# Patient Record
Sex: MALE | Race: WHITE | Employment: FULL TIME | URBAN - METROPOLITAN AREA
[De-identification: names, ages, dates, MRNs, and addresses within clinical notes are randomized per-mention and may not be internally consistent; named-entity substitution may affect disease eponyms.]

---

## 2019-11-25 ENCOUNTER — HOSPITAL ENCOUNTER (OUTPATIENT)
Age: 59
Setting detail: SPECIMEN
Discharge: HOME | End: 2019-11-25
Payer: OTHER GOVERNMENT

## 2019-11-25 LAB
ABSOLUTE EOS #: 0.43 K/UL (ref 0–0.44)
ABSOLUTE IMMATURE GRANULOCYTE: 0.03 K/UL (ref 0–0.3)
ABSOLUTE LYMPH #: 2.22 K/UL (ref 1.1–3.7)
ABSOLUTE MONO #: 0.51 K/UL (ref 0.1–1.2)
ANION GAP SERPL CALCULATED.3IONS-SCNC: 15 MMOL/L (ref 9–17)
BASOPHILS # BLD: 1 % (ref 0–2)
BASOPHILS ABSOLUTE: 0.07 K/UL (ref 0–0.2)
BUN BLDV-MCNC: 22 MG/DL (ref 8–23)
BUN/CREAT BLD: ABNORMAL (ref 9–20)
CALCIUM SERPL-MCNC: 10.3 MG/DL (ref 8.6–10.4)
CHLORIDE BLD-SCNC: 95 MMOL/L (ref 98–107)
CHOLESTEROL/HDL RATIO: 6.1
CHOLESTEROL: 220 MG/DL
CO2: 24 MMOL/L (ref 20–31)
CREAT SERPL-MCNC: 0.6 MG/DL (ref 0.7–1.2)
DIFFERENTIAL TYPE: ABNORMAL
EOSINOPHILS RELATIVE PERCENT: 5 % (ref 1–4)
GFR AFRICAN AMERICAN: >60 ML/MIN
GFR NON-AFRICAN AMERICAN: >60 ML/MIN
GFR SERPL CREATININE-BSD FRML MDRD: ABNORMAL ML/MIN/{1.73_M2}
GFR SERPL CREATININE-BSD FRML MDRD: ABNORMAL ML/MIN/{1.73_M2}
GLUCOSE BLD-MCNC: 418 MG/DL (ref 70–99)
HCT VFR BLD CALC: 45.7 % (ref 40.7–50.3)
HDLC SERPL-MCNC: 36 MG/DL
HEMOGLOBIN: 15.1 G/DL (ref 13–17)
IMMATURE GRANULOCYTES: 0 %
LDL CHOLESTEROL: 154 MG/DL (ref 0–130)
LYMPHOCYTES # BLD: 24 % (ref 24–43)
MCH RBC QN AUTO: 27.4 PG (ref 25.2–33.5)
MCHC RBC AUTO-ENTMCNC: 33 G/DL (ref 28.4–34.8)
MCV RBC AUTO: 82.8 FL (ref 82.6–102.9)
MONOCYTES # BLD: 6 % (ref 3–12)
NRBC AUTOMATED: 0 PER 100 WBC
PDW BLD-RTO: 13.2 % (ref 11.8–14.4)
PLATELET # BLD: 407 K/UL (ref 138–453)
PLATELET ESTIMATE: ABNORMAL
PMV BLD AUTO: 11.4 FL (ref 8.1–13.5)
POTASSIUM SERPL-SCNC: 4.7 MMOL/L (ref 3.7–5.3)
RBC # BLD: 5.52 M/UL (ref 4.21–5.77)
RBC # BLD: ABNORMAL 10*6/UL
SEG NEUTROPHILS: 64 % (ref 36–65)
SEGMENTED NEUTROPHILS ABSOLUTE COUNT: 5.91 K/UL (ref 1.5–8.1)
SODIUM BLD-SCNC: 134 MMOL/L (ref 135–144)
TRIGL SERPL-MCNC: 150 MG/DL
TSH SERPL DL<=0.05 MIU/L-ACNC: 1.26 MIU/L (ref 0.3–5)
VLDLC SERPL CALC-MCNC: ABNORMAL MG/DL (ref 1–30)
WBC # BLD: 9.2 K/UL (ref 3.5–11.3)
WBC # BLD: ABNORMAL 10*3/UL

## 2020-03-25 ENCOUNTER — HOSPITAL ENCOUNTER (OUTPATIENT)
Age: 60
Setting detail: SPECIMEN
Discharge: HOME | End: 2020-03-25
Payer: OTHER GOVERNMENT

## 2020-03-29 LAB
CULTURE: ABNORMAL
DIRECT EXAM: ABNORMAL
DIRECT EXAM: ABNORMAL
Lab: ABNORMAL
SPECIMEN DESCRIPTION: ABNORMAL

## 2020-10-14 ENCOUNTER — HOSPITAL ENCOUNTER (OUTPATIENT)
Age: 60
Setting detail: SPECIMEN
Discharge: HOME | End: 2020-10-14
Payer: OTHER GOVERNMENT

## 2020-10-14 LAB
ABSOLUTE EOS #: 0.2 K/UL (ref 0–0.44)
ABSOLUTE IMMATURE GRANULOCYTE: 0.04 K/UL (ref 0–0.3)
ABSOLUTE LYMPH #: 2.05 K/UL (ref 1.1–3.7)
ABSOLUTE MONO #: 0.69 K/UL (ref 0.1–1.2)
ANION GAP SERPL CALCULATED.3IONS-SCNC: 15 MMOL/L (ref 9–17)
BASOPHILS # BLD: 1 % (ref 0–2)
BASOPHILS ABSOLUTE: 0.08 K/UL (ref 0–0.2)
BUN BLDV-MCNC: 20 MG/DL (ref 8–23)
BUN/CREAT BLD: ABNORMAL (ref 9–20)
CALCIUM SERPL-MCNC: 10 MG/DL (ref 8.6–10.4)
CHLORIDE BLD-SCNC: 97 MMOL/L (ref 98–107)
CHOLESTEROL/HDL RATIO: 4.2
CHOLESTEROL: 155 MG/DL
CO2: 24 MMOL/L (ref 20–31)
CREAT SERPL-MCNC: 0.66 MG/DL (ref 0.7–1.2)
DIFFERENTIAL TYPE: ABNORMAL
EOSINOPHILS RELATIVE PERCENT: 2 % (ref 1–4)
GFR AFRICAN AMERICAN: >60 ML/MIN
GFR NON-AFRICAN AMERICAN: >60 ML/MIN
GFR SERPL CREATININE-BSD FRML MDRD: ABNORMAL ML/MIN/{1.73_M2}
GFR SERPL CREATININE-BSD FRML MDRD: ABNORMAL ML/MIN/{1.73_M2}
GLUCOSE BLD-MCNC: 223 MG/DL (ref 70–99)
HCT VFR BLD CALC: 47.7 % (ref 40.7–50.3)
HDLC SERPL-MCNC: 37 MG/DL
HEMOGLOBIN: 15.3 G/DL (ref 13–17)
IMMATURE GRANULOCYTES: 0 %
LDL CHOLESTEROL: 82 MG/DL (ref 0–130)
LYMPHOCYTES # BLD: 21 % (ref 24–43)
MCH RBC QN AUTO: 27.8 PG (ref 25.2–33.5)
MCHC RBC AUTO-ENTMCNC: 32.1 G/DL (ref 28.4–34.8)
MCV RBC AUTO: 86.7 FL (ref 82.6–102.9)
MONOCYTES # BLD: 7 % (ref 3–12)
NRBC AUTOMATED: 0 PER 100 WBC
PDW BLD-RTO: 13.5 % (ref 11.8–14.4)
PLATELET # BLD: 351 K/UL (ref 138–453)
PLATELET ESTIMATE: ABNORMAL
PMV BLD AUTO: 10.9 FL (ref 8.1–13.5)
POTASSIUM SERPL-SCNC: 4.3 MMOL/L (ref 3.7–5.3)
RBC # BLD: 5.5 M/UL (ref 4.21–5.77)
RBC # BLD: ABNORMAL 10*6/UL
SEG NEUTROPHILS: 69 % (ref 36–65)
SEGMENTED NEUTROPHILS ABSOLUTE COUNT: 6.77 K/UL (ref 1.5–8.1)
SODIUM BLD-SCNC: 136 MMOL/L (ref 135–144)
TRIGL SERPL-MCNC: 181 MG/DL
TSH SERPL DL<=0.05 MIU/L-ACNC: 2.11 MIU/L (ref 0.3–5)
VLDLC SERPL CALC-MCNC: ABNORMAL MG/DL (ref 1–30)
WBC # BLD: 9.8 K/UL (ref 3.5–11.3)
WBC # BLD: ABNORMAL 10*3/UL

## 2021-09-10 ENCOUNTER — HOSPITAL ENCOUNTER (OUTPATIENT)
Dept: CT IMAGING | Age: 61
Discharge: HOME | End: 2021-09-10

## 2021-09-10 DIAGNOSIS — Z00.6 EXAMINATION FOR NORMAL COMPARISON FOR CLINICAL RESEARCH: ICD-10-CM

## 2021-09-13 ENCOUNTER — HOSPITAL ENCOUNTER (OUTPATIENT)
Dept: CT IMAGING | Age: 61
Discharge: HOME | End: 2021-09-13

## 2021-09-13 DIAGNOSIS — R52 PAIN: ICD-10-CM

## 2021-09-14 ENCOUNTER — HOSPITAL ENCOUNTER (OUTPATIENT)
Dept: GENERAL RADIOLOGY | Age: 61
Discharge: HOME | End: 2021-09-14

## 2021-09-14 DIAGNOSIS — Z00.6 EXAMINATION FOR NORMAL COMPARISON FOR CLINICAL RESEARCH: ICD-10-CM

## 2021-09-21 PROBLEM — C32.1 PRIMARY SQUAMOUS CELL CARCINOMA OF SUPRAGLOTTIS (HCC): Status: ACTIVE | Noted: 2021-09-21

## 2021-09-28 NOTE — PROGRESS NOTES
Stress:     Feeling of Stress :    Social Connections:     Frequency of Communication with Friends and Family:     Frequency of Social Gatherings with Friends and Family:     Attends [employer]ism Services:     Active Member of Clubs or Organizations:     Attends Club or Organization Meetings:     Marital Status:    Intimate Partner Violence:     Fear of Current or Ex-Partner:     Emotionally Abused:     Physically Abused:     Sexually Abused:         Kaye Acuna  [phone]    Phone: [phone] [address]  [place] [address]     Employment:  Retired [employer], . Immunizations:  Immunization History   Administered Date(s) Administered    Influenza 01/29/2013    Influenza Virus Vaccine 09/23/2014        Health Screenings:    C-Scope: never  Prostate: none        Health Maintenance Due   Topic Date Due    Hepatitis C screen  Never done    Pneumococcal 0-64 years Vaccine (1 of 2 - PPSV23) Never done    COVID-19 Vaccine (1) Never done    HIV screen  Never done    Colon cancer screen colonoscopy  Never done    Shingles Vaccine (1 of 2) Never done    Diabetic retinal exam  01/01/2015    DTaP/Tdap/Td vaccine (1 - Tdap) 08/05/2015    Diabetic foot exam  09/23/2015    A1C test (Diabetic or Prediabetic)  09/23/2015    Diabetic microalbuminuria test  09/23/2015    Flu vaccine (1) 09/01/2021        Interests:   Not reviewed today. Fam HX:   Family History   Problem Relation Age of Onset    Diabetes Mother     Stroke Mother     Cancer Maternal Grandfather         liver    Cancer Father         Hospitalizations:   None recent. Allergies:   Allergies   Allergen Reactions    Lisinopril Swelling     Angioedema of the face/lips  Other reaction(s): cough  Pt unsure but thinks he is allergic to this          Adult Illness:  Patient Active Problem List   Diagnosis    Diabetes mellitus type 2 in nonobese ([address]    GERD (gastroesophageal reflux disease)    Hyperlipidemia    tablet by mouth 2 times daily (before meals). 60 tablet 5    insulin glargine (LANTUS SOLOSTAR) 100 UNIT/ML injection pen Inject 50 Units into the skin nightly. 1 Pen 0    Cimetidine (ACID REDUCER PO) Take 150 mg by mouth daily.  Insulin Pen Needle ([employer] PEN NEEDLES) 31G X 6 MM MISC by Does not apply route. 100 each 3    MULTIPLE VITAMIN PO Take 1 tablet by mouth daily.  Dulaglutide 0.75 MG/0.5ML SOPN Inject into the skin every 7 days (Patient not taking: Reported on 2021)      hydrochlorothiazide (HYDRODIURIL) 25 MG tablet Take 1 tablet by mouth daily (Patient not taking: Reported on 2021) 30 tablet 3     No current facility-administered medications for this visit. Denies taking Tylenol with codeine at this time. EXAM:    BP (!) 167/79 (Site: Left Upper Arm, Position: Sitting, Cuff Size: Medium Adult)   Pulse 81   Temp 97.9 °F (36.6 °C) (Oral)   Resp 18   Ht 5' 10.98\" (1.803 m)   Wt 187 lb 12.8 oz (85.2 kg)   SpO2 98%   BMI 26.21 kg/m²      ECO  General: Non-ill appearing. Very pleasant with very upbeat positive demeanor. Significant other present. HEENT: NC/AT,nonicteric, perrla,eom intact, no mucosal lesions. Upper and lower dentition in poor repair. Neck: normal thyroid, no masses. pulses nl, no bruits,   Nodes: No adenopathy  Lungs/chest: clear, no rales,rhonchi or wheezing, lung bases clear  CV: rrr, no rubs ,gallops or murmurs  Breasts: Not examined  Abd/Rectal: soft, non-tender,bowel sounds normal , no HSM,no masses  Back: normal curvature, No midline tenderness. flanks nontender  : Not Examined  Extremities: no cyanosis,clubbing or edema. Skin: unremarkable  Neuro: A and O x 4, CN exam nonfocal, Motor- no deficits, Sensory- no deficits, gait-nl, speech- fluent, no ataxia.   Devices: none      DATA:    LAB:     CBC with Differential:      Lab Results   Component Value Date    WBC 9.8 10/14/2020    RBC 5.50 10/14/2020    HGB 15.3 10/14/2020    HCT 47.7 10/14/2020     10/14/2020    MCV 86.7 10/14/2020    MCH 27.8 10/14/2020    MCHC 32.1 10/14/2020    RDW 13.5 10/14/2020    NRBC 0 05/28/2015    SEGSPCT 72.3 05/28/2015    LYMPHOPCT 21 10/14/2020    MONOPCT 7 10/14/2020    BASOPCT 1 10/14/2020    MONOSABS 0.69 10/14/2020    MONOSABS 0.4 05/28/2015    LYMPHSABS 2.05 10/14/2020    LYMPHSABS 1.7 05/28/2015    EOSABS 0.20 10/14/2020    EOSABS 0.2 05/28/2015    BASOSABS 0.08 10/14/2020    DIFFTYPE NOT REPORTED 10/14/2020     Lab Results   Component Value Date/Time    SEGSABS 5.9 05/28/2015 10:45 AM       CMP:    Lab Results   Component Value Date     10/14/2020    K 4.3 10/14/2020    CL 97 10/14/2020    CO2 24 10/14/2020    BUN 20 10/14/2020    CREATININE 0.66 10/14/2020    GFRAA >60 10/14/2020    LABGLOM >60 10/14/2020    LABGLOM >90 05/28/2015    GLUCOSE 223 10/14/2020    PROT 6.3 08/14/2013    LABALBU 3.7 08/14/2013    CALCIUM 10.0 10/14/2020    BILITOT 0.4 08/14/2013    ALKPHOS 89 08/14/2013    AST 18 08/14/2013    ALT 16 08/14/2013       BMP:    Lab Results   Component Value Date     10/14/2020    K 4.3 10/14/2020    CL 97 10/14/2020    CO2 24 10/14/2020    BUN 20 10/14/2020    LABALBU 3.7 08/14/2013    CREATININE 0.66 10/14/2020    CALCIUM 10.0 10/14/2020    GFRAA >60 10/14/2020    LABGLOM >60 10/14/2020    LABGLOM >90 05/28/2015    GLUCOSE 223 10/14/2020       Magnesium:  No results found for: MG  PT/INR:  No results found for: PROTIME, INR  TSH:    Lab Results   Component Value Date    TSH 2.11 10/14/2020     VITAMIN B12: No components found for: B12  FOLATE:  No results found for: FOLATE  IRON:  No results found for: IRON  Iron Saturation:  No components found for: PERCENTFE  TIBC:  No results found for: TIBC  FERRITIN:  No results found for: FERRITIN  PSA: No results found for: PSA     9/29/2021  Labs ordered today include CBC, CMP and PSA    IMAGING:  [facility]   7/29/2021  Chest CT-no mediastinal or hilar adenopathy.   Right posterior lateral tracheal adherent mucus versus nodule 6 mm.    7/29/2021  CT soft tissues neck: 2.8 cm right transglottic laryngeal neoplasm. No cervical adenopathy. PROCEDURES:  Laryngoscopy with excision of tumor/vocal cord stripping per Dr. Daniela Mustafa 8/30/2021    PATHOLOGY:   A. 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 Right supraglottis biopsy: Keratinizing squamous cell carcinoma, invasive    GENETICS:  Not applicable    MOLECULAR:  Not applicable    ASSESSMENT/PLAN:    1: Diagnosis: 33-year-old gentleman with dysphonia since February 2021 found to have a firm lesion in the right larynx as described above. 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 Imaging thus far includes soft tissues of the neck and chest CT late July at [facility].  We will attempt to schedule PET scan. No adenopathy on exam.    2) Prognosis / Disease Status: Very good/pretreatment T3 N0 M0 supraglottic cancer    3) Work-up:    Labs: CBC, CMP, magnesium and PSA   Imaging: Attempt to get a PET/CT   Procedures: Done                                   Request PICC line in late October for chemotherapy. Consults: Nurse PICC line team                               Chemotherapy teaching. Radiation oncology already consulted. Other: Pre-existing diabetic neuropathy. 4) Symptom Management: None needed at this time. 5) Supportive care provided. Level of care is appropriate. Teaching done today. Emphasized that the standard of care is cis-platinum with concurrent radiation since he wishes not to have a total laryngectomy. I can give the platinum low-dose weekly to try to minimize toxicity but this will still very likely make his underlying neuropathy worse.   Other options was radiation alone which I think would compromise his cure rate for his worrisome supraglottic

## 2021-09-29 ENCOUNTER — HOSPITAL ENCOUNTER (OUTPATIENT)
Dept: RADIATION ONCOLOGY | Age: 61
Discharge: HOME | End: 2021-09-29
Payer: OTHER GOVERNMENT

## 2021-09-29 ENCOUNTER — CLINICAL DOCUMENTATION (OUTPATIENT)
Dept: CASE MANAGEMENT | Age: 61
End: 2021-09-29

## 2021-09-29 ENCOUNTER — HOSPITAL ENCOUNTER (OUTPATIENT)
Dept: INFUSION THERAPY | Age: 61
Discharge: HOME | End: 2021-09-29
Payer: OTHER GOVERNMENT

## 2021-09-29 ENCOUNTER — OFFICE VISIT (OUTPATIENT)
Dept: ONCOLOGY | Age: 61
End: 2021-09-29
Payer: OTHER GOVERNMENT

## 2021-09-29 VITALS
HEART RATE: 81 BPM | SYSTOLIC BLOOD PRESSURE: 167 MMHG | RESPIRATION RATE: 18 BRPM | HEIGHT: 71 IN | TEMPERATURE: 97.9 F | DIASTOLIC BLOOD PRESSURE: 79 MMHG | BODY MASS INDEX: 26.29 KG/M2 | WEIGHT: 187.8 LBS | OXYGEN SATURATION: 98 %

## 2021-09-29 VITALS
HEART RATE: 82 BPM | HEIGHT: 71 IN | WEIGHT: 189.6 LBS | OXYGEN SATURATION: 95 % | DIASTOLIC BLOOD PRESSURE: 89 MMHG | RESPIRATION RATE: 18 BRPM | SYSTOLIC BLOOD PRESSURE: 191 MMHG | TEMPERATURE: 97.7 F | BODY MASS INDEX: 26.54 KG/M2

## 2021-09-29 DIAGNOSIS — C32.1 PRIMARY SQUAMOUS CELL CARCINOMA OF SUPRAGLOTTIS (HCC): Primary | ICD-10-CM

## 2021-09-29 DIAGNOSIS — C32.1 PRIMARY SQUAMOUS CELL CARCINOMA OF SUPRAGLOTTIS (HCC): ICD-10-CM

## 2021-09-29 LAB
ABSOLUTE IMMATURE GRANULOCYTE: 0.01 THOU/MM3 (ref 0–0.07)
ALBUMIN SERPL-MCNC: 4.1 G/DL (ref 3.5–5.1)
ALP BLD-CCNC: 104 U/L (ref 38–126)
ALT SERPL-CCNC: 9 U/L (ref 11–66)
ANION GAP SERPL CALCULATED.3IONS-SCNC: 11 MEQ/L (ref 8–16)
AST SERPL-CCNC: 11 U/L (ref 5–40)
BASINOPHIL, AUTOMATED: 1 % (ref 0–3)
BASOPHILS ABSOLUTE: 0.1 THOU/MM3 (ref 0–0.1)
BILIRUB SERPL-MCNC: 0.5 MG/DL (ref 0.3–1.2)
BUN BLDV-MCNC: 10 MG/DL (ref 7–22)
CALCIUM SERPL-MCNC: 9.4 MG/DL (ref 8.5–10.5)
CHLORIDE BLD-SCNC: 101 MEQ/L (ref 98–111)
CO2: 25 MEQ/L (ref 23–33)
CREAT SERPL-MCNC: 0.6 MG/DL (ref 0.4–1.2)
EOSINOPHILS ABSOLUTE: 0.1 THOU/MM3 (ref 0–0.4)
EOSINOPHILS RELATIVE PERCENT: 1 % (ref 0–4)
GFR SERPL CREATININE-BSD FRML MDRD: > 90 ML/MIN/1.73M2
GLUCOSE BLD-MCNC: 163 MG/DL (ref 70–108)
HCT VFR BLD CALC: 42.7 % (ref 42–52)
HEMOGLOBIN: 14 GM/DL (ref 14–18)
IMMATURE GRANULOCYTES: 0 %
LYMPHOCYTES # BLD: 29 % (ref 15–47)
LYMPHOCYTES ABSOLUTE: 2.1 THOU/MM3 (ref 1–4.8)
MAGNESIUM: 1.9 MG/DL (ref 1.6–2.4)
MCH RBC QN AUTO: 27 PG (ref 26–33)
MCHC RBC AUTO-ENTMCNC: 32.8 GM/DL (ref 32.2–35.5)
MCV RBC AUTO: 82 FL (ref 80–94)
MONOCYTES ABSOLUTE: 0.3 THOU/MM3 (ref 0.4–1.3)
MONOCYTES: 5 % (ref 0–12)
PDW BLD-RTO: 13 % (ref 11.5–14.5)
PLATELET # BLD: 307 THOU/MM3 (ref 130–400)
PMV BLD AUTO: 9.4 FL (ref 9.4–12.4)
POTASSIUM SERPL-SCNC: 4.1 MEQ/L (ref 3.5–5.2)
PROSTATE SPECIFIC ANTIGEN: 1.06 NG/ML (ref 0–1)
RBC # BLD: 5.19 MILL/MM3 (ref 4.7–6.1)
SEG NEUTROPHILS: 64 % (ref 43–75)
SEGMENTED NEUTROPHILS ABSOLUTE COUNT: 4.7 THOU/MM3 (ref 1.8–7.7)
SODIUM BLD-SCNC: 137 MEQ/L (ref 135–145)
TOTAL PROTEIN: 7.4 G/DL (ref 6.1–8)
WBC # BLD: 7.4 THOU/MM3 (ref 4.8–10.8)

## 2021-09-29 PROCEDURE — 83735 ASSAY OF MAGNESIUM: CPT

## 2021-09-29 PROCEDURE — 99211 OFF/OP EST MAY X REQ PHY/QHP: CPT

## 2021-09-29 PROCEDURE — 36415 COLL VENOUS BLD VENIPUNCTURE: CPT

## 2021-09-29 PROCEDURE — 84153 ASSAY OF PSA TOTAL: CPT

## 2021-09-29 PROCEDURE — 80053 COMPREHEN METABOLIC PANEL: CPT

## 2021-09-29 PROCEDURE — 99202 OFFICE O/P NEW SF 15 MIN: CPT | Performed by: RADIOLOGY

## 2021-09-29 PROCEDURE — 99205 OFFICE O/P NEW HI 60 MIN: CPT | Performed by: RADIOLOGY

## 2021-09-29 PROCEDURE — 85025 COMPLETE CBC W/AUTO DIFF WBC: CPT

## 2021-09-29 PROCEDURE — 99204 OFFICE O/P NEW MOD 45 MIN: CPT | Performed by: INTERNAL MEDICINE

## 2021-09-29 RX ORDER — PROCHLORPERAZINE MALEATE 10 MG
10 TABLET ORAL EVERY 6 HOURS PRN
Qty: 30 TABLET | Refills: 3 | Status: SHIPPED | OUTPATIENT
Start: 2021-09-29 | End: 2021-11-24 | Stop reason: ALTCHOICE

## 2021-09-29 RX ORDER — GABAPENTIN 300 MG/1
300 CAPSULE ORAL 3 TIMES DAILY
COMMUNITY
Start: 2021-07-30

## 2021-09-29 RX ORDER — SODIUM FLUORIDE 5 MG/G
GEL, DENTIFRICE DENTAL
COMMUNITY
Start: 2021-09-22 | End: 2022-09-22

## 2021-09-29 RX ORDER — ONDANSETRON 8 MG/1
8 TABLET, ORALLY DISINTEGRATING ORAL EVERY 8 HOURS PRN
Qty: 10 TABLET | Refills: 2 | Status: SHIPPED | OUTPATIENT
Start: 2021-09-29 | End: 2021-11-24 | Stop reason: ALTCHOICE

## 2021-09-29 NOTE — PROGRESS NOTES
Perla Jones  9/29/2021    Chaperone: No    Advance Directives     Power of  Living Will ACP-Advance Directive ACP-Power of     Not on File Not on File Not on File Not on File        No living will or POA. Interested in having set up. 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 No pertinent surgical history. Allergies   Allergen Reactions    Lisinopril Swelling     Angioedema of the face/lips          Current Outpatient Medications:     Dulaglutide (TRULICITY SC), Inject into the skin every 7 days, Disp: , Rfl:     Insulin Aspart (NOVOLOG FLEXPEN SC), Inject into the skin, Disp: , Rfl:     glimepiride (AMARYL) 4 MG tablet, TAKE ONE TABLET BY MOUTH TWICE DAILY, Disp: 30 tablet, Rfl: 0    LANTUS SOLOSTAR 100 UNIT/ML injection pen, INJECT 52 UNITS INTO THE SKIN NIGHTLY., Disp: 3 Pen, Rfl: 0    hydrochlorothiazide (HYDRODIURIL) 25 MG tablet, Take 1 tablet by mouth daily, Disp: 30 tablet, Rfl: 3    acetaminophen-codeine (TYLENOL/CODEINE #3) 300-30 MG per tablet, Take 1 tablet by mouth every 4 hours as needed for Pain, Disp: 20 tablet, Rfl: 0    ibuprofen (ADVIL;MOTRIN) 600 MG tablet, Take 1 tablet by mouth every 6 hours as needed for Pain for up to 30 doses. , Disp: 30 tablet, Rfl: 0    meloxicam (MOBIC) 7.5 MG tablet, TAKE ONE TABLET BY MOUTH EVERY DAY, Disp: 30 tablet, Rfl: 3    diphenhydrAMINE (BENADRYL) 25 MG tablet, Take 25 mg by mouth every 6 hours as needed for Itching., Disp: , Rfl:     metFORMIN (GLUCOPHAGE) 1000 MG tablet, Take 1 tablets twice a day, Disp: 60 tablet, Rfl: 3    hydrochlorothiazide (HYDRODIURIL) 25 MG tablet, Take 1 tablet by mouth daily. , Disp: 30 tablet, Rfl: 3    aspirin 81 MG tablet, Take 1 tablet by mouth daily. , Disp: 30 tablet, Rfl: 11    atorvastatin (LIPITOR) 20 MG tablet, Take 1 tablet by mouth daily. , Disp: 30 tablet, Rfl: 6    omeprazole (PRILOSEC) 20 MG capsule, Take 1 capsule by mouth daily. , Disp: 30 capsule, Rfl: 6    gemfibrozil (LOPID) 600 MG tablet, Take 1 tablet by mouth 2 times daily (before meals). , Disp: 60 tablet, Rfl: 5    insulin glargine (LANTUS SOLOSTAR) 100 UNIT/ML injection pen, Inject 50 Units into the skin nightly., Disp: 1 Pen, Rfl: 0    Cimetidine (ACID REDUCER PO), Take 150 mg by mouth daily. , Disp: , Rfl:     Insulin Pen Needle ([employer] PEN NEEDLES) 31G X 6 MM MISC, by Does not apply route., Disp: 100 each, Rfl: 3    MULTIPLE VITAMIN PO, Take 1 tablet by mouth daily. , Disp: , Rfl:       ADDITIONAL COMMENTS: Seen in Consultation with Dr. Radha Puckett today.        Myra Claude, RN BSN

## 2021-09-29 NOTE — PROGRESS NOTES
[address] [facility] [address], [address]        [facility], [address] [facility]        Amparo Wei: [phone]        F: [phone]       [email or web address]            INITIAL CONSULTATION    Date of Service: 2021  Patient ID: Chelle Finney   : 1960  MRN: [id number]   Acct Number: [de-identified]         Requesting Provider:  Dr. General Waldron  Reason for request: Evaluation for the potential role of definitive radiation therapy. CONSULTANT: Veto Diaz MD    CHIEF COMPLAINT: Evaluation for the potential role of definitive radiation therapy. ASSESSMENT:  Cancer Staging  Primary squamous cell carcinoma of supraglottis ([facility]  Staging form: Larynx - Supraglottis, AJCC 8th Edition  - Clinical stage from 2021: Stage III (cT3, cN0, cM0) - Signed by Morsi Palomo MD on 2021      PLAN:  With regards to radiation to the head/neck area, I discussed the possible short-term side effects of skin reaction (causing redness, dryness, or peeling), tiredness, low blood counts (causing infection or bleeding), sore throat, change in taste, hair loss in treated area and dryness of the mouth. Possible long-term side effects discussed included hyperpigmentation of the skin, increased firmness of the tissues of the neck, permanent dryness of the mouth, permanent change in taste, damage to the nerves (causing numbness, weakness, or paralysis), damage to the brain, damage to the bone (causing necrosis), decreased thyroid function, and scarring of the lung (causing shortness of breath/cough. Mr. Jeniffer Dan presents today, doing well overall noting only mild complaints related to his recently diagnosed head neck cancer. Patient states he tolerates p.o. diet well with no significant limitations. Patient states his appetite is good overall.   Patient notes voice character changes beginning approximately 6 months prior to initial presentation, these changes persist.  Patient notes that his weight is stable overall. Patient has no other specific general complaints with regards to his recently diagnosed cancer. Agree with recommendation to move forward with definitive chemoradiation therapy. Patient will see medical oncology, Dr. Vicente Guzman, for further discussion of concurrent systemic treatment. Plan of care discussed in detail with the patient and he is agreeable to move forward at this time. Patient requires dental evaluation prior to consideration of radiation therapy start. Patient will be evaluated by nutrition services during the course of radiation therapy. Patient initially seen and worked up at [facility] prior speech eval.    We discussed the risks, benefits, and rationale for proceeding with radiation therapy and all of their questions and concerns were answered and addressed to their satisfaction. Consent was signed at today's visit with CT simulation for treatment planning to be performed in the next 1-2 weeks. They have our clinic number to call with any questions or concerns if they were to have any prior to next visit. Thank you for allowing my assistance in the care of your patient. HISTORY OF PRESENT ILLNESS:  Oncology History Overview Note   Jeremiah Liang is a 59 y.o. male    Initially presented with a history significant for dysphonia for a period of approximately 8 months. He present to Dr. Esmer Feng ([facility]) on 08/25/21, with further examination demonstrating a Right sided supraglottic laryngeal mass, noting an immobile right vocal fold (left vocal fold mobile) with mild interarytenoid pachydermia and post cricoid edema. CT neck was performed prior to this visit on 07/29/2021 which demonstrated a Right sided laryngeal mass, 2.8 cm, that extended to midline.  He was taken for a DML on 08/30/21 with biopsy, intraoperative findings demonstrated a firm lesion of the right larynx, with submucosal fullness fo the right aryepiglottic fold posteriorly, extending down to the ipsilateral false cord, ventricle, and right true vocal cord, the lesion was noted to be necrotic in the deep aspect, supraglottic component mildly debulked, no apparent involvement of the right pyriform sinus. Pathology returned as invasive squamous cell carcinoma (SCC). 21 CT Abd/Pelv W Con    Impression:  No acute findings in the abdomen or pelvis        21 CT ST Neck W Con                 Primary squamous cell carcinoma of supraglottis ([address]   2021 Initial Diagnosis    Primary squamous cell carcinoma of supraglottis [facility])         Mr. Charles Samaniego is a 59 y.o. male with above mentioned oncologic history presenting today for initial consultation regarding the potential role for radiation therapy. Mr. Juli Moy presents today doing well overall. He does have complaints of persistent laryngitis which started approximately 6 months prior to presentation. He continues to have changes in his voice character however he can speak clearly overall with no significant difficulty communicating. Patient states that he has good oral intake, denies any significant recent weight loss, states that he has a good appetite, and tolerates normal diet well. He has a history of GERD, as well as moderate-severe diabetes from which he has chronic neuropathy in bilateral feet. He notes intermittent headache and fatigue. He notes mild shortness of breath with exertion. He notes good  and GI function however states that he does note intermittent frequent urination. He is otherwise well and has no other specific general complaints on review of systems at this time. REVIEW OF SYSTEMS: AS per HPI above. PHYSICAL EXAMINATION:   VITAL SIGNS: There were no vitals taken for this visit. ECO - Symptomatic but completely ambulatory (Restricted in physically strenuous activity but ambulatory and able to carry out work of a light or sedentary nature.  For example, light housework, office work)    PAIN: 0/10    General: NAD, AO x 3, Mentation is clear with appropriate affect. HEENT: Normocephalic, atraumatic  Thorax:  Unlabored  Thorax:  No obvious oral cavity masses lesions or abnormalities, no palpable cervical lymphadenopathy noted in the neck bilaterally. COR: Regular rate and rhythm  Abdomen:  Soft, NT/ND, no rebound  Neuro:  Cranial nerves grossly intact; no focal deficits    Past Medical History:   Diagnosis Date    Arthritis     GERD (gastroesophageal reflux disease)     Hypertension     Keratinizing squamous cell carcinoma of larynx ([facility] [address] [address] 08/30/2021    Type II or unspecified type diabetes mellitus without mention of complication, not stated as uncontrolled         No past surgical history on file. Family History   Problem Relation Age of Onset    Diabetes Mother     Stroke Mother     Cancer Maternal Grandfather         liver    Cancer Father        Social History     Socioeconomic History    Marital status:      Spouse name: Rick Aguilar Number of children: 2    Years of education: 15    Highest education level: Not on file   Occupational History    Occupation: [employer]     Employer: [employer]   Tobacco Use    Smoking status: Never Smoker    Smokeless tobacco: Never Used    Tobacco comment: Recently started cigars but quit   Substance and Sexual Activity    Alcohol use: Yes     Alcohol/week: 1.0 standard drinks     Types: 1 Shots of liquor per week     Comment: occasionaly    Drug use: No    Sexual activity: Not Currently   Other Topics Concern    Not on file   Social History Narrative    Not on file     Social Determinants of Health     Financial Resource Strain:     Difficulty of Paying Living Expenses:    Food Insecurity:     Worried About Running Out of Food in the Last Year:     [address] in the Last Year:    Transportation Needs:     Lack of Transportation (Medical):      Lack of Transportation (Non-Medical): Physical Activity:     Days of Exercise per Week:     Minutes of Exercise per Session:    Stress:     Feeling of Stress :    Social Connections:     Frequency of Communication with Friends and Family:     Frequency of Social Gatherings with Friends and Family:     Attends Nondenominational Services:     Active Member of Clubs or Organizations:     Attends Club or Organization Meetings:     Marital Status:    Intimate Partner Violence:     Fear of Current or Ex-Partner:     Emotionally Abused:     Physically Abused:     Sexually Abused: Allergies   Allergen Reactions    Lisinopril Swelling     Angioedema of the face/lips  Other reaction(s): cough  Pt unsure but thinks he is allergic to this          Current Outpatient Medications   Medication Sig Dispense Refill    Dulaglutide (TRULICITY SC) Inject into the skin every 7 days      Insulin Aspart (NOVOLOG FLEXPEN SC) Inject into the skin      Dulaglutide 0.75 MG/0.5ML SOPN Inject into the skin every 7 days (Patient not taking: Reported on 9/29/2021)      vitamin D (CHOLECALCIFEROL) 125 MCG (5000 UT) CAPS capsule Take 125 mcg by mouth daily      gabapentin (NEURONTIN) 300 MG capsule       SODIUM FLUORIDE, DENTAL GEL, 1.1 % GEL Apply 3-4 drops of gel directly to teeth. Do  Not eat or drink for 30min. Once daily at bedtime.  ondansetron (ZOFRAN-ODT) 8 MG TBDP disintegrating tablet Place 1 tablet under the tongue every 8 hours as needed for Nausea or Vomiting 10 tablet 2    prochlorperazine (COMPAZINE) 10 MG tablet Take 1 tablet by mouth every 6 hours as needed (nausea) 30 tablet 3    glimepiride (AMARYL) 4 MG tablet TAKE ONE TABLET BY MOUTH TWICE DAILY 30 tablet 0    LANTUS SOLOSTAR 100 UNIT/ML injection pen INJECT 52 UNITS INTO THE SKIN NIGHTLY.  3 Pen 0    hydrochlorothiazide (HYDRODIURIL) 25 MG tablet Take 1 tablet by mouth daily (Patient not taking: Reported on 9/29/2021) 30 tablet 3    acetaminophen-codeine (TYLENOL/CODEINE #3) 300-30 MG per tablet Take 1 tablet by mouth every 4 hours as needed for Pain 20 tablet 0    ibuprofen (ADVIL;MOTRIN) 600 MG tablet Take 1 tablet by mouth every 6 hours as needed for Pain for up to 30 doses. 30 tablet 0    meloxicam (MOBIC) 7.5 MG tablet TAKE ONE TABLET BY MOUTH EVERY DAY 30 tablet 3    diphenhydrAMINE (BENADRYL) 25 MG tablet Take 25 mg by mouth every 6 hours as needed for Itching.  metFORMIN (GLUCOPHAGE) 1000 MG tablet Take 1 tablets twice a day 60 tablet 3    hydrochlorothiazide (HYDRODIURIL) 25 MG tablet Take 1 tablet by mouth daily. 30 tablet 3    aspirin 81 MG tablet Take 1 tablet by mouth daily. 30 tablet 11    atorvastatin (LIPITOR) 20 MG tablet Take 1 tablet by mouth daily. 30 tablet 6    omeprazole (PRILOSEC) 20 MG capsule Take 1 capsule by mouth daily. 30 capsule 6    gemfibrozil (LOPID) 600 MG tablet Take 1 tablet by mouth 2 times daily (before meals). 60 tablet 5    insulin glargine (LANTUS SOLOSTAR) 100 UNIT/ML injection pen Inject 50 Units into the skin nightly. 1 Pen 0    Cimetidine (ACID REDUCER PO) Take 150 mg by mouth daily.  Insulin Pen Needle ([employer] PEN NEEDLES) 31G X 6 MM MISC by Does not apply route. 100 each 3    MULTIPLE VITAMIN PO Take 1 tablet by mouth daily. No current facility-administered medications for this encounter. No outpatient medications have been marked as taking for the 9/29/21 encounter [facility] Encounter) with Karina Virk MD.       LABORATORY STUDIES:   Onc labs:   Lab Results   Component Value Date    PSA 1.06 09/29/2021       PATHOLOGY: As per HPI above. RADIOLOGIC STUDIES: As per HPI above. Attending Supervising Physicians Attestation Statement  I saw and evaluated the patient independently.  I discussed the findings and plans with resident physician (Dr. Yan Glover) and agree as documented in his note after reviewing the patient chart carefully and prolonged discussion regarding the resident assessment and plan including adjustments that would benefit our patient. Electronically signed by Steve Jha MD on 9/29/21 at 10:10 AM EDT     ATTESTATION: 30 minutes were spent with the patient at today's visit reviewing pertinent information related to their oncologic diagnosis, including any recent labs, imaging, follow ups and plan of care going forward.     CC:Dr. Juanita Sylvester ([facility]) Dr. Tomasz Zavala   ACC:[facility]

## 2021-09-29 NOTE — PATIENT INSTRUCTIONS
Schedule PET/CT  Labs today  Plan concurrent chemotherapy with radiation which will include low-dose cisplatin weekly x6. Tentative start date November 1 (patient needs dental extraction done first at [facility])  Chemotherapy teaching. Patient will have a PICC line placed several days before starting chemotherapy.

## 2021-09-29 NOTE — PROGRESS NOTES
Name: Harpreet Carrillo  : 1960  MRN: [id number]    Oncology Navigation- Initial Note:    Intake-  Contact Type: Radiation Oncology    Diagnosis: Head/Neck- malignant    Home Disposition: Lives with other who is able to assist    Patient needs and barriers to care: Knowledge deficit, Financial Concerns/ Disability and Symptom Management     Referral Source: Outpatient    Receptive to Advanced Care Planning/ Palliative Care:  Deferred    Interventions-   General Interventions: Navigation Welcome Packet given and program explained. Referrals: Financial Navigation, Dietician, Speech         Continuum of Care: Diagnosis/Active Treatment    Notes:   First, met with Carina Tristan and wife, Desiree in Radiation Oncology prior to consultation with Dr. Arvind Reyes. Initial Staging and work up at [facility]. Prefers to have treatment here-combination Chemo/radiation. Has been seen by dental at [facility]-calling for another appointment to address teeth extraction per . Carina Tristan is a [employer] that [employer]-does have there insurance-is concerned regarding finances-just started as a  for [employer]. Only symptom-hoarse voice. Smoked cigars for one year, social drinker. Met with Thierno Yanes during consultation with Dr. Bryant Summers in Medical Oncology. Dr. Bryant Summers explained role of chemotherapy. Cisplatin for two cycles usually done, but due to Catracho's diabetes and neuropathy, will plan on weekly Cisplatin-lower dose. Will monitor neuropathies closely. Plan  PICC insertion closer to time to start chemotherapy. Labs drawn today. Chemo teaching 10/11  PET 10/14  Return for follow up with Dr. Bryant Summers and plan to start chemotherapy that day. Contact information given and they know to call with any questions or concerns. Dr. Bryant Summers escribed zofran and compazine for potential nausea/vomiting to his pharmacy to have once Chemotherapy begins.   Left voice mail regarding these scripts and what they were for as above.      Electronically signed by Noah Lauren RN on 9/29/2021 at 5:32 PM

## 2021-09-30 RX ORDER — DIPHENHYDRAMINE HYDROCHLORIDE 50 MG/ML
50 INJECTION INTRAMUSCULAR; INTRAVENOUS ONCE
OUTPATIENT
Start: 2021-11-02 | End: 2021-11-01

## 2021-09-30 RX ORDER — SODIUM CHLORIDE 0.9 % (FLUSH) 0.9 %
5-40 SYRINGE (ML) INJECTION PRN
OUTPATIENT
Start: 2021-11-02

## 2021-09-30 RX ORDER — METHYLPREDNISOLONE SODIUM SUCCINATE 125 MG/2ML
125 INJECTION, POWDER, LYOPHILIZED, FOR SOLUTION INTRAMUSCULAR; INTRAVENOUS ONCE
OUTPATIENT
Start: 2021-11-02 | End: 2021-11-01

## 2021-09-30 RX ORDER — EPINEPHRINE 1 MG/ML
0.3 INJECTION, SOLUTION, CONCENTRATE INTRAVENOUS PRN
OUTPATIENT
Start: 2021-11-02

## 2021-09-30 RX ORDER — HEPARIN SODIUM (PORCINE) LOCK FLUSH IV SOLN 100 UNIT/ML 100 UNIT/ML
500 SOLUTION INTRAVENOUS PRN
OUTPATIENT
Start: 2021-11-02

## 2021-09-30 RX ORDER — SODIUM CHLORIDE 9 MG/ML
INJECTION, SOLUTION INTRAVENOUS CONTINUOUS
OUTPATIENT
Start: 2021-11-02

## 2021-09-30 RX ORDER — SODIUM CHLORIDE 9 MG/ML
20 INJECTION, SOLUTION INTRAVENOUS ONCE
OUTPATIENT
Start: 2021-11-02 | End: 2021-11-01

## 2021-09-30 RX ORDER — SODIUM CHLORIDE 9 MG/ML
25 INJECTION, SOLUTION INTRAVENOUS PRN
OUTPATIENT
Start: 2021-11-02

## 2021-09-30 RX ORDER — PALONOSETRON 0.05 MG/ML
0.25 INJECTION, SOLUTION INTRAVENOUS ONCE
OUTPATIENT
Start: 2021-11-02

## 2021-10-05 RX ORDER — SODIUM CHLORIDE 0.9 % (FLUSH) 0.9 %
5-40 SYRINGE (ML) INJECTION PRN
OUTPATIENT
Start: 2021-10-05

## 2021-10-05 RX ORDER — SODIUM CHLORIDE 9 MG/ML
25 INJECTION, SOLUTION INTRAVENOUS PRN
OUTPATIENT
Start: 2021-10-05

## 2021-10-05 RX ORDER — HEPARIN SODIUM (PORCINE) LOCK FLUSH IV SOLN 100 UNIT/ML 100 UNIT/ML
500 SOLUTION INTRAVENOUS PRN
OUTPATIENT
Start: 2021-10-05

## 2021-10-08 ENCOUNTER — HOSPITAL ENCOUNTER (OUTPATIENT)
Dept: INFUSION THERAPY | Age: 61
Discharge: HOME | End: 2021-10-08
Payer: OTHER GOVERNMENT

## 2021-10-08 PROCEDURE — 99212 OFFICE O/P EST SF 10 MIN: CPT

## 2021-10-08 RX ORDER — AMLODIPINE BESYLATE 5 MG/1
5 TABLET ORAL DAILY
Status: ON HOLD | COMMUNITY
Start: 2021-09-30 | End: 2022-03-26 | Stop reason: HOSPADM

## 2021-10-08 NOTE — PROGRESS NOTES
Patient taught on Cisplatin with Pre Medications and IV hydration concurrent with radiation. New patient folder with the following - chemotherapy drug information sheets(Lexicomp and Chemocare), pre medication information sheets, chemotherapy side effects handouts, Understanding your blood counts, [place] diet, Importance to hydration,when to call physician sheet, reduce risk infection sheet,bleeding precaution,neutropenia precaution, All questions answered satisfactory and support given. Patient financial navigator explained to patient and informed that she may be calling him if assistance needing. Patient given a tour of facility. Approximately 75 minutes spent teaching patient. Spoke to Abby Fernandez regarding need for PICC insertion the last week of October, informed patient our office will be reaching out for PICC scheduling date. Discharged in satisfactory condition per self.

## 2021-10-08 NOTE — PLAN OF CARE
Problem: Intellectual/Education/Knowledge Deficit  Goal: Teaching initiated upon admission  Outcome: Met This Shift  Intervention: Verbal/written education provided  Note: Initial chemotherapy teaching completed, patient verbalizes understanding of potential chemotherapy side effects and when to call doctor if needed     Problem: Discharge Planning  Goal: Knowledge of discharge instructions  Description: Knowledge of discharge instructions  Outcome: Met This Shift  Intervention: Interaction with patient/family and care team  Note: Patient verbalizes understanding of discharge instructions, follow up appointment, and when to call physician if needed      Care plan reviewed with patient. Patient verbalizes understanding of the plan of care and contributes to goal setting.

## 2021-10-11 DIAGNOSIS — C32.1 PRIMARY SQUAMOUS CELL CARCINOMA OF SUPRAGLOTTIS (HCC): Primary | ICD-10-CM

## 2021-10-11 PROCEDURE — 99212 OFFICE O/P EST SF 10 MIN: CPT | Performed by: NURSE PRACTITIONER

## 2021-10-11 PROCEDURE — 99215 OFFICE O/P EST HI 40 MIN: CPT | Performed by: NURSE PRACTITIONER

## 2021-10-15 ENCOUNTER — TELEPHONE (OUTPATIENT)
Dept: ONCOLOGY | Age: 61
End: 2021-10-15

## 2021-10-18 NOTE — TELEPHONE ENCOUNTER
Dental extraction has been completed and is healing nicely. Understandably concerned about his diabetic neuropathy getting worse with the weekly cis-platinum.  -Upfront dose reduction from the standard 40 mg meter squared to 20 mg/m². Monitor weekly and discontinue if neuropathy is worsened. Low-dose carbotaxol category 2B recommendation but would also be neuropathic. Consider carbo AUC of 1 and Taxol very low dose. Carbo infusional 5-FU also a consideration. Plan low-dose cis-platinum and stop if needed. I reviewed this with the patient carefully today and he understands and agrees.

## 2021-10-19 ENCOUNTER — APPOINTMENT (OUTPATIENT)
Dept: RADIATION ONCOLOGY | Age: 61
End: 2021-10-19
Attending: RADIOLOGY
Payer: OTHER GOVERNMENT

## 2021-10-21 ENCOUNTER — HOSPITAL ENCOUNTER (OUTPATIENT)
Dept: RADIATION ONCOLOGY | Age: 61
Discharge: HOME | End: 2021-10-21
Attending: RADIOLOGY
Payer: OTHER GOVERNMENT

## 2021-10-21 ENCOUNTER — HOSPITAL ENCOUNTER (OUTPATIENT)
Dept: CT IMAGING | Age: 61
Discharge: HOME | End: 2021-10-21
Payer: OTHER GOVERNMENT

## 2021-10-21 DIAGNOSIS — C32.1 MALIGNANT NEOPLASM OF SUPRAGLOTTIS (HCC): ICD-10-CM

## 2021-10-21 PROCEDURE — 77334 RADIATION TREATMENT AID(S): CPT | Performed by: RADIOLOGY

## 2021-10-21 PROCEDURE — 77290 THER RAD SIMULAJ FIELD CPLX: CPT | Performed by: RADIOLOGY

## 2021-10-21 PROCEDURE — 3209999900 CT GUIDE RADIATION THERAPY NO CHARGE

## 2021-10-21 PROCEDURE — 77263 THER RADIOLOGY TX PLNG CPLX: CPT | Performed by: RADIOLOGY

## 2021-10-21 PROCEDURE — 77470 SPECIAL RADIATION TREATMENT: CPT | Performed by: RADIOLOGY

## 2021-10-21 PROCEDURE — 77332 RADIATION TREATMENT AID(S): CPT | Performed by: RADIOLOGY

## 2021-10-27 ENCOUNTER — HOSPITAL ENCOUNTER (OUTPATIENT)
Dept: PET IMAGING | Age: 61
Discharge: HOME | End: 2021-10-27
Payer: OTHER GOVERNMENT

## 2021-10-27 ENCOUNTER — HOSPITAL ENCOUNTER (OUTPATIENT)
Dept: PET IMAGING | Age: 61
Discharge: HOME | End: 2021-10-14
Payer: OTHER GOVERNMENT

## 2021-10-27 DIAGNOSIS — C32.1 PRIMARY SQUAMOUS CELL CARCINOMA OF SUPRAGLOTTIS (HCC): ICD-10-CM

## 2021-10-27 PROCEDURE — 78815 PET IMAGE W/CT SKULL-THIGH: CPT

## 2021-10-27 PROCEDURE — 3430000000 HC RX DIAGNOSTIC RADIOPHARMACEUTICAL: Performed by: INTERNAL MEDICINE

## 2021-10-27 PROCEDURE — A9552 F18 FDG: HCPCS | Performed by: INTERNAL MEDICINE

## 2021-10-27 RX ORDER — FLUDEOXYGLUCOSE F 18 200 MCI/ML
14 INJECTION, SOLUTION INTRAVENOUS
Status: COMPLETED | OUTPATIENT
Start: 2021-10-27 | End: 2021-10-27

## 2021-10-27 RX ADMIN — FLUDEOXYGLUCOSE F 18 14 MILLICURIE: 200 INJECTION, SOLUTION INTRAVENOUS at 12:00

## 2021-10-28 ENCOUNTER — HOSPITAL ENCOUNTER (OUTPATIENT)
Dept: PHYSICAL THERAPY | Age: 61
Setting detail: THERAPIES SERIES
Discharge: HOME | End: 2021-10-28
Payer: OTHER GOVERNMENT

## 2021-10-28 PROCEDURE — 97161 PT EVAL LOW COMPLEX 20 MIN: CPT

## 2021-10-28 PROCEDURE — 97535 SELF CARE MNGMENT TRAINING: CPT

## 2021-10-28 PROCEDURE — 97110 THERAPEUTIC EXERCISES: CPT

## 2021-10-28 NOTE — PROGRESS NOTES
** PLEASE SIGN, DATE AND TIME CERTIFICATION BELOW AND RETURN TO [facility] OUTPATIENT REHABILITATION (FAX #: [phone]). ATTEST/CO-SIGN IF ACCESSING VIA [employer]. THANK YOU.**    I certify that I have examined the patient below and determined that Physical Medicine and Rehabilitation service is necessary and that I approve the established plan of care for up to 90 days or as specifically noted. Attestation, signature or co-signature of physician indicates approval of certification requirements.    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]    Treatment Diagnosis Z71.9, cancer-related fatigue, postural abnormality   Date of Evaluation 10/28/21    Additional Pertinent History DM II, HTN, neuropathy due to DM,      Functional Outcome Measure Used O: BFI   Functional Outcome Score 6.2 (10/28/21)       Insurance: Primary: Payor:  EAST /  /  / ,   Secondary:    Authorization Information: $46 copay, aquatics and modalities coverd   Visit # 1, 1/10 for progress note   Visits Allowed: 13, then precert   Recertification Date:    Physician Follow-Up: Josué 21   Physician Orders: Fito Anguiano and treat   History of Present Illness: 2021 SCC of supraglottis, will be completing concurrent chemoradiation       SUBJECTIVE: Pt reports he is to start concurrent chemo radiation on Tuesday but was hoping that he would just have to do radiation only. Pt notes that he has had a long standing issue with fatigue and shortness of breath with exertion and is curious if it is related to his cancer.     Social/Functional History and Current Status:  Medications and Allergies have been reviewed and are listed on Medical History Questionnaire. Delford Jeans lives with spouse in a trailer with stairs and no handrail to enter. Wife lives in trailer and pt lives in truck. Task Previous Current   ADLs  Independent Independent   IADL's Independent Independent   Ambulation Independent - assistance for long distance with scooter Independent - assistance with long distance with scooter   Transfers Independent Independent   Recreation Independent Independent   Community Integration Independent Independent   Driving Active  Active    Work [employer]. Occupation:  and  for [employer].        OBJECTIVE:   Posture: Poor, Protruded Head, Protracted Scapula  Palpation: No tenderness with palpation  Observation: No obvious deformity other than posture    Range of Motion/Strength (Range of Motion in degrees)    Right Left Comments   Shoulder Flexion AROM 180 180    Shoulder ABDuction AROM 180 180    Shoulder Strength 4+/5 4+/5    Elbow Strength 5/5 5/5    Neck Flexion 64     Neck Extension 32     Neck Rotation 78 78    Neck Sidebending 38 37                  Circumferential Measurements:   Head and Neck Measurements   NECK (cm)   Superior 45   Middle 43   Inferior 40.5   TOTAL NECK COMPOSITE 128.5     Brief Fatigue Inventory: 6.2 (0: none, 1-3: mild, 4-6: moderate, 7-10: severe)  Quick Dash: 15.9% impaired    Treatment Initiated: See below    TREATMENT   Precautions: Lymphedema risk   Pain: 0/10     X in shaded column indicates activity completed today   Modalities Parameters/  Location   Notes             Manual Therapy Time/Technique   Notes             Exercise/Intervention     Notes   Diaphragmatic breathing 5x   x     Retro shoulder rolls 10x   x     Lateral neck bends 5 sec x10   x     Neck flexion 5 sec x10   x     Neck extension 5 sec x10   x     Shoulder abduction 10x   x     Scapular retractions 10x   x Chin retractions 10x   x                                              Lymphatic system, lymphedema risk factors and risk reduction strategies reviewed with handout provided. Discussed importance of good skin hygiene, preventing infections in head and neck as well as to utilize muscle-pump action of muscles to stimulate lymphatic drainage during times of both inactivity and activity. Encouraged pt to avoid using saunas/hot tubs longer than 15 minutes and safety concerns with grooming tasks. Educated to limit fatty and salty foods as this can aggravate lymphedema. Educated to importance of diaphragmatic breathing and strengthening of neck/chest/shoulder girdle to further improve the efficiency of the muscle pump action of the head and neck. Discussed potential benefits of compression garments and that purpose is to also stimulate lymphatic drainage along with maintaining size of head and neck. Educated that if the patient wishes to obtain a garment, the patient would need to go through a physician to get a prescription for insurance to cover garment or the patient can purchase outright. Specific Interventions Next Treatment: PORi protocol for gentle manual lymphatic drainage and myofascial release, gentle stretches, strengthening, conditioning, posture, lymphedema education as needed      Activity Tolerance: Patient tolerated treatment well    ASSESSMENT:  Assessment: Pt presents with poor posture and cancer related fatigue. Note pt will be starting concurrent chemoradiation which will place him at risk for fatigue, skin irritation/wounds, deconditioning, lymphedema and impaired shoulder and neck mobility and function. He would benefit from skilled PT/Oncology Rehab to address these issues and allow optimal survivorship.   Body Structures/Functions/Activity Limitations:Decreased tolerance of activities and Impaired posture  Prognosis: good    GOALS:  Patient Goal: Avoid lymphedema and reduced neck mobility for driving purposes for work    Short Term Goals to be met in 12 weeks:  1. See LTGs    Long Term Goals to be met in 12 weeks:   1. Pt to demo neck mobility controlled at 32 deg of neck extension for grooming tasks after completion of radiation. 2. 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 3. Pt to demo B neck sidebending controlled at 37-38 deg for grooming and dressing after completion of radiation. 4. Pt to demo BFI score sustained 6.2 after completion of radiation for continued ability to tolerate work. Patient Education: Plan of care, goals. See \"Treatment Initiated\" for further details. Education Outcome: Verbalized understanding  Education Barriers: None    PLAN:  Treatment Recommendations: Strengthening, Range of Motion, Conditioning, Lymphedema Management, Manual Techniques, Pain Management, Neuropathy Management, Postural Re-Training, Body Mechanics/Ergonomics, Home Exercise Prescription and Safety Education    Plan of care initiated. Plan to see patient up to 2 times per week for 12 weeks to address the treatment planned outlined above, will start with next appt 1/2 way thru radiation and adjust frequency as needed.     Time In 10:00   Time Out 1040   Timed Code Minutes: 30 min   Total Treatment Time: 40 min       Electronically Signed by: Sumi Cameron PT PT, DPT, NIKKI [id number] 10/28/2021

## 2021-11-01 DIAGNOSIS — C32.1 PRIMARY SQUAMOUS CELL CARCINOMA OF SUPRAGLOTTIS (HCC): Primary | ICD-10-CM

## 2021-11-02 ENCOUNTER — HOSPITAL ENCOUNTER (OUTPATIENT)
Dept: INFUSION THERAPY | Age: 61
End: 2021-11-02
Payer: OTHER GOVERNMENT

## 2021-11-02 ENCOUNTER — CLINICAL DOCUMENTATION (OUTPATIENT)
Dept: CASE MANAGEMENT | Age: 61
End: 2021-11-02

## 2021-11-02 NOTE — PROGRESS NOTES
Name: Jeanne Mon  : 1960  MRN: [id number]    Oncology Navigation Follow-Up Note    Contact Type:  Telephone    Notes:   21-Spoke with Dr. Mary Sullivan after he met with Carlos Ospina wanted to discuss with him if chemotherapy was necessary-he already spoke to [facility] and Dr. Rola Rogel who both said yes to chemotherapy. After discussing with Dr. Mary Sullivan, stated he still wanted to think about it and let him know. I notified Dr. Rola Rogel of above. We cancelled  appointment for follow up and chemotherapy-and rescheduled for -as that is when Radiation, per Dr. Mary Sullivan, would be ready to start radiation and Dr. Rola Rogel would initiate chemotherapy at same time. I phoned Leonel Adame to let him know of change, in case he decides to go ahead with chemo plus radiation as originally planned. Leonel Adame told me he has decided for no chemo, just radiation. I told him I would notify Dr. Rola Rogel and at this point no follow up until after radiation is completed. Urged him to contact Dr. Mary Sullivan and he verbalized he would let him know his decision. 21-Notified Dr. Rola Rogel of [facility] decision and removed him from the schedule. Notified Dr. Mary Sullivan and he heard from patient within past hour. Will follow in Radiation.     Electronically signed by Jayna Morocho RN on 2021 at 2:51 PM

## 2021-11-05 ENCOUNTER — HOSPITAL ENCOUNTER (OUTPATIENT)
Dept: RADIATION ONCOLOGY | Age: 61
End: 2021-11-05
Attending: RADIOLOGY
Payer: OTHER GOVERNMENT

## 2021-11-05 PROCEDURE — 77338 DESIGN MLC DEVICE FOR IMRT: CPT | Performed by: RADIOLOGY

## 2021-11-05 PROCEDURE — 77300 RADIATION THERAPY DOSE PLAN: CPT | Performed by: RADIOLOGY

## 2021-11-05 PROCEDURE — 77301 RADIOTHERAPY DOSE PLAN IMRT: CPT | Performed by: RADIOLOGY

## 2021-11-10 ENCOUNTER — HOSPITAL ENCOUNTER (OUTPATIENT)
Dept: RADIATION ONCOLOGY | Age: 61
Discharge: HOME | End: 2021-11-10
Attending: RADIOLOGY
Payer: OTHER GOVERNMENT

## 2021-11-10 PROCEDURE — 77386 HC NTSTY MODUL RAD TX DLVR CPLX: CPT | Performed by: RADIOLOGY

## 2021-11-10 PROCEDURE — 77014 PR CT GUIDANCE PLACEMENT RAD THERAPY FIELDS: CPT | Performed by: RADIOLOGY

## 2021-11-11 ENCOUNTER — HOSPITAL ENCOUNTER (OUTPATIENT)
Dept: RADIATION ONCOLOGY | Age: 61
Discharge: HOME | End: 2021-11-11
Attending: RADIOLOGY
Payer: OTHER GOVERNMENT

## 2021-11-11 PROCEDURE — 77386 HC NTSTY MODUL RAD TX DLVR CPLX: CPT | Performed by: RADIOLOGY

## 2021-11-11 PROCEDURE — 77014 PR CT GUIDANCE PLACEMENT RAD THERAPY FIELDS: CPT | Performed by: RADIOLOGY

## 2021-11-12 ENCOUNTER — HOSPITAL ENCOUNTER (OUTPATIENT)
Dept: RADIATION ONCOLOGY | Age: 61
Discharge: HOME | End: 2021-11-12
Attending: RADIOLOGY
Payer: OTHER GOVERNMENT

## 2021-11-12 DIAGNOSIS — C32.1 PRIMARY SQUAMOUS CELL CARCINOMA OF SUPRAGLOTTIS (HCC): Primary | ICD-10-CM

## 2021-11-12 PROCEDURE — 77386 HC NTSTY MODUL RAD TX DLVR CPLX: CPT | Performed by: RADIOLOGY

## 2021-11-12 PROCEDURE — 77014 PR CT GUIDANCE PLACEMENT RAD THERAPY FIELDS: CPT | Performed by: RADIOLOGY

## 2021-11-15 ENCOUNTER — HOSPITAL ENCOUNTER (OUTPATIENT)
Dept: RADIATION ONCOLOGY | Age: 61
Discharge: HOME | End: 2021-11-15
Attending: RADIOLOGY
Payer: OTHER GOVERNMENT

## 2021-11-15 PROCEDURE — 77386 HC NTSTY MODUL RAD TX DLVR CPLX: CPT | Performed by: RADIOLOGY

## 2021-11-15 PROCEDURE — 77014 PR CT GUIDANCE PLACEMENT RAD THERAPY FIELDS: CPT | Performed by: RADIOLOGY

## 2021-11-15 PROCEDURE — 77336 RADIATION PHYSICS CONSULT: CPT | Performed by: RADIOLOGY

## 2021-11-16 ENCOUNTER — CLINICAL DOCUMENTATION (OUTPATIENT)
Dept: NUTRITION | Age: 61
End: 2021-11-16

## 2021-11-16 ENCOUNTER — HOSPITAL ENCOUNTER (OUTPATIENT)
Dept: RADIATION ONCOLOGY | Age: 61
Discharge: HOME | End: 2021-11-16
Attending: RADIOLOGY
Payer: OTHER GOVERNMENT

## 2021-11-16 ENCOUNTER — HOSPITAL ENCOUNTER (OUTPATIENT)
Age: 61
Discharge: HOME | End: 2021-11-16
Payer: OTHER GOVERNMENT

## 2021-11-16 DIAGNOSIS — C32.1 PRIMARY SQUAMOUS CELL CARCINOMA OF SUPRAGLOTTIS (HCC): ICD-10-CM

## 2021-11-16 LAB
BASOPHILS # BLD: 0.6 %
BASOPHILS ABSOLUTE: 0 THOU/MM3 (ref 0–0.1)
EOSINOPHIL # BLD: 1.5 %
EOSINOPHILS ABSOLUTE: 0.1 THOU/MM3 (ref 0–0.4)
ERYTHROCYTE [DISTWIDTH] IN BLOOD BY AUTOMATED COUNT: 14 % (ref 11.5–14.5)
ERYTHROCYTE [DISTWIDTH] IN BLOOD BY AUTOMATED COUNT: 43 FL (ref 35–45)
HCT VFR BLD CALC: 46 % (ref 42–52)
HEMOGLOBIN: 14.6 GM/DL (ref 14–18)
IMMATURE GRANS (ABS): 0.02 THOU/MM3 (ref 0–0.07)
IMMATURE GRANULOCYTES: 0.2 %
LYMPHOCYTES # BLD: 18.7 %
LYMPHOCYTES ABSOLUTE: 1.5 THOU/MM3 (ref 1–4.8)
MCH RBC QN AUTO: 27.2 PG (ref 26–33)
MCHC RBC AUTO-ENTMCNC: 31.7 GM/DL (ref 32.2–35.5)
MCV RBC AUTO: 85.7 FL (ref 80–94)
MONOCYTES # BLD: 5.8 %
MONOCYTES ABSOLUTE: 0.5 THOU/MM3 (ref 0.4–1.3)
NUCLEATED RED BLOOD CELLS: 0 /100 WBC
PLATELET # BLD: 345 THOU/MM3 (ref 130–400)
PMV BLD AUTO: 10.4 FL (ref 9.4–12.4)
RBC # BLD: 5.37 MILL/MM3 (ref 4.7–6.1)
SEG NEUTROPHILS: 73.2 %
SEGMENTED NEUTROPHILS ABSOLUTE COUNT: 5.9 THOU/MM3 (ref 1.8–7.7)
WBC # BLD: 8.1 THOU/MM3 (ref 4.8–10.8)

## 2021-11-16 PROCEDURE — 77014 PR CT GUIDANCE PLACEMENT RAD THERAPY FIELDS: CPT | Performed by: RADIOLOGY

## 2021-11-16 PROCEDURE — 85025 COMPLETE CBC W/AUTO DIFF WBC: CPT

## 2021-11-16 PROCEDURE — 77427 RADIATION TX MANAGEMENT X5: CPT | Performed by: RADIOLOGY

## 2021-11-16 PROCEDURE — 77386 HC NTSTY MODUL RAD TX DLVR CPLX: CPT | Performed by: RADIOLOGY

## 2021-11-16 PROCEDURE — 36415 COLL VENOUS BLD VENIPUNCTURE: CPT

## 2021-11-17 ENCOUNTER — HOSPITAL ENCOUNTER (OUTPATIENT)
Dept: RADIATION ONCOLOGY | Age: 61
Discharge: HOME | End: 2021-11-17
Attending: RADIOLOGY
Payer: OTHER GOVERNMENT

## 2021-11-17 PROCEDURE — 77014 PR CT GUIDANCE PLACEMENT RAD THERAPY FIELDS: CPT | Performed by: RADIOLOGY

## 2021-11-17 PROCEDURE — 77386 HC NTSTY MODUL RAD TX DLVR CPLX: CPT | Performed by: RADIOLOGY

## 2021-11-18 ENCOUNTER — HOSPITAL ENCOUNTER (OUTPATIENT)
Dept: RADIATION ONCOLOGY | Age: 61
Discharge: HOME | End: 2021-11-18
Attending: RADIOLOGY
Payer: OTHER GOVERNMENT

## 2021-11-18 PROCEDURE — 77014 PR CT GUIDANCE PLACEMENT RAD THERAPY FIELDS: CPT | Performed by: RADIOLOGY

## 2021-11-18 PROCEDURE — 77386 HC NTSTY MODUL RAD TX DLVR CPLX: CPT | Performed by: RADIOLOGY

## 2021-11-19 ENCOUNTER — HOSPITAL ENCOUNTER (OUTPATIENT)
Dept: RADIATION ONCOLOGY | Age: 61
Discharge: HOME | End: 2021-11-19
Attending: RADIOLOGY
Payer: OTHER GOVERNMENT

## 2021-11-19 PROCEDURE — 77386 HC NTSTY MODUL RAD TX DLVR CPLX: CPT | Performed by: RADIOLOGY

## 2021-11-19 PROCEDURE — 77014 PR CT GUIDANCE PLACEMENT RAD THERAPY FIELDS: CPT | Performed by: RADIOLOGY

## 2021-11-21 ENCOUNTER — HOSPITAL ENCOUNTER (OUTPATIENT)
Dept: RADIATION ONCOLOGY | Age: 61
Discharge: HOME | End: 2021-11-21
Attending: RADIOLOGY
Payer: OTHER GOVERNMENT

## 2021-11-21 PROCEDURE — 77014 PR CT GUIDANCE PLACEMENT RAD THERAPY FIELDS: CPT | Performed by: RADIOLOGY

## 2021-11-21 PROCEDURE — 77386 HC NTSTY MODUL RAD TX DLVR CPLX: CPT | Performed by: RADIOLOGY

## 2021-11-21 PROCEDURE — 77336 RADIATION PHYSICS CONSULT: CPT | Performed by: RADIOLOGY

## 2021-11-22 ENCOUNTER — HOSPITAL ENCOUNTER (OUTPATIENT)
Dept: RADIATION ONCOLOGY | Age: 61
Discharge: HOME | End: 2021-11-22
Attending: RADIOLOGY
Payer: OTHER GOVERNMENT

## 2021-11-22 PROCEDURE — 77386 HC NTSTY MODUL RAD TX DLVR CPLX: CPT | Performed by: RADIOLOGY

## 2021-11-22 PROCEDURE — 77427 RADIATION TX MANAGEMENT X5: CPT | Performed by: RADIOLOGY

## 2021-11-22 PROCEDURE — 77014 PR CT GUIDANCE PLACEMENT RAD THERAPY FIELDS: CPT | Performed by: RADIOLOGY

## 2021-11-23 ENCOUNTER — HOSPITAL ENCOUNTER (OUTPATIENT)
Dept: RADIATION ONCOLOGY | Age: 61
Discharge: HOME | End: 2021-11-23
Attending: RADIOLOGY
Payer: OTHER GOVERNMENT

## 2021-11-23 PROCEDURE — 77014 PR CT GUIDANCE PLACEMENT RAD THERAPY FIELDS: CPT | Performed by: RADIOLOGY

## 2021-11-23 PROCEDURE — 77386 HC NTSTY MODUL RAD TX DLVR CPLX: CPT | Performed by: RADIOLOGY

## 2021-11-24 ENCOUNTER — APPOINTMENT (OUTPATIENT)
Dept: RADIATION ONCOLOGY | Age: 61
End: 2021-11-24
Attending: RADIOLOGY
Payer: OTHER GOVERNMENT

## 2021-11-24 ENCOUNTER — CLINICAL DOCUMENTATION (OUTPATIENT)
Dept: NUTRITION | Age: 61
End: 2021-11-24

## 2021-11-24 ENCOUNTER — HOSPITAL ENCOUNTER (OUTPATIENT)
Dept: SPEECH THERAPY | Age: 61
Setting detail: THERAPIES SERIES
Discharge: HOME | End: 2021-11-24
Payer: OTHER GOVERNMENT

## 2021-11-24 ENCOUNTER — HOSPITAL ENCOUNTER (OUTPATIENT)
Dept: RADIATION ONCOLOGY | Age: 61
Discharge: HOME | End: 2021-11-24
Attending: RADIOLOGY
Payer: OTHER GOVERNMENT

## 2021-11-24 ENCOUNTER — APPOINTMENT (OUTPATIENT)
Dept: SPEECH THERAPY | Age: 61
End: 2021-11-24
Payer: OTHER GOVERNMENT

## 2021-11-24 PROCEDURE — 77386 HC NTSTY MODUL RAD TX DLVR CPLX: CPT | Performed by: RADIOLOGY

## 2021-11-24 PROCEDURE — 77014 PR CT GUIDANCE PLACEMENT RAD THERAPY FIELDS: CPT | Performed by: RADIOLOGY

## 2021-11-24 PROCEDURE — 92610 EVALUATE SWALLOWING FUNCTION: CPT | Performed by: SPEECH-LANGUAGE PATHOLOGIST

## 2021-11-24 NOTE — PROGRESS NOTES
** PLEASE SIGN, DATE AND TIME CERTIFICATION BELOW AND RETURN TO [facility] OUTPATIENT REHABILITATION (FAX #: [phone]). ATTEST/CO-SIGN IF ACCESSING VIA [employer]. THANK YOU.**    I certify that I have examined the patient below and determined that Physical Medicine and Rehabilitation service is necessary and that I approve the established plan of care for up to 90 days or as specifically noted. Attestation, signature or co-signature of physician indicates approval of certification requirements.    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]    Treatment Diagnosis dysphagia   Date of Evaluation 21      Functional Outcome Measure Used formerly [facility] NOMS: swallowing   Functional Outcome Score Level 5 (21)       Insurance: Primary: Payor:  EAST /  /  / ,   Secondary:    Authorization Information: No precert required   Visit # 1, 1/10 for progress note   Visits Allowed: No visit limit based on medical necessity   Recertification Date: 54   Physician Follow-Up: Weekly with radiation   Physician Orders: Please schedule with ST at the cancer center - eval and treat   Pertinent History: Patient reports he had a history of dysphonia since at least 2021 when he had COVID-19. Patient saw Dr. Roman Gandhi at [facility] in 2021 and then had laryngoscopy with excision tumor or vocal cord stripping with operating microscope on 21.   The biopsy came back as invasive squamous cell carcinoma. Patient had a FEES completed at [facility] on 9/7/21 and a regular diet with thin liquids was recommended. Patient started radiation treatment on 11/11/21 and is scheduled to be finished on 12/30/21. Patient reports he is not doing chemotherapy. Patient does not currently have a feeding tube. Patient reports he has not lost any weight since the radiation started. Patient did have some dentition extracted prior to starting radiation. Patient also noted to have right vocal fold paralysis. Patient reports some difficulty swallowing pills at times. SUBJECTIVE: Patient cooperative. Seen after radiation treatment and follow up with Mauricio Elliott CNP and Dr. Zina Huber this date. Patient's spouse present throughout. Pain:  No pain reported. Current Diet: Regular with thin liquids    Respiratory Status:  Independent    Behavioral Observation:  Alert    Oral Mechanism Evaluation:      Facial / Labial WFL    Lingual WFL    Dentition Impaired Missing some dentition - had to have a few extracted prior to starting radiation   Velum WFL    Vocal Quality Impaired Aphonic   Sensation WFL    Cough Not Tested No spontaneous cough elicited     Patient Evaluated Using:  Puree, soft solid, hard solid, thin liquids via cup    Oral Phase:  Impaired:  Impaired/lack of Mastication at times    Pharyngeal Phase: WFL:  Pharyngeal phase appears WFL but cannot rule out pharyngeal phase deficits from a bedside swallowing evaluation alone. Signs and Symptoms of Laryngeal Penetration/Aspiration: No signs/symptoms of aspiration evident in this evaluation, but cannot rule out silent aspiration. IMPRESSIONS: Patient presents with mild oral dysphagia characterized by impaired/lack of mastication at times. Patient's pharyngeal phase of the swallow appears within functional limits at this time.   No s/s of aspiration identified during this evaluation, however, cannot rule out silent aspiration from a clinical swallow evaluation alone. **FEES completed on 9/7/21 at [facility] identified mild pharyngeal residue that cleared with multiple swallows. RECOMMENDATIONS/ASSESSMENT:  Instrumental Evaluation: Instrumental evaluation not indicated at this time. Diet Recommendations:  Regular with thin liquids  Strategies:  Full Upright Position, Small Bite/Sip, Medications Whole with Puree and slow rate   Rehabilitation Potential/Prognosis: good  Areas for Improvement: Impaired swallow function  Specific Interventions Next Treatment: Monitor diet, pharyngeal strengthening exercises, laryngeal elevation exercises    Activity/Treatment Tolerance:  [x]  Patient tolerated treatment well  []  Patient limited by fatigue  []  Patient limited by pain   []  Patient limited by other medical complications  []  Other:     GOALS:  Patient Goal: did not state. Will further assess in treatment. Short Term Goals:  Short-term Goals  Timeframe for Short-term Goals: 6 weeks  Goal 1: Patient will tolerate a regular diet with thin liquids without overt s/s of aspiration and use of swallowing strategies with min cues to safely maintain adequate nutrition and hydration orally. Goal 2: Patient will complete tongue base retraction exercises x 25 to continue with adequate range of motion and strength throughout radiation treatment. Goal 3: Patient will complete effortful swallows x 25 for continued pharyngeal strength to tolerate a PO diet throughout radiation treatment. Goal 4: Patient will complete laryngeal elevation exercises x 20 for continued airway protection throughout radiation treatment. Long Term Goals:  Long-term Goals  Timeframe for Long-term Goals: 12 weeks  Goal 1: Patient will tolerate a regular diet with thin liquids without overt s/s of aspiration and independent use of swallowing strategies to safely maintain adequate nutrition and hydration orally.       Patient Education:   [x]  HEP/Education Completed: Plan of Care, Goals, HEP - reviewed all exercises and patient return demonstrated, complete HEP 2 x per day, handout of HEP provided, effects of radiation on swallowing  []  No new Education completed  []  Reviewed Prior HEP      [x]  Patient verbalized and/or demonstrated understanding of education provided. []  Patient unable to verbalize and/or demonstrate understanding of education provided. Will continue education. []  Barriers to learning:     PLAN:  Treatment Recommendations:     [x]  Plan of care initiated. Plan to see patient 1 time every 1-3 weeks for 12 weeks to address the treatment planned outlined above.   []  Continue with current plan of care  []  Modify plan of care as follows:    []  Hold pending physician visit  []  Discharge    Time In 1155   Time Out 1229   Timed Code Minutes: 0 min   Total Treatment Time: 34 min       Tenzin Paulino M.S. [address] [id number]

## 2021-11-29 ENCOUNTER — HOSPITAL ENCOUNTER (OUTPATIENT)
Dept: RADIATION ONCOLOGY | Age: 61
Discharge: HOME | End: 2021-11-29
Attending: RADIOLOGY
Payer: OTHER GOVERNMENT

## 2021-11-29 PROCEDURE — 77014 PR CT GUIDANCE PLACEMENT RAD THERAPY FIELDS: CPT | Performed by: RADIOLOGY

## 2021-11-29 PROCEDURE — 77336 RADIATION PHYSICS CONSULT: CPT | Performed by: RADIOLOGY

## 2021-11-29 PROCEDURE — 77386 HC NTSTY MODUL RAD TX DLVR CPLX: CPT | Performed by: RADIOLOGY

## 2021-11-30 ENCOUNTER — HOSPITAL ENCOUNTER (OUTPATIENT)
Dept: RADIATION ONCOLOGY | Age: 61
Discharge: HOME | End: 2021-11-30
Attending: RADIOLOGY
Payer: OTHER GOVERNMENT

## 2021-11-30 PROCEDURE — 77014 PR CT GUIDANCE PLACEMENT RAD THERAPY FIELDS: CPT | Performed by: RADIOLOGY

## 2021-11-30 PROCEDURE — 77386 HC NTSTY MODUL RAD TX DLVR CPLX: CPT | Performed by: RADIOLOGY

## 2021-11-30 RX ORDER — LIDOCAINE HYDROCHLORIDE 20 MG/ML
5 SOLUTION OROPHARYNGEAL PRN
Qty: 100 ML | Refills: 3 | Status: SHIPPED | OUTPATIENT
Start: 2021-11-30

## 2021-12-01 ENCOUNTER — HOSPITAL ENCOUNTER (OUTPATIENT)
Dept: RADIATION ONCOLOGY | Age: 61
Discharge: HOME | End: 2021-12-01
Attending: RADIOLOGY
Payer: OTHER GOVERNMENT

## 2021-12-01 ENCOUNTER — CLINICAL DOCUMENTATION (OUTPATIENT)
Dept: NUTRITION | Age: 61
End: 2021-12-01

## 2021-12-01 PROCEDURE — 77386 HC NTSTY MODUL RAD TX DLVR CPLX: CPT | Performed by: RADIOLOGY

## 2021-12-01 PROCEDURE — 77427 RADIATION TX MANAGEMENT X5: CPT | Performed by: RADIOLOGY

## 2021-12-01 PROCEDURE — 77014 PR CT GUIDANCE PLACEMENT RAD THERAPY FIELDS: CPT | Performed by: RADIOLOGY

## 2021-12-02 ENCOUNTER — HOSPITAL ENCOUNTER (OUTPATIENT)
Dept: RADIATION ONCOLOGY | Age: 61
Discharge: HOME | End: 2021-12-02
Attending: RADIOLOGY
Payer: OTHER GOVERNMENT

## 2021-12-02 PROCEDURE — 77386 HC NTSTY MODUL RAD TX DLVR CPLX: CPT | Performed by: RADIOLOGY

## 2021-12-02 PROCEDURE — 77014 PR CT GUIDANCE PLACEMENT RAD THERAPY FIELDS: CPT | Performed by: RADIOLOGY

## 2021-12-03 ENCOUNTER — HOSPITAL ENCOUNTER (OUTPATIENT)
Dept: RADIATION ONCOLOGY | Age: 61
Discharge: HOME | End: 2021-12-03
Attending: RADIOLOGY
Payer: OTHER GOVERNMENT

## 2021-12-03 PROCEDURE — 77386 HC NTSTY MODUL RAD TX DLVR CPLX: CPT | Performed by: RADIOLOGY

## 2021-12-03 PROCEDURE — 77014 PR CT GUIDANCE PLACEMENT RAD THERAPY FIELDS: CPT | Performed by: RADIOLOGY

## 2021-12-06 ENCOUNTER — HOSPITAL ENCOUNTER (OUTPATIENT)
Dept: RADIATION ONCOLOGY | Age: 61
Discharge: HOME | End: 2021-12-06
Attending: RADIOLOGY
Payer: OTHER GOVERNMENT

## 2021-12-06 PROCEDURE — 77386 HC NTSTY MODUL RAD TX DLVR CPLX: CPT | Performed by: RADIOLOGY

## 2021-12-06 PROCEDURE — 77336 RADIATION PHYSICS CONSULT: CPT | Performed by: RADIOLOGY

## 2021-12-06 PROCEDURE — 77014 PR CT GUIDANCE PLACEMENT RAD THERAPY FIELDS: CPT | Performed by: RADIOLOGY

## 2021-12-07 ENCOUNTER — HOSPITAL ENCOUNTER (OUTPATIENT)
Dept: RADIATION ONCOLOGY | Age: 61
Discharge: HOME | End: 2021-12-07
Attending: RADIOLOGY
Payer: OTHER GOVERNMENT

## 2021-12-07 PROCEDURE — 77014 PR CT GUIDANCE PLACEMENT RAD THERAPY FIELDS: CPT | Performed by: RADIOLOGY

## 2021-12-07 PROCEDURE — 77386 HC NTSTY MODUL RAD TX DLVR CPLX: CPT | Performed by: RADIOLOGY

## 2021-12-08 ENCOUNTER — HOSPITAL ENCOUNTER (OUTPATIENT)
Dept: RADIATION ONCOLOGY | Age: 61
Discharge: HOME | End: 2021-12-08
Attending: RADIOLOGY
Payer: OTHER GOVERNMENT

## 2021-12-08 ENCOUNTER — CLINICAL DOCUMENTATION (OUTPATIENT)
Dept: NUTRITION | Age: 61
End: 2021-12-08

## 2021-12-08 DIAGNOSIS — C32.1 PRIMARY SQUAMOUS CELL CARCINOMA OF SUPRAGLOTTIS (HCC): ICD-10-CM

## 2021-12-08 PROCEDURE — 77386 HC NTSTY MODUL RAD TX DLVR CPLX: CPT | Performed by: RADIOLOGY

## 2021-12-08 PROCEDURE — 77427 RADIATION TX MANAGEMENT X5: CPT | Performed by: RADIOLOGY

## 2021-12-08 PROCEDURE — 77014 PR CT GUIDANCE PLACEMENT RAD THERAPY FIELDS: CPT | Performed by: RADIOLOGY

## 2021-12-09 ENCOUNTER — HOSPITAL ENCOUNTER (OUTPATIENT)
Dept: RADIATION ONCOLOGY | Age: 61
Discharge: HOME | End: 2021-12-09
Attending: RADIOLOGY
Payer: OTHER GOVERNMENT

## 2021-12-09 PROCEDURE — 77014 PR CT GUIDANCE PLACEMENT RAD THERAPY FIELDS: CPT | Performed by: RADIOLOGY

## 2021-12-09 PROCEDURE — 77386 HC NTSTY MODUL RAD TX DLVR CPLX: CPT | Performed by: RADIOLOGY

## 2021-12-09 PROCEDURE — 77427 RADIATION TX MANAGEMENT X5: CPT | Performed by: RADIOLOGY

## 2021-12-10 ENCOUNTER — HOSPITAL ENCOUNTER (OUTPATIENT)
Dept: RADIATION ONCOLOGY | Age: 61
Discharge: HOME | End: 2021-12-10
Attending: RADIOLOGY
Payer: OTHER GOVERNMENT

## 2021-12-10 PROCEDURE — 77386 HC NTSTY MODUL RAD TX DLVR CPLX: CPT | Performed by: RADIOLOGY

## 2021-12-10 PROCEDURE — 77014 PR CT GUIDANCE PLACEMENT RAD THERAPY FIELDS: CPT | Performed by: RADIOLOGY

## 2021-12-13 ENCOUNTER — HOSPITAL ENCOUNTER (OUTPATIENT)
Dept: RADIATION ONCOLOGY | Age: 61
Discharge: HOME | End: 2021-12-13
Attending: RADIOLOGY
Payer: OTHER GOVERNMENT

## 2021-12-13 PROCEDURE — 77386 HC NTSTY MODUL RAD TX DLVR CPLX: CPT | Performed by: RADIOLOGY

## 2021-12-13 PROCEDURE — 77336 RADIATION PHYSICS CONSULT: CPT | Performed by: RADIOLOGY

## 2021-12-13 PROCEDURE — 77014 PR CT GUIDANCE PLACEMENT RAD THERAPY FIELDS: CPT | Performed by: RADIOLOGY

## 2021-12-14 ENCOUNTER — HOSPITAL ENCOUNTER (OUTPATIENT)
Dept: RADIATION ONCOLOGY | Age: 61
Discharge: HOME | End: 2021-12-14
Attending: RADIOLOGY
Payer: OTHER GOVERNMENT

## 2021-12-14 PROCEDURE — 77014 PR CT GUIDANCE PLACEMENT RAD THERAPY FIELDS: CPT | Performed by: RADIOLOGY

## 2021-12-14 PROCEDURE — 77386 HC NTSTY MODUL RAD TX DLVR CPLX: CPT | Performed by: RADIOLOGY

## 2021-12-15 ENCOUNTER — HOSPITAL ENCOUNTER (OUTPATIENT)
Dept: RADIATION ONCOLOGY | Age: 61
Discharge: HOME | End: 2021-12-15
Attending: RADIOLOGY
Payer: OTHER GOVERNMENT

## 2021-12-15 PROCEDURE — 77014 PR CT GUIDANCE PLACEMENT RAD THERAPY FIELDS: CPT | Performed by: RADIOLOGY

## 2021-12-15 PROCEDURE — 77386 HC NTSTY MODUL RAD TX DLVR CPLX: CPT | Performed by: RADIOLOGY

## 2021-12-16 ENCOUNTER — HOSPITAL ENCOUNTER (OUTPATIENT)
Dept: RADIATION ONCOLOGY | Age: 61
Discharge: HOME | End: 2021-12-16
Attending: RADIOLOGY
Payer: OTHER GOVERNMENT

## 2021-12-16 ENCOUNTER — HOSPITAL ENCOUNTER (OUTPATIENT)
Dept: INFUSION THERAPY | Age: 61
Discharge: HOME | End: 2021-12-16
Payer: OTHER GOVERNMENT

## 2021-12-16 VITALS
DIASTOLIC BLOOD PRESSURE: 65 MMHG | SYSTOLIC BLOOD PRESSURE: 127 MMHG | RESPIRATION RATE: 18 BRPM | TEMPERATURE: 98.2 F | OXYGEN SATURATION: 98 % | HEART RATE: 71 BPM

## 2021-12-16 DIAGNOSIS — E86.0 DEHYDRATION: ICD-10-CM

## 2021-12-16 PROCEDURE — 2580000003 HC RX 258: Performed by: NURSE PRACTITIONER

## 2021-12-16 PROCEDURE — 96360 HYDRATION IV INFUSION INIT: CPT

## 2021-12-16 PROCEDURE — 77014 PR CT GUIDANCE PLACEMENT RAD THERAPY FIELDS: CPT | Performed by: RADIOLOGY

## 2021-12-16 PROCEDURE — 77427 RADIATION TX MANAGEMENT X5: CPT | Performed by: RADIOLOGY

## 2021-12-16 PROCEDURE — 77386 HC NTSTY MODUL RAD TX DLVR CPLX: CPT | Performed by: RADIOLOGY

## 2021-12-16 PROCEDURE — 96361 HYDRATE IV INFUSION ADD-ON: CPT

## 2021-12-16 RX ORDER — 0.9 % SODIUM CHLORIDE 0.9 %
1000 INTRAVENOUS SOLUTION INTRAVENOUS ONCE
Status: CANCELLED | OUTPATIENT
Start: 2021-12-21 | End: 2021-12-21

## 2021-12-16 RX ORDER — 0.9 % SODIUM CHLORIDE 0.9 %
1000 INTRAVENOUS SOLUTION INTRAVENOUS ONCE
Status: COMPLETED | OUTPATIENT
Start: 2021-12-16 | End: 2021-12-16

## 2021-12-16 RX ADMIN — SODIUM CHLORIDE 1000 ML: 9 INJECTION, SOLUTION INTRAVENOUS at 10:29

## 2021-12-16 ASSESSMENT — PAIN SCALES - GENERAL: PAINLEVEL_OUTOF10: 2

## 2021-12-16 NOTE — PLAN OF CARE
Care plan reviewed with patient. Patient verbalized understanding of the plan of care and contribute to goal setting. Problem: Intellectual/Education/Knowledge Deficit  Goal: Teaching initiated upon admission  Outcome: Met This Shift  Note: Verbalized understanding of hydration  Intervention: Verbal/written education provided  Note: Discuss hydration     Problem: Discharge Planning  Goal: Knowledge of discharge instructions  Description: Knowledge of discharge instructions  Outcome: Met This Shift  Note: Verbalized understanding of discharge instructions, follow ups and when to call doctor   Intervention: Discharge to appropriate level of care  Note: Discuss discharge instructions, follow ups and when to call doctor. Problem: Falls - Risk of:  Goal: Will remain free from falls  Description: Will remain free from falls  Outcome: Met This Shift  Note: Free from falls while in infusion center.  Verbalized understanding of fall prevention and to ask for assistance with ambulation   Intervention: Assess risk factors for falls  Description: Assess risk factors for falls  Note: Assess for fall risk, instruct to ask for assistance with ambulation

## 2021-12-16 NOTE — PROGRESS NOTES
Hydration complete. Tolerated well. Discharged in satisfactory condition. During infusion drank some water and ate a pudding. Encouraged to increase fluid intake. Verbalized understanding. Discharged in satisfactory condition.  Ambulated off unit per self

## 2021-12-17 ENCOUNTER — HOSPITAL ENCOUNTER (OUTPATIENT)
Dept: RADIATION ONCOLOGY | Age: 61
Discharge: HOME | End: 2021-12-17
Attending: RADIOLOGY
Payer: OTHER GOVERNMENT

## 2021-12-17 DIAGNOSIS — C32.1 PRIMARY SQUAMOUS CELL CARCINOMA OF SUPRAGLOTTIS (HCC): Primary | ICD-10-CM

## 2021-12-17 PROCEDURE — 77014 PR CT GUIDANCE PLACEMENT RAD THERAPY FIELDS: CPT | Performed by: RADIOLOGY

## 2021-12-17 PROCEDURE — 77386 HC NTSTY MODUL RAD TX DLVR CPLX: CPT | Performed by: RADIOLOGY

## 2021-12-17 RX ORDER — OXYCODONE HYDROCHLORIDE 5 MG/1
5 TABLET ORAL EVERY 4 HOURS PRN
Qty: 42 TABLET | Refills: 0 | Status: SHIPPED | OUTPATIENT
Start: 2021-12-17 | End: 2021-12-29 | Stop reason: SDUPTHER

## 2021-12-20 ENCOUNTER — HOSPITAL ENCOUNTER (OUTPATIENT)
Dept: RADIATION ONCOLOGY | Age: 61
Discharge: HOME | End: 2021-12-20
Attending: RADIOLOGY
Payer: OTHER GOVERNMENT

## 2021-12-20 PROCEDURE — 77336 RADIATION PHYSICS CONSULT: CPT | Performed by: RADIOLOGY

## 2021-12-20 PROCEDURE — 77014 PR CT GUIDANCE PLACEMENT RAD THERAPY FIELDS: CPT | Performed by: RADIOLOGY

## 2021-12-20 PROCEDURE — 77386 HC NTSTY MODUL RAD TX DLVR CPLX: CPT | Performed by: RADIOLOGY

## 2021-12-21 ENCOUNTER — HOSPITAL ENCOUNTER (OUTPATIENT)
Dept: RADIATION ONCOLOGY | Age: 61
Discharge: HOME | End: 2021-12-21
Attending: RADIOLOGY
Payer: OTHER GOVERNMENT

## 2021-12-21 ENCOUNTER — HOSPITAL ENCOUNTER (OUTPATIENT)
Dept: INFUSION THERAPY | Age: 61
Discharge: HOME | End: 2021-12-21
Payer: OTHER GOVERNMENT

## 2021-12-21 VITALS
DIASTOLIC BLOOD PRESSURE: 73 MMHG | TEMPERATURE: 98.2 F | OXYGEN SATURATION: 95 % | SYSTOLIC BLOOD PRESSURE: 141 MMHG | BODY MASS INDEX: 22.88 KG/M2 | HEART RATE: 81 BPM | HEIGHT: 71 IN | WEIGHT: 163.4 LBS | RESPIRATION RATE: 16 BRPM

## 2021-12-21 DIAGNOSIS — E86.0 DEHYDRATION: Primary | ICD-10-CM

## 2021-12-21 PROCEDURE — 77386 HC NTSTY MODUL RAD TX DLVR CPLX: CPT | Performed by: RADIOLOGY

## 2021-12-21 PROCEDURE — 2580000003 HC RX 258: Performed by: NURSE PRACTITIONER

## 2021-12-21 PROCEDURE — 96361 HYDRATE IV INFUSION ADD-ON: CPT

## 2021-12-21 PROCEDURE — 77014 PR CT GUIDANCE PLACEMENT RAD THERAPY FIELDS: CPT | Performed by: RADIOLOGY

## 2021-12-21 PROCEDURE — 96360 HYDRATION IV INFUSION INIT: CPT

## 2021-12-21 RX ORDER — 0.9 % SODIUM CHLORIDE 0.9 %
1000 INTRAVENOUS SOLUTION INTRAVENOUS ONCE
Status: CANCELLED | OUTPATIENT
Start: 2021-12-23 | End: 2021-12-23

## 2021-12-21 RX ORDER — 0.9 % SODIUM CHLORIDE 0.9 %
1000 INTRAVENOUS SOLUTION INTRAVENOUS ONCE
Status: COMPLETED | OUTPATIENT
Start: 2021-12-21 | End: 2021-12-21

## 2021-12-21 RX ADMIN — SODIUM CHLORIDE 1000 ML: 9 INJECTION, SOLUTION INTRAVENOUS at 10:12

## 2021-12-21 NOTE — PROGRESS NOTES
Patient tolerated one liter of normal saline over 2 hours without any complications. Patient verbalized understanding of discharge instructions. Ambulated off unit per self with belongings.

## 2021-12-21 NOTE — PLAN OF CARE
Problem: Musculor/Skeletal Functional Status  Goal: Absence of falls  Outcome: Met This Shift  Note: Patient verbalizes understanding of fall precautions. Patient free from falls this visit. Intervention: Fall precautions  Note: Discussed fall precautions, call light within reach. Problem: Intellectual/Education/Knowledge Deficit  Goal: Teaching initiated upon admission  Outcome: Met This Shift  Note: Patient verbalizes understanding to verbal information given on IV fluids,action and possible side effects. Aware to call MD if develop complications. Intervention: Verbal/written education provided  Note: Discussed IV fluids and when to call the doctor. Problem: Discharge Planning  Goal: Knowledge of discharge instructions  Description: Knowledge of discharge instructions  Outcome: Met This Shift  Note: Verbalized understanding of discharge instructions, follow-up appointments, and when to call the physician. Intervention: Discharge to appropriate level of care  Note: Discuss discharge instructions, follow ups, and when to call the doctor. Care plan reviewed with patient. Patient verbalizes understanding of the plan of care and contribute to goal setting.

## 2021-12-22 ENCOUNTER — HOSPITAL ENCOUNTER (OUTPATIENT)
Dept: RADIATION ONCOLOGY | Age: 61
Discharge: HOME | End: 2021-12-22
Attending: RADIOLOGY
Payer: OTHER GOVERNMENT

## 2021-12-22 ENCOUNTER — CLINICAL DOCUMENTATION (OUTPATIENT)
Dept: NUTRITION | Age: 61
End: 2021-12-22

## 2021-12-22 PROCEDURE — 77386 HC NTSTY MODUL RAD TX DLVR CPLX: CPT | Performed by: RADIOLOGY

## 2021-12-22 PROCEDURE — 77014 PR CT GUIDANCE PLACEMENT RAD THERAPY FIELDS: CPT | Performed by: RADIOLOGY

## 2021-12-23 ENCOUNTER — HOSPITAL ENCOUNTER (OUTPATIENT)
Dept: RADIATION ONCOLOGY | Age: 61
Discharge: HOME | End: 2021-12-23
Attending: RADIOLOGY
Payer: OTHER GOVERNMENT

## 2021-12-23 ENCOUNTER — HOSPITAL ENCOUNTER (OUTPATIENT)
Dept: INFUSION THERAPY | Age: 61
Discharge: HOME | End: 2021-12-23
Payer: OTHER GOVERNMENT

## 2021-12-23 VITALS
SYSTOLIC BLOOD PRESSURE: 144 MMHG | RESPIRATION RATE: 16 BRPM | TEMPERATURE: 98.2 F | HEART RATE: 78 BPM | BODY MASS INDEX: 22.62 KG/M2 | OXYGEN SATURATION: 97 % | HEIGHT: 71 IN | DIASTOLIC BLOOD PRESSURE: 68 MMHG | WEIGHT: 161.6 LBS

## 2021-12-23 DIAGNOSIS — E86.0 DEHYDRATION: Primary | ICD-10-CM

## 2021-12-23 PROCEDURE — 77014 PR CT GUIDANCE PLACEMENT RAD THERAPY FIELDS: CPT | Performed by: RADIOLOGY

## 2021-12-23 PROCEDURE — 77427 RADIATION TX MANAGEMENT X5: CPT | Performed by: RADIOLOGY

## 2021-12-23 PROCEDURE — 96360 HYDRATION IV INFUSION INIT: CPT

## 2021-12-23 PROCEDURE — 2580000003 HC RX 258: Performed by: NURSE PRACTITIONER

## 2021-12-23 PROCEDURE — 77386 HC NTSTY MODUL RAD TX DLVR CPLX: CPT | Performed by: RADIOLOGY

## 2021-12-23 PROCEDURE — 96361 HYDRATE IV INFUSION ADD-ON: CPT

## 2021-12-23 RX ORDER — 0.9 % SODIUM CHLORIDE 0.9 %
1000 INTRAVENOUS SOLUTION INTRAVENOUS ONCE
Status: CANCELLED | OUTPATIENT
Start: 2021-12-28 | End: 2021-12-28

## 2021-12-23 RX ORDER — 0.9 % SODIUM CHLORIDE 0.9 %
1000 INTRAVENOUS SOLUTION INTRAVENOUS ONCE
Status: COMPLETED | OUTPATIENT
Start: 2021-12-23 | End: 2021-12-23

## 2021-12-23 RX ADMIN — SODIUM CHLORIDE 1000 ML: 9 INJECTION, SOLUTION INTRAVENOUS at 10:44

## 2021-12-23 ASSESSMENT — PAIN DESCRIPTION - LOCATION: LOCATION: THROAT

## 2021-12-23 ASSESSMENT — PAIN SCALES - GENERAL: PAINLEVEL_OUTOF10: 6

## 2021-12-23 ASSESSMENT — PAIN DESCRIPTION - PAIN TYPE: TYPE: ACUTE PAIN

## 2021-12-23 NOTE — PROGRESS NOTES
Patient assessed for the following post IV hydration:    Dizziness   No  Lightheadedness  No      Acute nausea/vomiting No  Headache   No  Chest pain/pressure  No  Rash/itching   No  Shortness of breath  No    Patient tolerated IV fluids without any complications. Last vital signs:   BP (!) 144/68   Pulse 78   Temp 98.2 °F (36.8 °C) (Oral)   Resp 16   Ht 5' 11\" (1.803 m)   Wt 161 lb 9.6 oz (73.3 kg)   SpO2 97%   BMI 22.54 kg/m²     Patient instructed if experience any of the above symptoms following today's infusion, he is to notify MD immediately or go to the emergency department. Discharge instructions given to patient. Verbalizes understanding. Ambulated off unit per self, accompanied by spouse, with belongings.

## 2021-12-23 NOTE — PLAN OF CARE
Problem: Pain:  Description: Pain management should include both nonpharmacologic and pharmacologic interventions. Goal: Pain level will decrease  Description: Pain level will decrease  12/23/2021 1544 by Winsome Mar RN  Outcome: Met This Shift  12/23/2021 1543 by Winsome Mar RN  Outcome: Met This Shift  Goal: Control of acute pain  Description: Control of acute pain  12/23/2021 1544 by Winsome Mar RN  Outcome: Met This Shift  Note: Patient rating pain a 6 out of 10 using JETHRO scale, Pain located in throat. 12/23/2021 1543 by Winsome Mar RN  Outcome: Met This Shift  Goal: Control of chronic pain  Description: Control of chronic pain  12/23/2021 1544 by Winsome Mar RN  Outcome: Met This Shift  12/23/2021 1543 by Winsome Mar RN  Outcome: Met This Shift  Intervention: Assess barriers to pain control  Description: Assess barriers to pain control - REMINDER(s):  Addiction concerns. Healthcare practices, cultural.  Pain medication concerns. Past pain experiences. Note: Patient encouraged to take prescribed pain medications and call Physician if pain is not controlled. Problem: Musculor/Skeletal Functional Status  Goal: Absence of falls  Outcome: Met This Shift  Note: Patient free from falls while in O.P. Oncology. Intervention: Fall precautions  Note: Patient assessed for fall risk on [facility] to [facility]. Fall band placed on patient. Discussed the need to use the call light for assistance prior to getting up out of chair/bed. Problem: Intellectual/Education/Knowledge Deficit  Goal: Teaching initiated upon admission  Outcome: Met This Shift  Note: Verbalizes understanding to signs and symptoms of dehydration and when to call the Physician. Intervention: Verbal/written education provided  Note: Patient received 1000 ml  IV infusion over 2 hours. Patient drank nothing while in OP oncology. Encouraged patient to drink 48 to 64 ounces of fluids everyday. Problem: Discharge Planning  Goal: Knowledge of discharge instructions  Description: Knowledge of discharge instructions  Outcome: Met This Shift  Note: Verbalized understanding of discharge instructions, follow-up appointments, and when to call the physician. Intervention: Interaction with patient/family and care team  Note: Discuss understanding of discharge instructions,follow-up appointments, and when to call the physician. Care plan reviewed with patient and spouse. Patient and spouse verbalize understanding of the plan of care and contribute to goal setting.

## 2021-12-27 ENCOUNTER — HOSPITAL ENCOUNTER (OUTPATIENT)
Dept: RADIATION ONCOLOGY | Age: 61
Discharge: HOME | End: 2021-12-27
Attending: RADIOLOGY
Payer: OTHER GOVERNMENT

## 2021-12-27 PROCEDURE — 77336 RADIATION PHYSICS CONSULT: CPT | Performed by: RADIOLOGY

## 2021-12-27 PROCEDURE — 77386 HC NTSTY MODUL RAD TX DLVR CPLX: CPT | Performed by: RADIOLOGY

## 2021-12-27 PROCEDURE — 77014 PR CT GUIDANCE PLACEMENT RAD THERAPY FIELDS: CPT | Performed by: RADIOLOGY

## 2021-12-28 ENCOUNTER — HOSPITAL ENCOUNTER (OUTPATIENT)
Dept: RADIATION ONCOLOGY | Age: 61
Discharge: HOME | End: 2021-12-28
Attending: RADIOLOGY
Payer: OTHER GOVERNMENT

## 2021-12-28 ENCOUNTER — HOSPITAL ENCOUNTER (OUTPATIENT)
Dept: INFUSION THERAPY | Age: 61
Discharge: HOME | End: 2021-12-28
Payer: OTHER GOVERNMENT

## 2021-12-28 VITALS
HEIGHT: 71 IN | SYSTOLIC BLOOD PRESSURE: 119 MMHG | BODY MASS INDEX: 21.95 KG/M2 | TEMPERATURE: 97.8 F | DIASTOLIC BLOOD PRESSURE: 71 MMHG | HEART RATE: 99 BPM | RESPIRATION RATE: 16 BRPM | WEIGHT: 156.8 LBS | OXYGEN SATURATION: 97 %

## 2021-12-28 DIAGNOSIS — E86.0 DEHYDRATION: Primary | ICD-10-CM

## 2021-12-28 PROCEDURE — 96361 HYDRATE IV INFUSION ADD-ON: CPT

## 2021-12-28 PROCEDURE — 77014 PR CT GUIDANCE PLACEMENT RAD THERAPY FIELDS: CPT | Performed by: RADIOLOGY

## 2021-12-28 PROCEDURE — 77386 HC NTSTY MODUL RAD TX DLVR CPLX: CPT | Performed by: RADIOLOGY

## 2021-12-28 PROCEDURE — 2580000003 HC RX 258: Performed by: NURSE PRACTITIONER

## 2021-12-28 PROCEDURE — 96360 HYDRATION IV INFUSION INIT: CPT

## 2021-12-28 RX ORDER — 0.9 % SODIUM CHLORIDE 0.9 %
1000 INTRAVENOUS SOLUTION INTRAVENOUS ONCE
Status: CANCELLED | OUTPATIENT
Start: 2021-12-30 | End: 2021-12-30

## 2021-12-28 RX ORDER — 0.9 % SODIUM CHLORIDE 0.9 %
1000 INTRAVENOUS SOLUTION INTRAVENOUS ONCE
Status: COMPLETED | OUTPATIENT
Start: 2021-12-28 | End: 2021-12-28

## 2021-12-28 RX ADMIN — SODIUM CHLORIDE 1000 ML: 9 INJECTION, SOLUTION INTRAVENOUS at 10:19

## 2021-12-28 ASSESSMENT — PAIN DESCRIPTION - PAIN TYPE: TYPE: ACUTE PAIN

## 2021-12-28 ASSESSMENT — PAIN SCALES - GENERAL: PAINLEVEL_OUTOF10: 6

## 2021-12-28 ASSESSMENT — PAIN DESCRIPTION - LOCATION: LOCATION: THROAT

## 2021-12-28 NOTE — PROGRESS NOTES
Patient assessed for the following post fluid replacement:    Dizziness   No  Lightheadedness  No      Acute nausea/vomiting No  Headache   No  Chest pain/pressure  No  Rash/itching   No  Shortness of breath  No    Patient kept for 20 minutes observation post infusion. Patient tolerated treatment without any complications. Last vital signs:   /71   Pulse 99   Temp 97.8 °F (36.6 °C) (Oral)   Resp 16   Ht 5' 11\" (1.803 m)   Wt 156 lb 12.8 oz (71.1 kg)   SpO2 97%   BMI 21.87 kg/m²         Patient instructed if experience any of the above symptoms following today's infusion,he/she is to notify MD immediately or go to the emergency department. Discharge instructions given to patient. Verbalizes understanding. Ambulated off unit per self with belongings.

## 2021-12-28 NOTE — PLAN OF CARE
Problem: Musculor/Skeletal Functional Status  Goal: Absence of falls  Outcome: Met This Shift  Note: No falls occurred with visit today. Intervention: Fall precautions  Note: Discussed the need to use the call light for assistance when getting up to ambulate. Problem: Discharge Planning  Goal: Knowledge of discharge instructions  Description: Knowledge of discharge instructions  Outcome: Met This Shift  Note: Verbalize understanding of discharge instructions, follow up appointments, and when to call Physician. Intervention: Interaction with patient/family and care team  Note: Discuss understanding of discharge instructions, follow up appointments and when to call Physician. Problem: Intellectual/Education/Knowledge Deficit  Goal: Teaching initiated upon admission  Outcome: Met This Shift  Note: Education on fluid replacement  Intervention: Verbal/written education provided  Note: Patient verbalizes understanding to verbal information given on fluid bolus,action and possible side effects. Aware to call MD if develop complications. Care plan reviewed with patient. Patient verbalizes understanding of the plan of care and contributes to goal setting.

## 2021-12-29 ENCOUNTER — HOSPITAL ENCOUNTER (OUTPATIENT)
Dept: RADIATION ONCOLOGY | Age: 61
Discharge: HOME | End: 2021-12-29
Attending: RADIOLOGY
Payer: OTHER GOVERNMENT

## 2021-12-29 DIAGNOSIS — C32.1 PRIMARY SQUAMOUS CELL CARCINOMA OF SUPRAGLOTTIS (HCC): ICD-10-CM

## 2021-12-29 PROCEDURE — 77386 HC NTSTY MODUL RAD TX DLVR CPLX: CPT | Performed by: RADIOLOGY

## 2021-12-29 PROCEDURE — 77014 PR CT GUIDANCE PLACEMENT RAD THERAPY FIELDS: CPT | Performed by: RADIOLOGY

## 2021-12-29 RX ORDER — OXYCODONE HYDROCHLORIDE 5 MG/1
5 TABLET ORAL EVERY 4 HOURS PRN
Qty: 42 TABLET | Refills: 0 | Status: SHIPPED | OUTPATIENT
Start: 2021-12-29 | End: 2022-01-07 | Stop reason: SDUPTHER

## 2021-12-30 ENCOUNTER — HOSPITAL ENCOUNTER (OUTPATIENT)
Dept: RADIATION ONCOLOGY | Age: 61
Discharge: HOME | End: 2021-12-30
Attending: RADIOLOGY
Payer: OTHER GOVERNMENT

## 2021-12-30 ENCOUNTER — HOSPITAL ENCOUNTER (OUTPATIENT)
Dept: INFUSION THERAPY | Age: 61
Discharge: HOME | End: 2021-12-30
Payer: OTHER GOVERNMENT

## 2021-12-30 VITALS
HEART RATE: 102 BPM | TEMPERATURE: 98.2 F | OXYGEN SATURATION: 96 % | RESPIRATION RATE: 16 BRPM | DIASTOLIC BLOOD PRESSURE: 52 MMHG | SYSTOLIC BLOOD PRESSURE: 87 MMHG

## 2021-12-30 DIAGNOSIS — C32.1 PRIMARY SQUAMOUS CELL CARCINOMA OF SUPRAGLOTTIS (HCC): ICD-10-CM

## 2021-12-30 DIAGNOSIS — E86.0 DEHYDRATION: Primary | ICD-10-CM

## 2021-12-30 PROCEDURE — 77014 PR CT GUIDANCE PLACEMENT RAD THERAPY FIELDS: CPT | Performed by: RADIOLOGY

## 2021-12-30 PROCEDURE — 2580000003 HC RX 258: Performed by: NURSE PRACTITIONER

## 2021-12-30 PROCEDURE — 77386 HC NTSTY MODUL RAD TX DLVR CPLX: CPT | Performed by: RADIOLOGY

## 2021-12-30 PROCEDURE — 96360 HYDRATION IV INFUSION INIT: CPT

## 2021-12-30 PROCEDURE — 96361 HYDRATE IV INFUSION ADD-ON: CPT

## 2021-12-30 PROCEDURE — 6370000000 HC RX 637 (ALT 250 FOR IP): Performed by: INTERNAL MEDICINE

## 2021-12-30 RX ORDER — ONDANSETRON 4 MG/1
4 TABLET, FILM COATED ORAL ONCE
Status: DISCONTINUED | OUTPATIENT
Start: 2021-12-30 | End: 2021-12-30 | Stop reason: RX

## 2021-12-30 RX ORDER — FENTANYL 12 UG/H
2 PATCH TRANSDERMAL
Qty: 10 PATCH | Refills: 0 | Status: SHIPPED
Start: 2021-12-30 | End: 2022-01-07 | Stop reason: SDUPTHER

## 2021-12-30 RX ORDER — ONDANSETRON 4 MG/1
4 TABLET, ORALLY DISINTEGRATING ORAL ONCE
Status: COMPLETED | OUTPATIENT
Start: 2021-12-30 | End: 2021-12-30

## 2021-12-30 RX ORDER — 0.9 % SODIUM CHLORIDE 0.9 %
1000 INTRAVENOUS SOLUTION INTRAVENOUS ONCE
Status: CANCELLED | OUTPATIENT
Start: 2021-12-30 | End: 2021-12-30

## 2021-12-30 RX ORDER — 0.9 % SODIUM CHLORIDE 0.9 %
1000 INTRAVENOUS SOLUTION INTRAVENOUS ONCE
Status: COMPLETED | OUTPATIENT
Start: 2021-12-30 | End: 2021-12-30

## 2021-12-30 RX ADMIN — SODIUM CHLORIDE 1000 ML: 9 INJECTION, SOLUTION INTRAVENOUS at 11:12

## 2021-12-30 RX ADMIN — ONDANSETRON 4 MG: 4 TABLET, ORALLY DISINTEGRATING ORAL at 11:09

## 2021-12-30 ASSESSMENT — PAIN DESCRIPTION - FREQUENCY: FREQUENCY: CONTINUOUS

## 2021-12-30 ASSESSMENT — PAIN DESCRIPTION - LOCATION: LOCATION: THROAT

## 2021-12-30 ASSESSMENT — PAIN SCALES - GENERAL: PAINLEVEL_OUTOF10: 6

## 2021-12-30 ASSESSMENT — PAIN DESCRIPTION - PAIN TYPE: TYPE: ACUTE PAIN

## 2021-12-30 ASSESSMENT — PAIN DESCRIPTION - ONSET: ONSET: ON-GOING

## 2021-12-30 NOTE — PROGRESS NOTES
Patient assessed for the following post infusion:    Dizziness   No  Lightheadedness  No      Acute nausea/vomiting No  Headache   No  Chest pain/pressure  No  Rash/itching   No  Shortness of breath  No    Patient kept for 20 minutes observation post infusion chemotherapy. Patient tolerated infusion treatment without any complications. Last vital signs:   BP (!) 87/52   Pulse 102   Temp 98.2 °F (36.8 °C) (Oral)   Resp 16   SpO2 96%       Patient instructed if experience any of the above symptoms following today's infusion,he/she is to notify MD immediately or go to the emergency department. Discharge instructions given to patient. Verbalizes understanding. Ambulated off unit per self with belongings.

## 2021-12-30 NOTE — PLAN OF CARE
Problem: Intellectual/Education/Knowledge Deficit  Goal: Teaching initiated upon admission  Outcome: Met This Shift  Note: Verbalizes understanding to signs and symptoms of dehydration and when to call the Physician. Intervention: Verbal/written education provided  Note: Patient received 1000ml  IV infusion over 2 hours. Patient drank 12oz while in OP oncology. Encouraged patient to drink 48 to 64 ounces of fluids everyday. Problem: Musculor/Skeletal Functional Status  Goal: Absence of falls  Outcome: Met This Shift  Note: Free from falls while in O.P. Oncology. Intervention: Fall precautions  Note: Discussed the need to use the call light for assistance when getting up to ambulate. Problem: Discharge Planning  Goal: Knowledge of discharge instructions  Description: Knowledge of discharge instructions  Outcome: Met This Shift   Care plan reviewed with patient and spouse. Patient and spouse verbalize understanding of the plan of care and contribute to goal setting.

## 2022-01-03 ENCOUNTER — APPOINTMENT (OUTPATIENT)
Dept: RADIATION ONCOLOGY | Age: 62
End: 2022-01-03
Attending: RADIOLOGY
Payer: OTHER GOVERNMENT

## 2022-01-04 ENCOUNTER — HOSPITAL ENCOUNTER (OUTPATIENT)
Dept: INFUSION THERAPY | Age: 62
Discharge: HOME | End: 2022-01-04
Payer: OTHER GOVERNMENT

## 2022-01-04 VITALS
TEMPERATURE: 98.2 F | OXYGEN SATURATION: 96 % | SYSTOLIC BLOOD PRESSURE: 153 MMHG | RESPIRATION RATE: 16 BRPM | HEART RATE: 86 BPM | DIASTOLIC BLOOD PRESSURE: 86 MMHG

## 2022-01-04 DIAGNOSIS — E86.0 DEHYDRATION: Primary | ICD-10-CM

## 2022-01-04 DIAGNOSIS — C32.1 PRIMARY SQUAMOUS CELL CARCINOMA OF SUPRAGLOTTIS (HCC): ICD-10-CM

## 2022-01-04 PROCEDURE — 2580000003 HC RX 258: Performed by: RADIOLOGY

## 2022-01-04 PROCEDURE — 96360 HYDRATION IV INFUSION INIT: CPT

## 2022-01-04 PROCEDURE — 96361 HYDRATE IV INFUSION ADD-ON: CPT

## 2022-01-04 RX ORDER — 0.9 % SODIUM CHLORIDE 0.9 %
1000 INTRAVENOUS SOLUTION INTRAVENOUS ONCE
Status: COMPLETED | OUTPATIENT
Start: 2022-01-04 | End: 2022-01-04

## 2022-01-04 RX ORDER — 0.9 % SODIUM CHLORIDE 0.9 %
1000 INTRAVENOUS SOLUTION INTRAVENOUS ONCE
Status: CANCELLED | OUTPATIENT
Start: 2022-01-06 | End: 2022-01-06

## 2022-01-04 RX ADMIN — SODIUM CHLORIDE 1000 ML: 9 INJECTION, SOLUTION INTRAVENOUS at 13:36

## 2022-01-04 NOTE — PROGRESS NOTES
Patient tolerated 1 liter 0.9 Ns IV bolus over 2 hours without any complications. Denies dizziness, lightheadedness, acute nausea or vomiting, headache, heart palpitations, rash/itching or increased SOB. Last vital signs  BP (!) 153/86   Pulse 86   Temp 98.2 °F (36.8 °C) (Oral)   Resp 16   SpO2 96%     Patient instructed if they experience any of the above symptoms following today's visit, he/she is to notify the Physician or go to the Emergency Dept. Discharge instructions given to patient, Verbalizes understanding. Ambulated off unit per self in stable condition with all belongings.

## 2022-01-04 NOTE — PLAN OF CARE
Problem: Intellectual/Education/Knowledge Deficit  Goal: Teaching initiated upon admission  Outcome: Met This Shift  Note: Patient verbalizes understanding to verbal information given on 2 hours IV hydration,action and possible side effects. Aware to call MD if develop complications. Intervention: Verbal/written education provided  Note: Patient received 1 liter 0.9 NS IV infusion over 2 hours. Patient drank some water while in OP oncology. Encouraged patient to drink 48 to 64 ounces of fluids everyday. Problem: Musculor/Skeletal Functional Status  Goal: Absence of falls  Outcome: Met This Shift  Note: Free from falls while in O.P. Oncology. Intervention: Fall precautions  Note: Discussed the need to use the call light for assistance when getting up to ambulate. Problem: Discharge Planning  Goal: Knowledge of discharge instructions  Description: Knowledge of discharge instructions  Outcome: Met This Shift  Note: Verbalize understanding of discharge instructions, follow up appointments, and when to call Physician. Intervention: Interaction with patient/family and care team  Note: Discuss understanding of discharge instructions, follow up appointments and when to call Physician. Care plan reviewed with patient and spouse. Patient and spouse verbalize understanding of the plan of care and contribute to goal setting.

## 2022-01-06 ENCOUNTER — HOSPITAL ENCOUNTER (OUTPATIENT)
Dept: INFUSION THERAPY | Age: 62
Discharge: HOME | End: 2022-01-06
Payer: OTHER GOVERNMENT

## 2022-01-06 VITALS
DIASTOLIC BLOOD PRESSURE: 68 MMHG | HEIGHT: 71 IN | WEIGHT: 154.2 LBS | HEART RATE: 91 BPM | OXYGEN SATURATION: 96 % | BODY MASS INDEX: 21.59 KG/M2 | RESPIRATION RATE: 16 BRPM | TEMPERATURE: 98.4 F | SYSTOLIC BLOOD PRESSURE: 139 MMHG

## 2022-01-06 DIAGNOSIS — E86.0 DEHYDRATION: Primary | ICD-10-CM

## 2022-01-06 DIAGNOSIS — C32.1 PRIMARY SQUAMOUS CELL CARCINOMA OF SUPRAGLOTTIS (HCC): ICD-10-CM

## 2022-01-06 PROCEDURE — 96360 HYDRATION IV INFUSION INIT: CPT

## 2022-01-06 PROCEDURE — 2580000003 HC RX 258: Performed by: RADIOLOGY

## 2022-01-06 PROCEDURE — 96361 HYDRATE IV INFUSION ADD-ON: CPT

## 2022-01-06 RX ORDER — 0.9 % SODIUM CHLORIDE 0.9 %
1000 INTRAVENOUS SOLUTION INTRAVENOUS ONCE
Status: CANCELLED | OUTPATIENT
Start: 2022-01-11 | End: 2022-01-11

## 2022-01-06 RX ORDER — 0.9 % SODIUM CHLORIDE 0.9 %
1000 INTRAVENOUS SOLUTION INTRAVENOUS ONCE
Status: COMPLETED | OUTPATIENT
Start: 2022-01-06 | End: 2022-01-06

## 2022-01-06 RX ADMIN — SODIUM CHLORIDE 1000 ML: 9 INJECTION, SOLUTION INTRAVENOUS at 13:25

## 2022-01-06 ASSESSMENT — PAIN DESCRIPTION - LOCATION: LOCATION: THROAT

## 2022-01-06 ASSESSMENT — PAIN SCALES - GENERAL: PAINLEVEL_OUTOF10: 6

## 2022-01-06 ASSESSMENT — PAIN DESCRIPTION - PAIN TYPE: TYPE: ACUTE PAIN

## 2022-01-06 NOTE — PROGRESS NOTES
Patient assessed for the following post IV hydration:    Dizziness   No  Lightheadedness  No      Acute nausea/vomiting No  Headache   No  Chest pain/pressure  No  Rash/itching   No  Shortness of breath  No    Patient tolerated IV hydration without any complications. Last vital signs:   /68   Pulse 91   Temp 98.4 °F (36.9 °C) (Oral)   Resp 16   Ht 5' 11\" (1.803 m)   Wt 154 lb 3.2 oz (69.9 kg)   SpO2 96%   BMI 21.51 kg/m²     Patient instructed if experience any of the above symptoms following today's infusion, he is to notify MD immediately or go to the emergency department. Discharge instructions given to patient. Verbalizes understanding. Ambulated off unit per self, accompanied by spouse, with belongings.

## 2022-01-07 DIAGNOSIS — C32.1 PRIMARY SQUAMOUS CELL CARCINOMA OF SUPRAGLOTTIS (HCC): ICD-10-CM

## 2022-01-07 RX ORDER — OXYCODONE HYDROCHLORIDE 5 MG/1
5 TABLET ORAL EVERY 4 HOURS PRN
Qty: 42 TABLET | Refills: 0 | Status: SHIPPED | OUTPATIENT
Start: 2022-01-07 | End: 2022-01-14

## 2022-01-07 RX ORDER — FENTANYL 12 UG/H
2 PATCH TRANSDERMAL
Qty: 10 PATCH | Refills: 0 | Status: SHIPPED | OUTPATIENT
Start: 2022-01-07 | End: 2022-01-22

## 2022-01-10 NOTE — PROGRESS NOTES
[facility]           [address]        [place],  [facility]        Shayne Mckenzie: [phone]        F: [phone]       fintonic     END OF TREATMENT SUMMARY    Date of Service: 2021  Patient ID: Dileep Garrido   : 1960  MRN: [id number]   Acct Number: [de-identified]           DIAGNOSIS:   Cancer Staging  Primary squamous cell carcinoma of supraglottis ([facility] [place] [address]  Staging form: Larynx - Supraglottis, AJCC 8th Edition  - Clinical stage from 2021: Stage III (cT3, cN0, cM0) - Signed by Felipe Figueredo MD on 2021      HISTORY OF PRESENT ILLNESS:  Oncology History Overview Note   Dileep Garrido is a 59 y.o. male    Initially presented with a history significant for dysphonia for a period of approximately 8 months. He present to Dr. Maura Avery ([facility]) on 21, with further examination demonstrating a Right sided supraglottic laryngeal mass, noting an immobile right vocal fold (left vocal fold mobile) with mild interarytenoid pachydermia and post cricoid edema. CT neck was performed prior to this visit on 2021 which demonstrated a Right sided laryngeal mass, 2.8 cm, that extended to midline. He was taken for a DML on 21 with biopsy, intraoperative findings demonstrated a firm lesion of the right larynx, with submucosal fullness fo the right aryepiglottic fold posteriorly, extending down to the ipsilateral false cord, ventricle, and right true vocal cord, the lesion was noted to be necrotic in the deep aspect, supraglottic component mildly debulked, no apparent involvement of the right pyriform sinus. Pathology returned as invasive squamous cell carcinoma (SCC).     21 CT Abd/Pelv W Con    Impression:  No acute findings in the abdomen or pelvis        21 CT ST Neck W Con                 Primary squamous cell carcinoma of supraglottis ([facility] [place] [address]   2021 Initial Diagnosis    Primary squamous cell carcinoma of supraglottis ([facility] [address]     11/10/2021 - 12/30/2021 Radiation    Treatment Course Number: 1    Treatment Site (s) Modality Dose (cGy) Fractions Elapsed Days   Supraglottic larynx Primary, High Risk/Regional Lymph Nodes IMRT/SIB 7000/6300/5400 35 50     Cumulative Dose: 7000 cGy    Radiation therapy delivered under the care of Dr. Royer Richard MD MS ([facility])           Treatment Tolerance/Response:     Comfort Alteration  Fatigue:Must curtail daily activities even with rest periods and early bedtime    Pain Location: Throat  Pain Intensity (Current): 9  Between very severe and worst possible pain  Pain Treatment: Opioid  Pain Relief: Pain relieved 25%    Emotional Alteration:   Coping: effective    Nutritional Alteration  Anorexia: Loss of appetite but able to eat a small amount of food and/or liquids  Nausea: No nausea noted   Vomiting: No vomiting   Salivary Glands- Acute: Mild PO dryness, thick saliva, altered taste, no change in PO intake   Taste Disturbance (Dysgeusia): Markedly altered, no taste  Dyspepsia/Heartburn: None  Dysphagia/Esophagitis: Dysphagia, requires pureed, soft or liquid diet    Skin Alteration   Skin reaction: Faint or dull erythema; follicular reaction  Alopecia: Mild hair loss    Mucous Membrane Alteration  Mucositis XRT Related: Erythema of the mucosa  Pharynx and Esophagus- Acute: Mild dysphagia or odynophagia, topical anesthetic, soft diet  Voice Changes: Whispered speech, not able to vocalize, marked edema    Ventilation Alteration  Cough: Productive  Hemoptysis: None  Dyspnea: Dyspnea on exertion   Mucous Quantity/Quality: clear/yellow, thick    CNS Alteration  Level of Consciousness: Alert, responds briskly, appropriately with minimal stimulus  Seizure Activity: None  Insomnia: Normal   Orientation: Oriented in 3 spheres of person, place, and time  Comment: n/a  Speech Impairment: No  Ataxia: Normal    Additional Comments: Using CeraVe BID, Betamethasone/Aquaphor at night.  MLB is helping, and Biotene if mouth gets really dry. ECO - Symptomatic, <50% in bed during the day (Ambulatory and capable of all self care but unable to carry out any work activities. Up and about more than 50% of waking hours)    Roslyn Chapman tolerated radiation therapy well with moderate to severe treatment related symptoms as detailed above. Radiation therapy was completed as planned without any significant treatment breaks or suspensions. Any treatment effects experienced at completion of therapy should improve or resolve in the next 2-3 weeks. Continue symptom management, if required, as instructed on the last day of treatment. Systemic Therapy: None Patient refused      Follow Up Plan: Patient tolerated radiation therapy well overall with moderate to severe side effects noted above. Continue regular follow up with all other treating physicians. The patient will return to clinic in 1 month or sooner if clinically indicated. They have our clinic number to call with any questions or concerns if needed. Thank you for allowing us to be a part of their care.     Electronically signed by Candy Hernandez MD on 1/10/2022 at 2:58 PM  Radiation Oncology    CC:  Dr. Gracia Kumar ([facility])   [facility]: Tumor Registry: [address]

## 2022-01-11 ENCOUNTER — HOSPITAL ENCOUNTER (OUTPATIENT)
Dept: INFUSION THERAPY | Age: 62
End: 2022-01-11

## 2022-01-13 ENCOUNTER — HOSPITAL ENCOUNTER (OUTPATIENT)
Dept: INFUSION THERAPY | Age: 62
Discharge: HOME | End: 2022-01-13
Payer: OTHER GOVERNMENT

## 2022-01-13 VITALS
OXYGEN SATURATION: 98 % | TEMPERATURE: 99.2 F | DIASTOLIC BLOOD PRESSURE: 86 MMHG | RESPIRATION RATE: 18 BRPM | HEART RATE: 102 BPM | SYSTOLIC BLOOD PRESSURE: 124 MMHG

## 2022-01-13 DIAGNOSIS — E86.0 DEHYDRATION: Primary | ICD-10-CM

## 2022-01-13 DIAGNOSIS — C32.1 PRIMARY SQUAMOUS CELL CARCINOMA OF SUPRAGLOTTIS (HCC): ICD-10-CM

## 2022-01-13 PROCEDURE — 96361 HYDRATE IV INFUSION ADD-ON: CPT

## 2022-01-13 PROCEDURE — 2580000003 HC RX 258: Performed by: RADIOLOGY

## 2022-01-13 PROCEDURE — 96360 HYDRATION IV INFUSION INIT: CPT

## 2022-01-13 RX ORDER — 0.9 % SODIUM CHLORIDE 0.9 %
1000 INTRAVENOUS SOLUTION INTRAVENOUS ONCE
Status: CANCELLED | OUTPATIENT
Start: 2022-01-13 | End: 2022-01-13

## 2022-01-13 RX ORDER — 0.9 % SODIUM CHLORIDE 0.9 %
1000 INTRAVENOUS SOLUTION INTRAVENOUS ONCE
Status: COMPLETED | OUTPATIENT
Start: 2022-01-13 | End: 2022-01-13

## 2022-01-13 RX ADMIN — SODIUM CHLORIDE 1000 ML: 9 INJECTION, SOLUTION INTRAVENOUS at 13:16

## 2022-01-13 ASSESSMENT — PAIN DESCRIPTION - LOCATION: LOCATION: THROAT

## 2022-01-13 ASSESSMENT — PAIN SCALES - GENERAL: PAINLEVEL_OUTOF10: 4

## 2022-01-13 NOTE — PLAN OF CARE
Problem: Musculor/Skeletal Functional Status  Goal: Absence of falls  Outcome: Met This Shift  Note: Patient verbalizes understanding of fall precautions. Patient free from falls this visit. Intervention: Fall precautions  Note: Discussed fall precautions, call light within reach. Problem: Intellectual/Education/Knowledge Deficit  Goal: Teaching initiated upon admission  Outcome: Met This Shift  Note: Patient verbalizes understanding to verbal information given on IV fluids,action and possible side effects. Aware to call MD if develop complications. Intervention: Verbal/written education provided  Note: Discussed IV fluids and when to call the doctor. Problem: Discharge Planning  Goal: Knowledge of discharge instructions  Description: Knowledge of discharge instructions  Outcome: Met This Shift  Note: Verbalized understanding of discharge instructions, follow-up appointments, and when to call the physician. Intervention: Discharge to appropriate level of care  Note: Discuss discharge instructions, follow ups, and when to call the doctor. Care plan reviewed with patient and family. Patient and family verbalize understanding of the plan of care and contribute to goal setting.

## 2022-01-13 NOTE — PROGRESS NOTES
Hydration complete. Tolerated well. Patient drank full bottle of water while getting infusion. States will call if needed. Discharged in satisfactory condition.  Ambulated off unit per self

## 2022-02-02 ENCOUNTER — HOSPITAL ENCOUNTER (OUTPATIENT)
Dept: RADIATION ONCOLOGY | Age: 62
Discharge: HOME | End: 2022-02-02
Attending: RADIOLOGY
Payer: OTHER GOVERNMENT

## 2022-02-02 VITALS
WEIGHT: 148.6 LBS | HEART RATE: 101 BPM | OXYGEN SATURATION: 98 % | TEMPERATURE: 97.2 F | BODY MASS INDEX: 20.73 KG/M2 | SYSTOLIC BLOOD PRESSURE: 125 MMHG | RESPIRATION RATE: 18 BRPM | DIASTOLIC BLOOD PRESSURE: 80 MMHG

## 2022-02-02 PROCEDURE — 99212 OFFICE O/P EST SF 10 MIN: CPT | Performed by: RADIOLOGY

## 2022-02-02 ASSESSMENT — PAIN SCALES - GENERAL: PAINLEVEL_OUTOF10: 2

## 2022-02-02 ASSESSMENT — PAIN DESCRIPTION - LOCATION: LOCATION: THROAT

## 2022-02-02 ASSESSMENT — PAIN DESCRIPTION - PAIN TYPE: TYPE: CHRONIC PAIN

## 2022-02-08 DIAGNOSIS — C32.1 PRIMARY SQUAMOUS CELL CARCINOMA OF SUPRAGLOTTIS (HCC): Primary | ICD-10-CM

## 2022-02-08 NOTE — PROGRESS NOTES
[facility])         [address], [address]        [place],  [facility]        Allen Spatz: [phone]        F: [phone]       [email or web address]          FOLLOW UP    Date of Service: 2022  Patient ID: Bonny Little   : 1960  MRN: [id number]   Acct Number: [de-identified]       DATE OF SERVICE: 2022   LOCATION: [facility]  PROVIDER: Shelley Soliman MD    FOLLOW UP PHYSICIANS: Dr. Marlin Poole ([facility])    ASSESSMENT AND PLAN:  Cancer Staging  Primary squamous cell carcinoma of supraglottis ([facility] [address]  Staging form: Larynx - Supraglottis, AJCC 8th Edition  - Clinical stage from 2021: Stage III (cT3, cN0, cM0) - Signed by Tristan Sharp MD on 2021    Mr. Lorne Hamman presents today for regularly scheduled follow-up visit following completion of definitive chemoradiation therapy for his supraglottic laryngeal carcinoma. Patient is now 1 month out from treatment completion doing well overall. Patient tolerated p.o. intake well with no dietary limitations, weight stable. Patient notes only mild pain with swallowing which is relieved with over-the-counter NSAIDs. Patient notes taste is improving and is her. Patient does note intermittent coughing with swallowing from time to time. We will have him be seen by speech-language pathology for further evaluation and treatment recommendation. He is otherwise well with no other significant complaints. We will plan to order surveillance imaging for treatment response prior to next visit. Patient to continue regular follow-up with all other treating physicians. The patient will return to clinic in 3 months or sooner if clinically indicated. They have our clinic number to call with any questions or concerns if needed. Thank you for allowing us to be a part of their care.     HPI:  Oncology History Overview Note   Bonny Little is a 59 y.o. male    Initially presented with a history significant for now doing well. Patient states that he was partial taste which is improving. Patient states that he has good saliva. Patient notes intermittent cough with food. Patient notes intermittent blurry vision. Patient notes moderate fatigue on exertion. Patient otherwise well and has no other specific general complaints at this time on complete review of systems. CHAPERONE: na    ROS:  A complete review of systems was performed and found to be negative except as presented above. LABORATORY STUDIES:    Onc labs:   Lab Results   Component Value Date    PSA 1.06 09/29/2021       MEDICATIONS:   Current Outpatient Medications   Medication Sig Dispense Refill    lidocaine viscous hcl (XYLOCAINE) 2 % SOLN solution Take 5 mLs by mouth as needed for Irritation 100 mL 3    prochlorperazine (COMPAZINE) 10 MG tablet Take 1 tablet by mouth every 6 hours as needed (nausea) 120 tablet 3    amLODIPine (NORVASC) 5 MG tablet Take 5 mg by mouth daily      Dulaglutide (TRULICITY SC) Inject into the skin every 7 days      Insulin Aspart (NOVOLOG FLEXPEN SC) Inject into the skin Sliding Scale      vitamin D (CHOLECALCIFEROL) 125 MCG (5000 UT) CAPS capsule Take 125 mcg by mouth daily      gabapentin (NEURONTIN) 300 MG capsule 300 mg 3 times daily.  SODIUM FLUORIDE, DENTAL GEL, 1.1 % GEL Apply 3-4 drops of gel directly to teeth. Do  Not eat or drink for 30min. Once daily at bedtime.  glimepiride (AMARYL) 4 MG tablet TAKE ONE TABLET BY MOUTH TWICE DAILY 30 tablet 0    LANTUS SOLOSTAR 100 UNIT/ML injection pen INJECT 52 UNITS INTO THE SKIN NIGHTLY. 3 Pen 0    hydrochlorothiazide (HYDRODIURIL) 25 MG tablet Take 1 tablet by mouth daily 30 tablet 3    acetaminophen-codeine (TYLENOL/CODEINE #3) 300-30 MG per tablet Take 1 tablet by mouth every 4 hours as needed for Pain 20 tablet 0    ibuprofen (ADVIL;MOTRIN) 600 MG tablet Take 1 tablet by mouth every 6 hours as needed for Pain for up to 30 doses.  30 tablet 0    meloxicam (MOBIC) 7.5 MG tablet TAKE ONE TABLET BY MOUTH EVERY DAY 30 tablet 3    diphenhydrAMINE (BENADRYL) 25 MG tablet Take 25 mg by mouth every 6 hours as needed for Itching.  metFORMIN (GLUCOPHAGE) 1000 MG tablet Take 1 tablets twice a day 60 tablet 3    hydrochlorothiazide (HYDRODIURIL) 25 MG tablet Take 1 tablet by mouth daily. 30 tablet 3    aspirin 81 MG tablet Take 1 tablet by mouth daily. 30 tablet 11    atorvastatin (LIPITOR) 20 MG tablet Take 1 tablet by mouth daily. 30 tablet 6    omeprazole (PRILOSEC) 20 MG capsule Take 1 capsule by mouth daily. 30 capsule 6    gemfibrozil (LOPID) 600 MG tablet Take 1 tablet by mouth 2 times daily (before meals). 60 tablet 5    insulin glargine (LANTUS SOLOSTAR) 100 UNIT/ML injection pen Inject 50 Units into the skin nightly. 1 Pen 0    Cimetidine (ACID REDUCER PO) Take 150 mg by mouth daily.  Insulin Pen Needle ([employer] PEN NEEDLES) 31G X 6 MM MISC by Does not apply route. 100 each 3    MULTIPLE VITAMIN PO Take 1 tablet by mouth daily. No current facility-administered medications for this encounter. EXAMINATION:  VITAL SIGNS: /80   Pulse 101   Temp 97.2 °F (36.2 °C) (Infrared)   Resp 18   Wt 148 lb 9.6 oz (67.4 kg)   SpO2 98%   BMI 20.73 kg/m²     ECO - Symptomatic but completely ambulatory (Restricted in physically strenuous activity but ambulatory and able to carry out work of a light or sedentary nature. For example, light housework, office work)    PAIN: 0/10    General: NAD, AO x 3, Mentation is clear with appropriate affect. HEENT:  Visible oral cavity no obvious lesions noted on exam, no significant palpable lymphadenopathy noted bilaterally.    Thorax:  Unlabored  COR: Regular rate and rhythm  Abdomen:  Non-distended  Neuro:  Cranial nerves grossly intact; no focal deficits    Electronically signed by Kurt Sanches MD on 22 at 8:11 AM EST     ATTESTATION: 30 minutes were spent with the patient at today's visit reviewing pertinent information related to their oncologic diagnosis, including any recent labs, imaging, follow ups and plan of care going forward.     CC:Dr. Alexia Fox ([facility])  ACC:[facility]

## 2022-02-10 NOTE — PLAN OF CARE
Problem: Pain:  Description: Pain management should include both nonpharmacologic and pharmacologic interventions. Goal: Pain level will decrease  Description: Pain level will decrease  Outcome: Met This Shift  Goal: Control of acute pain  Description: Control of acute pain  Outcome: Met This Shift  Note: Patient rating pain a 6 out of 10 using JETHRO scale, Pain located in throat. Goal: Control of chronic pain  Description: Control of chronic pain  Outcome: Met This Shift  Intervention: Assess barriers to pain control  Description: Assess barriers to pain control - REMINDER(s):  Addiction concerns. Healthcare practices, cultural.  Pain medication concerns. Past pain experiences. Note: Patient encouraged to take prescribed pain medications and call Physician if pain is not controlled. Problem: Intellectual/Education/Knowledge Deficit  Goal: Teaching initiated upon admission  Outcome: Met This Shift  Note: Verbalizes understanding to signs and symptoms of dehydration and when to call the Physician. Intervention: Verbal/written education provided  Note: Patient received 1000 ml of NS IV infusion over 2 hours. Patient drank 250 ml of boost while in OP oncology. Encouraged patient to drink 48 to 64 ounces of fluids everyday. Problem: Musculor/Skeletal Functional Status  Goal: Absence of falls  Outcome: Met This Shift  Note: Patient free from falls while in O.P. Oncology. Intervention: Fall precautions  Note: Patient assessed for fall risk on admission to [address] [facility] [facility]. Fall band placed on patient. Discussed the need to use the call light for assistance prior to getting up out of chair/bed. Problem: Discharge Planning  Goal: Knowledge of discharge instructions  Description: Knowledge of discharge instructions  Outcome: Met This Shift  Note: Verbalized understanding of discharge instructions, follow-up appointments, and when to call the physician.    Intervention: Interaction with patient/family and care team  Note: Discuss understanding of discharge instructions,follow-up appointments, and when to call the physician. Care plan reviewed with patient and spouse. Patient and spouse verbalize understanding of the plan of care and contribute to goal setting. [FreeTextEntry3] : GM 1y FMA 1y PS 1y L 1y

## 2022-02-14 ENCOUNTER — HOSPITAL ENCOUNTER (OUTPATIENT)
Dept: PHYSICAL THERAPY | Age: 62
Setting detail: THERAPIES SERIES
Discharge: HOME | End: 2022-02-14
Payer: OTHER GOVERNMENT

## 2022-02-14 DIAGNOSIS — C32.1 PRIMARY SQUAMOUS CELL CARCINOMA OF SUPRAGLOTTIS (HCC): Primary | ICD-10-CM

## 2022-02-14 PROCEDURE — 97110 THERAPEUTIC EXERCISES: CPT

## 2022-02-14 PROCEDURE — 97161 PT EVAL LOW COMPLEX 20 MIN: CPT

## 2022-02-14 NOTE — DISCHARGE SUMMARY
** PLEASE SIGN, DATE AND TIME CERTIFICATION BELOW AND RETURN TO [facility] OUTPATIENT REHABILITATION (FAX #: [phone]). ATTEST/CO-SIGN IF ACCESSING VIA [employer]. THANK YOU.**    I certify that I have examined the patient below and determined that Physical Medicine and Rehabilitation service is necessary and that I approve the established plan of care for up to 90 days or as specifically noted. Attestation, signature or co-signature of physician indicates approval of certification requirements.    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 States his fatigue is slowly improving and has returned to work. Wife present for session to supplement history. Social/Functional History and Current Status:  Medications and Allergies have been reviewed and are listed on Medical History Questionnaire.       Claudia Simon lives with spouse in a trailer with stairs and no handrail to enter. Wife lives in trailer and pt lives in truck.     Task Previous Current   ADLs  Independent Independent   IADL's Independent Independent   Ambulation Independent - assistance for long distance with scooter Independent - assistance with long distance with scooter   Transfers Independent Independent   Recreation Independent Independent   Community Integration Independent Independent   Driving Active  Active    Work [employer]. Occupation:  and  for [employer].            OBJECTIVE:   Posture: Poor, Protruded Head, Protracted Scapula  Palpation: No tenderness with palpation  Observation: No obvious deformity other than posture    Range of Motion/Strength (Range of Motion in degrees)     Right Left Comments   Shoulder Flexion AROM 180 180     Shoulder ABDuction AROM 180 180     Shoulder Strength 4+/5 4+/5     Elbow Strength 5/5 5/5     Neck Flexion 55   64 deg on 10/28/21    Neck Extension 22    32 deg on 10/28/21   Neck Rotation 76 84     Neck Sidebending 39 34                              Circumferential Measurements:   Head and Neck Measurements   NECK (cm)   Superior 42.7   Middle 39   Inferior 38.8   TOTAL NECK COMPOSITE 120.5       10/28/21:  128.5  Brief Fatigue Inventory: 5 (0: none, 1-3: mild, 4-6: moderate, 7-10: severe)  Quick Dash: 13.6% impaired  6-Minute Walk Test: covered 1615 feet with Luna RPE of 3; normal for age range is 7420-8216 feet    Treatment Initiated: Reviewed HEP for neck flexion and extension stretches and importance of stretches with post-radiation and to help stimulate lymphatic drainage.       TREATMENT   Precautions: Head and neck lymphedema   Pain: 5/10 throat    X in shaded column indicates activity completed today   Modalities Parameters/  Location  Notes         Manual Therapy Time/Technique  Notes         Exercise/Intervention   Notes   Neck flexion and extension stretches 3  x Importance of stretches for ROM post-radiation and to encourage lymphatic drainage        Specific Interventions Next Treatment: N/A    Activity Tolerance: Patient tolerated treatment well    ASSESSMENT:  Assessment: Pt with reduced neck flexion and extension however still within functional limits and reviewed HEP. No further skilled PT recommended at this time. Recommend Speech Therapy consult for swallowing difficulties. Body Structures/Functions/Activity Limitations:Neck stiffness and Lymphedema Risk  Prognosis: good    GOALS:  Patient Goal: Improve neck mobility    Short Term Goals:  1. See LTGs    Long Term Goals to be met in 1 visit:   1. Pt to be independent with HEP for improved neck mobility for driving and grooming needs. Patient Education: Plan of care, goals. See \"Treatment Initiated\" for further details. Recommendation of discharge. Pt in agreement.   Education Outcome: Verbalized understanding  Education Barriers: None    PLAN:  discharge    Time In 0940   Time Out 1020   Timed Code Minutes: 15 min   Total Treatment Time: 40 min       Electronically Signed by: Jesus Trujillo PT, DPT, NIKKI, CLT [id number] 2/14/2022

## 2022-02-15 DIAGNOSIS — R13.10 DYSPHAGIA, UNSPECIFIED TYPE: ICD-10-CM

## 2022-02-15 DIAGNOSIS — C32.1 PRIMARY SQUAMOUS CELL CARCINOMA OF SUPRAGLOTTIS (HCC): Primary | ICD-10-CM

## 2022-03-15 ENCOUNTER — APPOINTMENT (OUTPATIENT)
Dept: CT IMAGING | Age: 62
DRG: 312 | End: 2022-03-15
Payer: OTHER GOVERNMENT

## 2022-03-15 ENCOUNTER — HOSPITAL ENCOUNTER (INPATIENT)
Age: 62
LOS: 13 days | Discharge: HOME | DRG: 312 | End: 2022-03-28
Attending: EMERGENCY MEDICINE | Admitting: INTERNAL MEDICINE
Payer: OTHER GOVERNMENT

## 2022-03-15 ENCOUNTER — APPOINTMENT (OUTPATIENT)
Dept: GENERAL RADIOLOGY | Age: 62
DRG: 312 | End: 2022-03-15
Payer: OTHER GOVERNMENT

## 2022-03-15 DIAGNOSIS — E86.0 DEHYDRATION: ICD-10-CM

## 2022-03-15 DIAGNOSIS — R55 SYNCOPE AND COLLAPSE: Primary | ICD-10-CM

## 2022-03-15 PROBLEM — I95.1 ORTHOSTATIC HYPOTENSION: Status: ACTIVE | Noted: 2022-03-15

## 2022-03-15 LAB
ANION GAP SERPL CALCULATED.3IONS-SCNC: 8 MEQ/L (ref 8–16)
BASOPHILS # BLD: 0.3 %
BASOPHILS ABSOLUTE: 0.1 THOU/MM3 (ref 0–0.1)
BUN BLDV-MCNC: 14 MG/DL (ref 7–22)
CALCIUM SERPL-MCNC: 9.6 MG/DL (ref 8.5–10.5)
CHLORIDE BLD-SCNC: 102 MEQ/L (ref 98–111)
CO2: 28 MEQ/L (ref 23–33)
CREAT SERPL-MCNC: 0.6 MG/DL (ref 0.4–1.2)
EKG ATRIAL RATE: 80 BPM
EKG P AXIS: 54 DEGREES
EKG P-R INTERVAL: 160 MS
EKG Q-T INTERVAL: 420 MS
EKG QRS DURATION: 100 MS
EKG QTC CALCULATION (BAZETT): 484 MS
EKG R AXIS: 56 DEGREES
EKG T AXIS: 75 DEGREES
EKG VENTRICULAR RATE: 80 BPM
EOSINOPHIL # BLD: 0.6 %
EOSINOPHILS ABSOLUTE: 0.1 THOU/MM3 (ref 0–0.4)
ERYTHROCYTE [DISTWIDTH] IN BLOOD BY AUTOMATED COUNT: 13.3 % (ref 11.5–14.5)
ERYTHROCYTE [DISTWIDTH] IN BLOOD BY AUTOMATED COUNT: 42 FL (ref 35–45)
GFR SERPL CREATININE-BSD FRML MDRD: > 90 ML/MIN/1.73M2
GLUCOSE BLD-MCNC: 191 MG/DL (ref 70–108)
GLUCOSE BLD-MCNC: 70 MG/DL (ref 70–108)
GLUCOSE BLD-MCNC: 88 MG/DL (ref 70–108)
GLUCOSE BLD-MCNC: 91 MG/DL (ref 70–108)
HCT VFR BLD CALC: 37.9 % (ref 42–52)
HEMOGLOBIN: 11.9 GM/DL (ref 14–18)
IMMATURE GRANS (ABS): 0.1 THOU/MM3 (ref 0–0.07)
IMMATURE GRANULOCYTES: 0.6 %
LYMPHOCYTES # BLD: 4.1 %
LYMPHOCYTES ABSOLUTE: 0.7 THOU/MM3 (ref 1–4.8)
MCH RBC QN AUTO: 27.1 PG (ref 26–33)
MCHC RBC AUTO-ENTMCNC: 31.4 GM/DL (ref 32.2–35.5)
MCV RBC AUTO: 86.3 FL (ref 80–94)
MONOCYTES # BLD: 3.1 %
MONOCYTES ABSOLUTE: 0.5 THOU/MM3 (ref 0.4–1.3)
NUCLEATED RED BLOOD CELLS: 0 /100 WBC
OSMOLALITY CALCULATION: 275.7 MOSMOL/KG (ref 275–300)
PLATELET # BLD: 347 THOU/MM3 (ref 130–400)
PMV BLD AUTO: 9.1 FL (ref 9.4–12.4)
POTASSIUM SERPL-SCNC: 4.1 MEQ/L (ref 3.5–5.2)
RBC # BLD: 4.39 MILL/MM3 (ref 4.7–6.1)
SEG NEUTROPHILS: 91.3 %
SEGMENTED NEUTROPHILS ABSOLUTE COUNT: 15.9 THOU/MM3 (ref 1.8–7.7)
SODIUM BLD-SCNC: 138 MEQ/L (ref 135–145)
WBC # BLD: 17.4 THOU/MM3 (ref 4.8–10.8)

## 2022-03-15 PROCEDURE — 36415 COLL VENOUS BLD VENIPUNCTURE: CPT

## 2022-03-15 PROCEDURE — 96360 HYDRATION IV INFUSION INIT: CPT

## 2022-03-15 PROCEDURE — 1200000003 HC TELEMETRY R&B

## 2022-03-15 PROCEDURE — 96361 HYDRATE IV INFUSION ADD-ON: CPT

## 2022-03-15 PROCEDURE — 2580000003 HC RX 258: Performed by: STUDENT IN AN ORGANIZED HEALTH CARE EDUCATION/TRAINING PROGRAM

## 2022-03-15 PROCEDURE — 99223 1ST HOSP IP/OBS HIGH 75: CPT | Performed by: PHYSICIAN ASSISTANT

## 2022-03-15 PROCEDURE — 85025 COMPLETE CBC W/AUTO DIFF WBC: CPT

## 2022-03-15 PROCEDURE — 6370000000 HC RX 637 (ALT 250 FOR IP): Performed by: STUDENT IN AN ORGANIZED HEALTH CARE EDUCATION/TRAINING PROGRAM

## 2022-03-15 PROCEDURE — 2580000003 HC RX 258: Performed by: PHYSICIAN ASSISTANT

## 2022-03-15 PROCEDURE — 93005 ELECTROCARDIOGRAM TRACING: CPT | Performed by: EMERGENCY MEDICINE

## 2022-03-15 PROCEDURE — 71111 X-RAY EXAM RIBS/CHEST4/> VWS: CPT

## 2022-03-15 PROCEDURE — 72125 CT NECK SPINE W/O DYE: CPT

## 2022-03-15 PROCEDURE — 82948 REAGENT STRIP/BLOOD GLUCOSE: CPT

## 2022-03-15 PROCEDURE — 6370000000 HC RX 637 (ALT 250 FOR IP): Performed by: PHYSICIAN ASSISTANT

## 2022-03-15 PROCEDURE — 93010 ELECTROCARDIOGRAM REPORT: CPT | Performed by: INTERNAL MEDICINE

## 2022-03-15 PROCEDURE — 71110 X-RAY EXAM RIBS BIL 3 VIEWS: CPT

## 2022-03-15 PROCEDURE — 6360000002 HC RX W HCPCS: Performed by: PHYSICIAN ASSISTANT

## 2022-03-15 PROCEDURE — 80048 BASIC METABOLIC PNL TOTAL CA: CPT

## 2022-03-15 PROCEDURE — 70450 CT HEAD/BRAIN W/O DYE: CPT

## 2022-03-15 PROCEDURE — 99285 EMERGENCY DEPT VISIT HI MDM: CPT

## 2022-03-15 RX ORDER — SODIUM CHLORIDE 0.9 % (FLUSH) 0.9 %
10 SYRINGE (ML) INJECTION EVERY 12 HOURS SCHEDULED
Status: DISCONTINUED | OUTPATIENT
Start: 2022-03-15 | End: 2022-03-28 | Stop reason: HOSPADM

## 2022-03-15 RX ORDER — AMLODIPINE BESYLATE 5 MG/1
5 TABLET ORAL DAILY
Status: DISCONTINUED | OUTPATIENT
Start: 2022-03-15 | End: 2022-03-19

## 2022-03-15 RX ORDER — LIDOCAINE 4 G/G
1 PATCH TOPICAL DAILY
Status: DISCONTINUED | OUTPATIENT
Start: 2022-03-15 | End: 2022-03-28 | Stop reason: HOSPADM

## 2022-03-15 RX ORDER — MAGNESIUM SULFATE IN WATER 40 MG/ML
2000 INJECTION, SOLUTION INTRAVENOUS PRN
Status: DISCONTINUED | OUTPATIENT
Start: 2022-03-15 | End: 2022-03-28 | Stop reason: HOSPADM

## 2022-03-15 RX ORDER — DEXTROSE MONOHYDRATE 100 MG/ML
250 INJECTION, SOLUTION INTRAVENOUS PRN
Status: DISCONTINUED | OUTPATIENT
Start: 2022-03-15 | End: 2022-03-28 | Stop reason: HOSPADM

## 2022-03-15 RX ORDER — PROCHLORPERAZINE MALEATE 10 MG
10 TABLET ORAL EVERY 6 HOURS PRN
Status: DISCONTINUED | OUTPATIENT
Start: 2022-03-15 | End: 2022-03-28 | Stop reason: HOSPADM

## 2022-03-15 RX ORDER — ONDANSETRON 4 MG/1
4 TABLET, ORALLY DISINTEGRATING ORAL EVERY 8 HOURS PRN
Status: DISCONTINUED | OUTPATIENT
Start: 2022-03-15 | End: 2022-03-28 | Stop reason: HOSPADM

## 2022-03-15 RX ORDER — 0.9 % SODIUM CHLORIDE 0.9 %
30 INTRAVENOUS SOLUTION INTRAVENOUS ONCE
Status: COMPLETED | OUTPATIENT
Start: 2022-03-15 | End: 2022-03-15

## 2022-03-15 RX ORDER — HYDROCHLOROTHIAZIDE 25 MG/1
25 TABLET ORAL DAILY
Status: DISCONTINUED | OUTPATIENT
Start: 2022-03-15 | End: 2022-03-21

## 2022-03-15 RX ORDER — POTASSIUM CHLORIDE 20 MEQ/1
40 TABLET, EXTENDED RELEASE ORAL PRN
Status: DISCONTINUED | OUTPATIENT
Start: 2022-03-15 | End: 2022-03-28 | Stop reason: HOSPADM

## 2022-03-15 RX ORDER — GEMFIBROZIL 600 MG/1
600 TABLET, FILM COATED ORAL
Status: DISCONTINUED | OUTPATIENT
Start: 2022-03-15 | End: 2022-03-28 | Stop reason: HOSPADM

## 2022-03-15 RX ORDER — POTASSIUM CHLORIDE 7.45 MG/ML
10 INJECTION INTRAVENOUS PRN
Status: DISCONTINUED | OUTPATIENT
Start: 2022-03-15 | End: 2022-03-28 | Stop reason: HOSPADM

## 2022-03-15 RX ORDER — HYDROCODONE BITARTRATE AND ACETAMINOPHEN 5; 325 MG/1; MG/1
1 TABLET ORAL ONCE
Status: COMPLETED | OUTPATIENT
Start: 2022-03-15 | End: 2022-03-15

## 2022-03-15 RX ORDER — PANTOPRAZOLE SODIUM 40 MG/1
40 TABLET, DELAYED RELEASE ORAL
Status: DISCONTINUED | OUTPATIENT
Start: 2022-03-16 | End: 2022-03-28 | Stop reason: HOSPADM

## 2022-03-15 RX ORDER — POLYETHYLENE GLYCOL 3350 17 G/17G
17 POWDER, FOR SOLUTION ORAL DAILY PRN
Status: DISCONTINUED | OUTPATIENT
Start: 2022-03-15 | End: 2022-03-28 | Stop reason: HOSPADM

## 2022-03-15 RX ORDER — SODIUM CHLORIDE 9 MG/ML
25 INJECTION, SOLUTION INTRAVENOUS PRN
Status: DISCONTINUED | OUTPATIENT
Start: 2022-03-15 | End: 2022-03-28 | Stop reason: HOSPADM

## 2022-03-15 RX ORDER — DEXTROSE MONOHYDRATE 100 MG/ML
125 INJECTION, SOLUTION INTRAVENOUS PRN
Status: DISCONTINUED | OUTPATIENT
Start: 2022-03-15 | End: 2022-03-28 | Stop reason: HOSPADM

## 2022-03-15 RX ORDER — ASPIRIN 81 MG/1
81 TABLET ORAL DAILY
Status: DISCONTINUED | OUTPATIENT
Start: 2022-03-15 | End: 2022-03-28 | Stop reason: HOSPADM

## 2022-03-15 RX ORDER — INSULIN GLARGINE 100 [IU]/ML
50 INJECTION, SOLUTION SUBCUTANEOUS NIGHTLY
Status: DISCONTINUED | OUTPATIENT
Start: 2022-03-16 | End: 2022-03-16

## 2022-03-15 RX ORDER — SODIUM CHLORIDE, SODIUM LACTATE, POTASSIUM CHLORIDE, CALCIUM CHLORIDE 600; 310; 30; 20 MG/100ML; MG/100ML; MG/100ML; MG/100ML
INJECTION, SOLUTION INTRAVENOUS CONTINUOUS
Status: DISCONTINUED | OUTPATIENT
Start: 2022-03-15 | End: 2022-03-17

## 2022-03-15 RX ORDER — ACETAMINOPHEN 325 MG/1
650 TABLET ORAL EVERY 6 HOURS PRN
Status: DISCONTINUED | OUTPATIENT
Start: 2022-03-15 | End: 2022-03-28 | Stop reason: HOSPADM

## 2022-03-15 RX ORDER — GABAPENTIN 300 MG/1
300 CAPSULE ORAL 3 TIMES DAILY
Status: DISCONTINUED | OUTPATIENT
Start: 2022-03-15 | End: 2022-03-28 | Stop reason: HOSPADM

## 2022-03-15 RX ORDER — ACETAMINOPHEN 650 MG/1
650 SUPPOSITORY RECTAL EVERY 6 HOURS PRN
Status: DISCONTINUED | OUTPATIENT
Start: 2022-03-15 | End: 2022-03-28 | Stop reason: HOSPADM

## 2022-03-15 RX ORDER — DEXTROSE MONOHYDRATE 25 G/50ML
12.5 INJECTION, SOLUTION INTRAVENOUS PRN
Status: DISCONTINUED | OUTPATIENT
Start: 2022-03-15 | End: 2022-03-15 | Stop reason: RX

## 2022-03-15 RX ORDER — NICOTINE POLACRILEX 4 MG
15 LOZENGE BUCCAL PRN
Status: DISCONTINUED | OUTPATIENT
Start: 2022-03-15 | End: 2022-03-15 | Stop reason: RX

## 2022-03-15 RX ORDER — SODIUM CHLORIDE 0.9 % (FLUSH) 0.9 %
10 SYRINGE (ML) INJECTION PRN
Status: DISCONTINUED | OUTPATIENT
Start: 2022-03-15 | End: 2022-03-25 | Stop reason: SDUPTHER

## 2022-03-15 RX ORDER — DEXTROSE MONOHYDRATE 50 MG/ML
100 INJECTION, SOLUTION INTRAVENOUS PRN
Status: DISCONTINUED | OUTPATIENT
Start: 2022-03-15 | End: 2022-03-28 | Stop reason: HOSPADM

## 2022-03-15 RX ORDER — ONDANSETRON 2 MG/ML
4 INJECTION INTRAMUSCULAR; INTRAVENOUS EVERY 6 HOURS PRN
Status: DISCONTINUED | OUTPATIENT
Start: 2022-03-15 | End: 2022-03-28 | Stop reason: HOSPADM

## 2022-03-15 RX ORDER — ATORVASTATIN CALCIUM 20 MG/1
20 TABLET, FILM COATED ORAL NIGHTLY
Status: DISCONTINUED | OUTPATIENT
Start: 2022-03-15 | End: 2022-03-28 | Stop reason: HOSPADM

## 2022-03-15 RX ADMIN — GEMFIBROZIL 600 MG: 600 TABLET ORAL at 17:52

## 2022-03-15 RX ADMIN — SODIUM CHLORIDE, POTASSIUM CHLORIDE, SODIUM LACTATE AND CALCIUM CHLORIDE: 600; 310; 30; 20 INJECTION, SOLUTION INTRAVENOUS at 15:57

## 2022-03-15 RX ADMIN — HYDROCODONE BITARTRATE AND ACETAMINOPHEN 1 TABLET: 5; 325 TABLET ORAL at 13:43

## 2022-03-15 RX ADMIN — ASPIRIN 81 MG: 81 TABLET, COATED ORAL at 17:52

## 2022-03-15 RX ADMIN — INSULIN LISPRO 1 UNITS: 100 INJECTION, SOLUTION INTRAVENOUS; SUBCUTANEOUS at 20:33

## 2022-03-15 RX ADMIN — GABAPENTIN 300 MG: 300 CAPSULE ORAL at 20:30

## 2022-03-15 RX ADMIN — ATORVASTATIN CALCIUM 20 MG: 20 TABLET, FILM COATED ORAL at 20:30

## 2022-03-15 RX ADMIN — ENOXAPARIN SODIUM 40 MG: 100 INJECTION SUBCUTANEOUS at 17:10

## 2022-03-15 RX ADMIN — GABAPENTIN 300 MG: 300 CAPSULE ORAL at 17:52

## 2022-03-15 RX ADMIN — SODIUM CHLORIDE 2013 ML: 9 INJECTION, SOLUTION INTRAVENOUS at 12:39

## 2022-03-15 ASSESSMENT — ENCOUNTER SYMPTOMS
VOICE CHANGE: 0
PHOTOPHOBIA: 0
CHOKING: 0
SHORTNESS OF BREATH: 0
GASTROINTESTINAL NEGATIVE: 1
TROUBLE SWALLOWING: 0
CHEST TIGHTNESS: 0
SORE THROAT: 1

## 2022-03-15 ASSESSMENT — PAIN DESCRIPTION - PAIN TYPE
TYPE: ACUTE PAIN

## 2022-03-15 ASSESSMENT — PAIN - FUNCTIONAL ASSESSMENT
PAIN_FUNCTIONAL_ASSESSMENT: 0-10

## 2022-03-15 ASSESSMENT — PAIN DESCRIPTION - LOCATION
LOCATION: BACK;RIB CAGE
LOCATION: BACK;RIB CAGE

## 2022-03-15 ASSESSMENT — PAIN SCALES - GENERAL
PAINLEVEL_OUTOF10: 10
PAINLEVEL_OUTOF10: 8
PAINLEVEL_OUTOF10: 10
PAINLEVEL_OUTOF10: 10
PAINLEVEL_OUTOF10: 8
PAINLEVEL_OUTOF10: 10
PAINLEVEL_OUTOF10: 0

## 2022-03-15 ASSESSMENT — PAIN DESCRIPTION - DESCRIPTORS: DESCRIPTORS: DISCOMFORT

## 2022-03-15 NOTE — ED TRIAGE NOTES
Pt presents to the ER from home after passing out this morning. Pt states he got dizzy and fell back onto cement. Pt states he does remember hitting his head, denies blood thinners or headache. Pt states he is feeling some pain in his back and ribs. Pt states he has a hx of larynx cancer in 2021 and is not able to drink much d/t the pain. Pt also has a hx of diabetes, states BG was 222 this morning. Pt states he has passed out in the past but it has been a few years. Pt denies chest pain and SOB. Assessment complete. Pt alert and oriented, respirations even and unlabored. No obvious injuries to back or head. BG currently 88.

## 2022-03-15 NOTE — ED NOTES
Pt back in room from radiology. Lab at bedside for blood draw. No concerns expressed at this time.  [facility], [address] [place] [address]  03/15/22 9314

## 2022-03-15 NOTE — ED NOTES
ED to inpatient nurses report    Chief Complaint   Patient presents with    Loss of Consciousness      Present to ED from home  LOC: alert and orientated to name, place, date  Vital signs   Vitals:    03/15/22 1124 03/15/22 1155 03/15/22 1241 03/15/22 1346   BP:  (!) 69/46 (!) 166/79 (!) 159/77   Pulse:  76 80 82   Resp: 16 14 18 16   Temp:       TempSrc:       SpO2: 98% 99% 99% 100%   Weight:       Height:          Oxygen Baseline RA    Current needs required RA Bipap/Cpap No  LDAs:   Peripheral IV 03/15/22 Right Antecubital (Active)   Site Assessment Clean;Dry; Intact 03/15/22 1347   Line Status Infusing 03/15/22 1347   Dressing Status Clean;Dry; Intact 03/15/22 1347     Mobility: Requires assistance * 1  Pending ED orders: Complete  Present condition: Stable    Electronically signed by Dhara Price RN on 3/15/2022 at 2:22 PM     Dhara Price RN  03/15/22 1429

## 2022-03-15 NOTE — ED PROVIDER NOTES
[facility] ENCOUNTER          Pt Name: Jose G Elliott  MRN: [id number]  [place] 1960  Date of evaluation: 3/15/2022  Treating Resident Physician: Alex Liang MD  Supervising Physician: Wai Langley       Chief Complaint   Patient presents with    Loss of Consciousness     History obtained from the patient. HISTORY OF PRESENT ILLNESS    HPI  Jose G Elliott is a 57 y.o. male who presents to the emergency department for evaluation of LOC. Patient states that he has had increased odynophagia without dysphagia over the past couple weeks, has had no frequently decreased p.o. intake. Was not feeling well this morning, started driving home from work, stood up out of the car, got dizzy fell back striking his head. Complaining of right-sided rib pain only. Still gets dizzy whenever he tries to stand up. The patient has no other acute complaints at this time. REVIEW OF SYSTEMS   Review of Systems   Constitutional: Negative for fatigue and fever. HENT: Positive for sore throat. Negative for trouble swallowing and voice change. Eyes: Negative for photophobia and visual disturbance. Respiratory: Negative for choking, chest tightness and shortness of breath. Cardiovascular: Negative for chest pain, palpitations and leg swelling. Gastrointestinal: Negative. Endocrine: Negative for polyphagia and polyuria. Genitourinary: Negative. Musculoskeletal: Negative. Neurological: Positive for syncope and light-headedness. Negative for weakness and numbness. PAST MEDICAL AND SURGICAL HISTORY     Past Medical History:   Diagnosis Date    Arthritis     GERD (gastroesophageal reflux disease)     Hypertension     [facility]    Type II or unspecified type diabetes mellitus without mention of complication, not stated as uncontrolled      History reviewed.  No pertinent surgical history.       MEDICATIONS     Current Facility-Administered Medications:     lidocaine 4 % external patch 1 patch, 1 patch, TransDERmal, Daily, Fernando Milton MD, 1 patch at 03/15/22 1343    aspirin EC tablet 81 mg, 81 mg, Oral, Daily, Aydee Pimentel PA-C    [Held by provider] amLODIPine (NORVASC) tablet 5 mg, 5 mg, Oral, Daily, Alison Denney PA-C    atorvastatin (LIPITOR) tablet 20 mg, 20 mg, Oral, Nightly, Alison Denney PA-C    gabapentin (NEURONTIN) capsule 300 mg, 300 mg, Oral, TID, Alison Denney PA-C    gemfibrozil (LOPID) tablet 600 mg, 600 mg, Oral, BID AC, Alison Denney PA-C    [Held by provider] hydroCHLOROthiazide (HYDRODIURIL) tablet 25 mg, 25 mg, Oral, Daily, Alison Denney PA-C    [START ON 3/16/2022] insulin glargine (LANTUS) injection vial 50 Units, 50 Units, SubCUTAneous, Nightly, Alison Denney PA-C    [START ON 3/16/2022] pantoprazole (PROTONIX) tablet 40 mg, 40 mg, Oral, QAM AC, Alison Denney PA-C    prochlorperazine (COMPAZINE) tablet 10 mg, 10 mg, Oral, Q6H PRN, Alison Denney PA-C    sodium chloride flush 0.9 % injection 10 mL, 10 mL, IntraVENous, 2 times per day, Alison Denney PA-C    sodium chloride flush 0.9 % injection 10 mL, 10 mL, IntraVENous, PRN, Alison Denney PA-C    0.9 % sodium chloride infusion, 25 mL, IntraVENous, PRN, Alison Denney PA-C    enoxaparin (LOVENOX) injection 40 mg, 40 mg, SubCUTAneous, Daily, Alison Denney PA-C, 40 mg at 03/15/22 1710    ondansetron (ZOFRAN-ODT) disintegrating tablet 4 mg, 4 mg, Oral, Q8H PRN **OR** ondansetron (ZOFRAN) injection 4 mg, 4 mg, IntraVENous, Q6H PRN, Alison Denney PA-C    polyethylene glycol (GLYCOLAX) packet 17 g, 17 g, Oral, Daily PRN, Alison Denney PA-C    acetaminophen (TYLENOL) tablet 650 mg, 650 mg, Oral, Q6H PRN **OR** acetaminophen (TYLENOL) suppository 650 mg, 650 mg, Rectal, Q6H PRN, Alison Denney PA-C    potassium chloride (KLOR-CON M) extended release tablet 40 mEq, 40 mEq, Oral, PRN **OR** potassium bicarb-citric acid (EFFER-K) effervescent tablet 40 mEq, 40 mEq, Oral, PRN **OR** potassium chloride 10 mEq/100 mL IVPB (Peripheral Line), 10 mEq, IntraVENous, PRN, Alison Vonseggern, PA-C    magnesium sulfate 2000 mg in 50 mL IVPB premix, 2,000 mg, IntraVENous, PRN, Alison Vonseggern, PA-C    insulin lispro (HUMALOG) injection vial 0-12 Units, 0-12 Units, SubCUTAneous, TID WC, Alison Vonseggern, PA-C    insulin lispro (HUMALOG) injection vial 0-6 Units, 0-6 Units, SubCUTAneous, Nightly, Alison Vonseggern, PA-C    glucagon (rDNA) injection 1 mg, 1 mg, IntraMUSCular, PRN, Alison Vonseggern, PA-C    dextrose 5 % solution, 100 mL/hr, IntraVENous, PRN, Alison Vonseggern, PA-C    glucose chewable tablet 4 each, 4 tablet, Oral, PRN, Alison Vonseggern, PA-C    dextrose 10 % infusion, 125 mL, IntraVENous, PRN **OR** dextrose 10 % infusion, 250 mL, IntraVENous, PRN, Alison Vonseggern, PA-C    lactated ringers infusion, , IntraVENous, Continuous, Alison Vonseggern, PA-C, Last Rate: 125 mL/hr at 03/15/22 1557, New Bag at 03/15/22 1557      SOCIAL HISTORY     Social History     Social History Narrative    Not on file     Social History     Tobacco Use    Smoking status: Never Smoker    Smokeless tobacco: Never Used    Tobacco comment: Recently started cigars but quit   Vaping Use    Vaping Use: Never used   Substance Use Topics    Alcohol use: Not Currently     Alcohol/week: 1.0 standard drink     Types: 1 Shots of liquor per week     Comment: occasionaly    Drug use: No         ALLERGIES     Allergies   Allergen Reactions    Lisinopril Swelling     Angioedema of the face/lips  Other reaction(s): cough  Pt unsure but thinks he is allergic to this           FAMILY HISTORY     Family History   Problem Relation Age of Onset    Diabetes Mother     Stroke Mother     Cancer Maternal Grandfather         liver    Cancer Father          PREVIOUS RECORDS   Previous records reviewed: Medical, past surgical, medications, allergies        PHYSICAL EXAM     ED Triage Vitals [03/15/22 1114]   BP Temp Temp Source Pulse Resp SpO2 Height Weight   113/78 97.7 °F (36.5 °C) Oral 81 18 98 % 5' 11\" (1.803 m) 148 lb (67.1 kg)     Initial vital signs and nursing assessment reviewed and Orthostatic. Body mass index is 21.12 kg/m². Pulsoximetry is normal per my interpretation. Additional Vital Signs:  Vitals:    03/15/22 1559   BP: (!) 154/78   Pulse: 81   Resp: 18   Temp: 97.3 °F (36.3 °C)   SpO2: 100%       Physical Exam  Constitutional:       Appearance: Normal appearance. He is ill-appearing. HENT:      Head: Normocephalic and atraumatic. Right Ear: External ear normal.      Left Ear: External ear normal.      Nose: Nose normal.      Mouth/Throat:      Mouth: Mucous membranes are dry. Pharynx: Oropharynx is clear. Eyes:      Extraocular Movements: Extraocular movements intact. Conjunctiva/sclera: Conjunctivae normal.      Pupils: Pupils are equal, round, and reactive to light. Cardiovascular:      Rate and Rhythm: Normal rate and regular rhythm. Pulses: Normal pulses. Heart sounds: Normal heart sounds. Comments: Orthostatic  Pulmonary:      Effort: Pulmonary effort is normal.      Breath sounds: Normal breath sounds. Chest:      Chest wall: Tenderness present. Abdominal:      General: Abdomen is flat. Bowel sounds are normal.   Musculoskeletal:         General: Normal range of motion. Right lower leg: No edema. Left lower leg: No edema. Skin:     General: Skin is warm and dry. Capillary Refill: Capillary refill takes less than 2 seconds. Neurological:      General: No focal deficit present. Mental Status: He is alert and oriented to person, place, and time. Sensory: No sensory deficit. Motor: No weakness.               MEDICAL DECISION MAKING   Initial Assessment and Plan:    This is a 78-year-old male, history of hypertension, hyperlipidemia, diabetes, squamous cell carcinoma of supraglottis, status post radiation last in December, presenting with syncope and collapse. Patient states that he has had increased odynophagia without dysphagia over the past couple weeks, has had no frequently decreased p.o. intake. Was not feeling well this morning, started driving home from work, stood up out of the car, got dizzy fell back striking his head. Complaining of right-sided rib pain only. Still gets dizzy whenever he tries to stand up. Physical exam significant for severe orthostasis down to 69/46 with standing, tenderness to palpation of lateral right seventh rib. Labs significant for leukocytosis. I do not believe this patient is septic, no tachycardia, tachypnea, or fever. CT head and neck unremarkable. X-ray of bilateral ribs show irregularity of right seventh as well as several on the left. No tenderness to palpation of left ribs. I believe this patient has single right-sided rib fracture. Am not consulting trauma at this time, as patient only has one rib fracture, and is not being admitted due to that rib fracture. I would like to medically admit this patient for dehydration with syncope and collapse. ED RESULTS   Laboratory results:  Labs Reviewed   CBC WITH AUTO DIFFERENTIAL - Abnormal; Notable for the following components:       Result Value    WBC 17.4 (*)     RBC 4.39 (*)     Hemoglobin 11.9 (*)     Hematocrit 37.9 (*)     MCHC 31.4 (*)     MPV 9.1 (*)     Segs Absolute 15.9 (*)     Lymphocytes Absolute 0.7 (*)     Immature Grans (Abs) 0.10 (*)     All other components within normal limits   BASIC METABOLIC PANEL   ANION GAP   OSMOLALITY   GLOMERULAR FILTRATION RATE, ESTIMATED   POCT GLUCOSE   POCT GLUCOSE       Radiologic studies results:  CT Head WO Contrast   Final Result      1. Mild atrophy.    2. Inflammatory changes involving the left frontal sinus, ethmoid and maxillary sinuses bilaterally. 3. Slight deformity of the right maxillary sinus posterolaterally. 4. Otherwise negative noncontrast CT scan of the brain. **This report has been created using voice recognition software. It may contain minor errors which are inherent in voice recognition technology. **      Final report electronically signed by DR Anupama Lopez on 3/15/2022 12:47 PM      CT Cervical Spine WO Contrast   Final Result       1. Cervical spondylosis especially at C5-6 and C6-7.   2. No acute fracture noted. 3. Bilateral carotid artery calcification. 4. Otherwise negative CT scan of the cervical spine. .               **This report has been created using voice recognition software. It may contain minor errors which are inherent in voice recognition technology. **      Final report electronically signed by DR Anupama Lopez on 3/15/2022 12:52 PM      XR RIBS BILATERAL (MIN 4 VIEWS)   Final Result       1. Slight irregularity involving the left sixth, seventh and eighth ribs laterally and right seventh rib laterally, please correlate clinically. 2. The remaining ribs are intact. 3. There is no pneumothorax. 4. There are small calcified granulomata in the right lower lobe and left upper lobe. .               **This report has been created using voice recognition software. It may contain minor errors which are inherent in voice recognition technology. **      Final report electronically signed by DR Anupama Lopez on 3/15/2022 12:35 PM          ED Medications administered this visit:   Medications   lidocaine 4 % external patch 1 patch (1 patch TransDERmal Patch Applied 3/15/22 1343)   aspirin EC tablet 81 mg (has no administration in time range)   amLODIPine (NORVASC) tablet 5 mg ( Oral Automatically Held 3/18/22 0900)   atorvastatin (LIPITOR) tablet 20 mg (has no administration in time range)   gabapentin (NEURONTIN) capsule 300 mg (has no administration in time range)   gemfibrozil (LOPID) tablet 600 mg (has no administration in time range)   hydroCHLOROthiazide (HYDRODIURIL) tablet 25 mg ( Oral Automatically Held 3/18/22 0900)   insulin glargine (LANTUS) injection vial 50 Units (has no administration in time range)   pantoprazole (PROTONIX) tablet 40 mg (has no administration in time range)   prochlorperazine (COMPAZINE) tablet 10 mg (has no administration in time range)   sodium chloride flush 0.9 % injection 10 mL (has no administration in time range)   sodium chloride flush 0.9 % injection 10 mL (has no administration in time range)   0.9 % sodium chloride infusion (has no administration in time range)   enoxaparin (LOVENOX) injection 40 mg (40 mg SubCUTAneous Given 3/15/22 1710)   ondansetron (ZOFRAN-ODT) disintegrating tablet 4 mg (has no administration in time range)     Or   ondansetron (ZOFRAN) injection 4 mg (has no administration in time range)   polyethylene glycol (GLYCOLAX) packet 17 g (has no administration in time range)   acetaminophen (TYLENOL) tablet 650 mg (has no administration in time range)     Or   acetaminophen (TYLENOL) suppository 650 mg (has no administration in time range)   potassium chloride (KLOR-CON M) extended release tablet 40 mEq (has no administration in time range)     Or   potassium bicarb-citric acid (EFFER-K) effervescent tablet 40 mEq (has no administration in time range)     Or   potassium chloride 10 mEq/100 mL IVPB (Peripheral Line) (has no administration in time range)   magnesium sulfate 2000 mg in 50 mL IVPB premix (has no administration in time range)   insulin lispro (HUMALOG) injection vial 0-12 Units (0 Units SubCUTAneous Not Given 3/15/22 1623)   insulin lispro (HUMALOG) injection vial 0-6 Units (has no administration in time range)   glucagon (rDNA) injection 1 mg (has no administration in time range)   dextrose 5 % solution (has no administration in time range)   glucose chewable tablet 4 each (has no administration in time range)   dextrose 10 % infusion (has no administration in time range)     Or   dextrose 10 % infusion (has no administration in time range)   lactated ringers infusion ( IntraVENous New Bag 3/15/22 1557)   0.9 % sodium chloride bolus (0 mLs IntraVENous Stopped 3/15/22 1643)   HYDROcodone-acetaminophen (NORCO) 5-325 MG per tablet 1 tablet (1 tablet Oral Given 3/15/22 1343)         ED COURSE             MEDICATION CHANGES     Current Discharge Medication List            FINAL DISPOSITION     Final diagnoses:   Syncope and collapse   Dehydration     Condition: condition: stable  Dispo: Admit to telemetry      This transcription was electronically signed. Parts of this transcriptions may have been dictated by use of voice recognition software and electronically transcribed, and parts may have been transcribed with the assistance of an ED scribe. The transcription may contain errors not detected in proofreading. Please refer to my supervising physician's documentation if my documentation differs.     Electronically Signed: Jacki Arguelles MD, 03/15/22, 5:37 PM          Jacki Arguelles MD  Resident  03/15/22 2100

## 2022-03-15 NOTE — H&P
Hospitalist History & Physical    Patient:  Ivon Temple    Unit/Bed:6K-15/015-A  YOB: 1960  MRN: [id number]   Acct: [de-identified]   PCP: KATHRYN Christianson NP  Code Status: Full Code    Date of Service: Pt seen/examined on 03/15/22 and admitted to Inpatient with expected LOS greater than two midnights due to medical therapy. Chief Complaint: syncope    Assessment/Plan:    1. Orthostatic Hypotension:  Most likely secondary to dehydration, patient with poor p.o. intake. Aggressive IV fluids, AMPARO hose, hold home amlodipine, hctz. Check Ortho q shift. 2. Squamous cell carcinoma of supraglottis: Follows with Dr. Alf Desai, not currently undergoing any treatment. Consult palliative care. 3. Dysphagia:  Secondary to #2. Patient states that he has difficulty swallowing, especially thin liquids. Consult SLP. 4. IDDMII:  Resume home insulin regimen. Accu-Cheks. Hypoglycemia protocol. History of Present Illness:  57-year-old male with past medical history hypertension, squamous cell carcinoma, DM2 who presented to [address] [place] [address] ED for syncope. The patient states that this morning he became lightheaded upon standing, \"blacked out\" and hit his side on the cement. The patient states he had dizziness upon standing for a couple weeks however has not passed out. The patient states he has severe pain with swallowing and states he has not been able to eat or drink much recently. He states that he has difficulty swallowing thin liquids. The patient denies any chest pain, shortness of breath, diaphoresis, fever/chills, abdominal pain, N/V/D. Orthostatics positive in the ED. Patient admitted to hospitalist service for further management. Review of Systems: Pertinent positives as noted in the HPI. All other systems reviewed and negative.     Past Medical History:        Diagnosis Date    Arthritis     GERD (gastroesophageal reflux disease)     Hypertension     Keratinizing squamous cell carcinoma of larynx ([facility] [address] 08/30/2021    Type II or unspecified type diabetes mellitus without mention of complication, not stated as uncontrolled        Past Surgical History:    History reviewed. No pertinent surgical history. Home Medications:   No current facility-administered medications on file prior to encounter. Current Outpatient Medications on File Prior to Encounter   Medication Sig Dispense Refill    lidocaine viscous hcl (XYLOCAINE) 2 % SOLN solution Take 5 mLs by mouth as needed for Irritation 100 mL 3    prochlorperazine (COMPAZINE) 10 MG tablet Take 1 tablet by mouth every 6 hours as needed (nausea) 120 tablet 3    amLODIPine (NORVASC) 5 MG tablet Take 5 mg by mouth daily      Dulaglutide (TRULICITY SC) Inject into the skin every 7 days      Insulin Aspart (NOVOLOG FLEXPEN SC) Inject into the skin Sliding Scale      vitamin D (CHOLECALCIFEROL) 125 MCG (5000 UT) CAPS capsule Take 125 mcg by mouth daily      gabapentin (NEURONTIN) 300 MG capsule 300 mg 3 times daily.  SODIUM FLUORIDE, DENTAL GEL, 1.1 % GEL Apply 3-4 drops of gel directly to teeth. Do  Not eat or drink for 30min. Once daily at bedtime.  glimepiride (AMARYL) 4 MG tablet TAKE ONE TABLET BY MOUTH TWICE DAILY 30 tablet 0    LANTUS SOLOSTAR 100 UNIT/ML injection pen INJECT 52 UNITS INTO THE SKIN NIGHTLY. 3 Pen 0    hydrochlorothiazide (HYDRODIURIL) 25 MG tablet Take 1 tablet by mouth daily 30 tablet 3    acetaminophen-codeine (TYLENOL/CODEINE #3) 300-30 MG per tablet Take 1 tablet by mouth every 4 hours as needed for Pain 20 tablet 0    ibuprofen (ADVIL;MOTRIN) 600 MG tablet Take 1 tablet by mouth every 6 hours as needed for Pain for up to 30 doses. 30 tablet 0    meloxicam (MOBIC) 7.5 MG tablet TAKE ONE TABLET BY MOUTH EVERY DAY 30 tablet 3    diphenhydrAMINE (BENADRYL) 25 MG tablet Take 25 mg by mouth every 6 hours as needed for Itching.       metFORMIN (GLUCOPHAGE) 1000 MG tablet Take 1 tablets twice a day 60 tablet 3    hydrochlorothiazide (HYDRODIURIL) 25 MG tablet Take 1 tablet by mouth daily. 30 tablet 3    aspirin 81 MG tablet Take 1 tablet by mouth daily. 30 tablet 11    atorvastatin (LIPITOR) 20 MG tablet Take 1 tablet by mouth daily. 30 tablet 6    omeprazole (PRILOSEC) 20 MG capsule Take 1 capsule by mouth daily. 30 capsule 6    gemfibrozil (LOPID) 600 MG tablet Take 1 tablet by mouth 2 times daily (before meals). 60 tablet 5    insulin glargine (LANTUS SOLOSTAR) 100 UNIT/ML injection pen Inject 50 Units into the skin nightly. 1 Pen 0    Cimetidine (ACID REDUCER PO) Take 150 mg by mouth daily.  Insulin Pen Needle ([employer] PEN NEEDLES) 31G X 6 MM MISC by Does not apply route. 100 each 3    MULTIPLE VITAMIN PO Take 1 tablet by mouth daily. Allergies:    Lisinopril    Social History:    reports that he has never smoked. He has never used smokeless tobacco. He reports previous alcohol use of about 1.0 standard drink of alcohol per week. He reports that he does not use drugs. Family History:       Problem Relation Age of Onset    Diabetes Mother     Stroke Mother     Cancer Maternal Grandfather         liver    Cancer Father        Diet:  ADULT DIET; Regular; 5 carb choices (75 gm/meal)      Physical Exam:  BP (!) 154/78   Pulse 81   Temp 97.3 °F (36.3 °C) (Oral)   Resp 18   Ht 5' 11\" (1.803 m)   Wt 151 lb 6.4 oz (68.7 kg)   SpO2 100%   BMI 21.12 kg/m²   General:   Pleasant, chronically ill appearing male. NAD  HEENT:  normocephalic and atraumatic. No scleral icterus. PERR. Neck: supple. No JVD. No thyromegaly. Lungs: clear to auscultation. No retractions  Cardiac: RRR without murmur. Abdomen: soft. Nontender. Bowel sounds positive. Extremities:  No clubbing, cyanosis, or edema x 4. Vasculature: capillary refill < 3 seconds. Palpable LE pulses bilaterally. Skin:  warm and dry. No rashes or lesions. Psych:  Alert and oriented x3.   Affect appropriate  Lymph:  No Final Result       1. Slight irregularity involving the left sixth, seventh and eighth ribs laterally and right seventh rib laterally, please correlate clinically. 2. The remaining ribs are intact. 3. There is no pneumothorax. 4. There are small calcified granulomata in the right lower lobe and left upper lobe. .               **This report has been created using voice recognition software. It may contain minor errors which are inherent in voice recognition technology. **      Final report electronically signed by DR Kaye Simeon on 3/15/2022 12:35 PM        XR RIBS BILATERAL (MIN 4 VIEWS)    Result Date: 3/15/2022  PROCEDURE: XR RIBS BILATERAL (MIN 4 VIEWS) CLINICAL INFORMATION: Fall, rib pain. COMPARISON: Chest x-ray dated 14 August 2018. Ember Gitroy TECHNIQUE: Two views of the bilateral ribs were obtained. A PA upright view of the chest was also obtained. FINDINGS: There is slight irregularity involving the right seventh rib laterally, left sixth, seventh and eighth ribs laterally laterally, please correlate clinically. The remaining ribs are intact. There is no displaced fracture. There is no pneumothorax. The heart size is normal.  The mediastinum is not widened. There are no pulmonary infiltrates or effusions. There are small calcified granulomata in the right lower lobe and left upper lobe. The pulmonary vascularity is normal.  There is thoracic spondylosis. .      1. Slight irregularity involving the left sixth, seventh and eighth ribs laterally and right seventh rib laterally, please correlate clinically. 2. The remaining ribs are intact. 3. There is no pneumothorax. 4. There are small calcified granulomata in the right lower lobe and left upper lobe. . **This report has been created using voice recognition software. It may contain minor errors which are inherent in voice recognition technology. ** Final report electronically signed by DR Kaye Simeon on 3/15/2022 12:35 PM    CT Head WO Contrast    Result Date: 3/15/2022  PROCEDURE: CT HEAD WO CONTRAST CLINICAL INFORMATION: fall. COMPARISON: No prior study. TECHNIQUE: Noncontrast 5 mm axial images were obtained through the brain. Sagittal and coronal reconstructions were obtained. All CT scans at this facility use dose modulation, iterative reconstruction, and/or weight-based dosing when appropriate to reduce radiation dose to as low as reasonably achievable. FINDINGS: There is mild atrophy There is no hemorrhage. There are no intra-or extra-axial collections. There is no hydrocephalus, midline shift or mass effect. The gray-white matter differentiation is preserved. There are inflammatory changes involving the left frontal sinus, ethmoid and maxillary sinuses bilaterally. There is slight deformity of the right maxillary sinus posterolaterally. The remaining paranasal sinuses and mastoid air cells are normally aerated. There is no suspicious calvarial abnormality. 1. Mild atrophy. 2. Inflammatory changes involving the left frontal sinus, ethmoid and maxillary sinuses bilaterally. 3. Slight deformity of the right maxillary sinus posterolaterally. 4. Otherwise negative noncontrast CT scan of the brain. **This report has been created using voice recognition software. It may contain minor errors which are inherent in voice recognition technology. ** Final report electronically signed by DR Ioana Figueredo on 3/15/2022 12:47 PM    CT Cervical Spine WO Contrast    Result Date: 3/15/2022  PROCEDURE: CT CERVICAL SPINE WO CONTRAST CLINICAL INFORMATION: fall. COMPARISON: No prior study. TECHNIQUE: 3 mm noncontrast axial images were obtained through the cervical spine with sagittal and coronal reconstructions. All CT scans at this facility use dose modulation, iterative reconstruction, and/or weight-based dosing when appropriate to reduce radiation dose to as low as reasonably achievable. FINDINGS: The cervical vertebral bodies are normally aligned. There is no fracture.   There is no prevertebral soft tissue swelling. There is cervical spondylosis especially at C5-6 and C6-7. At C1-2, there is normal alignment of the dens relative to anterior arch of C1. At C2-3, there is no disc herniation, canal or foraminal stenosis. At C3-4, there is no disc herniation, canal or foraminal stenosis. At C4-5, there is no disc herniation, canal or foraminal stenosis. At C5-6, there is a 1.2 mm ventral extradural defect secondary to bulging disc and uncinate process hypertrophy. This causes mild canal and mild-to-moderate bilateral foraminal stenosis. At C6-7, there is 1.4 mm ventral extradural defect secondary to bulging disc and uncinate process hypertrophy. This causes mild canal, moderate right and mild-to-moderate left-sided foraminal stenosis. At C7-T1, there is no disc herniation, canal or foraminal stenosis. There is bilateral carotid artery calcification. .  There are no suspicious findings in the lung apices. 1. Cervical spondylosis especially at C5-6 and C6-7. 2. No acute fracture noted. 3. Bilateral carotid artery calcification. 4. Otherwise negative CT scan of the cervical spine. . **This report has been created using voice recognition software. It may contain minor errors which are inherent in voice recognition technology. ** Final report electronically signed by DR Trisha Schumacher on 3/15/2022 12:52 PM        Tele:   [x] yes             [] no      Thank you KATHRYN Christianson NP for the opportunity to be involved in this patient's care.     Electronically signed by Richardson Hyman PA-C on 3/15/2022 at 4:46 PM

## 2022-03-15 NOTE — ED NOTES
Pt resting on bed, playing on iPad. Pt states his pain has slightly improved. Pt updated on IP bed status.  YADIEL Jacobsen RN  03/15/22 [facility]  03/15/22 [id number]

## 2022-03-16 LAB
ANION GAP SERPL CALCULATED.3IONS-SCNC: 12 MEQ/L (ref 8–16)
AVERAGE GLUCOSE: 189 MG/DL (ref 70–126)
BASOPHILS # BLD: 0.4 %
BASOPHILS ABSOLUTE: 0.1 THOU/MM3 (ref 0–0.1)
BUN BLDV-MCNC: 8 MG/DL (ref 7–22)
CALCIUM SERPL-MCNC: 8.8 MG/DL (ref 8.5–10.5)
CHLORIDE BLD-SCNC: 102 MEQ/L (ref 98–111)
CO2: 25 MEQ/L (ref 23–33)
CREAT SERPL-MCNC: 0.6 MG/DL (ref 0.4–1.2)
EOSINOPHIL # BLD: 1.9 %
EOSINOPHILS ABSOLUTE: 0.2 THOU/MM3 (ref 0–0.4)
ERYTHROCYTE [DISTWIDTH] IN BLOOD BY AUTOMATED COUNT: 13.4 % (ref 11.5–14.5)
ERYTHROCYTE [DISTWIDTH] IN BLOOD BY AUTOMATED COUNT: 41.9 FL (ref 35–45)
GFR SERPL CREATININE-BSD FRML MDRD: > 90 ML/MIN/1.73M2
GLUCOSE BLD-MCNC: 104 MG/DL (ref 70–108)
GLUCOSE BLD-MCNC: 178 MG/DL (ref 70–108)
GLUCOSE BLD-MCNC: 197 MG/DL (ref 70–108)
GLUCOSE BLD-MCNC: 58 MG/DL (ref 70–108)
GLUCOSE BLD-MCNC: 59 MG/DL (ref 70–108)
HBA1C MFR BLD: 8.3 % (ref 4.4–6.4)
HCT VFR BLD CALC: 35 % (ref 42–52)
HEMOGLOBIN: 10.8 GM/DL (ref 14–18)
IMMATURE GRANS (ABS): 0.06 THOU/MM3 (ref 0–0.07)
IMMATURE GRANULOCYTES: 0.5 %
LYMPHOCYTES # BLD: 8 %
LYMPHOCYTES ABSOLUTE: 1 THOU/MM3 (ref 1–4.8)
MAGNESIUM: 1.7 MG/DL (ref 1.6–2.4)
MCH RBC QN AUTO: 26.3 PG (ref 26–33)
MCHC RBC AUTO-ENTMCNC: 30.9 GM/DL (ref 32.2–35.5)
MCV RBC AUTO: 85.4 FL (ref 80–94)
MONOCYTES # BLD: 4.1 %
MONOCYTES ABSOLUTE: 0.5 THOU/MM3 (ref 0.4–1.3)
NUCLEATED RED BLOOD CELLS: 0 /100 WBC
PLATELET # BLD: 350 THOU/MM3 (ref 130–400)
PMV BLD AUTO: 9.2 FL (ref 9.4–12.4)
POTASSIUM REFLEX MAGNESIUM: 3.5 MEQ/L (ref 3.5–5.2)
RBC # BLD: 4.1 MILL/MM3 (ref 4.7–6.1)
SEG NEUTROPHILS: 85.1 %
SEGMENTED NEUTROPHILS ABSOLUTE COUNT: 10.7 THOU/MM3 (ref 1.8–7.7)
SODIUM BLD-SCNC: 139 MEQ/L (ref 135–145)
WBC # BLD: 12.6 THOU/MM3 (ref 4.8–10.8)

## 2022-03-16 PROCEDURE — 85025 COMPLETE CBC W/AUTO DIFF WBC: CPT

## 2022-03-16 PROCEDURE — 6370000000 HC RX 637 (ALT 250 FOR IP): Performed by: STUDENT IN AN ORGANIZED HEALTH CARE EDUCATION/TRAINING PROGRAM

## 2022-03-16 PROCEDURE — 92610 EVALUATE SWALLOWING FUNCTION: CPT

## 2022-03-16 PROCEDURE — 92612 ENDOSCOPY SWALLOW (FEES) VID: CPT

## 2022-03-16 PROCEDURE — 83036 HEMOGLOBIN GLYCOSYLATED A1C: CPT

## 2022-03-16 PROCEDURE — 6370000000 HC RX 637 (ALT 250 FOR IP): Performed by: PHYSICIAN ASSISTANT

## 2022-03-16 PROCEDURE — 6360000002 HC RX W HCPCS: Performed by: HOSPITALIST

## 2022-03-16 PROCEDURE — 83735 ASSAY OF MAGNESIUM: CPT

## 2022-03-16 PROCEDURE — 1200000003 HC TELEMETRY R&B

## 2022-03-16 PROCEDURE — 36415 COLL VENOUS BLD VENIPUNCTURE: CPT

## 2022-03-16 PROCEDURE — 2580000003 HC RX 258: Performed by: PHYSICIAN ASSISTANT

## 2022-03-16 PROCEDURE — 6360000002 HC RX W HCPCS: Performed by: PHYSICIAN ASSISTANT

## 2022-03-16 PROCEDURE — 80048 BASIC METABOLIC PNL TOTAL CA: CPT

## 2022-03-16 PROCEDURE — 2580000003 HC RX 258: Performed by: HOSPITALIST

## 2022-03-16 PROCEDURE — 92523 SPEECH SOUND LANG COMPREHEN: CPT

## 2022-03-16 PROCEDURE — 82948 REAGENT STRIP/BLOOD GLUCOSE: CPT

## 2022-03-16 PROCEDURE — 92526 ORAL FUNCTION THERAPY: CPT

## 2022-03-16 RX ORDER — MAGNESIUM SULFATE IN WATER 40 MG/ML
2000 INJECTION, SOLUTION INTRAVENOUS ONCE
Status: COMPLETED | OUTPATIENT
Start: 2022-03-16 | End: 2022-03-16

## 2022-03-16 RX ORDER — 0.9 % SODIUM CHLORIDE 0.9 %
500 INTRAVENOUS SOLUTION INTRAVENOUS ONCE
Status: COMPLETED | OUTPATIENT
Start: 2022-03-16 | End: 2022-03-16

## 2022-03-16 RX ORDER — INSULIN GLARGINE 100 [IU]/ML
40 INJECTION, SOLUTION SUBCUTANEOUS NIGHTLY
Status: DISCONTINUED | OUTPATIENT
Start: 2022-03-16 | End: 2022-03-25

## 2022-03-16 RX ORDER — POTASSIUM CHLORIDE 7.45 MG/ML
10 INJECTION INTRAVENOUS
Status: ACTIVE | OUTPATIENT
Start: 2022-03-16 | End: 2022-03-16

## 2022-03-16 RX ADMIN — ATORVASTATIN CALCIUM 20 MG: 20 TABLET, FILM COATED ORAL at 21:49

## 2022-03-16 RX ADMIN — GABAPENTIN 300 MG: 300 CAPSULE ORAL at 08:50

## 2022-03-16 RX ADMIN — PANTOPRAZOLE SODIUM 40 MG: 40 TABLET, DELAYED RELEASE ORAL at 05:29

## 2022-03-16 RX ADMIN — GEMFIBROZIL 600 MG: 600 TABLET ORAL at 15:49

## 2022-03-16 RX ADMIN — ENOXAPARIN SODIUM 40 MG: 100 INJECTION SUBCUTANEOUS at 08:50

## 2022-03-16 RX ADMIN — INSULIN LISPRO 1 UNITS: 100 INJECTION, SOLUTION INTRAVENOUS; SUBCUTANEOUS at 21:52

## 2022-03-16 RX ADMIN — GABAPENTIN 300 MG: 300 CAPSULE ORAL at 21:49

## 2022-03-16 RX ADMIN — GEMFIBROZIL 600 MG: 600 TABLET ORAL at 05:29

## 2022-03-16 RX ADMIN — SODIUM CHLORIDE 500 ML: 9 INJECTION, SOLUTION INTRAVENOUS at 18:43

## 2022-03-16 RX ADMIN — SODIUM CHLORIDE 500 ML: 9 INJECTION, SOLUTION INTRAVENOUS at 14:31

## 2022-03-16 RX ADMIN — ASPIRIN 81 MG: 81 TABLET, COATED ORAL at 08:50

## 2022-03-16 RX ADMIN — ACETAMINOPHEN 650 MG: 325 TABLET ORAL at 12:45

## 2022-03-16 RX ADMIN — INSULIN LISPRO 2 UNITS: 100 INJECTION, SOLUTION INTRAVENOUS; SUBCUTANEOUS at 16:39

## 2022-03-16 RX ADMIN — MAGNESIUM SULFATE HEPTAHYDRATE 2000 MG: 40 INJECTION, SOLUTION INTRAVENOUS at 16:30

## 2022-03-16 RX ADMIN — SODIUM CHLORIDE, POTASSIUM CHLORIDE, SODIUM LACTATE AND CALCIUM CHLORIDE: 600; 310; 30; 20 INJECTION, SOLUTION INTRAVENOUS at 01:25

## 2022-03-16 RX ADMIN — ACETAMINOPHEN 650 MG: 325 TABLET ORAL at 05:29

## 2022-03-16 RX ADMIN — GABAPENTIN 300 MG: 300 CAPSULE ORAL at 14:33

## 2022-03-16 RX ADMIN — INSULIN GLARGINE 40 UNITS: 100 INJECTION, SOLUTION SUBCUTANEOUS at 21:52

## 2022-03-16 RX ADMIN — POTASSIUM BICARBONATE 40 MEQ: 782 TABLET, EFFERVESCENT ORAL at 15:45

## 2022-03-16 ASSESSMENT — PAIN SCALES - GENERAL
PAINLEVEL_OUTOF10: 0
PAINLEVEL_OUTOF10: 3
PAINLEVEL_OUTOF10: 4
PAINLEVEL_OUTOF10: 7
PAINLEVEL_OUTOF10: 5

## 2022-03-16 NOTE — PROGRESS NOTES
[address] [address]  Fiberoptic Endoscopic Evaluation of Swallowing & Treatment   STRZ RENAL TELEMETRY 6K      SLP Individual Minutes  Time In: 8853  Time Out: 4747  Minutes: 45  Timed Code Treatment Minutes: 0 Minutes       Date: 3/16/2022  Patient Name: Ginny Suazo       CSN: [id number]   : 1960  (57 y.o.)  Gender: male   Referring Physician:  Jameel Rust MD  Diagnosis: Orthostatic Hypotension   Date of Last MBS/FEES:  FEES 21 at [address]:  Regular solids, thin liquids     History of Present Illness/Injury: Per chart review; Ginny Suazo  Is a 56 y/o male who was admitted to [facility] with the above medical dx. Patient admitted with above diagnosis. Per chart review, \"57year-old male with past medical history hypertension, squamous cell carcinoma, DM2 who presented to [facility] ED for syncope.  The patient states that this morning he became lightheaded upon standing, \"blacked out\" and hit his side on the cement.  The patient states he had dizziness upon standing for a couple weeks however has not passed out.  The patient states he has severe pain with swallowing and states he has not been able to eat or drink much recently. [facility] states that he has difficulty swallowing thin liquids. Patient has a history of squamous cell carcinoma of supraglottis and completed radiation in January. FEES completed on 2021 prior to completion of radiation with recommendations for regular and thin liquids and presence of mild pharyngeal stasis that patient cleared independently with additional swallows.  Patient also noted to have right vocal fold paralysis. The patient denies any chest pain, shortness of breath, diaphoresis, fever/chills, abdominal pain, N/V/D.  Orthostatics positive in the ED.  Patient admitted to hospitalist service for further management. \" ST consulted to complete [id number] evaluation and clinical swallow evaluation to assess current function and update POC as clinically indicated -- ST immediate referred for a FEES    has a past medical history of Arthritis, GERD (gastroesophageal reflux disease), Hypertension, Keratinizing squamous cell carcinoma of larynx ([address] and Type II or unspecified type diabetes mellitus without mention of complication, not stated as uncontrolled. Reason for Study: Rule out pharyngeal dysfunction  Prior Swallowing Evaluation/Results: CSE recommended repeat instrumental assessment given acute odynophagia and dysphagia. Pain:  None reported at this time     Current Diet: Regular Solids    Respiratory Status: Room Air    Behavioral Observation:alert and oriented    Cognition: Exam not limited by cognition    ORAL MECHANISM EVALUATION:       Facial / Labial Impaired Bilateral generalized weakness    Lingual Impaired Decreased ROM, slight lingual deviation to L   Dentition Impaired Missing some upper and lower dentition    Velum WFL     Vocal Quality Impaired Hoarse, decreased vocal intensity    Sensation Not Tested     Cough Not Tested          FEES PROCEDURE DETAILS:  Procedure performed by: Paul Bean MA., [facility]-SLP / [id number]  Feeder/Assistant: IMANI Cathy Inch   Scope/Serial Number: Chip Tip Scope - Red #3; Serial Number: [id number]; Model Number: [id number]  Topical Anesthetic Used: No  Patient Positioning: Bed    Procedure explained and education provided to patient including purpose, benefits and risks of testing. Understanding and agreement with testing was verbalized. A flexible fiber-optic nasoendoscope was passed transnasally to view the nasopharynx, oropharynx, hypopharynx, larynx through the right nares with poor toerance - increased gag response in the nasal passages.  .    Patient tolerance of procedure: Discomfort significantly limited study    ANATOMIC/PHYSIOLIGIC ASSESSMENT:  Nasal Cavity WFL    Velopharyngeal Port Impaired Edema    Base of Tongue Impaired Moderate to severe edema    Lingual Tonsils Impaired Moderate to severe edema Vocal Fold Margin - Right Impaired Dx of Right VF paralysis    Vocal Fold Margin - Left Impaired    Abductory/Adductory Movement Impaired    Ventricular Folds Impaired Moderate to severe edema    Pharyngeal Contraction for Pitch Glide Impaired    Epiglottis   Impaired Severe epiglottic edema    Valleculae Impaired    Secretions - Appearance Impaired Mild moderate excess secretions   Secretions - Location    Impaired Diffuse throughout pharyngeal cavity    Pyriform Sinus   Impaired Limited sinus cavity given diffuse edema    Glottal Closure   Impaired    Arytenoids Impaired Moderate to severe edema    Posterior Commisure Impaired Moderate to severe edema      PATIENT WAS EVALUATED USING: Secretions    SWALLOW OBSERVATION: Incomplete epiglottic inversion, Residue - Vallecular and Residue - Pyriform    LARYNGEAL PENETRATION/ASPIRATION: laryngeal penetration of sescretions prior and after the swallow resulting in a immediate coughing response - unable to fully discern and rule out aspiration of secretions d/t poor tolerance of FEES scope     PHARYNGEAL PHASE JULIEN SCORE (dysphagia outcome and severity score)  5 = Mild Dysphagia - may need one consistency restricted - May have one or more of the following: Aspiration with thin - cough to clear, Airway penetration midway to the vocal cords with one or more consistency or to the vocal folds with one consistency, but clears spontaneously - Residue in the pharynx clears spontaneously    PENETRATION-ASPIRATION SCALE (PAS)   Ice Chips: 4 = Material enters the airway, contacts the vocal folds, and is ejected from the airway    ESOPHAGEAL PHASE: No significant findings     ATTEMPTED TECHNIQUES:  Small Bolus Size Not Attempted    Straw Not Attempted    Cup Not Attempted    Wendall Marking Not Attempted    Head Turn Not Attempted    Spoon Presentations Not Attempted    Volitional Cough Not Attempted    Spontaneous Cough Effective           DIAGNOSTIC IMPRESSIONS:  Completed limited FEES instrumental assessment given pt's POOR tolerance of chip tip pediatric scope. Immediately upon entrance in the nasal turbinates the pt began gaging, coughing, and swallowing. FEES assessment was completed to further assess anatomy/physiology and diet recommendations, BUT was limited to secretions only. Upon entrance into the nasopharynx the pt presented with moderate to severe gross edema of all nasopharyngeal structures. The pt reported his most recent chemoradiation and laryngeal sx (mass resection) in December 2021 - FEES at [facility] on 9/7/21 with recent worsening dysphagia and dysphonia. NOTED pooling of clear secretions in the pyriforms and above the UES, resulting in laryngeal penetration before and after the swallow, spilling between the arytenoids into the posterior commissure -- resulting in a effective hard cough response. Exam limited to secretions only given poor tolerance of the chip tip scope. Recommended to remain on reg/thin with MBS to further assess swallow function, determine penetration/aspiration risk, amount, and depth, and further assess, swallow strategies and head positioning. **Education: Immediately following FEES, the pt was given skilled education and review of swallow study via visual & verbal model-- watching FEES (pause, video), and detailed explanation of anatomy & physiology, why he is at a high risk of aspiration, and what he can do to improve airway protection.    -Immediately prior to FEES, ST completed a skilled dietary analysis - pt had [employer], coleslaw, mashed potatoes, and thin liquids. The pt completed 80% of his meal - Of the 80% completed, pt had EXCELLENT success with no overt s/s of aspiration/penetration.  Despite increased risk of aspiration identified on the limited FEES, ST recommended to continue PO consumption until MBS is completed as the pt has excellent insight, awareness of swallow function, independent use of strategies (upright, slow PO intake, single bites/drinks, and no signs of consecutive drinks during dietary analysis). Additionally, no signs of pharyngeal residue of dietary analysis upon immediate entrance into nasopharynx. -ST reviewed potential implications (pneumonia, recurrent/prolonged hospitalizations, medical decline) if further PO intake was desired to be consumed and/or non-compliance with recommendations/diet modification. ST briefly discussed consideration of PEG alternate means of nutrition; - pending results of MBS; patient with multiple questions and concerns re: \"feeding tube. \" ST briefly discussed concerns for airway patency, ENT consult for review of FEES, and if the pt would ever be agreeable for a artificial airway (trach) if MD recommends or patency decreases. - pending MD recommendations and ENT. *verbalized agreement for both PEG and trach; \"if it's a last resort and I have to I would be agreeable. Patient would highly benefit from ongoing education. May consider palliative care consultation as clinically appropriate. Patient would also benefit from further education re: needs to engage in comprehensive oral care in attempts to reduce potential bacteria colonization and to maximize oral integrity as well as to engage in ambulation with staffing (as able) to assist with mobility and overall pulmonary health. *Verbal receptiveness noted. *Will continue dysphagia education and skilled dysphagia tx within future sessions.             Diet Recommendations:  Reg/thin  Strategies:  Set-up with Meals, Full Upright Position, Small Bite/Sip, Medications Crushed with Puree and Strategies pending MBS completion   Rehabilitation Potential: fair    EDUCATION:  Learner: Patient  Education:  Reviewed results and recommendations of this evaluation, Reviewed diet and strategies, Reviewed signs, symptoms and risks of aspiration, Reviewed ST goals and Plan of Care and Reviewed recommendations for follow-up  Evaluation of Education: Bertha Pastor understanding, Needs further instruction and Family not present    PLAN:  Recommendations pending MBS    PATIENT GOAL:    Return to least restrictive diet. SHORT TERM GOALS:  Short-term Goals  Timeframe for Short-term Goals: 2 weeks  Goal 1: Patient will consume a regular texture diet and thin liquids with no overt s/s of aspiration and adequate endurance to assist with meeting nutrition/hydration needs  Goal 2: Patient will complete MBS to re-assess pharyngeal swallow function s/p completing radiation to update POC as clinically indicated. LONG TERM GOALS:  No LTM Goals recommended, due to anticipated short ELOS. Paul Head MA., Dori Gallegos / [id number]

## 2022-03-16 NOTE — PROGRESS NOTES
Follow Up / Progress Note        Patient:   Herbert Arguello  YOB: 1960  Age:  57 y.o. Room:  [address] [place] [address]  Code Status:  Full Code  MRN:  [id number]            Family/Patient Discussion:  Received consult for goals of care and code status discussion. On unit to speak with patient. Patient resting in bed with lights out, eyes closed. Did not disturb patient at this time. Plan/Follow-Up:  Will return to speak with patient when appropriate.        Electronically signed by Michelle Oden RN on 3/16/2022 at 1:21 PM             Palliative Care Office: [phone]

## 2022-03-16 NOTE — PLAN OF CARE
Problem: Falls - Risk of:  Goal: Will remain free from falls  Description: Will remain free from falls  Outcome: Ongoing  Note: Fall before admission, bed alarm on, up with staff assistance,  socks     Problem: Pain:  Goal: Control of acute pain  Description: Control of acute pain  Outcome: Ongoing  Note: Lidocaine patch, Tylenol as needed, heating pad     Problem: Respiratory:  Goal: Ability to maintain a clear airway will improve  Description: Ability to maintain a clear airway will improve  Outcome: Ongoing  Note: History of laryngeal cancer, pills crushed in applesauce, modified swallow 3-17, slow bites     Problem: Fluid Volume:  Goal: Ability to achieve a balanced intake and output will improve  Description: Ability to achieve a balanced intake and output will improve  Outcome: Ongoing  Note: Modified barium 3-17, history of laryngeal cancer, IV fluids, encourage fluid intake even though painful with swallowing     Care plan reviewed with patient. Patient verbalize understanding of the plan of care and contribute to goal setting.

## 2022-03-16 NOTE — PROGRESS NOTES
[facility]  SPEECH THERAPY  STRZ RENAL TELEMETRY 6K  Speech - Language - Cognitive Evaluation + Clinical Swallow Evaluation    SLP Individual Minutes  Time In: 4739  Time Out: 1027  Minutes: 30  Timed Code Treatment Minutes: 0 Minutes       CSE: 10 minutes   Woypej-Zqpterru-Jufrybagj Evaluation: 20 minutes     Date: 3/16/2022  Patient Name: Jose G Elliott      CSN: [id number]   : 1960  (57 y.o.)  Gender: male   Referring Physician: Иван Novak PA-C  Diagnosis: Orthostatic Hypotension   Precautions: Fall Risk   History of Present Illness/Injury: Patient admitted with above diagnosis. Per chart review, \"57year-old male with past medical history hypertension, squamous cell carcinoma, DM2 who presented to [facility] ED for syncope. The patient states that this morning he became lightheaded upon standing, \"blacked out\" and hit his side on the cement. The patient states he had dizziness upon standing for a couple weeks however has not passed out. The patient states he has severe pain with swallowing and states he has not been able to eat or drink much recently. He states that he has difficulty swallowing thin liquids. Patient has a history of squamous cell carcinoma of supraglottis and completed radiation in January. FEES completed on 2021 prior to completion of radiation with recommendations for regular and thin liquids and presence of mild pharyngeal stasis that patient cleared independently with additional swallows. Patient also noted to have right vocal fold paralysis. The patient denies any chest pain, shortness of breath, diaphoresis, fever/chills, abdominal pain, N/V/D. Orthostatics positive in the ED. Patient admitted to hospitalist service for further management. \" ST consulted to complete grngfj-svzletws-obaxrsmql evaluation and clinical swallow evaluation to assess current function and update POC as clinically indicated.      Past Medical History:   Diagnosis Date    Arthritis     GERD (gastroesophageal reflux disease)     Hypertension     Keratinizing squamous cell carcinoma of larynx ([facility] [address] 2021    Type II or unspecified type diabetes mellitus without mention of complication, not stated as uncontrolled        Pain: 8/10 - Pain location: Rib; 5/10 - Pain location: throat - RN aware     Subjective:  RN Abdi Kirk approved completion of evaluation this date and reported good success with current diet level. Upon arrival to room, patient awake in bed and agreeable to evaluations. Pleasant and cooperative throughout session. SOCIAL HISTORY:   Living Arrangements: Home with wife   Work History:  for [employer] Level: [employer] Status: Active   Finance Management: Independent  Medication Management: Independent - utilizes pillbox   ADL's: Independent. Hobbies: taking care of dogs   Vision Status: glasses - not present during completion of evaluation   Hearing: WFL - no assistive hearing devices       SPEECH / VOICE:  Voice: hoarse, decreased loudness  Speech and Voice appear to be grossly intact for basic and complex daily communication    LANGUAGE:  Receptive:  Receptive language skills appear to be grossly intact for basic and complex daily communication. Expressive:  Expressive language skills appear to be grossly intact for basic and complex daily communication. COGNITION:  Klaus Cognitive Assessment (MOCA) version 7.2 completed. Pt scored 27/30*. Normal is greater than or equal to 26/30.   *Inclusion of +1 point given highest level of education achieved less than/equal to 12th grade or GED with limited-0 post-secondary schooling     Orientation:    Immediate Recall: Trial 1: 5, Trial 2: 5/5   Short-Term Recall: 4/5 independently, 1/5 given categorical cue  Divergent Namin words/minute  Reasonin/2   Thought Organization: 0/1   Attention: 1/1 - intact basic sustained/selective attention   Math Computation: 2/3   Executive Functionin/5   Cognitive skills appear to be grossly intact. SWALLOWING:    Respiratory Status: Room Air      Behavioral Observation: Alert and Oriented    ORAL MECHANISM EVALUATION:      Facial / Labial Impaired Bilateral generalized weakness    Lingual Impaired Decreased ROM, slight lingual deviation to L   Dentition Impaired Missing some upper and lower dentition    Velum WFL    Vocal Quality Impaired Hoarse, decreased vocal intensity    Sensation Not Tested    Cough Not Tested      PATIENT WAS EVALUATED USING:  Secretions, Thin Liquids, Puree and Coarse Solids    ORAL PHASE:  Impaired:  Impaired Mastication    PHARYNGEAL PHASE:  Impaired:  Suspected Pharyngeal Residue    SIGNS AND SYMPTOMS OF LARYNGEAL PENETRATION / ASPIRATION:  Throat Clear    INSTRUMENTAL EVALUATION: Fiberoptic Endoscopic Evaluation of the Swallow (FEES)    DIET RECOMMENDATIONS:  Regular and Thin Liquids     STRATEGIES: Full Upright Position, Small Bite/Sip, Multiple Swallow, Medications Whole with Thin, Alternate Solids and Liquids and Strategies pending FEES completion          RECOMMENDATIONS/ASSESSMENT:  DIAGNOSTIC IMPRESSIONS:    Clinical Swallow Evaluation: Patient presents with oral phase of swallow function that is essentially [facility] with inability to fully discern potential presence of pharyngeal phase deficits without formal instrumentation. All labial/lingual structures intact and appear to be functioning appropriately at bedside. Oral phase highly unremarkable during consumption of hard/textured solids with patient demonstrating adequate mastication pattern for textural breakdown when given additional time, cohesive bolus formation and manipulation with complete bolus clearance. Thin liquids consumed without overt difficulty and with suspected adequate bolus control/containment of fluid bolus. Patient with presence of consistent throat clearing with and without po intake.  Certainly not able to exclude pharyngeal phase dysfunction and/or airway invasion events in its entirety at bedside alone. Patient with history of squamous cell carcinoma of supraglottis with completion of FEES on 9/7/21 PRIOR to completion of radiation; however, patient has not completed instrumental evaluation POST radiation on 12/30/21. Patient would greatly benefit from repeat FEES to re-assess pharyngeal swallow function and update POC as clinically indicated. Patient with no overt coughing during consumption of thin liquids; however, patient reports feeling like he's \"drowning\" at times when consuming thin liquids. At this time, patient's swallow physiology does appear appropriate to support PO intake without distress. Recommend continuation of regular diet with thin liquids with plans to compelte FEES. Jcjhgv-Jykimyvo-Zqclqypzj Evaluation: Patient demonstrates evidence of [facility] cognitive-linguistic skills based on a MOCA score of 27/30. Patient's expressive and receptive language appear [facility] for basic and complex daily communication. No dysarthria; however, presence of decreased vocal intensity and hoarseness that does not impact patient's ability to communicate basic and complex wants/needs with 100% speech intelligibility. Per chart review,  had a history of dysphonia since at least February of 2021 when he had COVID-19. Patient saw Dr. Carrie Fuentes at [facility] in August of 2021 and then had laryngoscopy with excision tumor or vocal cord stripping with operating microscope on 8/30/21. The biopsy came back as invasive squamous cell carcinoma. Patient also noted to have right vocal fold paralysis. At this time, further skilled ST services are NOT warranted to address cognition.      Rehabilitation Potential: good    EDUCATION:  Learner: Patient  Education:  Reviewed results and recommendations of this evaluation, Reviewed diet and strategies, Reviewed signs, symptoms and risks of aspiration, Reviewed ST goals and Plan of Care, Reviewed recommendations for follow-up, Education Related to Potential Risks and Complications Due to Impairment/Illness/Injury and Education Related to [employer] and Wellness  Evaluation of Education: Verbalizes understanding, Demonstrates with assistance and Needs further instruction    PLAN:  Skilled SLP intervention on acute care 3-5 x per week or until goals met and/or pt plateaus in function. Specific interventions for next session may include: FEES. PATIENT GOAL:    Did not state. Will further assess during treatment. SHORT TERM GOALS:  Short-term Goals  Timeframe for Short-term Goals: 2 weeks  Goal 1: Patient will consume a regular texture diet and thin liquids with no overt s/s of aspiration and adequate endurance to assist with meeting nutrition/hydration needs  Goal 2: Patient will complete FEES to re-assess pharyngeal swallow function s/p completing radiation to update POC as clinically indicated.     LONG TERM GOALS:  No long term goals recommended d/t reji Rivera ([address]) Edwin Martinez M.A., [address]

## 2022-03-16 NOTE — PLAN OF CARE
Problem: Falls - Risk of:  Goal: Will remain free from falls  Description: Will remain free from falls  Outcome: Ongoing  Note: No falls noted this shift. Continue falling star program. Bed alarm on, bed in low position. Call light and personal belongings in reach. Patient uses call light appropriately. Goal: Absence of physical injury  Description: Absence of physical injury  Outcome: Ongoing     Problem: Pain:  Goal: Pain level will decrease  Description: Pain level will decrease  Outcome: Ongoing  Note: No complaint of pain voiced at this time. Continue to monitor. PRN medications available if needed. Goal: Control of acute pain  Description: Control of acute pain  Outcome: Ongoing  Goal: Control of chronic pain  Description: Control of chronic pain  Outcome: Ongoing     Problem: Discharge Planning:  Goal: Participates in care planning  Description: Participates in care planning  Outcome: Ongoing  Note: Patient plans to return home at discharge. Will continue to assess further needs. Goal: Discharged to appropriate level of care  Description: Discharged to appropriate level of care  Outcome: Ongoing     Problem: Skin Integrity - Impaired:  Goal: Will show no infection signs and symptoms  Description: Will show no infection signs and symptoms  Outcome: Ongoing  Goal: Absence of new skin breakdown  Description: Absence of new skin breakdown  Outcome: Ongoing  Note: No skin break down noted at this time. Encouraged patient to reposition self in bed. Care plan reviewed with patient. Patient verbalizes understanding of plan of care and contributes to goal setting.

## 2022-03-16 NOTE — PROGRESS NOTES
Hospitalist Progress Note      Patient:  Jocelin Ramirez    Unit/Bed:615/015-A  YOB: 1960  MRN: [id number]   Acct: [de-identified]   PCP: Ned Sorto, KATHRYN Carter NP  Date of Admission: 3/15/2022    Assessment/Plan:  Syncope: likely orthostatic hypotension induced. Unclear if due to dehydration in the setting of s/p radiation therapy vs other etiologies. Electrolytes and BG WNL. EKG with no acute ischemic signs. No recent acute illness, clinically no signs of infectious etiology present. S/p IV hydration. Monitor on Tele. Get ECHO. No known history of arrhythmias but may need a Cardiac monitor on discharge. Essential HTN: controlled. On Norvasc and HCTZ- held at this time. IDDMII: HbA1c 8.3 on 3/16/2022. On Lantus 85O nightly, Trulicity, and Amaryl. PO meds held at this time. On Lantus 40 u nightly with SSI. Monitor BG with inpatient goal 140-180. 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 associated dysphagia and mild odynophagia present. Follows with Dr. Lloyd Rojas. ENT consulted for dyspshagia. Tele:   [x] yes             [] no    Code Status: Full Code    Fluids:  Diet:  ADULT DIET;  Regular; 5 carb choices (75 gm/meal)    DVT prophylaxis: [x] Lovenox                                 [] SCDs                                 [] SQ Heparin                                 [] Encourage ambulation                                 [] Already on Anticoagulation    Disposition:      [] TBD                             [x] Home                             [] TCU                             [] Rehab                             [] Psych                             [] SNF                             [] [place]                             [] Other-    Chief Complaint: loss of consciousness    Hospital Course:   58 yo M with history of Keratinizing SCC of larynx (diagnosed 08/2021), HTN, T2DM, Arthritis presented after an episode of syncope while going home from work due to associated dizziness/lightheadedness the day of presentation. Had associated trauma to right chest area from fall. ED course: Presented afebrile and hemodynamically stable. Labs generally unremarkable. WBC 17. 4. XR ribs showed slight irregularity involving the left 6th, 7th, and 8th ribs laterally and right 7th rib laterally. No pneumothorax. CT head wo contrast with inflammatory changes involving the left frontal sinus, ethmoid, and maxillary sinuses bilaterally, slight deformity of the right maxillary sinus posterolaterally. CT cervical spondylosis especially at C5-6 and C6-7. Admitted for further work up and management. Interval:   No acute events overnight. Subjective:   Reports feeling dizzy on standing, \"eyes went white\" and lost consciousness. Has not had an episode like this in years. Only had one other episode of syncope years ago due to hypoglycemia. Has no known history of arrhythmias. No known cardiac history. Reports not being sick prior to this episode. Completed radiation therapy in 12/2021. Currently no symptoms. Has had issues with persistent cough and swallowing since radiation and lost his voice. Has not been able to take PO diabetic meds due to dysphagia so has had suboptimal control of BG.         ROS (12 point review of systems completed. Pertinent positives noted.  Otherwise ROS is negative)     Medications:  Reviewed    Infusion Medications    sodium chloride      dextrose      dextrose      Or    dextrose      [Held by provider] lactated ringers 125 mL/hr at 03/16/22 0125     Scheduled Medications    insulin glargine  40 Units SubCUTAneous Nightly    potassium chloride  10 mEq IntraVENous Q1H    magnesium sulfate  2,000 mg IntraVENous Once    sodium chloride  500 mL IntraVENous Once    lidocaine  1 patch TransDERmal Daily    aspirin  81 mg Oral Daily    [Held by provider] amLODIPine  5 mg Oral Daily    atorvastatin  20 mg Oral Nightly    gabapentin  300 mg Oral TID    gemfibrozil  600 mg Oral BID AC    [Held by provider] hydroCHLOROthiazide  25 mg Oral Daily    pantoprazole  40 mg Oral QAM AC    sodium chloride flush  10 mL IntraVENous 2 times per day    enoxaparin  40 mg SubCUTAneous Daily    insulin lispro  0-12 Units SubCUTAneous TID WC    insulin lispro  0-6 Units SubCUTAneous Nightly     PRN Meds: prochlorperazine, sodium chloride flush, sodium chloride, ondansetron **OR** ondansetron, polyethylene glycol, acetaminophen **OR** acetaminophen, potassium chloride **OR** potassium alternative oral replacement **OR** potassium chloride, magnesium sulfate, glucagon (rDNA), dextrose, glucose, dextrose **OR** dextrose      Intake/Output Summary (Last 24 hours) at 3/16/2022 1655  Last data filed at 3/16/2022 1249  Gross per 24 hour   Intake 300 ml   Output 2630 ml   Net -2330 ml         Exam:  /67   Pulse 85   Temp 97.8 °F (36.6 °C) (Axillary)   Resp 18   Ht 5' 11\" (1.803 m)   Wt 155 lb 3.2 oz (70.4 kg)   SpO2 96%   BMI 21.65 kg/m²   General appearance: No apparent distress, appears stated age and cooperative. Thin appearing. HEENT: Conjunctivae/corneas clear. Neck: Supple, with full range of motion. No jugular venous distention. Respiratory:  Normal respiratory effort. Clear to auscultation, bilaterally without Rales/Wheezes/Rhonchi. Cardiovascular: Regular rate and rhythm with normal S1/S2 without murmurs, rubs or gallops. Abdomen: Soft, non-tender, non-distended with normal bowel sounds. Musculoskeletal: passive and active ROM x 4 extremities. Skin: Skin color, texture, turgor normal.  No rashes or lesions. Neurologic:  Neurovascularly intact without any focal sensory/motor deficits.  Cranial nerves: II-XII intact, grossly non-focal.  Psychiatric: Alert and oriented, thought content appropriate, normal insight  Capillary Refill: Brisk,< 3 seconds Peripheral Pulses: +2 palpable, equal bilaterally     Labs:   Recent Labs     03/15/22  1243 03/16/22  0507   WBC 17.4* 12.6*   HGB 11.9* 10.8*   HCT 37.9* 35.0*    350     Recent Labs     03/15/22  1243 03/16/22  0507    139   K 4.1 3.5    102   CO2 28 25   BUN 14 8   CREATININE 0.6 0.6   CALCIUM 9.6 8.8     No results for input(s): AST, ALT, BILIDIR, BILITOT, ALKPHOS in the last 72 hours. No results for input(s): INR in the last 72 hours. No results for input(s): Odell Sol in the last 72 hours. Microbiology:    Blood culture #1: No results found for: BC    Blood culture #2:No results found for: Viona Croak    Organism:No results found for: ORG    No results found for: LABGRAM    MRSA culture only:No results found for: [facility]    Urine culture:   Lab Results   Component Value Date    LABURIN 100 mg 09/23/2014       Respiratory culture: No results found for: CULTRESP    Aerobic and Anaerobic :  No results found for: LABAERO  No results found for: LABANAE    Urinalysis:      Lab Results   Component Value Date    NITRU NEGATIVE 05/28/2015    WBCUA 0-2 05/28/2015    BACTERIA NONE 05/28/2015    RBCUA 0-2 05/28/2015    BLOODU NEGATIVE 05/28/2015    GLUCOSEU >= 1000 05/28/2015       Radiology:  CT Head WO Contrast   Final Result      1. Mild atrophy. 2. Inflammatory changes involving the left frontal sinus, ethmoid and maxillary sinuses bilaterally. 3. Slight deformity of the right maxillary sinus posterolaterally. 4. Otherwise negative noncontrast CT scan of the brain. **This report has been created using voice recognition software. It may contain minor errors which are inherent in voice recognition technology. **      Final report electronically signed by DR Terra Pierce on 3/15/2022 12:47 PM      CT Cervical Spine WO Contrast   Final Result       1. Cervical spondylosis especially at C5-6 and C6-7.   2. No acute fracture noted. 3. Bilateral carotid artery calcification. 4. Otherwise negative CT scan of the cervical spine. .               **This report has been created using voice recognition software. It may contain minor errors which are inherent in voice recognition technology. **      Final report electronically signed by DR Aida Segura on 3/15/2022 12:52 PM      XR RIBS BILATERAL (MIN 4 VIEWS)   Final Result       1. Slight irregularity involving the left sixth, seventh and eighth ribs laterally and right seventh rib laterally, please correlate clinically. 2. The remaining ribs are intact. 3. There is no pneumothorax. 4. There are small calcified granulomata in the right lower lobe and left upper lobe. .               **This report has been created using voice recognition software. It may contain minor errors which are inherent in voice recognition technology. **      Final report electronically signed by DR Aida Segura on 3/15/2022 12:35 PM      FL MODIFIED BARIUM SWALLOW W VIDEO    (Results Pending)     XR RIBS BILATERAL (MIN 4 VIEWS)    Result Date: 3/15/2022  PROCEDURE: XR RIBS BILATERAL (MIN 4 VIEWS) CLINICAL INFORMATION: Fall, rib pain. COMPARISON: Chest x-ray dated 14 August 2018. Ho Edwards TECHNIQUE: Two views of the bilateral ribs were obtained. A PA upright view of the chest was also obtained. FINDINGS: There is slight irregularity involving the right seventh rib laterally, left sixth, seventh and eighth ribs laterally laterally, please correlate clinically. The remaining ribs are intact. There is no displaced fracture. There is no pneumothorax. The heart size is normal.  The mediastinum is not widened. There are no pulmonary infiltrates or effusions. There are small calcified granulomata in the right lower lobe and left upper lobe. The pulmonary vascularity is normal.  There is thoracic spondylosis. .      1. Slight irregularity involving the left sixth, seventh and eighth ribs laterally and right seventh rib laterally, please correlate clinically.  2. The remaining TECHNIQUE: 3 mm noncontrast axial images were obtained through the cervical spine with sagittal and coronal reconstructions. All CT scans at this facility use dose modulation, iterative reconstruction, and/or weight-based dosing when appropriate to reduce radiation dose to as low as reasonably achievable. FINDINGS: The cervical vertebral bodies are normally aligned. There is no fracture. There is no prevertebral soft tissue swelling. There is cervical spondylosis especially at C5-6 and C6-7. At C1-2, there is normal alignment of the dens relative to anterior arch of C1. At C2-3, there is no disc herniation, canal or foraminal stenosis. At C3-4, there is no disc herniation, canal or foraminal stenosis. At C4-5, there is no disc herniation, canal or foraminal stenosis. At C5-6, there is a 1.2 mm ventral extradural defect secondary to bulging disc and uncinate process hypertrophy. This causes mild canal and mild-to-moderate bilateral foraminal stenosis. At C6-7, there is 1.4 mm ventral extradural defect secondary to bulging disc and uncinate process hypertrophy. This causes mild canal, moderate right and mild-to-moderate left-sided foraminal stenosis. At C7-T1, there is no disc herniation, canal or foraminal stenosis. There is bilateral carotid artery calcification. .  There are no suspicious findings in the lung apices. 1. Cervical spondylosis especially at C5-6 and C6-7. 2. No acute fracture noted. 3. Bilateral carotid artery calcification. 4. Otherwise negative CT scan of the cervical spine. . **This report has been created using voice recognition software. It may contain minor errors which are inherent in voice recognition technology. ** Final report electronically signed by DR Aida Segura on 3/15/2022 12:52 PM          Daphne Nicole MD   PGY2, Internal Medicine   3/16/2022

## 2022-03-16 NOTE — CARE COORDINATION
3/16/22, 8:13 AM EDT  DISCHARGE PLANNING EVALUATION:    Herbert Arguello       Admitted: 3/15/2022/ 1107   Hospital day: 1   Location: -15/015-A Reason for admit: Orthostatic hypotension [I95.1]  Syncope and collapse [R55]  Dehydration [E86.0]   PMH:  has a past medical history of Arthritis, GERD (gastroesophageal reflux disease), Hypertension, Keratinizing squamous cell carcinoma of larynx ([facility] [address] and Type II or unspecified type diabetes mellitus without mention of complication, not stated as uncontrolled. Procedure: none  Barriers to Discharge:  WBC 12.6.  +orthostatic BP. LR infusion. Palliative care eval.    PCP: KATHRYN Blankenship NP  Readmission Risk Score: 14.1 ( )%    Patient Goals/Plan/Treatment Preferences: Met with Nancy Ervin, he is from home with his wife. He is employed, independent, denies use of DME and HH, and needs at this time. Transportation/Food Security/Housekeeping Addressed:  No issues identified.

## 2022-03-17 ENCOUNTER — APPOINTMENT (OUTPATIENT)
Dept: GENERAL RADIOLOGY | Age: 62
DRG: 312 | End: 2022-03-17
Payer: OTHER GOVERNMENT

## 2022-03-17 PROBLEM — E43 SEVERE MALNUTRITION (HCC): Chronic | Status: ACTIVE | Noted: 2022-03-17

## 2022-03-17 LAB
ANION GAP SERPL CALCULATED.3IONS-SCNC: 12 MEQ/L (ref 8–16)
BUN BLDV-MCNC: 13 MG/DL (ref 7–22)
CALCIUM SERPL-MCNC: 8.9 MG/DL (ref 8.5–10.5)
CHLORIDE BLD-SCNC: 102 MEQ/L (ref 98–111)
CO2: 25 MEQ/L (ref 23–33)
CREAT SERPL-MCNC: 0.7 MG/DL (ref 0.4–1.2)
ERYTHROCYTE [DISTWIDTH] IN BLOOD BY AUTOMATED COUNT: 13.4 % (ref 11.5–14.5)
ERYTHROCYTE [DISTWIDTH] IN BLOOD BY AUTOMATED COUNT: 43.1 FL (ref 35–45)
GFR SERPL CREATININE-BSD FRML MDRD: > 90 ML/MIN/1.73M2
GLUCOSE BLD-MCNC: 149 MG/DL (ref 70–108)
GLUCOSE BLD-MCNC: 151 MG/DL (ref 70–108)
GLUCOSE BLD-MCNC: 165 MG/DL (ref 70–108)
GLUCOSE BLD-MCNC: 187 MG/DL (ref 70–108)
GLUCOSE BLD-MCNC: 212 MG/DL (ref 70–108)
HCT VFR BLD CALC: 37.9 % (ref 42–52)
HEMOGLOBIN: 11.7 GM/DL (ref 14–18)
MAGNESIUM: 2 MG/DL (ref 1.6–2.4)
MCH RBC QN AUTO: 26.8 PG (ref 26–33)
MCHC RBC AUTO-ENTMCNC: 30.9 GM/DL (ref 32.2–35.5)
MCV RBC AUTO: 86.9 FL (ref 80–94)
PHOSPHORUS: 3.4 MG/DL (ref 2.4–4.7)
PLATELET # BLD: 318 THOU/MM3 (ref 130–400)
PMV BLD AUTO: 9.3 FL (ref 9.4–12.4)
POTASSIUM SERPL-SCNC: 4 MEQ/L (ref 3.5–5.2)
RBC # BLD: 4.36 MILL/MM3 (ref 4.7–6.1)
SODIUM BLD-SCNC: 139 MEQ/L (ref 135–145)
WBC # BLD: 11.2 THOU/MM3 (ref 4.8–10.8)

## 2022-03-17 PROCEDURE — 6370000000 HC RX 637 (ALT 250 FOR IP): Performed by: STUDENT IN AN ORGANIZED HEALTH CARE EDUCATION/TRAINING PROGRAM

## 2022-03-17 PROCEDURE — 85027 COMPLETE CBC AUTOMATED: CPT

## 2022-03-17 PROCEDURE — 82948 REAGENT STRIP/BLOOD GLUCOSE: CPT

## 2022-03-17 PROCEDURE — 99253 IP/OBS CNSLTJ NEW/EST LOW 45: CPT | Performed by: PHYSICIAN ASSISTANT

## 2022-03-17 PROCEDURE — 92526 ORAL FUNCTION THERAPY: CPT

## 2022-03-17 PROCEDURE — 84100 ASSAY OF PHOSPHORUS: CPT

## 2022-03-17 PROCEDURE — 80048 BASIC METABOLIC PNL TOTAL CA: CPT

## 2022-03-17 PROCEDURE — 36415 COLL VENOUS BLD VENIPUNCTURE: CPT

## 2022-03-17 PROCEDURE — 2580000003 HC RX 258: Performed by: PHYSICIAN ASSISTANT

## 2022-03-17 PROCEDURE — 6370000000 HC RX 637 (ALT 250 FOR IP): Performed by: PHYSICIAN ASSISTANT

## 2022-03-17 PROCEDURE — 83735 ASSAY OF MAGNESIUM: CPT

## 2022-03-17 PROCEDURE — 2500000003 HC RX 250 WO HCPCS: Performed by: HOSPITALIST

## 2022-03-17 PROCEDURE — 2580000003 HC RX 258: Performed by: STUDENT IN AN ORGANIZED HEALTH CARE EDUCATION/TRAINING PROGRAM

## 2022-03-17 PROCEDURE — 1200000003 HC TELEMETRY R&B

## 2022-03-17 PROCEDURE — 92611 MOTION FLUOROSCOPY/SWALLOW: CPT

## 2022-03-17 PROCEDURE — 74230 X-RAY XM SWLNG FUNCJ C+: CPT

## 2022-03-17 RX ORDER — 0.9 % SODIUM CHLORIDE 0.9 %
500 INTRAVENOUS SOLUTION INTRAVENOUS ONCE
Status: COMPLETED | OUTPATIENT
Start: 2022-03-17 | End: 2022-03-17

## 2022-03-17 RX ORDER — SODIUM CHLORIDE 9 MG/ML
INJECTION, SOLUTION INTRAVENOUS CONTINUOUS
Status: DISCONTINUED | OUTPATIENT
Start: 2022-03-17 | End: 2022-03-24

## 2022-03-17 RX ADMIN — GEMFIBROZIL 600 MG: 600 TABLET ORAL at 15:44

## 2022-03-17 RX ADMIN — BARIUM SULFATE 20 ML: 0.81 POWDER, FOR SUSPENSION ORAL at 09:44

## 2022-03-17 RX ADMIN — INSULIN GLARGINE 40 UNITS: 100 INJECTION, SOLUTION SUBCUTANEOUS at 20:31

## 2022-03-17 RX ADMIN — BARIUM SULFATE 10 ML: 400 PASTE ORAL at 09:44

## 2022-03-17 RX ADMIN — SODIUM CHLORIDE 500 ML: 9 INJECTION, SOLUTION INTRAVENOUS at 15:43

## 2022-03-17 RX ADMIN — SODIUM CHLORIDE: 9 INJECTION, SOLUTION INTRAVENOUS at 16:53

## 2022-03-17 RX ADMIN — BARIUM SULFATE 20 ML: 400 SUSPENSION ORAL at 09:45

## 2022-03-17 RX ADMIN — INSULIN LISPRO 2 UNITS: 100 INJECTION, SOLUTION INTRAVENOUS; SUBCUTANEOUS at 20:32

## 2022-03-17 RX ADMIN — ASPIRIN 81 MG: 81 TABLET, COATED ORAL at 10:11

## 2022-03-17 RX ADMIN — GABAPENTIN 300 MG: 300 CAPSULE ORAL at 15:22

## 2022-03-17 RX ADMIN — SODIUM CHLORIDE, PRESERVATIVE FREE 10 ML: 5 INJECTION INTRAVENOUS at 10:12

## 2022-03-17 RX ADMIN — GABAPENTIN 300 MG: 300 CAPSULE ORAL at 10:10

## 2022-03-17 RX ADMIN — INSULIN LISPRO 2 UNITS: 100 INJECTION, SOLUTION INTRAVENOUS; SUBCUTANEOUS at 16:54

## 2022-03-17 RX ADMIN — INSULIN LISPRO 2 UNITS: 100 INJECTION, SOLUTION INTRAVENOUS; SUBCUTANEOUS at 13:12

## 2022-03-17 RX ADMIN — GABAPENTIN 300 MG: 300 CAPSULE ORAL at 20:33

## 2022-03-17 RX ADMIN — ATORVASTATIN CALCIUM 20 MG: 20 TABLET, FILM COATED ORAL at 20:33

## 2022-03-17 ASSESSMENT — PAIN SCALES - GENERAL
PAINLEVEL_OUTOF10: 0
PAINLEVEL_OUTOF10: 0

## 2022-03-17 NOTE — FLOWSHEET NOTE
[place] [address] PROGRESS NOTE      Patient: Jacquelyn Mahan  Room #: 9K-68/715-U            YOB: 1960  Age: 57 y.o. Gender: male            Admit Date & Time: 3/15/2022 11:07 AM    Assessment:  Initial Spiritual Care Contact:   Stephanie Eric is a 57year old male who is sitting up in a chair. He is soft spoken and is finishing his supper. No family is present at this time. He is approachable and is happy to see a . He belongs to [employer] and is good friends with the . His  [facility] is aware that he is here. He spoke of reading his bible both in the old testiment and the new one  . As an intervention:  I did active listening and had prayer for his healing. Outcomes:  He is thankful for the spiritual care contact. Plan: 1. Care Plan:  Continue spiritual and emotional care for patient and family. Including prayers.      Electronically signed by Jie Richey on 3/17/2022 at 5:16 PM.  [address] [facility]  [phone]

## 2022-03-17 NOTE — PROGRESS NOTES
[address] [place] [address]  SPEECH THERAPY  STR[facility] RENAL TELEMETRY 6K  Modified Barium Swallow + Dysphagia tx    SLP Individual Minutes  Time In: 3328  Time Out: 3015  Minutes: 34  Timed Code Treatment Minutes: 0 Minutes     MBS: 20 minutes   Dysphagia tx: 14 minutes     Date: 3/17/2022  Patient Name: Reginaldo Stewart      CSN: [id number]   : 1960  (57 y.o.)  Gender: male   Referring Physician:  Abigail Kunz MD   Diagnosis: Orthostatic Hypotension  Precautions: Aspiration Risk     History of Present Illness/Injury:  Patient admitted with above diagnosis. Per chart review, \"57year-old male with past medical history hypertension, squamous cell carcinoma, DM2 who presented to [facility] ED for syncope.  The patient states that this morning he became lightheaded upon standing, \"blacked out\" and hit his side on the cement.  The patient states he had dizziness upon standing for a couple weeks however has not passed out.  The patient states he has severe pain with swallowing and states he has not been able to eat or drink much recently. Quenten Million states that he has difficulty swallowing thin liquids. Patient has a history of squamous cell carcinoma of supraglottis and completed radiation in January. FEES completed on 2021 prior to completion of radiation with recommendations for regular and thin liquids and presence of mild pharyngeal stasis that patient cleared independently with additional swallows.  Patient also noted to have right vocal fold paralysis. The patient denies any chest pain, shortness of breath, diaphoresis, fever/chills, abdominal pain, N/V/D.  Orthostatics positive in the ED.  Patient admitted to hospitalist service for further management. \" ST recommended completion of MBS to further assess pharyngeal swallow function and determine safest level of PO intake and update POC as clinically indicated d/t poor tolerance of FEES and limited view s/t pharyngeal edema on 3/16/22.      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 Current Diet: Regular and Thin Liquids     Pain: No pain reported. SUBJECTIVE:  Patient arrived to fluoro suite via wheelchair. Patient reporting he is not feeling well this date and endorsing increased difficulty with swallowing. Adequate alertness, engagement, and participation this date. OBJECTIVE:    Respiratory Status:  Room Air    Behavioral Observation:  Alert and Oriented    PATIENT WAS EVALUATED USING:  Barium: Thin Liquids, Mildly Thick Liquids, Moderately Thick Liquids, Puree, Soft Solids and Coarse Solids    ORAL PREPARATION PHASE:  WFL    ORAL PHASE: WFL     ORAL PHASE JULIEN SCORE: (Dysphagia outcome and severity scale)  6 = WFL/Modified Independent - Normal Diet - May have mild oral delay    PHARYNGEAL PHASE:  Impaired: Decreased Airway Protection, Decreased Epiglottic Inversion, Decreased Hyolaryngeal Elevation, Decreased Hyolaryngeal Anterior Excursion, Decreased Tongue Based Retraction, Residue in the Valleculae, Decreased Pharyngeal Constriction, Residue Along the Posterior Pharyngeal Wall and Decreased Thyrohyoid Approximation     PHARYNGEAL PHASE JULIEN SCORE: (Dysphagia outcome and severity scale)  3 = Moderate Dysphagia - Two or more diet consistencies restricted - May exhibit one or more of the following:  Moderate residue clears with cue, Airway penetration to the level of the vocal folds without cough with two or more consistencies, Aspiration with two consistencies with weak or no reflexive cough, Aspiration of one consistency, no cough and airway penetration with one consistency, no cough    EVIDENCE FOR LARYNGEAL PENETRATION AND/OR ASPIRATION:  Laryngeal penetration evident with thin liquids, mildly thick liquids, moderately thick liquids, juice from peach, and hard/coarse texture   Audible aspiration evident with thin liquids  Silent aspiration evident with moderately thick liquids     PENETRATION-ASPIRATION SCALE (PAS): Thin Liquids with head neutral: 7 = Material enters the airway, passes below the vocal folds, and is not ejected from the trachea despite effort    Thin Liquids with chin tuck/head turn to the R: 3 = Material enters the airway, remains above the vocal folds, and is not ejected from the airway    Mildly Thick Liquids head neutral/chin tuck:  3 = Material enters the airway, remains above the vocal folds, and is not ejected from the airway    Mildly Thick Liquids with head turn to R: 1 = Material does not enter the airway    Moderately Thick Liquids with head neutral: 3 = Material enters the airway, remains above the vocal folds, and is not ejected from the airway    Moderately Thick Liquids with chin tuck: 8 = Material enters the airway, passes below the vocal folds, and no effort is made to eject    Moderately Thick Liquids with head turn to the R: 5 = Material enters the airway, contact the vocal folds, and is not ejected from the airway.     Puree:  1 = Material does not enter the airway    Soft Solid (mixed consistency):  3 = Material enters the airway, remains above the vocal folds, and is not ejected from the airway    Hard Solid (laura cracker dipped in moderately thick barium): 2 = Material enters the airway, remains above vocal folds, and is ejected from the airway    ESOPHAGEAL PHASE:   No significant findings    ATTEMPTED TECHNIQUES:  Small Bolus Size Effective With mildly thick liquids head turn to R   Straw Not Attempted    Cup Effective With mildly thick liquids head turn to R   [employer] Effective With mildly thick liquids    Head Turn to the R Effective With mildly thick liquids    Spoon Presentations Ineffective    Volitional Cough Effective and Ineffective Weak cough   Spontaneous Cough Effective and Ineffective Weak cough           DIAGNOSTIC IMPRESSIONS:  Patient presents with WFL/MOD I oral swallow function and moderate pharyngeal dysphagia based on skilled clinical findings outlined above. Patient with mildly prolonged/coordinated mastication with effective textural breakdown, adequate bolus formation, and timely/coordinated ap transit resulting in complete bolus clearance. Patient with timely swallow initiation; however, patient with decreased hyolaryngeal elevation and anterior excursion resulting in incomplete epiglottic inversion. Epiglottic inversion also impacted by pharyngeal edema noted along posterior pharyngeal wall. Patient with decreased thyrohyoid approximation resulting in incomplete airway protection leading to penetration/aspiration occurring DURING the swallow. Patient with decreased base of tongue retraction and pharyngeal constriction resulting in mild-moderate pharyngeal residue remaining in the vallecula and mild residue along the posterior pharyngeal wall with liquid consistencies. Multiple swallows (3-7) effective in decreasing pharyngeal residue with head in neutral position, chin tuck, and head turn to the R. Only mild residue with solid trials which cleared with 2-3 additional swallows. Patient initially with good airway protection with thin liquids, mildly thick liquids, and moderately thick liquids with head in neutral position and chin tuck position; however, penetration observed during and after the swallow with thin liquids, mildly thick liquids, moderately thick liquids, and juice from mixed consistencies when patient was utilizing multiple swallows in attempt to clear pharyngeal residue. Head turn to the right with mildly thick liquids was EFFECTIVE in eliminating penetration and aspiration as well as reducing/effectively clearing pharyngeal residue from vallecula with 2-3 additional swallows.      The following airway invasion events were observed:   -Thin liquids in head neutral position - penetration during/after the swallow that doesn't clear; aspiration during the swallow     -Thin liquids with chin tuck - penetration during/after the swallow that doesn't clear    -Thin liquids with head turn to the R - penetration during the swallow that doesn't clear     -Mildly thick liquids with head in neutral position - penetration during/after the swallow that doesn't clear     -Mildly thick liquids with chin tuck - penetration during the swallow inconsistently transient/doesn't clear     -Mildly thick liquids with head turn to the R - no penetration or aspiration     -Moderately thick liquids with head in neutral position - penetration during the swallow that doesn't clear    -Moderately thick liquids with chin tuck - penetration and trace, silent aspiration occurring AFTER the swallow s/t residue    -Moderately thick liquids with head turn to the R - deep penetration to the VF that does not clear. At this time, it is recommended patient initiate a regular texture diet and mildly thick liquids with head turn to the right + multiple swallows. Patient remains at increased risk for aspiration, therefore recommend close pulmonary monitoring. Patient would continue to benefit from ENT consult s/t pharyngeal edema and history of squamous cell carcinoma of supraglottis and right vocal fold paralysis. Dysphagia tx: Following completion of MBS, ST completed review of swallow anatomy and physiology via use of computer monitor. ST defined aspiration and penetration and explained risk factors associated with aspiration (pneumonia, pulmonary decline, respiratory failure, extended/frequent hospitalizations, etc). ST then reviewed results from [facility] and discussed rationale for trialing thickened liquids and positional maneuvers. ST discussed diet recommendations as well as recommended swallow strategies to improve safety and efficiency.  ST discussed need for ongoing dysphagia tx services to improve pharyngeal weakness and progress back to regular diet + thin liquids. Patient stated understanding and was in agreement with POC moving forward. Diet Recommendations:  Regular Texture with Mildly Thick Liquids with Head Turn to the Right + Multiple Swallows    Strategies:  Full Upright Position, Small Bite/Sip, No Straw, Multiple Swallow, Head Turn to the Right, Pulmonary Monitoring, Medications Whole with Puree and Limit Distractions   Rehabilitation Potential: good    EDUCATION:  Learner: Patient  Education:  Reviewed results and recommendations of this evaluation, Reviewed diet and strategies, Reviewed signs, symptoms and risks of aspiration, Reviewed ST goals and Plan of Care, Reviewed recommendations for follow-up, Education Related to Potential Risks and Complications Due to Impairment/Illness/Injury, Education Related to Prevention of Recurrence of Impairment/Illness/Injury and Education Related to [employer] and Wellness  Evaluation of Education: Verbalizes understanding, Demonstrates with assistance and Needs further instruction    PLAN:  Skilled SLP intervention on acute care 3-5 x per week or until goals met and/or pt plateaus in function. Specific interventions for next session may include: Review results from [facility] and vida, Pharyngeal Strengthening Exercises. PATIENT GOAL:    Return to least restrictive diet.     SHORT TERM GOALS:  Short-term Goals  Timeframe for Short-term Goals: 2 weeks  Goal 1: Patient will consume a regular texture diet and mildly thick liquids with head turn to the R + multiple swallows with no overt s/s of aspiration and adequate endurance to assist with meeting nutrition/hydration needs  Goal 2: Patient will complete pharyngeal and laryngeal strengthening exercises (effortful swallow, francisca, mendelsohn, shaker, pitch glide, sustained /i/) x20 with good success to decreased pharyngeal residue and improve airway protection for potential diet advancement  Goal 3: Patient will complete comprehensive oral hygiene routine in order to decrease colonization of oral bacteria and development of aspiration pneumonia  Goal 4: Patient will complete repeat instrumental evaluation in 2 weeks or as clinically indicated to re-assess pharyngeal swallow function and update POC.       LONG TERM GOALS:  No long term goals recommended d/t short RICH Rivera (Donovan Tee 587) [address] M[address]

## 2022-03-17 NOTE — PLAN OF CARE
Problem: Nutrition  Goal: Optimal nutrition therapy  Outcome: Ongoing   Nutrition Problem #1: Severe malnutrition,In context of chronic illness  Intervention: Food and/or Nutrient Delivery: Continue Current Diet,Start Oral Nutrition Supplement  Nutritional Goals: Patient will safely consume 75% or more of meals during LOS.

## 2022-03-17 NOTE — CONSULTS
Department of Otolaryngology  Consult Note    Reason for Consult:  Dysphagia with worsened odynophagia  Requesting Physician:  Dr Royce Martinez: Dysphagia    History Obtained From:  patient, electronic medical record    HISTORY OF PRESENT ILLNESS:                The patient is a 57 y.o. male who presented to [facility] ER on 3/15/2022 for evaluation of dysphagia and odynophagia. The patient does have a history of laryngeal cancer and has followed with [facility] ENT for this. During his work-up he was found to reportedly have right vocal cord paralysis as well. Dr. Candie Meyers saw the patient and discussed possible total laryngectomy vs chemoradiation. The patient opted to proceed with chemoradiation and gets it locally in [facility]. The patient completed radiation on December 31, 2021. He reports that he was having dysphagia and odynophagia throughout radiation, but in the last couple weeks his symptoms have seemed to worsen. Patient reports that he has a PET scan coming up next month that was ordered by Dr. Salas Blood his radiation oncologist.  Patient reports that he does okay with swallowing some thicker foods such as pudding, but has difficulty with thin liquids. 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 No pertinent surgical history.     Current Medications:   Current Facility-Administered Medications: 0.9 % sodium chloride infusion, , IntraVENous, Continuous  menthol lozenge 5.8 mg, 1 lozenge, Oral, Q2H PRN  insulin glargine (LANTUS) injection vial 40 Units, 40 Units, SubCUTAneous, Nightly  lidocaine 4 % external patch 1 patch, 1 patch, TransDERmal, Daily  aspirin EC tablet 81 SARAH Ochoa mg, 81 mg, Oral, Daily  amLODIPine (NORVASC) tablet 5 mg, 5 mg, Oral, Daily  atorvastatin (LIPITOR) tablet 20 mg, 20 mg, Oral, Nightly  gabapentin (NEURONTIN) capsule 300 mg, 300 mg, Oral, TID  gemfibrozil (LOPID) tablet 600 mg, 600 mg, Oral, BID AC  [Held by provider] hydroCHLOROthiazide (HYDRODIURIL) tablet 25 mg, 25 mg, Oral, Daily  pantoprazole (PROTONIX) tablet 40 mg, 40 mg, Oral, QAM AC  prochlorperazine (COMPAZINE) tablet 10 mg, 10 mg, Oral, Q6H PRN  sodium chloride flush 0.9 % injection 10 mL, 10 mL, IntraVENous, 2 times per day  sodium chloride flush 0.9 % injection 10 mL, 10 mL, IntraVENous, PRN  0.9 % sodium chloride infusion, 25 mL, IntraVENous, PRN  enoxaparin (LOVENOX) injection 40 mg, 40 mg, SubCUTAneous, Daily  ondansetron (ZOFRAN-ODT) disintegrating tablet 4 mg, 4 mg, Oral, Q8H PRN **OR** ondansetron (ZOFRAN) injection 4 mg, 4 mg, IntraVENous, Q6H PRN  polyethylene glycol (GLYCOLAX) packet 17 g, 17 g, Oral, Daily PRN  acetaminophen (TYLENOL) tablet 650 mg, 650 mg, Oral, Q6H PRN **OR** acetaminophen (TYLENOL) suppository 650 mg, 650 mg, Rectal, Q6H PRN  potassium chloride (KLOR-CON M) extended release tablet 40 mEq, 40 mEq, Oral, PRN **OR** potassium bicarb-citric acid (EFFER-K) effervescent tablet 40 mEq, 40 mEq, Oral, PRN **OR** potassium chloride 10 mEq/100 mL IVPB (Peripheral Line), 10 mEq, IntraVENous, PRN  magnesium sulfate 2000 mg in 50 mL IVPB premix, 2,000 mg, IntraVENous, PRN  insulin lispro (HUMALOG) injection vial 0-12 Units, 0-12 Units, SubCUTAneous, TID WC  insulin lispro (HUMALOG) injection vial 0-6 Units, 0-6 Units, SubCUTAneous, Nightly  glucagon (rDNA) injection 1 mg, 1 mg, IntraMUSCular, PRN  dextrose 5 % solution, 100 mL/hr, IntraVENous, PRN  glucose chewable tablet 4 each, 4 tablet, Oral, PRN  dextrose 10 % infusion, 125 mL, IntraVENous, PRN **OR** dextrose 10 % infusion, 250 mL, IntraVENous, PRN    Allergies:     Allergies   Allergen Reactions    Lisinopril Swelling Angioedema of the face/lips  Other reaction(s): cough  Pt unsure but thinks he is allergic to this          Social History:    TOBACCO:   reports that he has never smoked. He has never used smokeless tobacco.  ETOH:   reports previous alcohol use of about 1.0 standard drink of alcohol per week. DRUGS:   reports no history of drug use. Family History:       Problem Relation Age of Onset    Diabetes Mother     Stroke Mother     Cancer Maternal Grandfather         liver    Cancer Father        REVIEW OF SYSTEMS:    ROS completed and negative unless stated in HPI    PHYSICAL EXAM:    VITALS:  BP (!) 152/86   Pulse 90   Temp 98.4 °F (36.9 °C) (Oral)   Resp 18   Ht 5' 11\" (1.803 m)   Wt 155 lb 3.2 oz (70.4 kg)   SpO2 95%   BMI 21.65 kg/m²     This is a 57 y. o.chronically appearing male. Patient is alert and oriented to person, place and time. Mood is happy with normal affect. Not obviously hearing impaired. Voice is weak and hoarse    Head:   Normocephalic, atraumatic. No obvious masses or lesions noted. Nose:    External nose: Appears midline. No obvious deformity or masses. Septum:  deviated. No septal hematoma. No perforation. Mucosa:  clear  Turbinates: normal and pink            Discharge:  clear    Mouth/Throat:  Lips, tongue and oral cavity: Normal. No masses or lesions noted   Oral mucosa: moist  Oropharynx: normal-appearing mucosa  Hard and soft palates: symmetrical and intact. Salivary glands: not enlarged and no tenderness to palpation. Uvula: midline, no obvious lesions   Gag reflex is present    Neck: Trachea midline. Palpation neck is relatively soft, which is surprising given previous radiation. No obvious masses noted with palpation. Lymphatic: No palpable cervical lymphadenopathy noted. Eyes: FLIP, EOM intact. Conjunctiva moist without discharge. Lungs: Normal effort of breathing, not obviously distressed. Neuro: Cranial nerves II-XII grossly intact.     DATA:    CBC:   Lab Results   Component Value Date    WBC 11.2 03/17/2022    RBC 4.36 03/17/2022    HGB 11.7 03/17/2022    HCT 37.9 03/17/2022    MCV 86.9 03/17/2022    MCH 26.8 03/17/2022    MCHC 30.9 03/17/2022    RDW 13.0 09/29/2021     03/17/2022    MPV 9.3 03/17/2022     BMP:    Lab Results   Component Value Date     03/17/2022    K 4.0 03/17/2022    K 3.5 03/16/2022     03/17/2022    CO2 25 03/17/2022    BUN 13 03/17/2022    LABALBU 4.1 09/29/2021    CREATININE 0.7 03/17/2022    CALCIUM 8.9 03/17/2022    GFRAA >60 10/14/2020    LABGLOM >90 03/17/2022    GLUCOSE 165 03/17/2022     Radiology Review:  CT Head wo contrast 3/15/22-  FINDINGS:       There is mild atrophy       There is no hemorrhage. There are no intra-or extra-axial collections.  There is no hydrocephalus, midline shift or mass effect.  The gray-white matter differentiation is preserved.        There are inflammatory changes involving the left frontal sinus, ethmoid and maxillary sinuses bilaterally. There is slight deformity of the right maxillary sinus posterolaterally.  The remaining paranasal sinuses and mastoid air cells are normally    aerated.  There is no suspicious calvarial abnormality.                         Impression       1. Mild atrophy. 2. Inflammatory changes involving the left frontal sinus, ethmoid and maxillary sinuses bilaterally. 3. Slight deformity of the right maxillary sinus posterolaterally. 4. Otherwise negative noncontrast CT [place] [facility] [place]   FINDINGS: Oral, pharyngeal and esophageal structures appear normal. There is laryngeal penetration of honey thick, nectar thick, thin, soft and solid barium with aspiration of honey thick and thin barium.           Impression   1. Laryngeal penetration of honey thick, nectar thick, thin, soft and solid barium with aspiration of honey thick and thin barium. 2. Additional recommendations from the speech therapist will follow.      Reviewed FEES video individually and with Dr Desmond Tidwell. Patient has watery edema of the larynx and surrounding tissue that would be consistent with previous radiation. No obvious masses visible, but somewhat limited due to edema. Minimal ability to visualize the vocal cords given edema. He does appear to have saliva freely flowing into trachea    IMPRESSION/RECOMMENDATIONS:      Dysphagia, odynophagia, history laryngeal cancer, history head and neck radiation    -Patient's dysphagia likely secondary to laryngeal edema consistent with recent radiation. Ultimately, this needs time for edema to improve/resolve. Uncertain if steroids would help edema much and increased concern that giving patient steroids could likely predispose him to development of severe thrush  -May need to consider PEG tube in future  -Proceed with PET scan as planned by Dr. Lloyd Rojas as outpatient  -Patient can follow with [facility] ENT to discuss possibility of total laryngectomy if recommended by radiation oncology    FEES reviewed as well as impression/plan created in discussion with Dr Desmond Tidwell.     Electronically signed by REMA Rojas on 3/17/2022 at 4:51 PM

## 2022-03-17 NOTE — FLOWSHEET NOTE
03/16/22 2204   Vital Signs   Blood Pressure Lying 167/77   Pulse Lying 83 PER MINUTE   Blood Pressure Sitting 158/84   Pulse Sitting 89 PER MINUTE   Blood Pressure Standing 150/77   Pulse Standing 93 PER MINUTE   After 500 ml bolus complete.

## 2022-03-17 NOTE — PLAN OF CARE
Problem: Falls - Risk of:  Goal: Will remain free from falls  Description: Will remain free from falls  3/17/2022 0026 by Isak Resendiz RN  Outcome: Ongoing  Fall risk assessment complete. Fall sign posted. Non skid socks on. Bed in lowest position, 2/4 side rails up. Bed alarm on. Call light and all possessions within reach. Ambulates with assist. Educated to use call light and wait for staff before exiting bed. Patient verbalized understanding. Problem: Pain:  Goal: Control of acute pain  Description: Control of acute pain  3/17/2022 0026 by Isak Resendiz RN  Outcome: Ongoing   Patient denies pain up to this point. Educated patient to inform staff of pain before it becomes intolerable. Problem: Discharge Planning:  Goal: Participates in care planning  Description: Participates in care planning  Outcome: Ongoing  Patient from home with wife. Plans to return home at discharge. Problem: Skin Integrity - Impaired:  Goal: Will show no infection signs and symptoms  Description: Will show no infection signs and symptoms  Outcome: Ongoing  Skin assessment complete. No signs/symptoms of infection noted. Problem: Respiratory:  Goal: Ability to maintain a clear airway will improve  Description: Ability to maintain a clear airway will improve  3/17/2022 0026 by Isak Resendiz RN  Outcome: Ongoing  Patient able to maintain patent airway. Productive cough. Clears throat often. Patient on room air.

## 2022-03-17 NOTE — PROGRESS NOTES
Comprehensive Nutrition Assessment    Type and Reason for Visit:  Initial,Positive Nutrition Screen (poor oral intake, unplanned weight loss, swallowing trouble)    Nutrition Recommendations/Plan:   -Consider MVI. -ONS initiated: Magic Cup TID. -Continue current diet, further recommendations per SLP. Nutrition Assessment:    Pt. severely malnourished AEB criteria listed below. 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 Malnutrition Assessment:  Malnutrition Status:  Severe malnutrition    Context:  Chronic Illness     Findings of the 6 clinical characteristics of malnutrition:  Energy Intake:  7 - 75% or less estimated energy requirements for 1 month or longer  Weight Loss:  7 - 10% over 6 months (18.2% loss)     Body Fat Loss:   (moderate body fat loss) Fat Overlying Ribs,Triceps,Orbital   Muscle Mass Loss:  7 - Severe muscle mass loss Calf (gastrocnemius),Thigh (quadraceps),Clavicles (pectoralis & deltoids)  Fluid Accumulation:  No significant fluid accumulation     Strength:  Not Performed    Estimated Daily Nutrient Needs:  Energy (kcal):  8694-9137 kcals (25-30 kcals/kg/day); Weight Used for Energy Requirements:   (70 kg)     Protein (g):  84 grams (1.2 grams/kg/day); Weight Used for Protein Requirements:   (70 kg)          Nutrition Related Findings:      Pt. Report/Treatments/Miscellaneous: Patient seen earlier today eating lunch. Reports tolerating current diet. S/p MBS-SLP following. Poor oral intake and unplanned weight loss since supraglottic larynx cancer treatment. Reports home ONS use, agreed to Magic cup here.     GI Status: denied any issues, no BM since admit  Pertinent Labs: 3/16/22: HgbA1c 8.3%/189 average glucose  Pertinent Meds: lipitor, lopid, hydrodiuril, lantus, humalog, protonix     Wounds:  None Current Nutrition Therapies:    ADULT DIET; Regular; 5 carb choices (75 gm/meal); Mildly Thick (Nectar); No Drinking Straws  ADULT ORAL NUTRITION SUPPLEMENT; Breakfast, Lunch, Dinner; Frozen Oral Supplement    Anthropometric Measures:  · Height: 5' 11\" (180.3 cm)  · Current Body Weight: 155 lb 3.2 oz (70.4 kg) (3/16/22 no edema)   · Admission Body Weight: 151 lb 6.4 oz (68.7 kg) (3/15/22 no edema)    · Usual Body Weight:  (~ 180# per pt prior cancer treatment. Per EMR: 9/29/21: 189#9. 6oz, 12/16/21: 166#3. 6oz, 1/6/22: 154#3. 2oz, 2/2/22: 148#9. 6oz)     · Ideal Body Weight: 172 lbs;   · BMI: 21.7  · Adjusted Body Weight:  ; No Adjustment   · BMI Categories: Normal Weight (BMI 18.5-24. 9)       Nutrition Diagnosis:   · Severe malnutrition,In context of chronic illness related to inadequate protein-energy intake as evidenced by severe muscle loss,moderate loss of subcutaneous fat      Nutrition Interventions:   Food and/or Nutrient Delivery:  Continue Current Diet,Start Oral Nutrition Supplement  Nutrition Education/Counseling:  Education initiated (Encouraged oral intake and ONS use.)   Coordination of Nutrition Care:  Continue to monitor while inpatient    Goals:  Patient will safely consume 75% or more of meals during LOS. Nutrition Monitoring and Evaluation:   Behavioral-Environmental Outcomes:  None Identified   Food/Nutrient Intake Outcomes:  Food and Nutrient Intake,Diet Advancement/Tolerance,Supplement Intake  Physical Signs/Symptoms Outcomes:  Biochemical Data,Chewing or Swallowing,GI Status,Nutrition Focused Physical Findings,Skin,Weight     Discharge Planning:     Too soon to determine     Electronically signed by Ravinder Herrera RD, LD on 3/17/22 at 1:46 PM EDT    Contact: [phone]

## 2022-03-17 NOTE — PROGRESS NOTES
[facility] [address]  OCCUPATIONAL THERAPY MISSED TREATMENT NOTE  [facility] TELE[facility] 6K  6K-15/015-A      Date: 3/17/2022  Patient Name: Minh Brown        CSN: [id number]   : 1960  (57 y.o.)  Gender: male                REASON FOR MISSED TREATMENT: Pt politely declined stating he was too tired to participate in therapy this date. This writer educated on the importance of  OOB tasks to increase endurance and strength needed to complete functional task independently. Pt continued to decline stating \"not right now\". Will try back later today or try again tomorrow as able.

## 2022-03-17 NOTE — PROGRESS NOTES
Hospitalist Progress Note      Patient:  Yang Westbrook    Unit/Bed:6K-15/015-A  YOB: 1960  MRN: [id number]   Acct: [de-identified]   PCP: KATHRYN Kruger NP  Date of Admission: 3/15/2022    Assessment/Plan:  Syncope: likely orthostatic hypotension induced. Suspect due to dehydration as patient has not been able to take fluids in due to odynophagia. On mIVF. Monitor on Tele. ECHO pending. On modified dysphagia diet per [address] 3/17. No known history of arrhythmias but will consider a Cardiac monitor on discharge. Essential HTN: controlled. On Norvasc with parameters. HCTZ held at this time. IDDMII: HbA1c 8.3 on 3/16/2022. On Lantus 99K nightly, Trulicity, and Amaryl. PO meds held at this time. On Lantus 40 u nightly with SSI. Monitor BG with inpatient goal 140-180. 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 associated dysphagia and mild odynophagia present. Follows with Dr. Jeimy Damian. ENT consulted for dyspshagia. On modified diet. Tele:   [x] yes             [] no    Code Status: Full Code    Fluids:  IVNS at 76 cc/hr  Diet:  ADULT DIET; Regular; 5 carb choices (75 gm/meal); Mildly Thick (Nectar);  No Drinking Straws  ADULT ORAL NUTRITION SUPPLEMENT; Breakfast, Lunch, Dinner; Frozen Oral Supplement    DVT prophylaxis: [x] Lovenox                                 [] SCDs                                 [] SQ Heparin                                 [] Encourage ambulation                                 [] Already on Anticoagulation    Disposition:      [] TBD                             [x] Home                             [] TCU                             [] Rehab                             [] Psych                             [] SNF                             [] [facility]                             [] Other-    Chief Complaint: loss of consciousness    Hospital Course:   56 yo M with history of Keratinizing SCC of larynx (diagnosed 08/2021), HTN, T2DM, Arthritis presented after an episode of syncope while going home from work due to associated dizziness/lightheadedness the day of presentation. Had associated trauma to right chest area from fall. ED course: Presented afebrile and hemodynamically stable. Labs generally unremarkable. WBC 17. 4. XR ribs showed slight irregularity involving the left 6th, 7th, and 8th ribs laterally and right 7th rib laterally. No pneumothorax. CT head wo contrast with inflammatory changes involving the left frontal sinus, ethmoid, and maxillary sinuses bilaterally, slight deformity of the right maxillary sinus posterolaterally. CT cervical spondylosis especially at C5-6 and C6-7. Admitted for further work up and management. Of note, has had issues with persistent cough and swallowing since radiation and lost his voice. Has not been able to take PO diabetic meds due to dysphagia so has had suboptimal control of BG. Has no known history of arrhythmias. No known cardiac history. Interval:   No acute events overnight. Modified barium swallow done this morning. Subjective:   Reports feeling ok. No new complaints. Dysphagia mainly with liquids, does better with solid/pureed diet. No chest pain, dyspnea, palpitations, dizziness/lightheadedness. ROS (12 point review of systems completed. Pertinent positives noted.  Otherwise ROS is negative)     Medications:  Reviewed    Infusion Medications    sodium chloride      sodium chloride      dextrose      dextrose      Or    dextrose      [Held by provider] lactated ringers Stopped (03/16/22 1430)     Scheduled Medications    sodium chloride  500 mL IntraVENous Once    insulin glargine  40 Units SubCUTAneous Nightly    lidocaine  1 patch TransDERmal Daily    aspirin  81 mg Oral Daily    amLODIPine  5 mg Oral Daily    atorvastatin  20 mg Oral Nightly    gabapentin  300 mg Oral TID    gemfibrozil  600 mg Oral BID AC    hydroCHLOROthiazide  25 mg Oral Daily    pantoprazole  40 mg Oral QAM AC    sodium chloride flush  10 mL IntraVENous 2 times per day    enoxaparin  40 mg SubCUTAneous Daily    insulin lispro  0-12 Units SubCUTAneous TID WC    insulin lispro  0-6 Units SubCUTAneous Nightly     PRN Meds: prochlorperazine, sodium chloride flush, sodium chloride, ondansetron **OR** ondansetron, polyethylene glycol, acetaminophen **OR** acetaminophen, potassium chloride **OR** potassium alternative oral replacement **OR** potassium chloride, magnesium sulfate, glucagon (rDNA), dextrose, glucose, dextrose **OR** dextrose      Intake/Output Summary (Last 24 hours) at 3/17/2022 1528  Last data filed at 3/17/2022 1127  Gross per 24 hour   Intake 4127.61 ml   Output 2025 ml   Net 2102.61 ml         Exam:  BP (!) 152/86   Pulse 90   Temp 98.4 °F (36.9 °C) (Oral)   Resp 18   Ht 5' 11\" (1.803 m)   Wt 155 lb 3.2 oz (70.4 kg)   SpO2 95%   BMI 21.65 kg/m²   General appearance: No apparent distress, appears stated age and cooperative. Thin appearing. HEENT: Conjunctivae/corneas clear. Neck: Supple, with full range of motion. No jugular venous distention. Respiratory:  Normal respiratory effort. Clear to auscultation, bilaterally without Rales/Wheezes/Rhonchi. Cardiovascular: Regular rate and rhythm with normal S1/S2 without murmurs, rubs or gallops. Abdomen: Soft, non-tender, non-distended with normal bowel sounds. Musculoskeletal: passive and active ROM x 4 extremities. Skin: Skin color, texture, turgor normal.  No rashes or lesions. Neurologic:  Neurovascularly intact without any focal sensory/motor deficits.  Cranial nerves: II-XII intact, grossly non-focal.  Psychiatric: Alert and oriented, thought content appropriate, normal insight  Capillary Refill: Brisk,< 3 seconds   Peripheral Pulses: +2 palpable, equal bilaterally     Labs:   Recent Labs of the right maxillary sinus posterolaterally. 4. Otherwise negative noncontrast CT scan of the brain. **This report has been created using voice recognition software. It may contain minor errors which are inherent in voice recognition technology. **      Final report electronically signed by DR Herbert Sierra on 3/15/2022 12:47 PM      CT Cervical Spine WO Contrast   Final Result       1. Cervical spondylosis especially at C5-6 and C6-7.   2. No acute fracture noted. 3. Bilateral carotid artery calcification. 4. Otherwise negative CT scan of the cervical spine. .               **This report has been created using voice recognition software. It may contain minor errors which are inherent in voice recognition technology. **      Final report electronically signed by DR Herbert Sierra on 3/15/2022 12:52 PM      XR RIBS BILATERAL (MIN 4 VIEWS)   Final Result       1. Slight irregularity involving the left sixth, seventh and eighth ribs laterally and right seventh rib laterally, please correlate clinically. 2. The remaining ribs are intact. 3. There is no pneumothorax. 4. There are small calcified granulomata in the right lower lobe and left upper lobe. .               **This report has been created using voice recognition software. It may contain minor errors which are inherent in voice recognition technology. **      Final report electronically signed by DR Herbert Sierra on 3/15/2022 12:35 PM        XR RIBS BILATERAL (MIN 4 VIEWS)    Result Date: 3/15/2022  PROCEDURE: XR RIBS BILATERAL (MIN 4 VIEWS) CLINICAL INFORMATION: Fall, rib pain. COMPARISON: Chest x-ray dated 14 August 2018. [facility] TECHNIQUE: Two views of the bilateral ribs were obtained. A PA upright view of the chest was also obtained. FINDINGS: There is slight irregularity involving the right seventh rib laterally, left sixth, seventh and eighth ribs laterally laterally, please correlate clinically. The remaining ribs are intact.  There is no displaced fracture. There is no pneumothorax. The heart size is normal.  The mediastinum is not widened. There are no pulmonary infiltrates or effusions. There are small calcified granulomata in the right lower lobe and left upper lobe. The pulmonary vascularity is normal.  There is thoracic spondylosis. .      1. Slight irregularity involving the left sixth, seventh and eighth ribs laterally and right seventh rib laterally, please correlate clinically. 2. The remaining ribs are intact. 3. There is no pneumothorax. 4. There are small calcified granulomata in the right lower lobe and left upper lobe. . **This report has been created using voice recognition software. It may contain minor errors which are inherent in voice recognition technology. ** Final report electronically signed by DR So De La Vega on 3/15/2022 12:35 PM    CT Head WO Contrast    Result Date: 3/15/2022  PROCEDURE: CT HEAD WO CONTRAST CLINICAL INFORMATION: fall. COMPARISON: No prior study. TECHNIQUE: Noncontrast 5 mm axial images were obtained through the brain. Sagittal and coronal reconstructions were obtained. All CT scans at this facility use dose modulation, iterative reconstruction, and/or weight-based dosing when appropriate to reduce radiation dose to as low as reasonably achievable. FINDINGS: There is mild atrophy There is no hemorrhage. There are no intra-or extra-axial collections. There is no hydrocephalus, midline shift or mass effect. The gray-white matter differentiation is preserved. There are inflammatory changes involving the left frontal sinus, ethmoid and maxillary sinuses bilaterally. There is slight deformity of the right maxillary sinus posterolaterally. The remaining paranasal sinuses and mastoid air cells are normally aerated. There is no suspicious calvarial abnormality. 1. Mild atrophy. 2. Inflammatory changes involving the left frontal sinus, ethmoid and maxillary sinuses bilaterally.  3. Slight deformity of the right maxillary sinus posterolaterally. 4. Otherwise negative noncontrast CT scan of the brain. **This report has been created using voice recognition software. It may contain minor errors which are inherent in voice recognition technology. ** Final report electronically signed by DR Rebekah Diaz on 3/15/2022 12:47 PM    CT Cervical Spine WO Contrast    Result Date: 3/15/2022  PROCEDURE: CT CERVICAL SPINE WO CONTRAST CLINICAL INFORMATION: fall. COMPARISON: No prior study. TECHNIQUE: 3 mm noncontrast axial images were obtained through the cervical spine with sagittal and coronal reconstructions. All CT scans at this facility use dose modulation, iterative reconstruction, and/or weight-based dosing when appropriate to reduce radiation dose to as low as reasonably achievable. FINDINGS: The cervical vertebral bodies are normally aligned. There is no fracture. There is no prevertebral soft tissue swelling. There is cervical spondylosis especially at C5-6 and C6-7. At C1-2, there is normal alignment of the dens relative to anterior arch of C1. At C2-3, there is no disc herniation, canal or foraminal stenosis. At C3-4, there is no disc herniation, canal or foraminal stenosis. At C4-5, there is no disc herniation, canal or foraminal stenosis. At C5-6, there is a 1.2 mm ventral extradural defect secondary to bulging disc and uncinate process hypertrophy. This causes mild canal and mild-to-moderate bilateral foraminal stenosis. At C6-7, there is 1.4 mm ventral extradural defect secondary to bulging disc and uncinate process hypertrophy. This causes mild canal, moderate right and mild-to-moderate left-sided foraminal stenosis. At C7-T1, there is no disc herniation, canal or foraminal stenosis. There is bilateral carotid artery calcification. .  There are no suspicious findings in the lung apices. 1. Cervical spondylosis especially at C5-6 and C6-7. 2. No acute fracture noted. 3. Bilateral carotid artery calcification.  4. Otherwise negative CT scan of the cervical spine. . **This report has been created using voice recognition software. It may contain minor errors which are inherent in voice recognition technology. ** Final report electronically signed by DR Terra Pierce on 3/15/2022 12:52 PM          Willem Heath MD   PGY2, Internal Medicine   3/17/2022

## 2022-03-18 LAB
GLUCOSE BLD-MCNC: 183 MG/DL (ref 70–108)
GLUCOSE BLD-MCNC: 199 MG/DL (ref 70–108)
GLUCOSE BLD-MCNC: 298 MG/DL (ref 70–108)
GLUCOSE BLD-MCNC: 66 MG/DL (ref 70–108)
GLUCOSE BLD-MCNC: 84 MG/DL (ref 70–108)
LV EF: 65 %
LVEF MODALITY: NORMAL

## 2022-03-18 PROCEDURE — 6370000000 HC RX 637 (ALT 250 FOR IP): Performed by: STUDENT IN AN ORGANIZED HEALTH CARE EDUCATION/TRAINING PROGRAM

## 2022-03-18 PROCEDURE — 97530 THERAPEUTIC ACTIVITIES: CPT

## 2022-03-18 PROCEDURE — 1200000003 HC TELEMETRY R&B

## 2022-03-18 PROCEDURE — 2580000003 HC RX 258: Performed by: STUDENT IN AN ORGANIZED HEALTH CARE EDUCATION/TRAINING PROGRAM

## 2022-03-18 PROCEDURE — 93306 TTE W/DOPPLER COMPLETE: CPT

## 2022-03-18 PROCEDURE — 6370000000 HC RX 637 (ALT 250 FOR IP): Performed by: PHYSICIAN ASSISTANT

## 2022-03-18 PROCEDURE — 82948 REAGENT STRIP/BLOOD GLUCOSE: CPT

## 2022-03-18 PROCEDURE — 97535 SELF CARE MNGMENT TRAINING: CPT

## 2022-03-18 PROCEDURE — 97116 GAIT TRAINING THERAPY: CPT

## 2022-03-18 PROCEDURE — 94760 N-INVAS EAR/PLS OXIMETRY 1: CPT

## 2022-03-18 PROCEDURE — 6360000002 HC RX W HCPCS: Performed by: PHYSICIAN ASSISTANT

## 2022-03-18 PROCEDURE — 97162 PT EVAL MOD COMPLEX 30 MIN: CPT

## 2022-03-18 PROCEDURE — 97166 OT EVAL MOD COMPLEX 45 MIN: CPT

## 2022-03-18 RX ADMIN — GEMFIBROZIL 600 MG: 600 TABLET ORAL at 06:04

## 2022-03-18 RX ADMIN — ACETAMINOPHEN 650 MG: 325 TABLET ORAL at 15:55

## 2022-03-18 RX ADMIN — SODIUM CHLORIDE: 9 INJECTION, SOLUTION INTRAVENOUS at 18:23

## 2022-03-18 RX ADMIN — SODIUM CHLORIDE: 9 INJECTION, SOLUTION INTRAVENOUS at 06:20

## 2022-03-18 RX ADMIN — ATORVASTATIN CALCIUM 20 MG: 20 TABLET, FILM COATED ORAL at 20:57

## 2022-03-18 RX ADMIN — AMLODIPINE BESYLATE 5 MG: 5 TABLET ORAL at 09:06

## 2022-03-18 RX ADMIN — ACETAMINOPHEN 650 MG: 325 TABLET ORAL at 08:11

## 2022-03-18 RX ADMIN — ENOXAPARIN SODIUM 40 MG: 100 INJECTION SUBCUTANEOUS at 09:06

## 2022-03-18 RX ADMIN — PANTOPRAZOLE SODIUM 40 MG: 40 TABLET, DELAYED RELEASE ORAL at 06:04

## 2022-03-18 RX ADMIN — GABAPENTIN 300 MG: 300 CAPSULE ORAL at 20:57

## 2022-03-18 RX ADMIN — INSULIN GLARGINE 40 UNITS: 100 INJECTION, SOLUTION SUBCUTANEOUS at 21:45

## 2022-03-18 RX ADMIN — GABAPENTIN 300 MG: 300 CAPSULE ORAL at 09:06

## 2022-03-18 RX ADMIN — GEMFIBROZIL 600 MG: 600 TABLET ORAL at 16:32

## 2022-03-18 RX ADMIN — GABAPENTIN 300 MG: 300 CAPSULE ORAL at 13:21

## 2022-03-18 RX ADMIN — INSULIN LISPRO 2 UNITS: 100 INJECTION, SOLUTION INTRAVENOUS; SUBCUTANEOUS at 16:32

## 2022-03-18 RX ADMIN — ASPIRIN 81 MG: 81 TABLET, COATED ORAL at 09:06

## 2022-03-18 RX ADMIN — INSULIN LISPRO 3 UNITS: 100 INJECTION, SOLUTION INTRAVENOUS; SUBCUTANEOUS at 21:45

## 2022-03-18 RX ADMIN — INSULIN LISPRO 2 UNITS: 100 INJECTION, SOLUTION INTRAVENOUS; SUBCUTANEOUS at 11:42

## 2022-03-18 ASSESSMENT — PAIN DESCRIPTION - PAIN TYPE
TYPE: ACUTE PAIN

## 2022-03-18 ASSESSMENT — PAIN DESCRIPTION - LOCATION
LOCATION: BACK;RIB CAGE

## 2022-03-18 ASSESSMENT — PAIN SCALES - GENERAL
PAINLEVEL_OUTOF10: 4
PAINLEVEL_OUTOF10: 6
PAINLEVEL_OUTOF10: 4
PAINLEVEL_OUTOF10: 1
PAINLEVEL_OUTOF10: 1
PAINLEVEL_OUTOF10: 6
PAINLEVEL_OUTOF10: 4

## 2022-03-18 ASSESSMENT — PAIN - FUNCTIONAL ASSESSMENT: PAIN_FUNCTIONAL_ASSESSMENT: ACTIVITIES ARE NOT PREVENTED

## 2022-03-18 ASSESSMENT — PAIN DESCRIPTION - DESCRIPTORS: DESCRIPTORS: DISCOMFORT

## 2022-03-18 ASSESSMENT — PAIN DESCRIPTION - PROGRESSION: CLINICAL_PROGRESSION: GRADUALLY IMPROVING

## 2022-03-18 NOTE — PROGRESS NOTES
Hospitalist Progress Note      Patient:  Jerardo Valladares    Unit/Bed:6K-15/015-A  YOB: 1960  MRN: [id number]   Acct: [de-identified]   PCP: KATHRYN Huerta NP  Date of Admission: 3/15/2022    Assessment/Plan:  Syncope: likely orthostatic hypotension induced. Orthostatics positive. Suspect due to dehydration as patient has not been able to take fluids in due to odynophagia. On mIVF. Monitor on Tele. ECHO pending. On modified dysphagia diet per [address] 3/17. No known history of arrhythmias but will consider a Cardiac monitor on discharge. Essential HTN: controlled. On Norvasc with parameters. HCTZ held at this time. IDDMII: HbA1c 8.3 on 3/16/2022. On Lantus 09T nightly, Trulicity, and Amaryl. PO meds held at this time. On Lantus 40 u nightly with SSI. Monitor BG with inpatient goal 140-180. 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 associated dysphagia and mild odynophagia present. Follows with Dr. Desirae Levy. ENT consulted for dyspshagia- follow up. On modified diet. Tele:   [x] yes             [] no    Code Status: Full Code    Fluids:  IVNS at 76 cc/hr  Diet:  ADULT DIET; Regular; 5 carb choices (75 gm/meal); Mildly Thick (Nectar);  No Drinking Straws  ADULT ORAL NUTRITION SUPPLEMENT; Breakfast, Lunch, Dinner; Frozen Oral Supplement    DVT prophylaxis: [x] Lovenox                                 [] SCDs                                 [] SQ Heparin                                 [] Encourage ambulation                                 [] Already on Anticoagulation    Disposition:      [] TBD                             [x] Home                             [] TCU                             [] Rehab                             [] Psych                             [] SNF                             [] [place]                             [] Other-    Chief Complaint: loss of consciousness    Hospital Course:   56 yo M with history of Keratinizing SCC of larynx (diagnosed 08/2021), HTN, T2DM, Arthritis presented after an episode of syncope while going home from work due to associated dizziness/lightheadedness the day of presentation. Had associated trauma to right chest area from fall. ED course: Presented afebrile and hemodynamically stable. Labs generally unremarkable. WBC 17. 4. XR ribs showed slight irregularity involving the left 6th, 7th, and 8th ribs laterally and right 7th rib laterally. No pneumothorax. CT head wo contrast with inflammatory changes involving the left frontal sinus, ethmoid, and maxillary sinuses bilaterally, slight deformity of the right maxillary sinus posterolaterally. CT cervical spondylosis especially at C5-6 and C6-7. Admitted for further work up and management. Of note, has had issues with persistent cough and swallowing since radiation and lost his voice. Has not been able to take PO diabetic meds due to dysphagia so has had suboptimal control of BG. Has no known history of arrhythmias. No known cardiac history. Interval:   No acute events overnight. Modified barium swallow done this morning. Subjective:   Reports feeling weak still but overall ok. Working with PTOT. Felt dizzy on standing. BP dropped. Worried about going home today but does not think he needs home health on discharge. Has wife and daughter to help him. ROS (12 point review of systems completed. Pertinent positives noted.  Otherwise ROS is negative)     Medications:  Reviewed    Infusion Medications    sodium chloride 75 mL/hr at 03/18/22 0620    sodium chloride      dextrose      dextrose      Or    dextrose       Scheduled Medications    insulin glargine  40 Units SubCUTAneous Nightly    lidocaine  1 patch TransDERmal Daily    aspirin  81 mg Oral Daily    amLODIPine  5 mg Oral Daily    atorvastatin  20 mg Oral Nightly    gabapentin  300 mg Oral TID    gemfibrozil  600 mg Oral BID AC    [Held by provider] hydroCHLOROthiazide  25 mg Oral Daily    pantoprazole  40 mg Oral QAM AC    sodium chloride flush  10 mL IntraVENous 2 times per day    enoxaparin  40 mg SubCUTAneous Daily    insulin lispro  0-12 Units SubCUTAneous TID WC    insulin lispro  0-6 Units SubCUTAneous Nightly     PRN Meds: menthol, prochlorperazine, sodium chloride flush, sodium chloride, ondansetron **OR** ondansetron, polyethylene glycol, acetaminophen **OR** acetaminophen, potassium chloride **OR** potassium alternative oral replacement **OR** potassium chloride, magnesium sulfate, glucagon (rDNA), dextrose, glucose, dextrose **OR** dextrose      Intake/Output Summary (Last 24 hours) at 3/18/2022 1726  Last data filed at 3/18/2022 1555  Gross per 24 hour   Intake 200 ml   Output 1550 ml   Net -1350 ml         Exam:  BP (!) 153/84   Pulse 84   Temp 97.7 °F (36.5 °C) (Oral)   Resp 16   Ht 5' 11\" (1.803 m)   Wt 155 lb 3.2 oz (70.4 kg)   SpO2 95%   BMI 21.65 kg/m²   General appearance: No apparent distress, appears stated age and cooperative. Thin appearing. HEENT: Conjunctivae/corneas clear. Neck: Supple, with full range of motion. No jugular venous distention. Respiratory: Normal respiratory effort. Clear to auscultation, bilaterally without Rales/Wheezes/Rhonchi. Cardiovascular: Regular rate and rhythm with normal S1/S2 without murmurs, rubs or gallops. Abdomen: Soft, non-tender, non-distended with normal bowel sounds. Musculoskeletal: passive and active ROM x 4 extremities. Skin: Skin color, texture, turgor normal.  No rashes or lesions. Neurologic: Neurovascularly intact without any focal sensory/motor deficits.  Cranial nerves: II-XII intact, grossly non-focal.  Psychiatric: Alert and oriented, thought content appropriate, normal insight  Capillary Refill: Brisk,< 3 seconds   Peripheral Pulses: +2 palpable, equal bilaterally     Labs:   Recent Labs 03/16/22  0507 03/17/22  1012   WBC 12.6* 11.2*   HGB 10.8* 11.7*   HCT 35.0* 37.9*    318     Recent Labs     03/16/22  0507 03/17/22  0618    139   K 3.5 4.0    102   CO2 25 25   BUN 8 13   CREATININE 0.6 0.7   CALCIUM 8.8 8.9   PHOS  --  3.4     No results for input(s): AST, ALT, BILIDIR, BILITOT, ALKPHOS in the last 72 hours. No results for input(s): INR in the last 72 hours. No results for input(s): Victoria Divine in the last 72 hours. Microbiology:    Blood culture #1: No results found for: BC    Blood culture #2:No results found for: Brenita Ek    Organism:No results found for: ORG    No results found for: LABGRAM    MRSA culture only:No results found for: [facility]    Urine culture:   Lab Results   Component Value Date    LABURIN 100 mg 09/23/2014       Respiratory culture: No results found for: CULTRESP    Aerobic and Anaerobic :  No results found for: LABAERO  No results found for: LABANAE    Urinalysis:      Lab Results   Component Value Date    NITRU NEGATIVE 05/28/2015    WBCUA 0-2 05/28/2015    BACTERIA NONE 05/28/2015    RBCUA 0-2 05/28/2015    BLOODU NEGATIVE 05/28/2015    GLUCOSEU >= 1000 05/28/2015       Radiology:  FL MODIFIED BARIUM SWALLOW W VIDEO   Final Result   1. Laryngeal penetration of honey thick, nectar thick, thin, soft and solid barium with aspiration of honey thick and thin barium. 2. Additional recommendations from the speech therapist will follow. **This report has been created using voice recognition software. It may contain minor errors which are inherent in voice recognition technology. **         Final report electronically signed by Dr. Markus Dickerson on 3/17/2022 10:51 AM      CT Head WO Contrast   Final Result      1. Mild atrophy. 2. Inflammatory changes involving the left frontal sinus, ethmoid and maxillary sinuses bilaterally. 3. Slight deformity of the right maxillary sinus posterolaterally.    4. Otherwise negative noncontrast CT scan of the brain. **This report has been created using voice recognition software. It may contain minor errors which are inherent in voice recognition technology. **      Final report electronically signed by DR Anupama Freitas on 3/15/2022 12:47 PM      CT Cervical Spine WO Contrast   Final Result       1. Cervical spondylosis especially at C5-6 and C6-7.   2. No acute fracture noted. 3. Bilateral carotid artery calcification. 4. Otherwise negative CT scan of the cervical spine. .               **This report has been created using voice recognition software. It may contain minor errors which are inherent in voice recognition technology. **      Final report electronically signed by DR Anupama Freitas on 3/15/2022 12:52 PM      XR RIBS BILATERAL (MIN 4 VIEWS)   Final Result       1. Slight irregularity involving the left sixth, seventh and eighth ribs laterally and right seventh rib laterally, please correlate clinically. 2. The remaining ribs are intact. 3. There is no pneumothorax. 4. There are small calcified granulomata in the right lower lobe and left upper lobe. .               **This report has been created using voice recognition software. It may contain minor errors which are inherent in voice recognition technology. **      Final report electronically signed by DR Anupama Freitas on 3/15/2022 12:35 PM        XR RIBS BILATERAL (MIN 4 VIEWS)    Result Date: 3/15/2022  PROCEDURE: XR RIBS BILATERAL (MIN 4 VIEWS) CLINICAL INFORMATION: Fall, rib pain. COMPARISON: Chest x-ray dated 14 August 2018. Saul Settler TECHNIQUE: Two views of the bilateral ribs were obtained. A PA upright view of the chest was also obtained. FINDINGS: There is slight irregularity involving the right seventh rib laterally, left sixth, seventh and eighth ribs laterally laterally, please correlate clinically. The remaining ribs are intact. There is no displaced fracture. There is no pneumothorax.  The heart size is normal.  The mediastinum is not **This report has been created using voice recognition software. It may contain minor errors which are inherent in voice recognition technology. ** Final report electronically signed by DR Francia Severin on 3/15/2022 12:47 PM    CT Cervical Spine WO Contrast    Result Date: 3/15/2022  PROCEDURE: CT CERVICAL SPINE WO CONTRAST CLINICAL INFORMATION: fall. COMPARISON: No prior study. TECHNIQUE: 3 mm noncontrast axial images were obtained through the cervical spine with sagittal and coronal reconstructions. All CT scans at this facility use dose modulation, iterative reconstruction, and/or weight-based dosing when appropriate to reduce radiation dose to as low as reasonably achievable. FINDINGS: The cervical vertebral bodies are normally aligned. There is no fracture. There is no prevertebral soft tissue swelling. There is cervical spondylosis especially at C5-6 and C6-7. At C1-2, there is normal alignment of the dens relative to anterior arch of C1. At C2-3, there is no disc herniation, canal or foraminal stenosis. At C3-4, there is no disc herniation, canal or foraminal stenosis. At C4-5, there is no disc herniation, canal or foraminal stenosis. At C5-6, there is a 1.2 mm ventral extradural defect secondary to bulging disc and uncinate process hypertrophy. This causes mild canal and mild-to-moderate bilateral foraminal stenosis. At C6-7, there is 1.4 mm ventral extradural defect secondary to bulging disc and uncinate process hypertrophy. This causes mild canal, moderate right and mild-to-moderate left-sided foraminal stenosis. At C7-T1, there is no disc herniation, canal or foraminal stenosis. There is bilateral carotid artery calcification. .  There are no suspicious findings in the lung apices. 1. Cervical spondylosis especially at C5-6 and C6-7. 2. No acute fracture noted. 3. Bilateral carotid artery calcification. 4. Otherwise negative CT scan of the cervical spine. . **This report has been created using voice recognition software. It may contain minor errors which are inherent in voice recognition technology. ** Final report electronically signed by DR Wilma Samano on 3/15/2022 12:52 PM          Charlesetta Baumgarten, MD   PGY2, Internal Medicine   3/18/2022

## 2022-03-18 NOTE — PROGRESS NOTES
[facility]  INPATIENT PHYSICAL THERAPY  EVALUATION  [facility] RENAL TELEMETRY 6K - 6K-15/015-A    Time In: 4877  Time Out: 0830  Timed Code Treatment Minutes: 24 Minutes  Minutes: 34          Date: 3/18/2022  Patient Name: Bonny Little,  Gender:  male        MRN: [id number]  : 1960  (57 y.o.)      Referring Practitioner: Keke Lacy MD  Diagnosis: Orthostatic hypotension  Additional Pertinent Hx: Per EMR 3/16: Bonny Little is a 57 y.o. male who presents to the emergency department for evaluation of LOC. Patient states that he has had increased odynophagia without dysphagia over the past couple weeks, has had no frequently decreased p.o. intake. Was not feeling well this morning, started driving home from work, stood up out of the car, got dizzy fell back striking his head. Complaining of right-sided rib pain only. Still gets dizzy whenever he tries to stand up. The patient does have a history of laryngeal cancer and has followed with [facility]NT for this. During his work-up he was found to reportedly have right vocal cord paralysis as well. The patient opted to proceed with chemoradiation and gets it locally in [address] [place] [address]. The patient completed radiation on 2021. Restrictions/Precautions:  Restrictions/Precautions: Fall Risk,General Precautions  Position Activity Restriction  Other position/activity restrictions: monitor ortho BP and dizziness, hx laryngeal cancer    Subjective:  Chart Reviewed: Yes  Patient assessed for rehabilitation services?: Yes  Family / Caregiver Present: No  Subjective: RN approved lucio, pt pleasantly agrees for PT lucio. Pt states he wishes to return home with his wife, and plans to return to work when his ribs no longer hurt.     General:  Overall Orientation Status: Within Functional Limits (not formally assessed)  Follows Commands: Within Functional Limits    Vision: Impaired  Vision Exceptions: Wears glasses at all times    Hearing: Within functional limits       Pain: 9/10: R ribs and throat. Pt requesting tylenol during session, RN in for this. Informed pt to brace with holding a Pillow for improved tolerance with coughing. Vitals: Blood Pressure: 107/64 seated, 91/52 standing   Oxygen: 95% baseline, maintained WFL   Heart Rate: 95 bpm maintained [facility]    RN aware of pt's drop in BP and c/o dizziness    Social/Functional History:    Lives With: Spouse  Type of Home:  ([facility])  Home Layout: One level  Home Access: Stairs to enter without rails  Entrance Stairs - Number of Steps: 3  Home Equipment: Rolling walker,Electric scooter          Receives Help From: Family        Ambulation Assistance: Independent  Transfer Assistance: Independent    Active : Yes  Occupation: Full time employment  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 Pt indep with very rare use of RW, but him and his wife share the RW. Pt bought a RW for use with going to the zoo but has not used it yet. OBJECTIVE:  Range of Motion:  Bilateral Lower Extremity: WFL    Strength:  Right Lower Extremity: Hip flexion 4+/5, knee extension 4+/5, knee flexion 4+/5, DF 4+/5, PF 4+/5  Left Lower Extremity: Hip flexion 4/5, knee extension 4+/5, knee flexion 4+/5, DF 4+/5, PF 4+/5    Balance:  Dynamic Sitting Balance: Stand By Assistance  Static Standing Balance: Stand By Assistance, Contact Guard Assistance  Dynamic Standing Balance: Contact Guard Assistance, Minimal Assistance   CGA to Min assist provided with the Tinetti. *Took ~3 bouts to complete Tinetti secondary to pt having dizziness. Required multiple seated rests to recover and finish with cues for deep breathing. Would resolve quickly upon sitting.      Bed Mobility:  Supine to Sit: Stand By Assistance, with head of bed raised, with increased time for completion   *HOB~ 30degrees  *Cues to complete slowly to prevent dizziness   *Increased pain in R ribs with bed mobility    Transfers:  Sit to Stand: Stand By Assistance, [employer], with increased time for completion  Stand to Sit:Stand By Assistance, [employer], with increased time for completion   *Intermittent CGA provided for stability d/t pt intermittent c/o dizziness    Ambulation:  Stand By Assistance, [employer], with verbal cues , with increased time for completion  Distance: ~15'  Surface: Level Tile  Device:No Device  Gait Deviations:  Slow Cinthia, Decreased Heel Strike Bilaterally, Wide Base of Support, Mild Path Deviations, Unsteady Gait and Decreased Terminal Knee Extension  *Pt noted to ambulate with increased VALENTINA, and sustained knee flexion. CGA provided initially for stability. *Pt with no c/o of dizziness prior to ambulation trial or directly following it. Stairs:  Unable to safely assess secondary to low BP and c/o dizziness  Plan to assess next session     Exercise:  None completed    Functional Outcome Measures: Completed  Balance Score: 10  Gait Score: 9  Tinetti Total Score: 19/28 with moderate fall risk   AM-PAC Inpatient Mobility Raw Score : 18  AM-PAC Inpatient T-Scale Score : 43.63     Risk Indicators:  Less than/equal to 18 = high risk  19-23 Moderate risk  Greater than/equal to 24 = low risk    ASSESSMENT:  Activity Tolerance:  Patient tolerance of  treatment: fair. Pt with increased pain behaviors with rib pain and c/o dizziness intermittently with standing. Pt's low BP and intermittent dizziness were barriers to further mobility this date. Plan to assess safety with stair completion next session to ensure safety with home entry. Treatment Initiated: Treatment and education initiated within context of evaluation.   Evaluation time included review of current medical information, gathering information related to past medical, social and functional history, completion of standardized testing, formal and informal observation of tasks, assessment of data and development of plan of care and goals. Treatment time included skilled education and facilitation of tasks to increase safety and independence with functional mobility for improved independence and quality of life. Assessment: Body structures, Functions, Activity limitations: Decreased functional mobility ,Decreased balance,Decreased strength,Decreased endurance  Assessment: This patient is a 57 bpm y.o. who presents with orthostatic hypotension. This is a decline from the patient's baseline status of indep, with a full time job, and active . Pt scored a 19/28 with a moderate fall risk. The patient is observed to have deficits in strength, balance, activity tolerance, and safety awareness and would benefit from skilled PT services to progress functional mobility, safety awareness, and to decrease overall risk of falls.      Prognosis: Good    REQUIRES PT FOLLOW UP: Yes    Discharge Recommendations:  Discharge Recommendations: Continue to assess pending progress,Patient would benefit from continued therapy after discharge    Patient Education:  PT Education: Osman Mcarthur of [facility] Mobility Training    Equipment Recommendations:  Equipment Needed: No (continue to assess needs)    Plan:  Times per week: 5x GM  Current Treatment Recommendations: Strengthening,Neuromuscular Re-education,Home Exercise Program,Safety Education & Burnetta Brawn Training,Patient/Caregiver Education & Training,Functional Mobility Training,Equipment Evaluation, Education, & procurement,Transfer Training,Gait Training,Stair training    Goals:  Patient goals : for his ribs to get better  Short term goals  Time Frame for Short term goals: by hospital d/c  Short term goal 1: Pt to demo supine <->sit indep for ability to get in and out of bed easily  Short term goal 2: Pt to complete sit <->stand Indep for ability to get up from various surfaces easily  Short term goal 3: Pt to ambulate >=100' indep for ability to progress moving in his environment  Short term goal 4: Pt to ascend/descend 3 steps with no HR indep for ability to get in and out of his home  Short term goal 5: Pt to improve Tinetti score to 24/28 to progress to the low fall risk category  Long term goals  Time Frame for Long term goals : NA due to short ELOS    Following session, patient left in safe position with all fall risk precautions in place.

## 2022-03-18 NOTE — PLAN OF CARE
Problem: Falls - Risk of:  Goal: Will remain free from falls  Description: Will remain free from falls  3/18/2022 0039 by Ramin Doan  Outcome: Ongoing  Note: No falls noted this shift. Continue falling star program. Bed alarm on, bed in low position. Call light and personal belongings in reach. Patient uses call light appropriately. Problem: Pain:  Goal: Pain level will decrease  Description: Pain level will decrease  Outcome: Ongoing  Note: Patient free from pain this shift. Pain rated on 0-10 pain rating scale. Will continue to reassess. Problem: Discharge Planning:  Goal: Participates in care planning  Description: Participates in care planning  Outcome: Ongoing  Note: Patient plans to return back home with wife when medically stable. Problem: Skin Integrity - Impaired:  Goal: Absence of new skin breakdown  Description: Absence of new skin breakdown  Outcome: Ongoing  Note: No new signs or symptoms of skin breakdown noted this shift, encouraging patient to turn and reposition self in bed q2h       Problem: Respiratory:  Goal: Ability to maintain a clear airway will improve  Description: Ability to maintain a clear airway will improve  3/18/2022 0039 by Ramin Valenciar  Outcome: Ongoing     Problem: Fluid Volume:  Goal: Ability to achieve a balanced intake and output will improve  Description: Ability to achieve a balanced intake and output will improve  3/18/2022 0039 by Faustin Saver  Outcome: Ongoing  Note: Patient had urine output of 500 ml this shift. Will continue to monitor. Care plan reviewed with patient. Patient verbalize understanding of the plan of care and contribute to goal setting.

## 2022-03-18 NOTE — PROGRESS NOTES
Annyasad Peg 58  INPATIENT OCCUPATIONAL THERAPY  [facility] RENAL TELEMETRY 6K  EVALUATION    Time:   Time In: 1005  Time Out: 1038  Timed Code Treatment Minutes: 23 Minutes  Minutes: 33          Date: 3/18/2022  Patient Name: Claudia Simon,   Gender: male      MRN: [id number]  : 1960  (57 y.o.)  Referring Practitioner: Pete Mota MD  Diagnosis: orthostatic hypertension       Restrictions/Precautions:  Restrictions/Precautions: Fall Risk,General Precautions  Position Activity Restriction  Other position/activity restrictions: monitor ortho BP and dizziness, hx laryngeal cancer    Subjective  Chart Reviewed: Yes,Orders,Progress Notes  Patient assessed for rehabilitation services?: Yes  Response to previous treatment: Patient with no complaints from previous session  Family / Caregiver Present: No    Subjective: RN approved of OT session. Pt supine in bed on arrival and agreeable to therapy. Pt stated he typically can tell when he gets dizzy and sits as soon as he starts to feel symptoms. Pain:  Pain Assessment  Patient Currently in Pain: Yes (ribs 5/10 - just received tyenol prior session)    Vitals: Orthostatic Blood Pressure: Supine: 147/87, Sittin/80, Standin/73 - denied dizziness     Social/Functional History:  Lives With: Spouse  Type of Home:  ([facility])  Home Layout: One level  Home Access: Stairs to enter without rails  Entrance Stairs - Number of Steps: 3  Home Equipment: Rolling walker,Electric scooter   Bathroom Shower/Tub: Walk-in shower  Bathroom Toilet: Handicap height  Bathroom Equipment: Shower chair,Grab bars in shower    Receives Help From: Family  ADL Assistance: Independent  Homemaking Assistance: Independent  Ambulation Assistance: Independent  Transfer Assistance: Independent    Active : Yes  Occupation: Full time employment  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  Pt indep with very rare use of RW, but him and his wife share the RW. Pt bought a RW for use with going to the zoo but has not used it yet. VISION:WFL    HEARING:  WFL    COGNITION: Decreased Insight and Decreased Problem Solving    RANGE OF MOTION:  Bilateral Upper Extremity:  WFL    STRENGTH:  Bilateral Upper Extremity:  WFL    SENSATION:   WFL    ADL:   Grooming: Stand By Assistance.  hand hygiene and oral care standing at the sink   Lower Extremity Dressing: Stand By Assistance. doffing depends and donning new depends   Toileting: Contact Guard Assistance. with standing to void. BALANCE:  Sitting Balance:  Stand By Assistance. EOB   Standing Balance: Contact Guard Assistance. with bilateral release standing x4 minutes     BED MOBILITY:  Supine to Sit: Stand By Assistance    Sit to Supine: Stand By Assistance    *cues for completing slowly to prevent dizziness     TRANSFERS:  Sit to Stand:  Stand By Assistance, [address], X 1.    Stand to Sit: Stand By Assistance, [employer] Assistance, X 1.      FUNCTIONAL MOBILITY:  Assistive Device: None  Assist Level:  Contact Guard Assistance. Distance: To and from bathroom and short distance in hallway    *no LOB, slightly unsteady     Activity Tolerance:  Patient tolerance of  treatment: good. Assessment:  Assessment: pt requires assistance with ADLs, transfers, and functional mobility compared to  PLOF. Pt would benefit from skilled OT services to address these tasks, in addition to strength and endurance to return to PLOF. Performance deficits / Impairments: Decreased functional mobility ,Decreased ADL status,Decreased endurance,Decreased high-level IADLs  Prognosis: Good  REQUIRES OT FOLLOW UP: Yes  Decision Making: Medium Complexity    Treatment Initiated: Treatment and education initiated within context of evaluation.   Evaluation time included review of current medical information, gathering information related to past medical, social and functional history, completion of standardized testing, formal and informal observation of tasks, assessment of data and development of plan of care and goals. Treatment time included skilled education and facilitation of tasks to increase safety and independence with ADL's for improved functional independence and quality of life. Discharge Recommendations:  Home with [address] [place] [address] to assess pending progress    Patient Education:  OT Education: OT Role,Plan of [place] [address] Training    Equipment Recommendations:  Equipment Needed: No    Plan:  Times per week: 5x  Times per day: Daily  Current Treatment Recommendations: Strengthening,Patient/Caregiver Education & Training,Balance Training,Functional Mobility Training,Endurance Training,Self-Care / ADL,Safety Education & Training. See long-term goal time frame for expected duration of plan of care. If no long-term goals established, a short length of stay is anticipated. Goals:  Patient goals : return home  Short term goals  Time Frame for Short term goals: by discharge  Short term goal 1: pt will complete functional mobility within [facility] distances mod-I, pt will complete sit<>stand transfer with mod-I without requiring cues to monitor for ortho static symoptoms, to access ADLs  Short term goal 2: pt will complete BADL routine mod-I to increase indep with self care tasks  Short term goal 3: pt will complete sit<>stand transfer with mod-I without requiring cues to monitor for ortho static symoptoms, in prep to perform ADLs         Following session, patient left in safe position with all fall risk precautions in place.

## 2022-03-18 NOTE — FLOWSHEET NOTE
03/17/22 2222   Treatment Team Notification   Reason for Communication Evaluate   Team Member Name Dr. Inocencia Kan   Treatment Team Role Resident   Method of Communication Secure Message   Response No new orders     Notified Dr. Inocencia Kan due to pts orthostatic blood pressure being positive. No new orders.

## 2022-03-18 NOTE — PLAN OF CARE
Problem: Falls - Risk of:  Goal: Will remain free from falls  Description: Will remain free from falls  3/18/2022 1027 by Antonio Joseph RN  Outcome: Ongoing  Call light within reach. Side rails up x2. Bed alarm on. Non skid slippers available. Problem: Pain:  Goal: Pain level will decrease  Description: Pain level will decrease  3/18/2022 1027 by Antonio Joseph RN  Outcome: Ongoing  Patient complained of pain in his ribs throughout the shift. Pain rated on 0-10 pain rating scale. Pain goal 3/10. PRN tylenol given as requested and ordered. Will continue to reassess. Problem: Discharge Planning:  Goal: Discharged to appropriate level of care  Description: Discharged to appropriate level of care  3/18/2022 1027 by Antonio Joseph RN  Outcome: Ongoing  Patient plans to be discharged home with wife when medically stable. Problem: Skin Integrity - Impaired:  Goal: Absence of new skin breakdown  Description: Absence of new skin breakdown  3/18/2022 1027 by Antonio Joseph RN  Outcome: Ongoing  Patient free from new skin breakdown. Patient turns self and makes frequent positional changes. Will continue to monitor. Problem: Respiratory:  Goal: Ability to maintain a clear airway will improve  Description: Ability to maintain a clear airway will improve  3/18/2022 1027 by Antonio Joseph RN  Outcome: Ongoing  Patient on room air, will continue to monitor. Problem: Fluid Volume:  Goal: Ability to achieve a balanced intake and output will improve  Description: Ability to achieve a balanced intake and output will improve  3/18/2022 1027 by Antonio Joseph RN  Outcome: Ongoing  Accurate I&O. Will continue to monitor. Care plan reviewed with patient and family. Patient and family verbalize understanding of the plan of care and contribute to goal setting.

## 2022-03-18 NOTE — PROGRESS NOTES
I followed up with patient today he reports that he is doing fairly well. He reports he is probably going to be discharge soon, possibly today. Patient reports that he is not planning to follow-up with [facility] ENT at this time. He states he is planning to follow-up with Dr. Jeimy Damian, but he is willing to follow-up with ENT if this is recommended by Dr. Jeimy Damian. Patient is unsure if he would rather follow with ENT more locally or continue with care at [facility] since he is established (if recommended by Dr Jeimy Damian). I recommended ENT follow-up. He can follow up with our office as outpatient if he would prefer more local care, but may also continue with his care at [facility] since he is still established with them. ENT will follow up while inpatient as needed.  Contact ENT with further questions/concerns

## 2022-03-18 NOTE — PROGRESS NOTES
[address] [place] [address]  SPEECH THERAPY MISSED TREATMENT NOTE  [facility] RENAL TELEMETRY 6K      Date: 3/18/2022  Patient Name: Jerardo Vallaadres        MRN: [id number]    : 1960  (57 y.o.)    REASON FOR MISSED TREATMENT:  ST attempted to see patient this date for completion of dysphagia tx. IMANI Claudio approved completion of tx and reported good success with current diet level + implementation of strategies. Upon arrival to room, patient sleeping in bed. ST provided patient with thickened liquid handout and pharyngeal strengthening exercises. ST to re-attempt at a later date/time as patient is medically appropriate/available.      [facility]) [address]

## 2022-03-18 NOTE — PROGRESS NOTES
Follow Up / Progress Note        Patient:   Jacquelyn Mahan  YOB: 1960  Age:  57 y.o. Room:  [address] [place] [address]  Code Status:  Full Code  MRN:  [id number]            Family/Patient Discussion:  On unit to speak with patient. Per Cheri patient doing well, patient was dehydrated and plan for possible discharge soon. Patient had MBS and FEES and is able to tolerate PO intake. No needs from palliative. In room to speak with patient. Patient resting in bed with arm over his face, did not disturb patient at this time. Plan/Follow-Up:  Will follow PRN, please call if immediate needs arise.        Electronically signed by Tsering Ventura RN on 3/18/2022 at 1:44 PM             Palliative Care Office: [phone]

## 2022-03-19 LAB
ANION GAP SERPL CALCULATED.3IONS-SCNC: 11 MEQ/L (ref 8–16)
BUN BLDV-MCNC: 10 MG/DL (ref 7–22)
CALCIUM SERPL-MCNC: 9.1 MG/DL (ref 8.5–10.5)
CHLORIDE BLD-SCNC: 102 MEQ/L (ref 98–111)
CO2: 24 MEQ/L (ref 23–33)
CREAT SERPL-MCNC: 0.5 MG/DL (ref 0.4–1.2)
ERYTHROCYTE [DISTWIDTH] IN BLOOD BY AUTOMATED COUNT: 13.1 % (ref 11.5–14.5)
ERYTHROCYTE [DISTWIDTH] IN BLOOD BY AUTOMATED COUNT: 40.3 FL (ref 35–45)
GFR SERPL CREATININE-BSD FRML MDRD: > 90 ML/MIN/1.73M2
GLUCOSE BLD-MCNC: 121 MG/DL (ref 70–108)
GLUCOSE BLD-MCNC: 165 MG/DL (ref 70–108)
GLUCOSE BLD-MCNC: 172 MG/DL (ref 70–108)
GLUCOSE BLD-MCNC: 187 MG/DL (ref 70–108)
GLUCOSE BLD-MCNC: 211 MG/DL (ref 70–108)
HCT VFR BLD CALC: 35.6 % (ref 42–52)
HEMOGLOBIN: 11 GM/DL (ref 14–18)
MAGNESIUM: 1.9 MG/DL (ref 1.6–2.4)
MCH RBC QN AUTO: 26.3 PG (ref 26–33)
MCHC RBC AUTO-ENTMCNC: 30.9 GM/DL (ref 32.2–35.5)
MCV RBC AUTO: 85 FL (ref 80–94)
PLATELET # BLD: 315 THOU/MM3 (ref 130–400)
PMV BLD AUTO: 9.2 FL (ref 9.4–12.4)
POTASSIUM SERPL-SCNC: 4.2 MEQ/L (ref 3.5–5.2)
RBC # BLD: 4.19 MILL/MM3 (ref 4.7–6.1)
SODIUM BLD-SCNC: 137 MEQ/L (ref 135–145)
WBC # BLD: 6.5 THOU/MM3 (ref 4.8–10.8)

## 2022-03-19 PROCEDURE — 80048 BASIC METABOLIC PNL TOTAL CA: CPT

## 2022-03-19 PROCEDURE — 6370000000 HC RX 637 (ALT 250 FOR IP): Performed by: STUDENT IN AN ORGANIZED HEALTH CARE EDUCATION/TRAINING PROGRAM

## 2022-03-19 PROCEDURE — 82948 REAGENT STRIP/BLOOD GLUCOSE: CPT

## 2022-03-19 PROCEDURE — 2580000003 HC RX 258: Performed by: STUDENT IN AN ORGANIZED HEALTH CARE EDUCATION/TRAINING PROGRAM

## 2022-03-19 PROCEDURE — 6370000000 HC RX 637 (ALT 250 FOR IP): Performed by: PHYSICIAN ASSISTANT

## 2022-03-19 PROCEDURE — 83735 ASSAY OF MAGNESIUM: CPT

## 2022-03-19 PROCEDURE — 94760 N-INVAS EAR/PLS OXIMETRY 1: CPT

## 2022-03-19 PROCEDURE — 6360000002 HC RX W HCPCS: Performed by: PHYSICIAN ASSISTANT

## 2022-03-19 PROCEDURE — 1200000003 HC TELEMETRY R&B

## 2022-03-19 PROCEDURE — 36415 COLL VENOUS BLD VENIPUNCTURE: CPT

## 2022-03-19 PROCEDURE — 85027 COMPLETE CBC AUTOMATED: CPT

## 2022-03-19 RX ORDER — AMLODIPINE BESYLATE 10 MG/1
10 TABLET ORAL DAILY
Status: DISCONTINUED | OUTPATIENT
Start: 2022-03-20 | End: 2022-03-22

## 2022-03-19 RX ORDER — AMLODIPINE BESYLATE 5 MG/1
5 TABLET ORAL DAILY
Status: DISCONTINUED | OUTPATIENT
Start: 2022-03-19 | End: 2022-03-21

## 2022-03-19 RX ORDER — 0.9 % SODIUM CHLORIDE 0.9 %
1000 INTRAVENOUS SOLUTION INTRAVENOUS ONCE
Status: COMPLETED | OUTPATIENT
Start: 2022-03-19 | End: 2022-03-19

## 2022-03-19 RX ADMIN — INSULIN GLARGINE 20 UNITS: 100 INJECTION, SOLUTION SUBCUTANEOUS at 20:15

## 2022-03-19 RX ADMIN — ATORVASTATIN CALCIUM 20 MG: 20 TABLET, FILM COATED ORAL at 20:10

## 2022-03-19 RX ADMIN — SODIUM CHLORIDE: 9 INJECTION, SOLUTION INTRAVENOUS at 22:31

## 2022-03-19 RX ADMIN — GEMFIBROZIL 600 MG: 600 TABLET ORAL at 05:50

## 2022-03-19 RX ADMIN — AMLODIPINE BESYLATE 5 MG: 5 TABLET ORAL at 14:01

## 2022-03-19 RX ADMIN — INSULIN LISPRO 2 UNITS: 100 INJECTION, SOLUTION INTRAVENOUS; SUBCUTANEOUS at 11:36

## 2022-03-19 RX ADMIN — ENOXAPARIN SODIUM 40 MG: 100 INJECTION SUBCUTANEOUS at 07:47

## 2022-03-19 RX ADMIN — GEMFIBROZIL 600 MG: 600 TABLET ORAL at 17:20

## 2022-03-19 RX ADMIN — GABAPENTIN 300 MG: 300 CAPSULE ORAL at 07:46

## 2022-03-19 RX ADMIN — INSULIN LISPRO 2 UNITS: 100 INJECTION, SOLUTION INTRAVENOUS; SUBCUTANEOUS at 20:14

## 2022-03-19 RX ADMIN — PANTOPRAZOLE SODIUM 40 MG: 40 TABLET, DELAYED RELEASE ORAL at 05:50

## 2022-03-19 RX ADMIN — ACETAMINOPHEN 650 MG: 325 TABLET ORAL at 17:26

## 2022-03-19 RX ADMIN — AMLODIPINE BESYLATE 5 MG: 5 TABLET ORAL at 07:46

## 2022-03-19 RX ADMIN — ACETAMINOPHEN 650 MG: 325 TABLET ORAL at 07:49

## 2022-03-19 RX ADMIN — ACETAMINOPHEN 650 MG: 325 TABLET ORAL at 03:19

## 2022-03-19 RX ADMIN — GABAPENTIN 300 MG: 300 CAPSULE ORAL at 14:01

## 2022-03-19 RX ADMIN — SODIUM CHLORIDE 1000 ML: 9 INJECTION, SOLUTION INTRAVENOUS at 20:21

## 2022-03-19 RX ADMIN — INSULIN LISPRO 2 UNITS: 100 INJECTION, SOLUTION INTRAVENOUS; SUBCUTANEOUS at 17:20

## 2022-03-19 RX ADMIN — GABAPENTIN 300 MG: 300 CAPSULE ORAL at 20:10

## 2022-03-19 RX ADMIN — ASPIRIN 81 MG: 81 TABLET, COATED ORAL at 07:46

## 2022-03-19 ASSESSMENT — PAIN SCALES - GENERAL
PAINLEVEL_OUTOF10: 5
PAINLEVEL_OUTOF10: 5
PAINLEVEL_OUTOF10: 6
PAINLEVEL_OUTOF10: 6
PAINLEVEL_OUTOF10: 3

## 2022-03-19 NOTE — PROGRESS NOTES
Hospitalist Progress Note      Patient:  Landen Elizabeth    Unit/Bed:6K-15/015-A  YOB: 1960  MRN: [id number]   Acct: [de-identified]   PCP: KATHRYN Leiva NP  Date of Admission: 3/15/2022    Assessment/Plan:  Syncope: likely orthostatic hypotension induced. Orthostatics positive. Suspect due to dehydration as patient has not been able to take fluids in due to odynophagia. On mIVF. Monitor on Tele. ECHO EF 65% with grade 1 DD. On modified dysphagia diet per [address] 3/17. No known history of arrhythmias but will consider a Cardiac monitor on discharge. Essential HTN: controlled. On Norvasc with parameters. HCTZ held at this time. IDDMII: HbA1c 8.3 on 3/16/2022. On Lantus 97P nightly, Trulicity, and Amaryl. PO meds held at this time. On Lantus 40 u nightly with SSI. Monitor BG with inpatient goal 140-180. 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 associated dysphagia and mild odynophagia present. Follows with Dr. Thi Sharif. ENT consulted for dyspshagia- follow up. On modified diet. Tele:   [x] yes             [] no    Code Status: Full Code    Fluids:  IVNS at 76 cc/hr  Diet:  ADULT DIET; Regular; 5 carb choices (75 gm/meal); Mildly Thick (Nectar);  No Drinking Straws  ADULT ORAL NUTRITION SUPPLEMENT; Breakfast, Lunch, Dinner; Frozen Oral Supplement    DVT prophylaxis: [x] Lovenox                                 [] SCDs                                 [] SQ Heparin                                 [] Encourage ambulation                                 [] Already on Anticoagulation    Disposition:      [] TBD                             [x] Home                             [] TCU                             [] Rehab                             [] Psych                             [] SNF                             [] [facility]                             [] Other-    Chief Complaint: loss of consciousness    Hospital Course:   58 yo M with history of Keratinizing SCC of larynx (diagnosed 08/2021), HTN, T2DM, Arthritis presented after an episode of syncope while going home from work due to associated dizziness/lightheadedness the day of presentation. Had associated trauma to right chest area from fall. ED course: Presented afebrile and hemodynamically stable. Labs generally unremarkable. WBC 17. 4. XR ribs showed slight irregularity involving the left 6th, 7th, and 8th ribs laterally and right 7th rib laterally. No pneumothorax. CT head wo contrast with inflammatory changes involving the left frontal sinus, ethmoid, and maxillary sinuses bilaterally, slight deformity of the right maxillary sinus posterolaterally. CT cervical spondylosis especially at C5-6 and C6-7. Admitted for further work up and management. Of note, has had issues with persistent cough and swallowing since radiation and lost his voice. Has not been able to take PO diabetic meds due to dysphagia so has had suboptimal control of BG. Has no known history of arrhythmias. No known cardiac history. Interval:   No acute events overnight. Placed on thick liquid diet. Unable to take fluids due to dysphagia. Subjective:   Sleeping comfortably. Reports no new complaints. ROS (12 point review of systems completed. Pertinent positives noted.  Otherwise ROS is negative)     Medications:  Reviewed    Infusion Medications    sodium chloride 75 mL/hr at 03/18/22 2052    sodium chloride      dextrose      dextrose      Or    dextrose       Scheduled Medications    [Held by provider] amLODIPine  10 mg Oral Daily    amLODIPine  5 mg Oral Daily    insulin glargine  40 Units SubCUTAneous Nightly    lidocaine  1 patch TransDERmal Daily    aspirin  81 mg Oral Daily    atorvastatin  20 mg Oral Nightly    gabapentin  300 mg Oral TID    gemfibrozil  600 mg Oral BID AC    [Held by provider] hydroCHLOROthiazide  25 mg Oral Daily    pantoprazole  40 mg Oral QAM AC    sodium chloride flush  10 mL IntraVENous 2 times per day    enoxaparin  40 mg SubCUTAneous Daily    insulin lispro  0-12 Units SubCUTAneous TID WC    insulin lispro  0-6 Units SubCUTAneous Nightly     PRN Meds: menthol, prochlorperazine, sodium chloride flush, sodium chloride, ondansetron **OR** ondansetron, polyethylene glycol, acetaminophen **OR** acetaminophen, potassium chloride **OR** potassium alternative oral replacement **OR** potassium chloride, magnesium sulfate, glucagon (rDNA), dextrose, glucose, dextrose **OR** dextrose      Intake/Output Summary (Last 24 hours) at 3/19/2022 1904  Last data filed at 3/19/2022 1453  Gross per 24 hour   Intake 1376.86 ml   Output 2580 ml   Net -1203.14 ml         Exam:  /84   Pulse 85   Temp 98.1 °F (36.7 °C) (Oral)   Resp 18   Ht 5' 11\" (1.803 m)   Wt 153 lb 14.4 oz (69.8 kg)   SpO2 95%   BMI 21.46 kg/m²   General appearance: No apparent distress, appears stated age and cooperative. Thin appearing. HEENT: Conjunctivae/corneas clear. Neck: Supple, with full range of motion. No jugular venous distention. Respiratory: Normal respiratory effort. Clear to auscultation, bilaterally without Rales/Wheezes/Rhonchi. Cardiovascular: Regular rate and rhythm with normal S1/S2 without murmurs, rubs or gallops. Abdomen: Soft, non-tender, non-distended with normal bowel sounds. Musculoskeletal: passive and active ROM x 4 extremities. Skin: Skin color, texture, turgor normal.  No rashes or lesions. Neurologic: Neurovascularly intact without any focal sensory/motor deficits.  Cranial nerves: II-XII intact, grossly non-focal.  Psychiatric: Alert and oriented, thought content appropriate, normal insight  Capillary Refill: Brisk,< 3 seconds   Peripheral Pulses: +2 palpable, equal bilaterally     Labs:   Recent Labs     03/17/22  1012 03/19/22  1138   WBC 11.2* 6.5   HGB 11.7* 11.0*   HCT 37.9* 35.6*    315     Recent Labs     03/17/22  0618 03/19/22  1138    137   K 4.0 4.2    102   CO2 25 24   BUN 13 10   CREATININE 0.7 0.5   CALCIUM 8.9 9.1   PHOS 3.4  --      No results for input(s): AST, ALT, BILIDIR, BILITOT, ALKPHOS in the last 72 hours. No results for input(s): INR in the last 72 hours. No results for input(s): Paulagee Rodriguezi in the last 72 hours. Microbiology:    Blood culture #1: No results found for: BC    Blood culture #2:No results found for: [facility]    Organism:No results found for: ORG    No results found for: LABGRAM    MRSA culture only:No results found for: [facility]    Urine culture:   Lab Results   Component Value Date    LABURIN 100 mg 09/23/2014       Respiratory culture: No results found for: CULTRESP    Aerobic and Anaerobic :  No results found for: LABAERO  No results found for: LABANAE    Urinalysis:      Lab Results   Component Value Date    NITRU NEGATIVE 05/28/2015    WBCUA 0-2 05/28/2015    BACTERIA NONE 05/28/2015    RBCUA 0-2 05/28/2015    BLOODU NEGATIVE 05/28/2015    GLUCOSEU >= 1000 05/28/2015       Radiology:  FL MODIFIED BARIUM SWALLOW W VIDEO   Final Result   1. Laryngeal penetration of honey thick, nectar thick, thin, soft and solid barium with aspiration of honey thick and thin barium. 2. Additional recommendations from the speech therapist will follow. **This report has been created using voice recognition software. It may contain minor errors which are inherent in voice recognition technology. **         Final report electronically signed by Dr. Alysha Emanuel on 3/17/2022 10:51 AM      CT Head WO Contrast   Final Result      1. Mild atrophy. 2. Inflammatory changes involving the left frontal sinus, ethmoid and maxillary sinuses bilaterally. 3. Slight deformity of the right maxillary sinus posterolaterally. 4. Otherwise negative noncontrast CT scan of the brain.          **This report has been created using voice recognition software. It may contain minor errors which are inherent in voice recognition technology. **      Final report electronically signed by DR Abraham Monzon on 3/15/2022 12:47 PM      CT Cervical Spine WO Contrast   Final Result       1. Cervical spondylosis especially at C5-6 and C6-7.   2. No acute fracture noted. 3. Bilateral carotid artery calcification. 4. Otherwise negative CT scan of the cervical spine. .               **This report has been created using voice recognition software. It may contain minor errors which are inherent in voice recognition technology. **      Final report electronically signed by DR Abraham Monzon on 3/15/2022 12:52 PM      XR RIBS BILATERAL (MIN 4 VIEWS)   Final Result       1. Slight irregularity involving the left sixth, seventh and eighth ribs laterally and right seventh rib laterally, please correlate clinically. 2. The remaining ribs are intact. 3. There is no pneumothorax. 4. There are small calcified granulomata in the right lower lobe and left upper lobe. .               **This report has been created using voice recognition software. It may contain minor errors which are inherent in voice recognition technology. **      Final report electronically signed by DR Abraham Monzon on 3/15/2022 12:35 PM        XR RIBS BILATERAL (MIN 4 VIEWS)    Result Date: 3/15/2022  PROCEDURE: XR RIBS BILATERAL (MIN 4 VIEWS) CLINICAL INFORMATION: Fall, rib pain. COMPARISON: Chest x-ray dated 14 August 2018. Luis Pesa TECHNIQUE: Two views of the bilateral ribs were obtained. A PA upright view of the chest was also obtained. FINDINGS: There is slight irregularity involving the right seventh rib laterally, left sixth, seventh and eighth ribs laterally laterally, please correlate clinically. The remaining ribs are intact. There is no displaced fracture. There is no pneumothorax. The heart size is normal.  The mediastinum is not widened. There are no pulmonary infiltrates or effusions.   There are contain minor errors which are inherent in voice recognition technology. ** Final report electronically signed by DR Ioana Figueredo on 3/15/2022 12:47 PM    CT Cervical Spine WO Contrast    Result Date: 3/15/2022  PROCEDURE: CT CERVICAL SPINE WO CONTRAST CLINICAL INFORMATION: fall. COMPARISON: No prior study. TECHNIQUE: 3 mm noncontrast axial images were obtained through the cervical spine with sagittal and coronal reconstructions. All CT scans at this facility use dose modulation, iterative reconstruction, and/or weight-based dosing when appropriate to reduce radiation dose to as low as reasonably achievable. FINDINGS: The cervical vertebral bodies are normally aligned. There is no fracture. There is no prevertebral soft tissue swelling. There is cervical spondylosis especially at C5-6 and C6-7. At C1-2, there is normal alignment of the dens relative to anterior arch of C1. At C2-3, there is no disc herniation, canal or foraminal stenosis. At C3-4, there is no disc herniation, canal or foraminal stenosis. At C4-5, there is no disc herniation, canal or foraminal stenosis. At C5-6, there is a 1.2 mm ventral extradural defect secondary to bulging disc and uncinate process hypertrophy. This causes mild canal and mild-to-moderate bilateral foraminal stenosis. At C6-7, there is 1.4 mm ventral extradural defect secondary to bulging disc and uncinate process hypertrophy. This causes mild canal, moderate right and mild-to-moderate left-sided foraminal stenosis. At C7-T1, there is no disc herniation, canal or foraminal stenosis. There is bilateral carotid artery calcification. .  There are no suspicious findings in the lung apices. 1. Cervical spondylosis especially at C5-6 and C6-7. 2. No acute fracture noted. 3. Bilateral carotid artery calcification. 4. Otherwise negative CT scan of the cervical spine. . **This report has been created using voice recognition software.  It may contain minor errors which are inherent in voice recognition technology. ** Final report electronically signed by DR Dexter Saba on 3/15/2022 12:52 PM          Jessica Garcia MD   PGY2, Internal Medicine   3/19/2022

## 2022-03-20 LAB
GLUCOSE BLD-MCNC: 134 MG/DL (ref 70–108)
GLUCOSE BLD-MCNC: 137 MG/DL (ref 70–108)
GLUCOSE BLD-MCNC: 157 MG/DL (ref 70–108)
GLUCOSE BLD-MCNC: 173 MG/DL (ref 70–108)

## 2022-03-20 PROCEDURE — 6360000002 HC RX W HCPCS: Performed by: PHYSICIAN ASSISTANT

## 2022-03-20 PROCEDURE — 2580000003 HC RX 258: Performed by: HOSPITALIST

## 2022-03-20 PROCEDURE — 99233 SBSQ HOSP IP/OBS HIGH 50: CPT | Performed by: HOSPITALIST

## 2022-03-20 PROCEDURE — 2580000003 HC RX 258: Performed by: PHYSICIAN ASSISTANT

## 2022-03-20 PROCEDURE — 94760 N-INVAS EAR/PLS OXIMETRY 1: CPT

## 2022-03-20 PROCEDURE — 6370000000 HC RX 637 (ALT 250 FOR IP): Performed by: STUDENT IN AN ORGANIZED HEALTH CARE EDUCATION/TRAINING PROGRAM

## 2022-03-20 PROCEDURE — 1200000003 HC TELEMETRY R&B

## 2022-03-20 PROCEDURE — 6370000000 HC RX 637 (ALT 250 FOR IP): Performed by: PHYSICIAN ASSISTANT

## 2022-03-20 PROCEDURE — 82948 REAGENT STRIP/BLOOD GLUCOSE: CPT

## 2022-03-20 RX ORDER — 0.9 % SODIUM CHLORIDE 0.9 %
1000 INTRAVENOUS SOLUTION INTRAVENOUS ONCE
Status: COMPLETED | OUTPATIENT
Start: 2022-03-20 | End: 2022-03-20

## 2022-03-20 RX ADMIN — GEMFIBROZIL 600 MG: 600 TABLET ORAL at 06:16

## 2022-03-20 RX ADMIN — ACETAMINOPHEN 650 MG: 325 TABLET ORAL at 02:02

## 2022-03-20 RX ADMIN — GABAPENTIN 300 MG: 300 CAPSULE ORAL at 14:07

## 2022-03-20 RX ADMIN — GEMFIBROZIL 600 MG: 600 TABLET ORAL at 16:48

## 2022-03-20 RX ADMIN — SODIUM CHLORIDE, PRESERVATIVE FREE 10 ML: 5 INJECTION INTRAVENOUS at 20:29

## 2022-03-20 RX ADMIN — GABAPENTIN 300 MG: 300 CAPSULE ORAL at 08:04

## 2022-03-20 RX ADMIN — GABAPENTIN 300 MG: 300 CAPSULE ORAL at 20:29

## 2022-03-20 RX ADMIN — ENOXAPARIN SODIUM 40 MG: 100 INJECTION SUBCUTANEOUS at 08:04

## 2022-03-20 RX ADMIN — ASPIRIN 81 MG: 81 TABLET, COATED ORAL at 08:04

## 2022-03-20 RX ADMIN — INSULIN GLARGINE 40 UNITS: 100 INJECTION, SOLUTION SUBCUTANEOUS at 20:29

## 2022-03-20 RX ADMIN — ATORVASTATIN CALCIUM 20 MG: 20 TABLET, FILM COATED ORAL at 20:29

## 2022-03-20 RX ADMIN — INSULIN LISPRO 1 UNITS: 100 INJECTION, SOLUTION INTRAVENOUS; SUBCUTANEOUS at 20:29

## 2022-03-20 RX ADMIN — ACETAMINOPHEN 650 MG: 325 TABLET ORAL at 08:03

## 2022-03-20 RX ADMIN — INSULIN LISPRO 2 UNITS: 100 INJECTION, SOLUTION INTRAVENOUS; SUBCUTANEOUS at 13:04

## 2022-03-20 RX ADMIN — SODIUM CHLORIDE 1000 ML: 9 INJECTION, SOLUTION INTRAVENOUS at 10:46

## 2022-03-20 ASSESSMENT — PAIN SCALES - GENERAL
PAINLEVEL_OUTOF10: 0
PAINLEVEL_OUTOF10: 9
PAINLEVEL_OUTOF10: 6

## 2022-03-20 NOTE — PLAN OF CARE
Problem: Pain:  Goal: Pain level will decrease  Description: Pain level will decrease  Outcome: Ongoing  Note: Lidocaine patch, Tylenol as needed, heating pad, fall prior to admission     Problem: Falls - Risk of:  Goal: Will remain free from falls  Description: Will remain free from falls  Outcome: Ongoing  Note: Fall before admission, bed alarm on, up with staff assistance,  socks, orthostatics being monitored     Problem: Respiratory:  Goal: Ability to maintain a clear airway will improve  Description: Ability to maintain a clear airway will improve  Outcome: Ongoing  Note: History of laryngeal cancer, pills whole in applesauce, modified swallow showed aspiration, mildly thick fluids     Problem: Fluid Volume:  Goal: Ability to achieve a balanced intake and output will improve  Description: Ability to achieve a balanced intake and output will improve  Outcome: Ongoing  Note: History of laryngeal cancer, IV fluids, encourage fluid intake even though painful with swallowing, mildly thick fluids    Care plan reviewed with patient. Patient verbalize understanding of the plan of care and contribute to goal setting.

## 2022-03-20 NOTE — PROGRESS NOTES
Hospitalist Progress Note      Patient:  Gurpreet Khalil    Unit/Bed:6-15/015-A  YOB: 1960  MRN: [id number]   Acct: [de-identified]   PCP: KATHRYN Novoa NP  Date of Admission: 3/15/2022    Assessment/Plan:  Syncope: likely orthostatic hypotension induced. Orthostatics positive. Suspect due to dehydration as patient has not been able to take fluids in due to odynophagia. On mIVF. Monitor on Tele. ECHO EF 65% with grade 1 DD. On modified dysphagia diet per [facility] 3/17. No known history of arrhythmias but will consider a Cardiac monitor on discharge. Encouraged to drink more thickened liquid, sit out of bed and ambulate with help if no dizziness. Gradually transition from IVF to po thickened liquids. Consult cardiology for input if no improvement. Essential HTN: controlled. On Norvasc with parameters. HCTZ held at this time. IDDMII: HbA1c 8.3 on 3/16/2022. On Lantus 36Y nightly, Trulicity, and Amaryl. PO meds held at this time. On Lantus 40 u nightly with SSI. Monitor BG with inpatient goal 140-180. 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 associated dysphagia and mild odynophagia present. Follows with Dr. Jenette Phalen. ENT consulted for dyspshagia- follow up. On modified diet. Tele:   [x] yes             [] no    Code Status: Full Code    Fluids:  IVNS at 76 cc/hr  Diet:  ADULT DIET; Regular; 5 carb choices (75 gm/meal); Mildly Thick (Nectar);  No Drinking Straws  ADULT ORAL NUTRITION SUPPLEMENT; Breakfast, Lunch, Dinner; Frozen Oral Supplement    DVT prophylaxis: [x] Lovenox                                 [] SCDs                                 [] SQ Heparin                                 [] Encourage ambulation                                 [] Already on Anticoagulation    Disposition:      [] TBD                             [x] Home                             [] TCU [] Rehab                             [] Psych                             [] SNF                             [] [place]                             [] Other-    Chief Complaint: loss of consciousness    Hospital Course:   58 yo M with history of Keratinizing SCC of larynx (diagnosed 08/2021), HTN, T2DM, Arthritis presented after an episode of syncope while going home from work due to associated dizziness/lightheadedness the day of presentation. Had associated trauma to right chest area from fall. ED course: Presented afebrile and hemodynamically stable. Labs generally unremarkable. WBC 17. 4. XR ribs showed slight irregularity involving the left 6th, 7th, and 8th ribs laterally and right 7th rib laterally. No pneumothorax. CT head wo contrast with inflammatory changes involving the left frontal sinus, ethmoid, and maxillary sinuses bilaterally, slight deformity of the right maxillary sinus posterolaterally. CT cervical spondylosis especially at C5-6 and C6-7. Admitted for further work up and management. Of note, has had issues with persistent cough and swallowing since radiation and lost his voice. Has not been able to take PO diabetic meds due to dysphagia so has had suboptimal control of BG. Has no known history of arrhythmias. No known cardiac history. Interval:   Nursing reported not orthostatic at night, only during the day. Still not taking much po fluids due to dysphagia. Subjective:   Nil complaints. No dizziness/lightheadedness. ROS (12 point review of systems completed. Pertinent positives noted.  Otherwise ROS is negative)     Medications:  Reviewed    Infusion Medications    sodium chloride 75 mL/hr at 03/19/22 2231    sodium chloride      dextrose      dextrose      Or    dextrose       Scheduled Medications    [Held by provider] amLODIPine  10 mg Oral Daily    amLODIPine  5 mg Oral Daily    insulin glargine  40 Units SubCUTAneous Nightly    lidocaine  1 patch TransDERmal Daily    aspirin  81 mg Oral Daily    atorvastatin  20 mg Oral Nightly    gabapentin  300 mg Oral TID    gemfibrozil  600 mg Oral BID AC    [Held by provider] hydroCHLOROthiazide  25 mg Oral Daily    pantoprazole  40 mg Oral QAM AC    sodium chloride flush  10 mL IntraVENous 2 times per day    enoxaparin  40 mg SubCUTAneous Daily    insulin lispro  0-12 Units SubCUTAneous TID WC    insulin lispro  0-6 Units SubCUTAneous Nightly     PRN Meds: menthol, prochlorperazine, sodium chloride flush, sodium chloride, ondansetron **OR** ondansetron, polyethylene glycol, acetaminophen **OR** acetaminophen, potassium chloride **OR** potassium alternative oral replacement **OR** potassium chloride, magnesium sulfate, glucagon (rDNA), dextrose, glucose, dextrose **OR** dextrose      Intake/Output Summary (Last 24 hours) at 3/20/2022 0810  Last data filed at 3/20/2022 0325  Gross per 24 hour   Intake 240 ml   Output 2175 ml   Net -1935 ml         Exam:  BP (!) 150/87   Pulse 83   Temp 97.7 °F (36.5 °C) (Oral)   Resp 18   Ht 5' 11\" (1.803 m)   Wt 153 lb 14.4 oz (69.8 kg)   SpO2 98%   BMI 21.46 kg/m²   General appearance: No apparent distress, appears stated age and cooperative. Thin appearing. HEENT: Conjunctivae/corneas clear. Neck: Supple, with full range of motion. No jugular venous distention. Respiratory: Normal respiratory effort. Clear to auscultation, bilaterally without Rales/Wheezes/Rhonchi. Cardiovascular: Regular rate and rhythm with normal S1/S2 without murmurs, rubs or gallops. Abdomen: Soft, non-tender, non-distended with normal bowel sounds. Musculoskeletal: passive and active ROM x 4 extremities. Skin: Skin color, texture, turgor normal.  No rashes or lesions. Neurologic: Neurovascularly intact without any focal sensory/motor deficits.  Cranial nerves: II-XII intact, grossly non-focal.  Psychiatric: Alert and oriented, thought content appropriate, normal insight  Capillary Refill: Brisk,< 3 seconds   Peripheral Pulses: +2 palpable, equal bilaterally     Labs:   Recent Labs     03/17/22  1012 03/19/22  1138   WBC 11.2* 6.5   HGB 11.7* 11.0*   HCT 37.9* 35.6*    315     Recent Labs     03/19/22  1138      K 4.2      CO2 24   BUN 10   CREATININE 0.5   CALCIUM 9.1     No results for input(s): AST, ALT, BILIDIR, BILITOT, ALKPHOS in the last 72 hours. No results for input(s): INR in the last 72 hours. No results for input(s): Elaine Jae in the last 72 hours. Microbiology:    Blood culture #1: No results found for: BC    Blood culture #2:No results found for: Liat Si    Organism:No results found for: ORG    No results found for: LABGRAM    MRSA culture only:No results found for: [facility]    Urine culture:   Lab Results   Component Value Date    LABURIN 100 mg 09/23/2014       Respiratory culture: No results found for: CULTRESP    Aerobic and Anaerobic :  No results found for: LABAERO  No results found for: LABANAE    Urinalysis:      Lab Results   Component Value Date    NITRU NEGATIVE 05/28/2015    WBCUA 0-2 05/28/2015    BACTERIA NONE 05/28/2015    RBCUA 0-2 05/28/2015    BLOODU NEGATIVE 05/28/2015    GLUCOSEU >= 1000 05/28/2015       Radiology:  FL MODIFIED BARIUM SWALLOW W VIDEO   Final Result   1. Laryngeal penetration of honey thick, nectar thick, thin, soft and solid barium with aspiration of honey thick and thin barium. 2. Additional recommendations from the speech therapist will follow. **This report has been created using voice recognition software. It may contain minor errors which are inherent in voice recognition technology. **         Final report electronically signed by Dr. Oral Dempsey on 3/17/2022 10:51 AM      CT Head WO Contrast   Final Result      1. Mild atrophy. 2. Inflammatory changes involving the left frontal sinus, ethmoid and maxillary sinuses bilaterally.    3. Slight deformity of the right maxillary sinus posterolaterally. 4. Otherwise negative noncontrast CT scan of the brain. **This report has been created using voice recognition software. It may contain minor errors which are inherent in voice recognition technology. **      Final report electronically signed by DR Olimpia Moreno on 3/15/2022 12:47 PM      CT Cervical Spine WO Contrast   Final Result       1. Cervical spondylosis especially at C5-6 and C6-7.   2. No acute fracture noted. 3. Bilateral carotid artery calcification. 4. Otherwise negative CT scan of the cervical spine. .               **This report has been created using voice recognition software. It may contain minor errors which are inherent in voice recognition technology. **      Final report electronically signed by DR Olimpia Moreno on 3/15/2022 12:52 PM      XR RIBS BILATERAL (MIN 4 VIEWS)   Final Result       1. Slight irregularity involving the left sixth, seventh and eighth ribs laterally and right seventh rib laterally, please correlate clinically. 2. The remaining ribs are intact. 3. There is no pneumothorax. 4. There are small calcified granulomata in the right lower lobe and left upper lobe. .               **This report has been created using voice recognition software. It may contain minor errors which are inherent in voice recognition technology. **      Final report electronically signed by DR Olimpia Moreno on 3/15/2022 12:35 PM        XR RIBS BILATERAL (MIN 4 VIEWS)    Result Date: 3/15/2022  PROCEDURE: XR RIBS BILATERAL (MIN 4 VIEWS) CLINICAL INFORMATION: Fall, rib pain. COMPARISON: Chest x-ray dated 14 August 2018. [facility] TECHNIQUE: Two views of the bilateral ribs were obtained. A PA upright view of the chest was also obtained. FINDINGS: There is slight irregularity involving the right seventh rib laterally, left sixth, seventh and eighth ribs laterally laterally, please correlate clinically. The remaining ribs are intact. There is no displaced fracture.  There is no pneumothorax. The heart size is normal.  The mediastinum is not widened. There are no pulmonary infiltrates or effusions. There are small calcified granulomata in the right lower lobe and left upper lobe. The pulmonary vascularity is normal.  There is thoracic spondylosis. .      1. Slight irregularity involving the left sixth, seventh and eighth ribs laterally and right seventh rib laterally, please correlate clinically. 2. The remaining ribs are intact. 3. There is no pneumothorax. 4. There are small calcified granulomata in the right lower lobe and left upper lobe. . **This report has been created using voice recognition software. It may contain minor errors which are inherent in voice recognition technology. ** Final report electronically signed by DR Rhea Wu on 3/15/2022 12:35 PM    CT Head WO Contrast    Result Date: 3/15/2022  PROCEDURE: CT HEAD WO CONTRAST CLINICAL INFORMATION: fall. COMPARISON: No prior study. TECHNIQUE: Noncontrast 5 mm axial images were obtained through the brain. Sagittal and coronal reconstructions were obtained. All CT scans at this facility use dose modulation, iterative reconstruction, and/or weight-based dosing when appropriate to reduce radiation dose to as low as reasonably achievable. FINDINGS: There is mild atrophy There is no hemorrhage. There are no intra-or extra-axial collections. There is no hydrocephalus, midline shift or mass effect. The gray-white matter differentiation is preserved. There are inflammatory changes involving the left frontal sinus, ethmoid and maxillary sinuses bilaterally. There is slight deformity of the right maxillary sinus posterolaterally. The remaining paranasal sinuses and mastoid air cells are normally aerated. There is no suspicious calvarial abnormality. 1. Mild atrophy. 2. Inflammatory changes involving the left frontal sinus, ethmoid and maxillary sinuses bilaterally.  3. Slight deformity of the right maxillary sinus posterolaterally. 4. Otherwise negative noncontrast CT scan of the brain. **This report has been created using voice recognition software. It may contain minor errors which are inherent in voice recognition technology. ** Final report electronically signed by DR Char Ba on 3/15/2022 12:47 PM    CT Cervical Spine WO Contrast    Result Date: 3/15/2022  PROCEDURE: CT CERVICAL SPINE WO CONTRAST CLINICAL INFORMATION: fall. COMPARISON: No prior study. TECHNIQUE: 3 mm noncontrast axial images were obtained through the cervical spine with sagittal and coronal reconstructions. All CT scans at this facility use dose modulation, iterative reconstruction, and/or weight-based dosing when appropriate to reduce radiation dose to as low as reasonably achievable. FINDINGS: The cervical vertebral bodies are normally aligned. There is no fracture. There is no prevertebral soft tissue swelling. There is cervical spondylosis especially at C5-6 and C6-7. At C1-2, there is normal alignment of the dens relative to anterior arch of C1. At C2-3, there is no disc herniation, canal or foraminal stenosis. At C3-4, there is no disc herniation, canal or foraminal stenosis. At C4-5, there is no disc herniation, canal or foraminal stenosis. At C5-6, there is a 1.2 mm ventral extradural defect secondary to bulging disc and uncinate process hypertrophy. This causes mild canal and mild-to-moderate bilateral foraminal stenosis. At C6-7, there is 1.4 mm ventral extradural defect secondary to bulging disc and uncinate process hypertrophy. This causes mild canal, moderate right and mild-to-moderate left-sided foraminal stenosis. At C7-T1, there is no disc herniation, canal or foraminal stenosis. There is bilateral carotid artery calcification. .  There are no suspicious findings in the lung apices. 1. Cervical spondylosis especially at C5-6 and C6-7. 2. No acute fracture noted. 3. Bilateral carotid artery calcification.  4. Otherwise negative CT scan of the cervical spine. . **This report has been created using voice recognition software. It may contain minor errors which are inherent in voice recognition technology. ** Final report electronically signed by DR Abraham Monzon on 3/15/2022 12:52 PM          Amy Reed MD   PGY2, Internal Medicine   3/20/2022

## 2022-03-21 LAB
GLUCOSE BLD-MCNC: 117 MG/DL (ref 70–108)
GLUCOSE BLD-MCNC: 158 MG/DL (ref 70–108)
GLUCOSE BLD-MCNC: 180 MG/DL (ref 70–108)
GLUCOSE BLD-MCNC: 222 MG/DL (ref 70–108)

## 2022-03-21 PROCEDURE — 1200000003 HC TELEMETRY R&B

## 2022-03-21 PROCEDURE — 6370000000 HC RX 637 (ALT 250 FOR IP): Performed by: STUDENT IN AN ORGANIZED HEALTH CARE EDUCATION/TRAINING PROGRAM

## 2022-03-21 PROCEDURE — 94761 N-INVAS EAR/PLS OXIMETRY MLT: CPT

## 2022-03-21 PROCEDURE — 6360000002 HC RX W HCPCS: Performed by: PHYSICIAN ASSISTANT

## 2022-03-21 PROCEDURE — 82948 REAGENT STRIP/BLOOD GLUCOSE: CPT

## 2022-03-21 PROCEDURE — 6370000000 HC RX 637 (ALT 250 FOR IP): Performed by: PHYSICIAN ASSISTANT

## 2022-03-21 RX ORDER — IBUPROFEN 400 MG/1
400 TABLET ORAL EVERY 4 HOURS PRN
Status: DISPENSED | OUTPATIENT
Start: 2022-03-21 | End: 2022-03-26

## 2022-03-21 RX ORDER — KETOROLAC TROMETHAMINE 10 MG/1
10 TABLET, FILM COATED ORAL EVERY 4 HOURS PRN
Status: DISCONTINUED | OUTPATIENT
Start: 2022-03-21 | End: 2022-03-21 | Stop reason: CLARIF

## 2022-03-21 RX ORDER — LANOLIN ALCOHOL/MO/W.PET/CERES
5 CREAM (GRAM) TOPICAL NIGHTLY PRN
Status: DISCONTINUED | OUTPATIENT
Start: 2022-03-21 | End: 2022-03-28 | Stop reason: HOSPADM

## 2022-03-21 RX ORDER — MIDODRINE HYDROCHLORIDE 2.5 MG/1
2.5 TABLET ORAL
Status: DISCONTINUED | OUTPATIENT
Start: 2022-03-21 | End: 2022-03-23

## 2022-03-21 RX ADMIN — INSULIN LISPRO 2 UNITS: 100 INJECTION, SOLUTION INTRAVENOUS; SUBCUTANEOUS at 11:53

## 2022-03-21 RX ADMIN — GABAPENTIN 300 MG: 300 CAPSULE ORAL at 14:32

## 2022-03-21 RX ADMIN — ATORVASTATIN CALCIUM 20 MG: 20 TABLET, FILM COATED ORAL at 21:02

## 2022-03-21 RX ADMIN — GABAPENTIN 300 MG: 300 CAPSULE ORAL at 09:41

## 2022-03-21 RX ADMIN — INSULIN LISPRO 2 UNITS: 100 INJECTION, SOLUTION INTRAVENOUS; SUBCUTANEOUS at 17:31

## 2022-03-21 RX ADMIN — GEMFIBROZIL 600 MG: 600 TABLET ORAL at 05:28

## 2022-03-21 RX ADMIN — METOPROLOL TARTRATE 25 MG: 25 TABLET, FILM COATED ORAL at 21:02

## 2022-03-21 RX ADMIN — ACETAMINOPHEN 650 MG: 325 TABLET ORAL at 05:28

## 2022-03-21 RX ADMIN — ASPIRIN 81 MG: 81 TABLET, COATED ORAL at 09:41

## 2022-03-21 RX ADMIN — METOPROLOL TARTRATE 25 MG: 25 TABLET, FILM COATED ORAL at 14:32

## 2022-03-21 RX ADMIN — ENOXAPARIN SODIUM 40 MG: 100 INJECTION SUBCUTANEOUS at 09:41

## 2022-03-21 RX ADMIN — INSULIN LISPRO 2 UNITS: 100 INJECTION, SOLUTION INTRAVENOUS; SUBCUTANEOUS at 21:15

## 2022-03-21 RX ADMIN — IBUPROFEN 400 MG: 400 TABLET, FILM COATED ORAL at 11:52

## 2022-03-21 RX ADMIN — MIDODRINE HYDROCHLORIDE 2.5 MG: 2.5 TABLET ORAL at 17:31

## 2022-03-21 RX ADMIN — PANTOPRAZOLE SODIUM 40 MG: 40 TABLET, DELAYED RELEASE ORAL at 05:28

## 2022-03-21 RX ADMIN — Medication 4.5 MG: at 22:41

## 2022-03-21 RX ADMIN — MIDODRINE HYDROCHLORIDE 2.5 MG: 2.5 TABLET ORAL at 14:32

## 2022-03-21 RX ADMIN — INSULIN GLARGINE 40 UNITS: 100 INJECTION, SOLUTION SUBCUTANEOUS at 21:15

## 2022-03-21 RX ADMIN — GABAPENTIN 300 MG: 300 CAPSULE ORAL at 21:02

## 2022-03-21 ASSESSMENT — PAIN SCALES - GENERAL
PAINLEVEL_OUTOF10: 8
PAINLEVEL_OUTOF10: 0
PAINLEVEL_OUTOF10: 0
PAINLEVEL_OUTOF10: 2
PAINLEVEL_OUTOF10: 6

## 2022-03-21 NOTE — PLAN OF CARE
Problem: Respiratory:  Goal: Ability to maintain a clear airway will improve  Description: Ability to maintain a clear airway will improve  Outcome: Ongoing  Note: History of laryngeal cancer, pills whole in applesauce, modified swallow showed aspiration, mildly thick fluids     Problem: Pain:  Goal: Pain level will decrease  Description: Pain level will decrease  Outcome: Ongoing  Note: Lidocaine patch, Tylenol and Toradol as needed, heating pad, fall prior to admission     Problem: Falls - Risk of:  Goal: Will remain free from falls  Description: Will remain free from falls  Outcome: Ongoing  Note: Fall before admission, bed and chair alarm on, up with staff assistance,  socks, orthostatics being monitored     Problem: Fluid Volume:  Goal: Ability to achieve a balanced intake and output will improve  Description: Ability to achieve a balanced intake and output will improve  Outcome: Ongoing  Note: History of laryngeal cancer, IV fluids, encourage fluid intake even though painful with swallowing, mildly thick fluids

## 2022-03-21 NOTE — PROGRESS NOTES
Hospitalist Progress Note      Patient:  Janey Gifford    Unit/Bed:6-15/015-A  YOB: 1960  MRN: [id number]   Acct: [de-identified]   PCP: KATHRYN Foster NP  Date of Admission: 3/15/2022    Assessment/Plan:  Syncope: due to orthostatic hypotension. Persistently orthostatics positive. Initially thought to be due to dehydration in the setting of dysphagia/odynophafis and reduced PO intake. S/p IVF therapy including multiple boluses, continues to be orthostatic. Unclear etiology- patient has no history of known causes of postural hypotension. ECHO EF 65% with grade 1 DD. No known history of arrhythmias but will consider a Cardiac monitor on discharge.    -On modified dysphagia diet per [facility] 3/17. On mIVF. Will start on low dose midodrine and lopressor. Fall precautions. Cardiology consult for assistance with work up, may need outpatient follow up. Essential HTN: controlled. Discontinue Norvasc (may cause orthostatic hypotension) and HCTZ. IDDMII: HbA1c 8.3 on 3/16/2022. On Lantus 60Y nightly, Trulicity, and Amaryl. PO meds held at this time. On Lantus 40 u nightly with SSI. Monitor BG with inpatient goal 140-180. 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 associated dysphagia and mild odynophagia present. Follows with Dr. Dimas Keating. ENT signed off. On modified diet. Tele:   [x] yes             [] no    Code Status: Full Code    Fluids:  IVNS at 76 cc/hr  Diet:  ADULT DIET; Regular; 5 carb choices (75 gm/meal); Mildly Thick (Nectar);  No Drinking Straws  ADULT ORAL NUTRITION SUPPLEMENT; Breakfast, Lunch, Dinner; Frozen Oral Supplement    DVT prophylaxis: [x] Lovenox                                 [] SCDs                                 [] SQ Heparin                                 [] Encourage ambulation                                 [] Already on Anticoagulation    Disposition:      [] TBD                             [x] Home                             [] TCU                             [] Rehab                             [] Psych                             [] SNF                             [] [place]                             [] Other-    Chief Complaint: loss of consciousness    Hospital Course:   58 yo M with history of Keratinizing SCC of larynx (diagnosed 08/2021), HTN, T2DM, Arthritis presented after an episode of syncope while going home from work due to associated dizziness/lightheadedness the day of presentation. Had associated trauma to right chest area from fall. ED course: Presented afebrile and hemodynamically stable. Labs generally unremarkable. WBC 17. 4. XR ribs showed slight irregularity involving the left 6th, 7th, and 8th ribs laterally and right 7th rib laterally. No pneumothorax. CT head wo contrast with inflammatory changes involving the left frontal sinus, ethmoid, and maxillary sinuses bilaterally, slight deformity of the right maxillary sinus posterolaterally. CT cervical spondylosis especially at C5-6 and C6-7. Admitted for further work up and management. Of note, has had issues with persistent cough and swallowing since radiation and lost his voice. Has not been able to take PO diabetic meds due to dysphagia so has had suboptimal control of BG. Has no known history of arrhythmias. No known cardiac history. Interval:   No acute events overnight. Continues to remain orthostatic positive as of this morning. Subjective:   Reports worsening in odynophagia and throat soreness/pain. Asking for stronger pain meds. Also reporting new back pain. Continues to feel dizzy on postural changes especially with standing. No other new complaints. ROS (12 point review of systems completed. Pertinent positives noted.  Otherwise ROS is negative)     Medications:  Reviewed    Infusion Medications    sodium chloride Stopped (03/20/22 1041)    sodium chloride      dextrose      dextrose      Or    dextrose       Scheduled Medications    midodrine  2.5 mg Oral TID WC    metoprolol tartrate  25 mg Oral BID    [Held by provider] amLODIPine  10 mg Oral Daily    insulin glargine  40 Units SubCUTAneous Nightly    lidocaine  1 patch TransDERmal Daily    aspirin  81 mg Oral Daily    atorvastatin  20 mg Oral Nightly    gabapentin  300 mg Oral TID    gemfibrozil  600 mg Oral BID AC    pantoprazole  40 mg Oral QAM AC    sodium chloride flush  10 mL IntraVENous 2 times per day    enoxaparin  40 mg SubCUTAneous Daily    insulin lispro  0-12 Units SubCUTAneous TID WC    insulin lispro  0-6 Units SubCUTAneous Nightly     PRN Meds: ibuprofen, menthol, [Held by provider] prochlorperazine, sodium chloride flush, sodium chloride, ondansetron **OR** ondansetron, polyethylene glycol, acetaminophen **OR** acetaminophen, potassium chloride **OR** potassium alternative oral replacement **OR** potassium chloride, magnesium sulfate, glucagon (rDNA), dextrose, glucose, dextrose **OR** dextrose      Intake/Output Summary (Last 24 hours) at 3/21/2022 1433  Last data filed at 3/21/2022 1145  Gross per 24 hour   Intake 4955.55 ml   Output 2375 ml   Net 2580.55 ml         Exam:  BP (!) 154/83   Pulse 79   Temp 97.7 °F (36.5 °C) (Oral)   Resp 18   Ht 5' 11\" (1.803 m)   Wt 153 lb 14.4 oz (69.8 kg)   SpO2 100%   BMI 21.46 kg/m²   General appearance: No apparent distress, appears stated age and cooperative. Thin appearing. Acutely ill appearing. HEENT: Conjunctivae/corneas clear. Neck: Supple, with full range of motion. No jugular venous distention. Respiratory: Normal respiratory effort. Clear to auscultation, bilaterally without Rales/Wheezes/Rhonchi. Cardiovascular: Regular rate and rhythm with normal S1/S2 without murmurs, rubs or gallops.   Abdomen: Soft, non-tender, non-distended with normal bowel sounds. Musculoskeletal: passive and active ROM x 4 extremities. Skin: Skin color, texture, turgor normal.  No rashes or lesions. Neurologic: Neurovascularly intact without any focal sensory/motor deficits. Cranial nerves: II-XII intact, grossly non-focal.  Psychiatric: Alert and oriented, thought content appropriate, normal insight  Capillary Refill: Brisk,< 3 seconds   Peripheral Pulses: +2 palpable, equal bilaterally     Labs:   Recent Labs     03/19/22  1138   WBC 6.5   HGB 11.0*   HCT 35.6*        Recent Labs     03/19/22  1138      K 4.2      CO2 24   BUN 10   CREATININE 0.5   CALCIUM 9.1     No results for input(s): AST, ALT, BILIDIR, BILITOT, ALKPHOS in the last 72 hours. No results for input(s): INR in the last 72 hours. No results for input(s): Kennyth Hammers in the last 72 hours. Microbiology:    Blood culture #1: No results found for: BC    Blood culture #2:No results found for: Radha Reynoso    Organism:No results found for: ORG    No results found for: LABGRAM    MRSA culture only:No results found for: [facility]    Urine culture:   Lab Results   Component Value Date    LABURIN 100 mg 09/23/2014       Respiratory culture: No results found for: CULTRESP    Aerobic and Anaerobic :  No results found for: LABAERO  No results found for: LABANAE    Urinalysis:      Lab Results   Component Value Date    NITRU NEGATIVE 05/28/2015    WBCUA 0-2 05/28/2015    BACTERIA NONE 05/28/2015    RBCUA 0-2 05/28/2015    BLOODU NEGATIVE 05/28/2015    GLUCOSEU >= 1000 05/28/2015       Radiology:  FL MODIFIED BARIUM SWALLOW W VIDEO   Final Result   1. Laryngeal penetration of honey thick, nectar thick, thin, soft and solid barium with aspiration of honey thick and thin barium. 2. Additional recommendations from the speech therapist will follow. **This report has been created using voice recognition software. It may contain minor errors which are inherent in voice recognition technology. ** Final report electronically signed by Dr. Fernando Pratt on 3/17/2022 10:51 AM      CT Head WO Contrast   Final Result      1. Mild atrophy. 2. Inflammatory changes involving the left frontal sinus, ethmoid and maxillary sinuses bilaterally. 3. Slight deformity of the right maxillary sinus posterolaterally. 4. Otherwise negative noncontrast CT scan of the brain. **This report has been created using voice recognition software. It may contain minor errors which are inherent in voice recognition technology. **      Final report electronically signed by DR Abraham Monzon on 3/15/2022 12:47 PM      CT Cervical Spine WO Contrast   Final Result       1. Cervical spondylosis especially at C5-6 and C6-7.   2. No acute fracture noted. 3. Bilateral carotid artery calcification. 4. Otherwise negative CT scan of the cervical spine. .               **This report has been created using voice recognition software. It may contain minor errors which are inherent in voice recognition technology. **      Final report electronically signed by DR Abraham Monzon on 3/15/2022 12:52 PM      XR RIBS BILATERAL (MIN 4 VIEWS)   Final Result       1. Slight irregularity involving the left sixth, seventh and eighth ribs laterally and right seventh rib laterally, please correlate clinically. 2. The remaining ribs are intact. 3. There is no pneumothorax. 4. There are small calcified granulomata in the right lower lobe and left upper lobe. .               **This report has been created using voice recognition software. It may contain minor errors which are inherent in voice recognition technology. **      Final report electronically signed by DR Abraham Monzon on 3/15/2022 12:35 PM        XR RIBS BILATERAL (MIN 4 VIEWS)    Result Date: 3/15/2022  PROCEDURE: XR RIBS BILATERAL (MIN 4 VIEWS) CLINICAL INFORMATION: Fall, rib pain. COMPARISON: Chest x-ray dated 14 August 2018. Luis Saavedra TECHNIQUE: Two views of the bilateral ribs were obtained.   A PA upright view of the chest was also obtained. FINDINGS: There is slight irregularity involving the right seventh rib laterally, left sixth, seventh and eighth ribs laterally laterally, please correlate clinically. The remaining ribs are intact. There is no displaced fracture. There is no pneumothorax. The heart size is normal.  The mediastinum is not widened. There are no pulmonary infiltrates or effusions. There are small calcified granulomata in the right lower lobe and left upper lobe. The pulmonary vascularity is normal.  There is thoracic spondylosis. .      1. Slight irregularity involving the left sixth, seventh and eighth ribs laterally and right seventh rib laterally, please correlate clinically. 2. The remaining ribs are intact. 3. There is no pneumothorax. 4. There are small calcified granulomata in the right lower lobe and left upper lobe. . **This report has been created using voice recognition software. It may contain minor errors which are inherent in voice recognition technology. ** Final report electronically signed by DR Betsey Keane on 3/15/2022 12:35 PM    CT Head WO Contrast    Result Date: 3/15/2022  PROCEDURE: CT HEAD WO CONTRAST CLINICAL INFORMATION: fall. COMPARISON: No prior study. TECHNIQUE: Noncontrast 5 mm axial images were obtained through the brain. Sagittal and coronal reconstructions were obtained. All CT scans at this facility use dose modulation, iterative reconstruction, and/or weight-based dosing when appropriate to reduce radiation dose to as low as reasonably achievable. FINDINGS: There is mild atrophy There is no hemorrhage. There are no intra-or extra-axial collections. There is no hydrocephalus, midline shift or mass effect. The gray-white matter differentiation is preserved. There are inflammatory changes involving the left frontal sinus, ethmoid and maxillary sinuses bilaterally. There is slight deformity of the right maxillary sinus posterolaterally.   The remaining paranasal sinuses and mastoid air cells are normally aerated. There is no suspicious calvarial abnormality. 1. Mild atrophy. 2. Inflammatory changes involving the left frontal sinus, ethmoid and maxillary sinuses bilaterally. 3. Slight deformity of the right maxillary sinus posterolaterally. 4. Otherwise negative noncontrast CT scan of the brain. **This report has been created using voice recognition software. It may contain minor errors which are inherent in voice recognition technology. ** Final report electronically signed by DR Leslie Lowry on 3/15/2022 12:47 PM    CT Cervical Spine WO Contrast    Result Date: 3/15/2022  PROCEDURE: CT CERVICAL SPINE WO CONTRAST CLINICAL INFORMATION: fall. COMPARISON: No prior study. TECHNIQUE: 3 mm noncontrast axial images were obtained through the cervical spine with sagittal and coronal reconstructions. All CT scans at this facility use dose modulation, iterative reconstruction, and/or weight-based dosing when appropriate to reduce radiation dose to as low as reasonably achievable. FINDINGS: The cervical vertebral bodies are normally aligned. There is no fracture. There is no prevertebral soft tissue swelling. There is cervical spondylosis especially at C5-6 and C6-7. At C1-2, there is normal alignment of the dens relative to anterior arch of C1. At C2-3, there is no disc herniation, canal or foraminal stenosis. At C3-4, there is no disc herniation, canal or foraminal stenosis. At C4-5, there is no disc herniation, canal or foraminal stenosis. At C5-6, there is a 1.2 mm ventral extradural defect secondary to bulging disc and uncinate process hypertrophy. This causes mild canal and mild-to-moderate bilateral foraminal stenosis. At C6-7, there is 1.4 mm ventral extradural defect secondary to bulging disc and uncinate process hypertrophy. This causes mild canal, moderate right and mild-to-moderate left-sided foraminal stenosis.  At C7-T1, there is no disc herniation, canal or foraminal stenosis. There is bilateral carotid artery calcification. .  There are no suspicious findings in the lung apices. 1. Cervical spondylosis especially at C5-6 and C6-7. 2. No acute fracture noted. 3. Bilateral carotid artery calcification. 4. Otherwise negative CT scan of the cervical spine. . **This report has been created using voice recognition software. It may contain minor errors which are inherent in voice recognition technology. ** Final report electronically signed by DR Francia Severin on 3/15/2022 12:52 PM          Mari Martinez MD   PGY2, Internal Medicine   3/21/2022

## 2022-03-21 NOTE — PLAN OF CARE
Problem: Falls - Risk of:  Goal: Will remain free from falls  Description: Will remain free from falls  3/21/2022 0235 by Chito Hernandez RN  Outcome: Ongoing  3/20/2022 1757 by Corinne Mary  Outcome: Ongoing  Note: Fall before admission, bed alarm on, up with staff assistance,  socks, orthostatics being monitored  Goal: Absence of physical injury  Description: Absence of physical injury  Outcome: Ongoing     Problem: Pain:  Goal: Pain level will decrease  Description: Pain level will decrease  3/21/2022 0235 by Chito Hernandez RN  Outcome: Ongoing  3/20/2022 1757 by Rolando Ellison  Outcome: Ongoing  Note: Lidocaine patch, Tylenol as needed, heating pad, fall prior to admission  Goal: Control of acute pain  Description: Control of acute pain  Outcome: Ongoing  Goal: Control of chronic pain  Description: Control of chronic pain  Outcome: Ongoing     Problem: Discharge Planning:  Goal: Participates in care planning  Description: Participates in care planning  Outcome: Ongoing  Goal: Discharged to appropriate level of care  Description: Discharged to appropriate level of care  Outcome: Ongoing     Problem: Respiratory:  Goal: Ability to maintain a clear airway will improve  Description: Ability to maintain a clear airway will improve  3/20/2022 1757 by Rolando Ellison  Outcome: Ongoing  Note: History of laryngeal cancer, pills whole in applesauce, modified swallow showed aspiration, mildly thick fluids

## 2022-03-21 NOTE — PROGRESS NOTES
[facility]  OCCUPATIONAL THERAPY MISSED TREATMENT NOTE  [facility] RENAL TELEMETRY 6K  6K-15/015-A      Date: 3/21/2022  Patient Name: Herbert Arguello        CSN: [id number]   : 1960  (57 y.o.)  Gender: male   Referring Practitioner: Rossy Young MD  Diagnosis: orthostatic hypertension         REASON FOR MISSED TREATMENT: Pt declined OT session this PM d/t reported fatigue, will attempt for next scheduled session.

## 2022-03-21 NOTE — PROGRESS NOTES
[facility]  SPEECH THERAPY MISSED TREATMENT NOTE  [facility] RENAL TELEMETRY 6K      Date: 3/21/2022  Patient Name: Janey Gifford        MRN: [id number]    : 1960  (57 y.o.)    REASON FOR MISSED TREATMENT:  ST attempted to see patient this date for completion of skilled dysphagia tx. IMANI Gupta with approval for completion of session; reported good success with current diet level + implementation of strategies, however, attempted medications WHOLE with difficulty and return to medications CRUSHED with improved success. Upon arrival to room, patient sleeping in bed with ST unable to awaken patient for completion of session. ST will re-attempt dysphagia treatment as clinically appropriate.     Kelly Gallegos M.S., [facility]

## 2022-03-21 NOTE — FLOWSHEET NOTE
[facility]  PHYSICAL THERAPY MISSED TREATMENT NOTE  ACUTE CARE  [facility] RENAL TELEMETRY 6K              Missed Treatment  Pt declined session due to pain and fatigue.  Pt reporting he is unable to participate

## 2022-03-22 LAB
GLUCOSE BLD-MCNC: 129 MG/DL (ref 70–108)
GLUCOSE BLD-MCNC: 145 MG/DL (ref 70–108)
GLUCOSE BLD-MCNC: 265 MG/DL (ref 70–108)
GLUCOSE BLD-MCNC: 94 MG/DL (ref 70–108)

## 2022-03-22 PROCEDURE — 97110 THERAPEUTIC EXERCISES: CPT

## 2022-03-22 PROCEDURE — 6370000000 HC RX 637 (ALT 250 FOR IP): Performed by: STUDENT IN AN ORGANIZED HEALTH CARE EDUCATION/TRAINING PROGRAM

## 2022-03-22 PROCEDURE — 1200000003 HC TELEMETRY R&B

## 2022-03-22 PROCEDURE — 6370000000 HC RX 637 (ALT 250 FOR IP): Performed by: PHYSICIAN ASSISTANT

## 2022-03-22 PROCEDURE — 99254 IP/OBS CNSLTJ NEW/EST MOD 60: CPT | Performed by: NUCLEAR MEDICINE

## 2022-03-22 PROCEDURE — 97530 THERAPEUTIC ACTIVITIES: CPT

## 2022-03-22 PROCEDURE — 82948 REAGENT STRIP/BLOOD GLUCOSE: CPT

## 2022-03-22 PROCEDURE — 2580000003 HC RX 258: Performed by: STUDENT IN AN ORGANIZED HEALTH CARE EDUCATION/TRAINING PROGRAM

## 2022-03-22 PROCEDURE — 94760 N-INVAS EAR/PLS OXIMETRY 1: CPT

## 2022-03-22 PROCEDURE — 6360000002 HC RX W HCPCS: Performed by: PHYSICIAN ASSISTANT

## 2022-03-22 RX ADMIN — GEMFIBROZIL 600 MG: 600 TABLET ORAL at 05:59

## 2022-03-22 RX ADMIN — GEMFIBROZIL 600 MG: 600 TABLET ORAL at 15:37

## 2022-03-22 RX ADMIN — PANTOPRAZOLE SODIUM 40 MG: 40 TABLET, DELAYED RELEASE ORAL at 05:59

## 2022-03-22 RX ADMIN — SODIUM CHLORIDE: 9 INJECTION, SOLUTION INTRAVENOUS at 02:48

## 2022-03-22 RX ADMIN — MIDODRINE HYDROCHLORIDE 2.5 MG: 2.5 TABLET ORAL at 08:26

## 2022-03-22 RX ADMIN — ASPIRIN 81 MG: 81 TABLET, COATED ORAL at 08:25

## 2022-03-22 RX ADMIN — INSULIN GLARGINE 40 UNITS: 100 INJECTION, SOLUTION SUBCUTANEOUS at 20:55

## 2022-03-22 RX ADMIN — METOPROLOL TARTRATE 25 MG: 25 TABLET, FILM COATED ORAL at 20:53

## 2022-03-22 RX ADMIN — ENOXAPARIN SODIUM 40 MG: 100 INJECTION SUBCUTANEOUS at 08:25

## 2022-03-22 RX ADMIN — INSULIN LISPRO 3 UNITS: 100 INJECTION, SOLUTION INTRAVENOUS; SUBCUTANEOUS at 20:53

## 2022-03-22 RX ADMIN — ATORVASTATIN CALCIUM 20 MG: 20 TABLET, FILM COATED ORAL at 20:53

## 2022-03-22 RX ADMIN — METOPROLOL TARTRATE 25 MG: 25 TABLET, FILM COATED ORAL at 08:26

## 2022-03-22 RX ADMIN — GABAPENTIN 300 MG: 300 CAPSULE ORAL at 20:53

## 2022-03-22 NOTE — PLAN OF CARE
Problem: Falls - Risk of:  Goal: Will remain free from falls  Description: Will remain free from falls  Outcome: Ongoing  Note: Falling star placed outside of patient's room. 2/4 bed rails up. Non-skid socks provided. Call light and personal items with in reach. Bed alarm on. Problem: Pain:  Goal: Pain level will decrease  Description: Pain level will decrease  Outcome: Ongoing  Note: Patient rates pain on 0-10 pain scale. States pain is a 0 on 0-10 scale. States goal is 0. Repositioned for comfort. Will continue to reassess. Problem: Discharge Planning:  Goal: Discharged to appropriate level of care  Description: Discharged to appropriate level of care  Outcome: Ongoing  Note: Patient plans to be discharged to home when medically stable. Problem: Skin Integrity - Impaired:  Goal: Absence of new skin breakdown  Description: Absence of new skin breakdown  Outcome: Ongoing  Note: No new skin breakdown noted at this time. Will continue to reassess. Patient turns and repositions self in bed frequently. Problem: Nutrition  Goal: Optimal nutrition therapy  Outcome: Ongoing  Note: Patient on regular carb control diet with thickened liquids. Tolerating okay. Poor appetite. Problem: Respiratory:  Goal: Ability to maintain a clear airway will improve  Description: Ability to maintain a clear airway will improve  Outcome: Ongoing  Note:   Vitals:    03/22/22 1530   BP: (!) 163/82   Pulse: 70   Resp: 18   Temp: 97.5 °F (36.4 °C)   SpO2: 94%   Will continue to reassess. Care plan reviewed with patient. No concerns voiced.

## 2022-03-22 NOTE — CONSULTS
[address] [place] [address]                                  CONSULTATION    PATIENT NAME: Fernanda Najera                      :        1960  MED REC NO:   [id number]                           ROOM:       [id number]  ACCOUNT NO:   [de-identified]                           ADMIT DATE: 03/15/2022  PROVIDER:     MICHELLE Roche Hillman:  2022    CARDIOLOGY CONSULTATION    REASON FOR CONSULTATION:  Dizziness and orthostatic hypotension. REQUESTING PROVIDER:  Hospitalist Service. HISTORY OF PRESENT ILLNESS:  This is a pleasant 77-year-old gentleman  who apparently has had diabetes for several years without any obvious  underlying coronary artery disease reported at least by the patient. He  has had recurrent symptoms of dizziness and lightheadedness, has been  dealing with orthostatic hypotension. We were consulted to assist in  the management of the patient. The patient denies any knowledge of any  significant coronary artery disease, has not had any obvious chest  discomfort. He has been treated for his hypertension and diabetes as  well as hyperlipidemia. The patient has had orthostatic hypotension and  dizziness since he has been here and has been treated with low-dose  midodrine with some improvement. REVIEW OF SYSTEMS:  No obvious fever or chills. No hematuria or  dysuria. No abdominal pain, nausea, vomiting, diarrhea. No obvious  active psych problems or suicidal ideation. No skin rashes. The  patient has had recurrent dizziness, lightheadedness and near syncope. No recent trauma. No bleeding problem. No HEENT-related problem. PAST MEDICAL HISTORY:  1. Hypertension. 2.  Diabetes. 3.  Hyperlipidemia. ALLERGIES:  LISINOPRIL. CURRENT MEDICATIONS:  ProAmatine 2.5 three times a day, Lipitor 20 a  day, Neurontin, Lopid 600 twice a day, insulin, Lipitor 20 a day.     SOCIAL HISTORY:  History of smoking. No alcohol or drug abuse reported. FAMILY HISTORY:  Significant for coronary artery disease. PHYSICAL EXAMINATION:  VITAL SIGNS:  Showed a blood pressure of 110/70, heart of 70. GENERAL APPEARANCE:  Pleasant gentleman, in no obvious acute distress. EYES AND EARS:  No discharge. NECK:  No JVD. No bruits. No masses. LUNGS:  Decreased air entry. No crackles or wheezes. HEART:  Normal S1, S2. Systolic murmur grade 2/6. ABDOMEN:  Soft, nontender. Positive bowel sounds. No organomegaly. EXTREMITIES:  Mild edema. NEUROLOGIC:  Grossly intact. Awake, alert. No focal deficits. PSYCH:  No evidence of active psychosis. SKIN:  No rashes. LABORATORY DATA:  Showed sodium 137, potassium 4.2, BUN 10, creatinine  0.5. White count 6.5, hemoglobin 11, hematocrit 35.6, platelets 327. IMPRESSION:  This is a gentleman who comes in with the above  presentation. 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 Treatment is going to be  as follows:  1. Encourage support hose. 2.  Low dose ProAmatine and increase as needed for blood pressure. 3.  We will obtain noninvasive ischemic cardiac workup to evaluate  underlying ischemia. 4.  We will continue with medical therapy and follow the patient  accordingly. Thank you for allowing me to participate in the care of this patient.         Jaky Matos M.D.    D: 03/22/2022 15:58:42       T: 03/22/2022 16:01:22     [email or web address]  Job#: [id number]     Doc#: [id number]    CC:

## 2022-03-22 NOTE — CARE COORDINATION
Discharge Planning Update:    Remains orthostatic. Midodrine initiated. Hopeful for discharge to home tomorrow. Plans home with wife, denies needs.   Electronically signed by Sean Bhardwaj RN on 3/22/2022 at 4:51 PM

## 2022-03-22 NOTE — PROGRESS NOTES
Orthostatic blood pressures taken this morning, positive. Pt asymptomatic, denies feeling dizzy or light headed.

## 2022-03-22 NOTE — PLAN OF CARE
Problem: Falls - Risk of:  Goal: Will remain free from falls  Description: Will remain free from falls  3/22/2022 0031 by Cleveland Bright RN  Outcome: Ongoing    Problem: Pain:  Goal: Pain level will decrease  Description: Pain level will decrease  3/22/2022 0031 by Cleveland Bright RN  Outcome: Ongoing  3/21/2022 1851 by Luz Pandya  Outcome: Ongoing    Problem: Discharge Planning:  Goal: Participates in care planning  Description: Participates in care planning  Outcome: Ongoing  Goal: Discharged to appropriate level of care  Description: Discharged to appropriate level of care  Outcome: Ongoing     Problem: Skin Integrity - Impaired:  Goal: Will show no infection signs and symptoms  Description: Will show no infection signs and symptoms  Outcome: Ongoing  Goal: Absence of new skin breakdown  Description: Absence of new skin breakdown  Outcome: Ongoing     Problem: Nutrition  Goal: Optimal nutrition therapy  Outcome: Ongoing     Problem: Respiratory:  Goal: Ability to maintain a clear airway will improve  Description: Ability to maintain a clear airway will improve  3/22/2022 0031 by Cleveland Bright RN  Outcome: Ongoing  3/21/2022 1851 by Phil Ellison  Outcome: Ongoing    Problem: Fluid Volume:  Goal: Ability to achieve a balanced intake and output will improve  Description: Ability to achieve a balanced intake and output will improve  3/22/2022 0031 by Cleveland Bright RN  Outcome: Ongoing  3/21/2022 1851 by Phil Ellison  Outcome: Ongoing  Note: History of laryngeal cancer, IV fluids, encourage fluid intake even though painful with swallowing, mildly thick fluids     Problem: Fluid Volume:  Goal: Ability to achieve a balanced intake and output will improve  Description: Ability to achieve a balanced intake and output will improve  3/22/2022 0031 by Cleveland Bright RN  Outcome: Ongoing     Goal: Control of acute pain  Description: Control of acute pain  Outcome: Ongoing  Goal: Control of chronic pain  Description: Control of chronic pain  Outcome: Ongoing     Goal: Absence of physical injury  Description: Absence of physical injury  Outcome: Ongoing

## 2022-03-22 NOTE — PROGRESS NOTES
Hospitalist Progress Note      Patient:  Simona José    Unit/Bed:6K-15/015-A  YOB: 1960  MRN: [id number]   Acct: [de-identified]   PCP: KATHRYN Barcenas NP  Date of Admission: 3/15/2022    Assessment/Plan:  Syncope: due to orthostatic hypotension. Persistently orthostatics positive. Initially thought to be due to dehydration in the setting of dysphagia/odynophafis and reduced PO intake. S/p IVF therapy including multiple boluses, continues to be orthostatic. Unclear etiology- patient has no history of known causes of postural hypotension. ECHO EF 65% with grade 1 DD. No known history of arrhythmias but will consider a Cardiac monitor on discharge.    -On modified dysphagia diet per [facility] 3/17. On mIVF. Started on low dose midodrine and lopressor 3/21. Fall precautions. Pending Cardiology consult. Essential HTN: controlled. Discontinue Norvasc (may cause orthostatic hypotension) and HCTZ. Lopressor as above. IDDMII: HbA1c 8.3 on 3/16/2022. On Lantus 47K nightly, Trulicity, and Amaryl. PO meds held at this time. On Lantus 40 u nightly with SSI. Monitor BG with inpatient goal 140-180. 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 associated dysphagia and mild odynophagia present. Follows with Dr. Chalino Mathews. On modified diet. To follow up outpatient. Tele:   [x] yes             [] no    Code Status: Full Code    Fluids:  IVNS at 76 cc/hr  Diet:  ADULT DIET; Regular; 5 carb choices (75 gm/meal); Mildly Thick (Nectar);  No Drinking Straws  ADULT ORAL NUTRITION SUPPLEMENT; Breakfast, Lunch, Dinner; Frozen Oral Supplement    DVT prophylaxis: [x] Lovenox                                 [] SCDs                                 [] SQ Heparin                                 [] Encourage ambulation                                 [] Already on Anticoagulation    Disposition:      [] TBD [x] Home                             [] TCU                             [] Rehab                             [] Psych                             [] SNF                             [] [facility]                             [] Other-    Chief Complaint: loss of consciousness    Hospital Course:   56 yo M with history of Keratinizing SCC of larynx (diagnosed 08/2021), HTN, T2DM, Arthritis presented after an episode of syncope while going home from work due to associated dizziness/lightheadedness the day of presentation. Had associated trauma to right chest area from fall. ED course: Presented afebrile and hemodynamically stable. Labs generally unremarkable. WBC 17. 4. XR ribs showed slight irregularity involving the left 6th, 7th, and 8th ribs laterally and right 7th rib laterally. No pneumothorax. CT head wo contrast with inflammatory changes involving the left frontal sinus, ethmoid, and maxillary sinuses bilaterally, slight deformity of the right maxillary sinus posterolaterally. CT cervical spondylosis especially at C5-6 and C6-7. Admitted for further work up and management. Of note, has had issues with persistent cough and swallowing since radiation and lost his voice. Has not been able to take PO diabetic meds due to dysphagia so has had suboptimal control of BG. Has no known history of arrhythmias. No known cardiac history. Interval:   No acute events overnight. Continues to remain orthostatic positive this AM.    Subjective:   Reports feeling weak. No dizziness today on standing up. Working with PTOT. ROS (12 point review of systems completed. Pertinent positives noted.  Otherwise ROS is negative)     Medications:  Reviewed    Infusion Medications    sodium chloride 75 mL/hr at 03/22/22 0248    sodium chloride      dextrose      dextrose      Or    dextrose       Scheduled Medications    midodrine  2.5 mg Oral TID WC    metoprolol tartrate  25 mg Oral BID    insulin glargine  40 Units SubCUTAneous Nightly    lidocaine  1 patch TransDERmal Daily    aspirin  81 mg Oral Daily    atorvastatin  20 mg Oral Nightly    gabapentin  300 mg Oral TID    gemfibrozil  600 mg Oral BID AC    pantoprazole  40 mg Oral QAM AC    sodium chloride flush  10 mL IntraVENous 2 times per day    enoxaparin  40 mg SubCUTAneous Daily    insulin lispro  0-12 Units SubCUTAneous TID WC    insulin lispro  0-6 Units SubCUTAneous Nightly     PRN Meds: ibuprofen, melatonin, menthol, [Held by provider] prochlorperazine, sodium chloride flush, sodium chloride, ondansetron **OR** ondansetron, polyethylene glycol, acetaminophen **OR** acetaminophen, potassium chloride **OR** potassium alternative oral replacement **OR** potassium chloride, magnesium sulfate, glucagon (rDNA), dextrose, glucose, dextrose **OR** dextrose      Intake/Output Summary (Last 24 hours) at 3/22/2022 1549  Last data filed at 3/22/2022 1530  Gross per 24 hour   Intake 876 ml   Output 1500 ml   Net -624 ml         Exam:  BP (!) 163/82   Pulse 70   Temp 97.5 °F (36.4 °C) (Oral)   Resp 18   Ht 5' 11\" (1.803 m)   Wt 153 lb 14.4 oz (69.8 kg)   SpO2 94%   BMI 21.46 kg/m²   General appearance: No apparent distress, appears stated age and cooperative. Thin appearing. Acutely ill appearing- appears better. HEENT: Conjunctivae/corneas clear. Neck: Supple, with full range of motion. No jugular venous distention. Respiratory: Normal respiratory effort. Clear to auscultation, bilaterally without Rales/Wheezes/Rhonchi. Cardiovascular: Regular rate and rhythm with normal S1/S2 without murmurs, rubs or gallops. Abdomen: Soft, non-tender, non-distended with normal bowel sounds. Musculoskeletal: passive and active ROM x 4 extremities. Skin: Skin color, texture, turgor normal.  No rashes or lesions. Neurologic: Neurovascularly intact without any focal sensory/motor deficits.  Cranial nerves: II-XII intact, grossly non-focal.  Psychiatric: Alert and oriented, thought content appropriate, normal insight  Capillary Refill: Brisk,< 3 seconds   Peripheral Pulses: +2 palpable, equal bilaterally     Labs:   No results for input(s): WBC, HGB, HCT, PLT in the last 72 hours. No results for input(s): NA, K, CL, CO2, BUN, CREATININE, CALCIUM, PHOS in the last 72 hours. Invalid input(s): MAGNES  No results for input(s): AST, ALT, BILIDIR, BILITOT, ALKPHOS in the last 72 hours. No results for input(s): INR in the last 72 hours. No results for input(s): [place]man in the last 72 hours. Microbiology:    Blood culture #1: No results found for: BC    Blood culture #2:No results found for: Oz Abbot    Organism:No results found for: ORG    No results found for: LABGRAM    MRSA culture only:No results found for: [facility]    Urine culture:   Lab Results   Component Value Date    LABURIN 100 mg 09/23/2014       Respiratory culture: No results found for: CULTRESP    Aerobic and Anaerobic :  No results found for: LABAERO  No results found for: LABANAE    Urinalysis:      Lab Results   Component Value Date    NITRU NEGATIVE 05/28/2015    WBCUA 0-2 05/28/2015    BACTERIA NONE 05/28/2015    RBCUA 0-2 05/28/2015    BLOODU NEGATIVE 05/28/2015    GLUCOSEU >= 1000 05/28/2015       Radiology:  FL MODIFIED BARIUM SWALLOW W VIDEO   Final Result   1. Laryngeal penetration of honey thick, nectar thick, thin, soft and solid barium with aspiration of honey thick and thin barium. 2. Additional recommendations from the speech therapist will follow. **This report has been created using voice recognition software. It may contain minor errors which are inherent in voice recognition technology. **         Final report electronically signed by Dr. Kayla Faith on 3/17/2022 10:51 AM      CT Head WO Contrast   Final Result      1. Mild atrophy.    2. Inflammatory changes involving the left frontal sinus, ethmoid and maxillary sinuses bilaterally. 3. Slight deformity of the right maxillary sinus posterolaterally. 4. Otherwise negative noncontrast CT scan of the brain. **This report has been created using voice recognition software. It may contain minor errors which are inherent in voice recognition technology. **      Final report electronically signed by DR Leslie Lowry on 3/15/2022 12:47 PM      CT Cervical Spine WO Contrast   Final Result       1. Cervical spondylosis especially at C5-6 and C6-7.   2. No acute fracture noted. 3. Bilateral carotid artery calcification. 4. Otherwise negative CT scan of the cervical spine. .               **This report has been created using voice recognition software. It may contain minor errors which are inherent in voice recognition technology. **      Final report electronically signed by DR Leslie Lowry on 3/15/2022 12:52 PM      XR RIBS BILATERAL (MIN 4 VIEWS)   Final Result       1. Slight irregularity involving the left sixth, seventh and eighth ribs laterally and right seventh rib laterally, please correlate clinically. 2. The remaining ribs are intact. 3. There is no pneumothorax. 4. There are small calcified granulomata in the right lower lobe and left upper lobe. .               **This report has been created using voice recognition software. It may contain minor errors which are inherent in voice recognition technology. **      Final report electronically signed by DR Leslie Lowry on 3/15/2022 12:35 PM        XR RIBS BILATERAL (MIN 4 VIEWS)    Result Date: 3/15/2022  PROCEDURE: XR RIBS BILATERAL (MIN 4 VIEWS) CLINICAL INFORMATION: Fall, rib pain. COMPARISON: Chest x-ray dated 14 August 2018. Meryle Sandman TECHNIQUE: Two views of the bilateral ribs were obtained. A PA upright view of the chest was also obtained. FINDINGS: There is slight irregularity involving the right seventh rib laterally, left sixth, seventh and eighth ribs laterally laterally, please correlate clinically.  The remaining ribs are intact. There is no displaced fracture. There is no pneumothorax. The heart size is normal.  The mediastinum is not widened. There are no pulmonary infiltrates or effusions. There are small calcified granulomata in the right lower lobe and left upper lobe. The pulmonary vascularity is normal.  There is thoracic spondylosis. .      1. Slight irregularity involving the left sixth, seventh and eighth ribs laterally and right seventh rib laterally, please correlate clinically. 2. The remaining ribs are intact. 3. There is no pneumothorax. 4. There are small calcified granulomata in the right lower lobe and left upper lobe. . **This report has been created using voice recognition software. It may contain minor errors which are inherent in voice recognition technology. ** Final report electronically signed by DR Yen Buckley on 3/15/2022 12:35 PM    CT Head WO Contrast    Result Date: 3/15/2022  PROCEDURE: CT HEAD WO CONTRAST CLINICAL INFORMATION: fall. COMPARISON: No prior study. TECHNIQUE: Noncontrast 5 mm axial images were obtained through the brain. Sagittal and coronal reconstructions were obtained. All CT scans at this facility use dose modulation, iterative reconstruction, and/or weight-based dosing when appropriate to reduce radiation dose to as low as reasonably achievable. FINDINGS: There is mild atrophy There is no hemorrhage. There are no intra-or extra-axial collections. There is no hydrocephalus, midline shift or mass effect. The gray-white matter differentiation is preserved. There are inflammatory changes involving the left frontal sinus, ethmoid and maxillary sinuses bilaterally. There is slight deformity of the right maxillary sinus posterolaterally. The remaining paranasal sinuses and mastoid air cells are normally aerated. There is no suspicious calvarial abnormality. 1. Mild atrophy. 2. Inflammatory changes involving the left frontal sinus, ethmoid and maxillary sinuses bilaterally.  3. Slight deformity of the right maxillary sinus posterolaterally. 4. Otherwise negative noncontrast CT scan of the brain. **This report has been created using voice recognition software. It may contain minor errors which are inherent in voice recognition technology. ** Final report electronically signed by DR Manisha Henriquez on 3/15/2022 12:47 PM    CT Cervical Spine WO Contrast    Result Date: 3/15/2022  PROCEDURE: CT CERVICAL SPINE WO CONTRAST CLINICAL INFORMATION: fall. COMPARISON: No prior study. TECHNIQUE: 3 mm noncontrast axial images were obtained through the cervical spine with sagittal and coronal reconstructions. All CT scans at this facility use dose modulation, iterative reconstruction, and/or weight-based dosing when appropriate to reduce radiation dose to as low as reasonably achievable. FINDINGS: The cervical vertebral bodies are normally aligned. There is no fracture. There is no prevertebral soft tissue swelling. There is cervical spondylosis especially at C5-6 and C6-7. At C1-2, there is normal alignment of the dens relative to anterior arch of C1. At C2-3, there is no disc herniation, canal or foraminal stenosis. At C3-4, there is no disc herniation, canal or foraminal stenosis. At C4-5, there is no disc herniation, canal or foraminal stenosis. At C5-6, there is a 1.2 mm ventral extradural defect secondary to bulging disc and uncinate process hypertrophy. This causes mild canal and mild-to-moderate bilateral foraminal stenosis. At C6-7, there is 1.4 mm ventral extradural defect secondary to bulging disc and uncinate process hypertrophy. This causes mild canal, moderate right and mild-to-moderate left-sided foraminal stenosis. At C7-T1, there is no disc herniation, canal or foraminal stenosis. There is bilateral carotid artery calcification. .  There are no suspicious findings in the lung apices. 1. Cervical spondylosis especially at C5-6 and C6-7. 2. No acute fracture noted.  3. Bilateral carotid artery calcification. 4. Otherwise negative CT scan of the cervical spine. . **This report has been created using voice recognition software. It may contain minor errors which are inherent in voice recognition technology. ** Final report electronically signed by DR Terra Pierce on 3/15/2022 12:52 PM          Willem Heath MD   PGY2, Internal Medicine   3/22/2022

## 2022-03-22 NOTE — FLOWSHEET NOTE
[address] [address]  PHYSICAL THERAPY MISSED TREATMENT NOTE  ACUTE CARE  [facility] RENAL TELEMETRY 6K              Missed Treatment  Pt refused session despite encouragement due to \"I don't feel like it\" pt states he did OT earlier and his blood pressure can't tolerate 2x a day.

## 2022-03-22 NOTE — PROGRESS NOTES
[address] RENAL TELEMETRY 6K  Occupational Therapy  Daily Note  Time:   Time In: 0900  Time Out: 930  Timed Code Treatment Minutes: 30 Minutes  Minutes: 30          Date: 3/22/2022  Patient Name: Claudia Simon,   Gender: male      Room: [facility]15/015-A  MRN: [id number]  : 1960  (57 y.o.)  Referring Practitioner: Pete Mota MD  Diagnosis: orthostatic hypertension       Restrictions/Precautions:  Restrictions/Precautions: Fall Risk,General Precautions  Position Activity Restriction  Other position/activity restrictions: monitor ortho BP and dizziness, hx laryngeal cancer     SUBJECTIVE: Pt. RN okayed OT treatment, and noted orthostatic BP's were positive this AM.  Pt. In room supine in bed upon entry. Pt. Pleasant and agreeable to participate. Pt. Provided lengthy detail of past medical history. PAIN: rib discomfort 4/10     Vitals: Orthostatic Blood Pressure: Supine: 134/72, Sittin/72, Standin/64-asymptomatic    COGNITION: Decreased Insight and Decreased Problem Solving    ADL:   Grooming: Stand By Assistance. to wash face and complete oral care. BALANCE:  Sitting Balance:  Stand By Assistance. while seated on EOB  Standing Balance: Contact Guard Assistance. BED MOBILITY:  Supine to Sit: Stand By Assistance    Sit to Supine: Stand By Assistance    Scooting: Stand By Assistance      TRANSFERS:  Sit to Stand:  [employer]. Stand to Sit: Contact Guard Assistance. FUNCTIONAL MOBILITY:  Pt. Declined due to fatigue and low BP's    ADDITIONAL ACTIVITIES:  Patient completed BUE strengthening exercises with skilled education on HEP: completed s34mial x1 set with AROM in all joints and all planes in order to improve UE strength and activity tolerance required for BADL routine and toilet / shower transfers. Patient tolerated , requiring  rest breaks. Patient also required verbal and visual cues for technique.        ASSESSMENT:     Activity Tolerance:

## 2022-03-23 ENCOUNTER — APPOINTMENT (OUTPATIENT)
Dept: NON INVASIVE DIAGNOSTICS | Age: 62
DRG: 312 | End: 2022-03-23
Payer: OTHER GOVERNMENT

## 2022-03-23 LAB
GLUCOSE BLD-MCNC: 144 MG/DL (ref 70–108)
GLUCOSE BLD-MCNC: 279 MG/DL (ref 70–108)
GLUCOSE BLD-MCNC: 65 MG/DL (ref 70–108)
GLUCOSE BLD-MCNC: 90 MG/DL (ref 70–108)

## 2022-03-23 PROCEDURE — 82948 REAGENT STRIP/BLOOD GLUCOSE: CPT

## 2022-03-23 PROCEDURE — 99232 SBSQ HOSP IP/OBS MODERATE 35: CPT | Performed by: NURSE PRACTITIONER

## 2022-03-23 PROCEDURE — 97116 GAIT TRAINING THERAPY: CPT

## 2022-03-23 PROCEDURE — 6370000000 HC RX 637 (ALT 250 FOR IP): Performed by: NURSE PRACTITIONER

## 2022-03-23 PROCEDURE — 6370000000 HC RX 637 (ALT 250 FOR IP): Performed by: STUDENT IN AN ORGANIZED HEALTH CARE EDUCATION/TRAINING PROGRAM

## 2022-03-23 PROCEDURE — A9500 TC99M SESTAMIBI: HCPCS | Performed by: INTERNAL MEDICINE

## 2022-03-23 PROCEDURE — 6360000002 HC RX W HCPCS: Performed by: PHYSICIAN ASSISTANT

## 2022-03-23 PROCEDURE — 97110 THERAPEUTIC EXERCISES: CPT

## 2022-03-23 PROCEDURE — 2580000003 HC RX 258: Performed by: STUDENT IN AN ORGANIZED HEALTH CARE EDUCATION/TRAINING PROGRAM

## 2022-03-23 PROCEDURE — 6360000002 HC RX W HCPCS

## 2022-03-23 PROCEDURE — 6370000000 HC RX 637 (ALT 250 FOR IP): Performed by: PHYSICIAN ASSISTANT

## 2022-03-23 PROCEDURE — 78452 HT MUSCLE IMAGE SPECT MULT: CPT

## 2022-03-23 PROCEDURE — 1200000003 HC TELEMETRY R&B

## 2022-03-23 PROCEDURE — 3430000000 HC RX DIAGNOSTIC RADIOPHARMACEUTICAL: Performed by: INTERNAL MEDICINE

## 2022-03-23 PROCEDURE — 99233 SBSQ HOSP IP/OBS HIGH 50: CPT | Performed by: INTERNAL MEDICINE

## 2022-03-23 PROCEDURE — 93017 CV STRESS TEST TRACING ONLY: CPT

## 2022-03-23 PROCEDURE — 97530 THERAPEUTIC ACTIVITIES: CPT

## 2022-03-23 RX ORDER — MIDODRINE HYDROCHLORIDE 5 MG/1
5 TABLET ORAL
Status: DISCONTINUED | OUTPATIENT
Start: 2022-03-23 | End: 2022-03-27

## 2022-03-23 RX ADMIN — Medication 8.7 MILLICURIE: at 10:50

## 2022-03-23 RX ADMIN — INSULIN GLARGINE 40 UNITS: 100 INJECTION, SOLUTION SUBCUTANEOUS at 20:39

## 2022-03-23 RX ADMIN — METOPROLOL TARTRATE 25 MG: 25 TABLET, FILM COATED ORAL at 20:24

## 2022-03-23 RX ADMIN — GABAPENTIN 300 MG: 300 CAPSULE ORAL at 20:34

## 2022-03-23 RX ADMIN — SODIUM CHLORIDE: 9 INJECTION, SOLUTION INTRAVENOUS at 18:09

## 2022-03-23 RX ADMIN — Medication 28.7 MILLICURIE: at 11:38

## 2022-03-23 RX ADMIN — ATORVASTATIN CALCIUM 20 MG: 20 TABLET, FILM COATED ORAL at 20:32

## 2022-03-23 RX ADMIN — INSULIN LISPRO 2 UNITS: 100 INJECTION, SOLUTION INTRAVENOUS; SUBCUTANEOUS at 20:38

## 2022-03-23 ASSESSMENT — PAIN SCALES - GENERAL
PAINLEVEL_OUTOF10: 0

## 2022-03-23 NOTE — PROGRESS NOTES
Cardiology Progress Note      Patient:  Robert Sanches  YOB: 1960  MRN: [id number]   Acct: [de-identified]  Admit Date:  3/15/2022  Primary Cardiologist: none  Rounding cardiologist:  Alona Jean Baptiste MD    Rationale for Cardiology consult: Dizziness and orthostatic hypotension. HPI/PMH: Consult as per Dr. Linda Myers 3/22/22: This is a pleasant 78-year-old gentleman  who apparently has had diabetes for several years without any obvious  underlying coronary artery disease reported at least by the patient. He  has had recurrent symptoms of dizziness and lightheadedness, has been dealing with orthostatic hypotension. We were consulted to assist in the management of the patient. The patient denies any knowledge of any significant coronary artery disease, has not had any obvious chest discomfort. He has been treated for his hypertension and diabetes as well as hyperlipidemia. The patient has had orthostatic hypotension and dizziness since he has been here and has been treated with low-dose midodrine with some improvement. PMH: HTN, DM, HLD. Chief Complaint: \"Doing ok\"    Subjective (Events in last 24 hours): Stable OVN  Denies chest discomfort or dyspnea  Tolerated stress test without issues  Orthostatics (+) this AM but improved; still with some dizziness upon standing. Objective:   BP (!) 142/70   Pulse 74   Temp 97.9 °F (36.6 °C) (Oral)   Resp 16   Ht 5' 11\" (1.803 m)   Wt 153 lb 14.4 oz (69.8 kg)   SpO2 97%   BMI 21.46 kg/m²        TELEMETRY: SR 70's     Physical Exam:  General Appearance: alert and oriented to person, place and time, in no acute distress, frequent coughing and clearing of throat due to throat CA  Cardiovascular: normal rate, regular rhythm, normal S1 and S2, no murmurs, rubs, clicks, or gallops.  Distal pulses intact, no JVD  Pulmonary/Chest: clear to auscultation bilaterally- no wheezes, rales or rhonchi, normal air movement, no respiratory distress, RA  Abdomen: soft, non-tender, non-distended, normal bowel sounds, no masses  Extremities: no cyanosis or edema, pulses 2+   Skin: warm and dry, no rash or erythema  Head: normocephalic and atraumatic  Eyes: pupils equal, round, and reactive to light  Neck: supple, fullness over voicebox; voice hoarse; speaks in whisper due to known throat CA under treatment   Musculoskeletal: normal range of motion, no joint swelling, deformity or tenderness  Neurological: alert, oriented, normal speech, no focal findings or movement disorder noted    Medications:    midodrine  2.5 mg Oral TID WC    metoprolol tartrate  25 mg Oral BID    insulin glargine  40 Units SubCUTAneous Nightly    lidocaine  1 patch TransDERmal Daily    aspirin  81 mg Oral Daily    atorvastatin  20 mg Oral Nightly    gabapentin  300 mg Oral TID    gemfibrozil  600 mg Oral BID AC    pantoprazole  40 mg Oral QAM AC    sodium chloride flush  10 mL IntraVENous 2 times per day    enoxaparin  40 mg SubCUTAneous Daily    insulin lispro  0-12 Units SubCUTAneous TID WC    insulin lispro  0-6 Units SubCUTAneous Nightly      sodium chloride 75 mL/hr at 03/22/22 0248    sodium chloride      dextrose      dextrose      Or    dextrose       regadenoson, 0.4 mg, ONCE PRN  ibuprofen, 400 mg, Q4H PRN  melatonin, 4.5 mg, Nightly PRN  menthol, 1 lozenge, Q2H PRN  [Held by provider] prochlorperazine, 10 mg, Q6H PRN  sodium chloride flush, 10 mL, PRN  sodium chloride, 25 mL, PRN  ondansetron, 4 mg, Q8H PRN   Or  ondansetron, 4 mg, Q6H PRN  polyethylene glycol, 17 g, Daily PRN  acetaminophen, 650 mg, Q6H PRN   Or  acetaminophen, 650 mg, Q6H PRN  potassium chloride, 40 mEq, PRN   Or  potassium alternative oral replacement, 40 mEq, PRN   Or  potassium chloride, 10 mEq, PRN  magnesium sulfate, 2,000 mg, PRN  glucagon (rDNA), 1 mg, PRN  dextrose, 100 mL/hr, PRN  glucose, 4 tablet, PRN  dextrose, 125 mL, PRN   Or  dextrose, 250 mL, PRN      Diagnostics:  EKG:  3/15/22  EKG Syncope  Order: [id number]   Status: Final result     Visible to patient: Yes (seen)     Next appt: 04/25/2022 at 10:30 AM in Radiology (STR PET RM 1)     0 Result Notes    Component Ref Range & Units 3/15/22 1105 8/14/13 0159   Ventricular Rate BPM 80  71    Atrial Rate BPM 80  71    P-R Interval ms 160  180    QRS Duration ms 100  108    Q-T Interval ms 420  396    QTc Calculation (Bazett) ms 484  430    P [place] degrees 54  68    R Axis degrees 56  82    T Axis degrees 75  72    Resulting Agency  [facility] MUSE [facility] MUSE             Narrative & Impression    Normal sinus rhythm  Prolonged QT interval or tu fusion, consider myocardial disease, electrolyte imbalance, or drug effects  Abnormal ECG  When compared with ECG of 14-AUG-2013 01:59,  QT has lengthened  Confirmed by Priyanka Agudelo MD, Autumn Felton ([id number]) on 3/15/2022 8:27:58 PM               Echo: 3/18/22  TTE procedure:ECHOCARDIOGRAM COMPLETE 2D W DOPPLER W COLOR. Procedure Date  Date: 03/18/2022 Start: 06:18 AM     Study Location: Bedside  Technical Quality: Adequate visualization     Indications:Syncope and Hypotension.     Additional Medical History:gastroesophageal reflux disease, diabetes,  hyperlipidemia, hypertension     Patient Status: Routine     Height: 71 inches Weight: 155 pounds BSA: 1.89 m^2 BMI: 21.62 kg/m^2     BP: 161/83 mmHg      Conclusions      Summary   Left ventricle size is normal.   Mildly increased left ventricle wall thickness. Systolic function was normal.   There were no regional wall motion abnormalities. Ejection fraction is visually estimated at 65%. Doppler parameters were consistent with abnormal left ventricular   relaxation (grade 1 diastolic dysfunction).     Signature    ---------------------------------------------------------------   Electronically signed by Avel Lora MD (Interpreting   physician) on 03/18/2022 at 08:42 PM   ----------------------------------------------------------------      Findings      Mitral Valve   The mitral valve structure was normal with normal leaflet separation. DOPPLER: The transmitral velocity was within the normal range with no   evidence for mitral stenosis. There was no evidence of mitral   regurgitation. Aortic Valve   The aortic valve was trileaflet with normal thickness and cuspal   separation. DOPPLER: Transaortic velocity was within the normal range with   no evidence of aortic stenosis. There was no evidence of aortic   regurgitation. Tricuspid Valve   The tricuspid valve structure was normal with normal leaflet separation. DOPPLER: There was no evidence of tricuspid stenosis. Trivial tricuspid   regurgitation visualized. Pulmonic Valve   The pulmonic valve leaflets exhibited normal thickness, no calcification,   and normal cuspal separation. DOPPLER: The transpulmonic velocity was   within the normal range with no evidence for regurgitation. Left Atrium   Left atrial size was normal.      Left Ventricle   Left ventricle size is normal.   Mildly increased left ventricle wall thickness. Systolic function was normal.   There were no regional wall motion abnormalities. Ejection fraction is visually estimated at 65%. Doppler parameters were consistent with abnormal left ventricular   relaxation (grade 1 diastolic dysfunction). Right Atrium   Right atrial size was normal.      Right Ventricle   The right ventricular size was normal with normal systolic function and   wall thickness. Pericardial Effusion   The pericardium was normal in appearance with no evidence of a pericardial   effusion. Pleural Effusion   No evidence of pleural effusion. Aorta / Great Vessels   -Aortic root dimension within normal limits.   -The Pulmonary artery is within normal limits. -IVC size is within normal limits with normal respiratory phasic changes.      M-Mode/2D Measurements & Calculations      LV Diastolic    LV Systolic Dimension:    AV Cusp Separation: 2 cmLA   Dimension: 4.6  3.2 cm                    Dimension: 3 cmAO Root   cm              LV Volume Diastolic: 87.0 Dimension: 3.3 cmLA Area: 15.9   LV FS:30.4 %    ml                        cm^2   LV PW           LV Volume Systolic: 41 ml   Diastolic: 1.6  LV EDV/LV EDV Index: 97.3   cm              ml/51 m^2LV ESV/LV ESV   Septum          Index: 41 ml/22 m^2       RV Diastolic Dimension: 2.5 cm   Diastolic: 1.3  EF Calculated: 57.9 %   cm                                        LA/Aorta: 0.91                                             Ascending Aorta: 3.4 cm                                             LA volume/Index: 42 ml /22m^2     Doppler Measurements & Calculations      MV Peak E-Wave: 56.3 cm/s AV Peak Velocity: 131 LVOT Peak Velocity: 84.4   MV Peak A-Wave: 82.5 cm/s cm/s                  cm/s   MV E/A Ratio: 0.68        AV Peak Gradient:     LVOT Peak Gradient: 3 mmHg   MV Peak Gradient: 1.27    6.86 mmHg   mmHg                                            TV Peak E-Wave: 42.2 cm/s                                                   TV Peak A-Wave: 48 cm/s   MV Deceleration Time: 289   msec                                            TV Peak Gradient: 0.71                             IVRT: 120 msec        mmHg                                                   TR Velocity:270 cm/s   MV E' Septal Velocity:                          TR Gradient:29.16 mmHg   4.9 cm/s                  AV DVI (Vmax):0.64    PV Peak Velocity: 82 cm/s   MV A' Septal Velocity:                          PV Peak Gradient: 2.69   9.8 cm/s                                        mmHg   MV E' Lateral Velocity:   8.2 cm/s   MV A' Lateral Velocity:   8.4 cm/s   E/E' septal: 11.49   E/E' lateral: 6.87     [id number]      Stress: 3/23/22:     Summary   This Nuclear Medicine study was negative for ischemia .    borderline low EF    Recommendation   Medical management. Signatures    ----------------------------------------------------------------   Electronically signed by Tiffanie Nicole MD (Interpreting   Cardiologist) on 03/23/2022 at 16:14    Left Heart Cath: not performed    Lab Data:    Cardiac Enzymes:  No results for input(s): CKTOTAL, CKMB, CKMBINDEX, TROPONINI in the last 72 hours.     CBC:   Lab Results   Component Value Date    WBC 6.5 03/19/2022    RBC 4.19 03/19/2022    HGB 11.0 03/19/2022    HCT 35.6 03/19/2022     03/19/2022       CMP:    Lab Results   Component Value Date     03/19/2022    K 4.2 03/19/2022    K 3.5 03/16/2022     03/19/2022    CO2 24 03/19/2022    BUN 10 03/19/2022    CREATININE 0.5 03/19/2022    GFRAA >60 10/14/2020    LABGLOM >90 03/19/2022    GLUCOSE 172 03/19/2022    CALCIUM 9.1 03/19/2022       Hepatic Function Panel:    Lab Results   Component Value Date    ALKPHOS 104 09/29/2021    ALT 9 09/29/2021    AST 11 09/29/2021    PROT 7.4 09/29/2021    BILITOT 0.5 09/29/2021    BILIDIR 0.1 08/14/2013    LABALBU 4.1 09/29/2021       Magnesium:    Lab Results   Component Value Date    MG 1.9 03/19/2022       PT/INR:  No results found for: PROTIME, INR    HgBA1c:    Lab Results   Component Value Date    LABA1C 8.3 03/16/2022       FLP:    Lab Results   Component Value Date    TRIG 181 10/14/2020    HDL 37 10/14/2020    LDLCALC 105 09/23/2014       TSH:    Lab Results   Component Value Date    TSH 2.11 10/14/2020         Assessment/Plan:  · Orthostatic hypotension  · Recurrent sx dizziness and lightheadedness  · No hx CAD; no sx currently  · ASA 81 QD  · Ischemia work-up   · Echo 3/18/22: EF 65%, Grade I DD  · Lexiscan stress today - negative for ischemia  · No chest discomfort or sx with anginal equivalent  · Low dose Midodrine  · 2.5 TID - will increase to 5 mg TID  · Continue to monitor  · Orthostatic VS - continue to assess  · 144/81 L; 142/70 S; 92/48 stand this AM  · Mild dizziness reported  · Sinus rhythm 70 - 80's  · One brief burst 's  · Metoprolol 25 BID  · HTN  · Amlodipine 5 QD PTA  · Hydrodiuril 25 QD PTA  · DM  · Peripheral neuropathy  · Neurontin  · HLD  · Lipitor 20  · Lopid  · Throat CA currently under treatment; s/p radiation  · Tylenol #3 q4h prn PTA    Stress neg for ischemia. No sx concerning for angina or ACS. Echo with EF 65%, Grade I DD  Orthostatics improved with IVF hydration and low dose midodrine but still some mild dizziness with standing this AM.   - increase to 5 mg TID   - continue to titrate up as warranted   - assure adequate hydration status  Patient OOB as tolerated; chair and ambulation  Diet as tolerated / recommended per SLP   - hx poor po intake related to CA treatment for supraglottic  larynx CA  No additional cardiac work-up at this time. Cardiology will follow on prn basis. Follow-up with Dr. Airam Brenner in the office in 2 - 4 weeks.      Electronically signed by KATHRYN Jamil CNP on 3/23/2022 at 11:22 AM

## 2022-03-23 NOTE — PROGRESS NOTES
[address] [address]  INPATIENT PHYSICAL THERAPY  DAILY NOTE  [facility] RENAL TELEMETRY 6K - 6K-15/015-A  Time In: 7049  Time Out: 1177  Minutes: 20  Left room to obtain HEP and reentered to finish session  Time In: 1435  Time Out: 1439  Minutes: 4  Total minutes: 24  Timed Code Treatment Minutes: 24        Date: 3/23/2022  Patient Name: Napoleon Foley,  Gender:  male        MRN: [id number]  : 1960  (57 y.o.)     Referring Practitioner: Serena Bernstein MD  Diagnosis: Orthostatic hypotension  Additional Pertinent Hx: Per EMR 3/16: Napoleon Foley is a 57 y.o. male who presents to the emergency department for evaluation of LOC. Patient states that he has had increased odynophagia without dysphagia over the past couple weeks, has had no frequently decreased p.o. intake. Was not feeling well this morning, started driving home from work, stood up out of the car, got dizzy fell back striking his head. Complaining of right-sided rib pain only. Still gets dizzy whenever he tries to stand up. The patient does have a history of laryngeal cancer and has followed with [facility] for this. During his work-up he was found to reportedly have right vocal cord paralysis as well. The patient opted to proceed with chemoradiation and gets it locally in [address] [place] [address]. The patient completed radiation on 2021. Prior Level of Function:  Lives With: Spouse  Type of Home:  ([facility])  Home Layout: One level  Home Access: Stairs to enter without rails  Entrance Stairs - Number of Steps: 3  Home Equipment: Rolling walker,Electric scooter   Bathroom Shower/Tub: Walk-in shower  Bathroom Toilet: Handicap height  Bathroom Equipment: Shower chair,Grab bars in shower    Receives Help From: Family  ADL Assistance: Independent  Homemaking Assistance: Independent  Ambulation Assistance: Independent  Transfer Assistance: Independent  Active :  Yes  Additional Comments: Pt and his wife live on the same property but in different locations. Pt indep with very rare use of RW, but him and his wife share the RW. Pt bought a RW for use with going to the zoo but has not used it yet. Restrictions/Precautions:  Restrictions/Precautions: Fall Risk,General Precautions  Position Activity Restriction  Other position/activity restrictions: monitor ortho BP and dizziness, hx laryngeal cancer     SUBJECTIVE: pt in bed upon arrival, just having finished eating lunch. Pt pleasant and agrees to therapy. PAIN: no c/o pain    Vitals: Orthostatic Blood Pressure: +  Supine: 121/69 MAP 85 HR 86  Sittin/58 MAP 70 HR 91  Standin/50 MAP 58 HR 97, pt symptomatic    OBJECTIVE:  Bed Mobility:  Rolling to Right: Modified Independent   Supine to Sit: Modified Independent  Sit to Supine: Modified Independent   Scooting: Modified Independent    Transfers:  Sit to Stand: Stand By Assistance  Stand to Sit:Stand By Assistance    Ambulation:  Not Tested  Due to low BP and c/o lightheadedness    Exercise:  Patient was guided in 1 set(s) 15 reps of exercise to both lower extremities. Ankle pumps, Glut sets, Quad sets, Heelslides, Short arc quads, Hip abduction/adduction and Straight leg raises. Exercises were completed for increased independence with functional mobility. Provided and reviewed HEP    Functional Outcome Measures: Completed  AM-PAC Inpatient Mobility Raw Score : 18  AM-PAC Inpatient T-Scale Score : 43.63    ASSESSMENT:  Assessment: Patient progressing toward established goals. Activity Tolerance:  Patient tolerance of  treatment: good. Pt tolerated session well. Pt demos decreased endurance, strength and independence with mobility.  Pt will benefit from cont PT at this time     Equipment Recommendations:Equipment Needed: No (continue to assess needs)  Discharge Recommendations: Patient would benefit from continued PT at discharge  Plan: Times per week: 5x GM  Current Treatment Recommendations: Deepak Baez Re-education,Home Exercise Program,Safety Education & Sanjuana Cabello Training,Patient/Caregiver Education & Training,Functional Mobility Training,Equipment Evaluation, Education, & procurement,Transfer Training,Gait Training,Stair training    Patient Education  Patient Education: Plan of Care, Bed Mobility, Transfers, , Verbal Exercise Instruction    Goals:  Patient goals : for his ribs to get better  Short term goals  Time Frame for Short term goals: by hospital d/c  Short term goal 1: Pt to demo supine <->sit indep for ability to get in and out of bed easily  Short term goal 2: Pt to complete sit <->stand Indep for ability to get up from various surfaces easily  Short term goal 3: Pt to ambulate >=100' indep for ability to progress moving in his environment  Short term goal 4: Pt to ascend/descend 3 steps with no HR indep for ability to get in and out of his home  Short term goal 5: Pt to improve Tinetti score to 24/28 to progress to the low fall risk category  Long term goals  Time Frame for Long term goals : NA due to short ELOS    Following session, patient left in safe position with all fall risk precautions in place.

## 2022-03-23 NOTE — PLAN OF CARE
Problem: Nutrition  Goal: Optimal nutrition therapy  3/23/2022 1153 by Gokul Ann MS, RD, LD  Outcome: Ongoing   Nutrition Problem #1: Severe malnutrition,In context of chronic illness  Intervention: Food and/or Nutrient Delivery: Continue Current Diet,Modify Oral Nutrition Supplement,Vitamin Supplement  Nutritional Goals: Patient will safely consume 75% or more of meals during LOS.

## 2022-03-23 NOTE — PROGRESS NOTES
[address] [address]  SPEECH THERAPY MISSED TREATMENT NOTE  [facility] RENAL TELEMETRY 6K      Date: 3/23/2022  Patient Name: Estrella Seymour        MRN: [id number]    : 1960  (57 y.o.)    REASON FOR MISSED TREATMENT:  ST attempted to see patient for completion of skilled dysphagia treatment, however, RN Thresea Course with report patient NPO for completion of procedure this date with good consumption of PO diet recommendations. ST will f/u 3/24 as clinically appropriate.     Maryjane Bedoya M.S., [facility]

## 2022-03-23 NOTE — PLAN OF CARE
Problem: Falls - Risk of:  Goal: Will remain free from falls  Description: Will remain free from falls  3/23/2022 1202 by Annie Cadet RN  Outcome: Met This Shift     Problem: Pain:  Goal: Pain level will decrease  Description: Pain level will decrease  3/23/2022 1202 by Annie Cadet RN  Outcome: Met This Shift     Problem: Skin Integrity - Impaired:  Goal: Absence of new skin breakdown  Description: Absence of new skin breakdown  3/23/2022 1202 by Annie Cadet RN  Outcome: Met This Shift     Problem: Skin Integrity:  Goal: Will show no infection signs and symptoms  Description: Will show no infection signs and symptoms  3/23/2022 1202 by Annie Cadet RN  Outcome: Met This Shift     Problem: Discharge Planning:  Goal: Discharged to appropriate level of care  Description: Discharged to appropriate level of care  3/23/2022 1202 by Annie Cadet RN  Outcome: Ongoing     Problem: Nutrition  Goal: Optimal nutrition therapy  3/23/2022 1202 by Annie Cadet RN  Outcome: Ongoing     Problem: Respiratory:  Goal: Ability to maintain a clear airway will improve  Description: Ability to maintain a clear airway will improve  3/23/2022 1202 by Annie Cadet RN  Outcome: Ongoing     Problem: Fluid Volume:  Goal: Ability to achieve a balanced intake and output will improve  Description: Ability to achieve a balanced intake and output will improve  3/23/2022 1202 by Annie Cadet RN  Outcome: Ongoing

## 2022-03-23 NOTE — PLAN OF CARE
Problem: Falls - Risk of:  Goal: Will remain free from falls  Description: Will remain free from falls  3/23/2022 0115 by Josette Primer  Outcome: Ongoing  Note: No falls noted this shift. Continue falling star program. Bed alarm on, bed in low position. Call light and personal belongings in reach. Patient uses call light appropriately. Problem: Falls - Risk of:  Goal: Absence of physical injury  Description: Absence of physical injury  Outcome: Ongoing  Note: No physical injury noted this shift. Will continue to monitor. Problem: Pain:  Goal: Pain level will decrease  Description: Pain level will decrease  3/23/2022 0115 by Josette Primer  Outcome: Ongoing  Note: Patient free from pain this shift. Pain rated on 0-10 pain rating scale. Will continue to reassess. Problem: Discharge Planning:  Goal: Participates in care planning  Description: Participates in care planning  Outcome: Ongoing  Note: Patient plans to discharge back home with wife when medically stable. Problem: Skin Integrity - Impaired:  Goal: Absence of new skin breakdown  Description: Absence of new skin breakdown  3/23/2022 0115 by Josette Primer  Outcome: Ongoing  Note: No new signs or symptoms of skin breakdown noted this shift, encouraging patient to turn and reposition self in bed q2h       Problem: Nutrition  Goal: Optimal nutrition therapy  3/23/2022 0115 by Josette Primer  Outcome: Ongoing  Note: Patient nothing by mouth/ no caffeine at this time due to stress test in the morning. Care plan reviewed with patient. Patient verbalize understanding of the plan of care and contribute to goal setting.

## 2022-03-23 NOTE — CARE COORDINATION
Discharge planning update:    PT/OT/ST. Cardiology following. IV fluids, Asa, Lipitor, Lovenox, diabetes ,management. Increased Midodrine dose for ortho stasis. Potassium replacement protocols. Planning stress test with Cardiology today. Plan is home with spouse.   Electronically signed by Lisa Nichols RN on 3/23/2022 at 11:39 AM

## 2022-03-23 NOTE — PROGRESS NOTES
Comprehensive Nutrition Assessment    Type and Reason for Visit:  Reassess (PO Monitor)    Nutrition Recommendations/Plan:   *Recommend a Multivitamin daily. *Advance diet when medically feasible per SLP & MD recommendations. *Will send [place] Yogurt TID when diet is advanced. Pt requested that the Magic Cups be discontinued  *Consider routine bowel medications d/t no bowel movement documented since admit. Nutrition Assessment: Pt improving from a nutritional standpoint AEB pt was eating % of most meals prior to NPO status today. 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 Malnutrition Assessment:  Malnutrition Status:  Severe malnutrition    Context:  Chronic Illness     Findings of the 6 clinical characteristics of malnutrition:  Energy Intake:  7 - 75% or less estimated energy requirements for 1 month or longer  Weight Loss:  7 - 10% over 6 months (18.2% loss)     Body Fat Loss:   (moderate body fat loss) Fat Overlying Ribs,Triceps,Orbital   Muscle Mass Loss:  7 - Severe muscle mass loss Calf (gastrocnemius),Thigh (quadraceps),Clavicles (pectoralis & deltoids)  Fluid Accumulation:  No significant fluid accumulation     Strength:  Not Performed    Estimated Daily Nutrient Needs:  Energy (kcal):  9899-9027 kcals (25-30 kcals/kg/day); Weight Used for Energy Requirements:   (70 kg)     Protein (g):  84 grams (1.2 grams/kg/day); Weight Used for Protein Requirements:   (70 kg)          Nutrition Related Findings:   Pt. Report/Treatments/Miscellaneous: Patient seen this morning. NPO at present. Pt reports eating all of his meals prior to NPO status but dislikes the Magic cups and would like to discontinue them. s/p MBS-SLP following. Hx of poor oral intake and unplanned weight loss since supraglottic larynx cancer treatment. GI Status: denied any issues, no BM since admit  Pertinent Labs: 3/16/22: HgbA1c 8.3%/189 average glucose  Pertinent Meds: lipitor, lopid, lantus, humalog, protonix    Wounds:  None       Current Nutrition Therapies:    Diet NPO    Anthropometric Measures:  · Height: 5' 11\" (180.3 cm)  · Current Body Weight: 153 lb 14.4 oz (69.8 kg) (3/19; no edema noted)   · Admission Body Weight: 151 lb 6.4 oz (68.7 kg) (3/15/22 no edema)    · Usual Body Weight:  (~ 180# per pt prior cancer treatment. Per EMR: 9/29/21: 189#9. 6oz, 12/16/21: 166#3. 6oz, 1/6/22: 154#3. 2oz, 2/2/22: 148#9. 6oz)     · Ideal Body Weight: 172 lbs  · BMI: 21.5  · BMI Categories: Normal Weight (BMI 18.5-24. 9)       Nutrition Diagnosis:   · Severe malnutrition,In context of chronic illness related to inadequate protein-energy intake as evidenced by severe muscle loss,moderate loss of subcutaneous fat    Nutrition Interventions:   Food and/or Nutrient Delivery:  Continue Current Diet,Modify Oral Nutrition Supplement,Vitamin Supplement  Nutrition Education/Counseling:  Education initiated (Encouraged po intake of meals at best effort)   Coordination of Nutrition Care:  Continue to monitor while inpatient    Goals:  Patient will safely consume 75% or more of meals during LOS. Nutrition Monitoring and Evaluation:   Behavioral-Environmental Outcomes:  None Identified   Food/Nutrient Intake Outcomes:  Diet Advancement/Tolerance,Food and Nutrient Intake,Supplement Intake,Vitamin/Mineral Intake  Physical Signs/Symptoms Outcomes:  Biochemical Data,Weight,Skin,Nutrition Focused Physical Findings     Discharge Planning:     Too soon to determine     Electronically signed by Anna Rubin, MS, RD, LD on 3/23/22 at 11:42 AM EDT    Contact: [phone]

## 2022-03-23 NOTE — PROGRESS NOTES
Patient refusing all medications at this time stating, \" I don't need them\". Nurse asked if there was any reason why he didn't want his medications and he again stated, \" I don't need them\". Nurse educated patient on the importance of these medications and how they would help with his hypotension. Patient still refusing. Nurse to update Hospitalist with new information.

## 2022-03-23 NOTE — PROGRESS NOTES
Nurse received call from [facility]" regarding patient and wanted updates. This nurse asked if patient knew 4 digit code so I could update her. Family member stated she did not and no one has asked for this code and has just been giving her updates. This nurse explained to [facility]" that legally I could not give any information out unless patient consent, but I could take down her number and get back with her. Nurse talked with patient and he gave consent to have said person receive updates. Nurse called back family member but no response and unable to leave call back. Family member called back numerous times but nurse was unable to hear anything on the other line besides muffled sounds. House supervisor called to update this nurse that family member would like to be updated and transferred call. This nurse attempted to talk with family member but could only hear muffled sounds. Hospital  [facility] this [facility] get [facility] on patient and transferred call. Nurse was able to talk with patient but was only able to hear part of what the family member was saying d/t muffled sounds. Nurse updated [facility] and no other concerns were voiced at this time. Will contact patient relations to update them of any possible concerns.

## 2022-03-23 NOTE — FLOWSHEET NOTE
[facility]  PHYSICAL THERAPY MISSED TREATMENT NOTE  ACUTE CARE  STRZ RENAL TELEMETRY 6K              Missed Treatment  Pt refusing session again this date  (Also declined 3/21 and 3/22) pt declined this date due to stress test today. Requesting \"later\" will try back if able.

## 2022-03-23 NOTE — PROGRESS NOTES
Hospitalist Progress Note      Patient:  Claudia Simon    Unit/Bed:6K-15/015-A  YOB: 1960  MRN: [id number]   Acct: [de-identified]   PCP: KATHRYN Dean - NP  Date of Admission: 3/15/2022    Assessment/Plan:  Syncope: due to orthostatic hypotension. Persistently orthostatics positive. Initially thought to be due to dehydration in the setting of dysphagia/odynophafis and reduced PO intake. S/p IVF therapy including multiple boluses, continues to be orthostatic. Unclear etiology- patient has no history of known causes of postural hypotension. ECHO EF 65% with grade 1 DD (03/2022). No known history of arrhythmias but will consider a Cardiac monitor on discharge.    -On modified dysphagia diet per [facility] 3/17. On mIVF. Started on low dose midodrine and lopressor 3/21. Fall precautions. Stress test today 3/23 per Cardiology consult. Saúl hose daily. Essential HTN: controlled. Discontinued Norvasc (may cause orthostatic hypotension) and HCTZ. Lopressor as above. IDDMII: HbA1c 8.3 on 3/16/2022. On Lantus 48Q nightly, Trulicity, and Amaryl. PO meds held at this time. On Lantus 40 u nightly with SSI. Monitor BG with inpatient goal 140-180. 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 associated dysphagia and mild odynophagia present. Follows with Dr. Isaias Isaac. On modified diet. ENT to follow up outpatient.          Tele:   [x] yes             [] no    Code Status: Full Code    Fluids:  IVNS at 76 cc/hr  Diet:  Diet NPO    DVT prophylaxis: [x] Lovenox                                 [] SCDs                                 [] SQ Heparin                                 [] Encourage ambulation                                 [] Already on Anticoagulation    Disposition:      [] TBD                             [x] Home                             [] TCU                             [] Rehab aspirin  81 mg Oral Daily    atorvastatin  20 mg Oral Nightly    gabapentin  300 mg Oral TID    gemfibrozil  600 mg Oral BID AC    pantoprazole  40 mg Oral QAM AC    sodium chloride flush  10 mL IntraVENous 2 times per day    enoxaparin  40 mg SubCUTAneous Daily    insulin lispro  0-12 Units SubCUTAneous TID WC    insulin lispro  0-6 Units SubCUTAneous Nightly     PRN Meds: regadenoson, ibuprofen, melatonin, menthol, [Held by provider] prochlorperazine, sodium chloride flush, sodium chloride, ondansetron **OR** ondansetron, polyethylene glycol, acetaminophen **OR** acetaminophen, potassium chloride **OR** potassium alternative oral replacement **OR** potassium chloride, magnesium sulfate, glucagon (rDNA), dextrose, glucose, dextrose **OR** dextrose      Intake/Output Summary (Last 24 hours) at 3/23/2022 0643  Last data filed at 3/23/2022 6001  Gross per 24 hour   Intake 876 ml   Output 2150 ml   Net -1274 ml         Exam:  BP (!) 147/83   Pulse 74   Temp 97.7 °F (36.5 °C) (Oral)   Resp 18   Ht 5' 11\" (1.803 m)   Wt 153 lb 14.4 oz (69.8 kg)   SpO2 99%   BMI 21.46 kg/m²   General appearance: No apparent distress, appears stated age and cooperative. Thin appearing. Acutely ill appearing- appears better. HEENT: Conjunctivae/corneas clear. Neck: Supple, with full range of motion. No jugular venous distention. Respiratory: Normal respiratory effort. Clear to auscultation, bilaterally without Rales/Wheezes/Rhonchi. Cardiovascular: Regular rate and rhythm with normal S1/S2 without murmurs, rubs or gallops. Abdomen: Soft, non-tender, non-distended with normal bowel sounds. Musculoskeletal: passive and active ROM x 4 extremities. Skin: Skin color, texture, turgor normal.  No rashes or lesions. Neurologic: Neurovascularly intact without any focal sensory/motor deficits.  Cranial nerves: II-XII intact, grossly non-focal.  Psychiatric: Alert and oriented, thought content appropriate, normal insight  Capillary Refill: Brisk,< 3 seconds   Peripheral Pulses: +2 palpable, equal bilaterally     Labs:   No results for input(s): WBC, HGB, HCT, PLT in the last 72 hours. No results for input(s): NA, K, CL, CO2, BUN, CREATININE, CALCIUM, PHOS in the last 72 hours. Invalid input(s): MAGNES  No results for input(s): AST, ALT, BILIDIR, BILITOT, ALKPHOS in the last 72 hours. No results for input(s): INR in the last 72 hours. No results for input(s): Yoselin Castor in the last 72 hours. Microbiology:    Blood culture #1: No results found for: BC    Blood culture #2:No results found for: Penelope Bauer    Organism:No results found for: ORG    No results found for: LABGRAM    MRSA culture only:No results found for: [facility]    Urine culture:   Lab Results   Component Value Date    LABURIN 100 mg 09/23/2014       Respiratory culture: No results found for: CULTRESP    Aerobic and Anaerobic :  No results found for: LABAERO  No results found for: LABANAE    Urinalysis:      Lab Results   Component Value Date    NITRU NEGATIVE 05/28/2015    WBCUA 0-2 05/28/2015    BACTERIA NONE 05/28/2015    RBCUA 0-2 05/28/2015    BLOODU NEGATIVE 05/28/2015    GLUCOSEU >= 1000 05/28/2015       Radiology:  FL MODIFIED BARIUM SWALLOW W VIDEO   Final Result   1. Laryngeal penetration of honey thick, nectar thick, thin, soft and solid barium with aspiration of honey thick and thin barium. 2. Additional recommendations from the speech therapist will follow. **This report has been created using voice recognition software. It may contain minor errors which are inherent in voice recognition technology. **         Final report electronically signed by Dr. Byron Conroy on 3/17/2022 10:51 AM      CT Head WO Contrast   Final Result      1. Mild atrophy. 2. Inflammatory changes involving the left frontal sinus, ethmoid and maxillary sinuses bilaterally. 3. Slight deformity of the right maxillary sinus posterolaterally.    4. Otherwise negative noncontrast CT scan of the brain. **This report has been created using voice recognition software. It may contain minor errors which are inherent in voice recognition technology. **      Final report electronically signed by DR Jasiel Russ on 3/15/2022 12:47 PM      CT Cervical Spine WO Contrast   Final Result       1. Cervical spondylosis especially at C5-6 and C6-7.   2. No acute fracture noted. 3. Bilateral carotid artery calcification. 4. Otherwise negative CT scan of the cervical spine. .               **This report has been created using voice recognition software. It may contain minor errors which are inherent in voice recognition technology. **      Final report electronically signed by DR Jasiel Russ on 3/15/2022 12:52 PM      XR RIBS BILATERAL (MIN 4 VIEWS)   Final Result       1. Slight irregularity involving the left sixth, seventh and eighth ribs laterally and right seventh rib laterally, please correlate clinically. 2. The remaining ribs are intact. 3. There is no pneumothorax. 4. There are small calcified granulomata in the right lower lobe and left upper lobe. .               **This report has been created using voice recognition software. It may contain minor errors which are inherent in voice recognition technology. **      Final report electronically signed by DR Jasiel Russ on 3/15/2022 12:35 PM        XR RIBS BILATERAL (MIN 4 VIEWS)    Result Date: 3/15/2022  PROCEDURE: XR RIBS BILATERAL (MIN 4 VIEWS) CLINICAL INFORMATION: Fall, rib pain. COMPARISON: Chest x-ray dated 14 August 2018. Roslynn Mutton TECHNIQUE: Two views of the bilateral ribs were obtained. A PA upright view of the chest was also obtained. FINDINGS: There is slight irregularity involving the right seventh rib laterally, left sixth, seventh and eighth ribs laterally laterally, please correlate clinically. The remaining ribs are intact. There is no displaced fracture. There is no pneumothorax.  The heart size is noncontrast CT scan of the brain. **This report has been created using voice recognition software. It may contain minor errors which are inherent in voice recognition technology. ** Final report electronically signed by DR Leslie Lowry on 3/15/2022 12:47 PM    CT Cervical Spine WO Contrast    Result Date: 3/15/2022  PROCEDURE: CT CERVICAL SPINE WO CONTRAST CLINICAL INFORMATION: fall. COMPARISON: No prior study. TECHNIQUE: 3 mm noncontrast axial images were obtained through the cervical spine with sagittal and coronal reconstructions. All CT scans at this facility use dose modulation, iterative reconstruction, and/or weight-based dosing when appropriate to reduce radiation dose to as low as reasonably achievable. FINDINGS: The cervical vertebral bodies are normally aligned. There is no fracture. There is no prevertebral soft tissue swelling. There is cervical spondylosis especially at C5-6 and C6-7. At C1-2, there is normal alignment of the dens relative to anterior arch of C1. At C2-3, there is no disc herniation, canal or foraminal stenosis. At C3-4, there is no disc herniation, canal or foraminal stenosis. At C4-5, there is no disc herniation, canal or foraminal stenosis. At C5-6, there is a 1.2 mm ventral extradural defect secondary to bulging disc and uncinate process hypertrophy. This causes mild canal and mild-to-moderate bilateral foraminal stenosis. At C6-7, there is 1.4 mm ventral extradural defect secondary to bulging disc and uncinate process hypertrophy. This causes mild canal, moderate right and mild-to-moderate left-sided foraminal stenosis. At C7-T1, there is no disc herniation, canal or foraminal stenosis. There is bilateral carotid artery calcification. .  There are no suspicious findings in the lung apices. 1. Cervical spondylosis especially at C5-6 and C6-7. 2. No acute fracture noted. 3. Bilateral carotid artery calcification. 4. Otherwise negative CT scan of the cervical spine. . **This report has been created using voice recognition software. It may contain minor errors which are inherent in voice recognition technology. ** Final report electronically signed by DR Manisha Henriquez on 3/15/2022 12:52 PM          Km Deluca MD   PGY2, Internal Medicine   3/23/2022

## 2022-03-24 LAB
GLUCOSE BLD-MCNC: 150 MG/DL (ref 70–108)
GLUCOSE BLD-MCNC: 157 MG/DL (ref 70–108)
GLUCOSE BLD-MCNC: 211 MG/DL (ref 70–108)
GLUCOSE BLD-MCNC: 72 MG/DL (ref 70–108)

## 2022-03-24 PROCEDURE — 6370000000 HC RX 637 (ALT 250 FOR IP): Performed by: STUDENT IN AN ORGANIZED HEALTH CARE EDUCATION/TRAINING PROGRAM

## 2022-03-24 PROCEDURE — 97116 GAIT TRAINING THERAPY: CPT

## 2022-03-24 PROCEDURE — 97535 SELF CARE MNGMENT TRAINING: CPT

## 2022-03-24 PROCEDURE — 2580000003 HC RX 258: Performed by: PHYSICIAN ASSISTANT

## 2022-03-24 PROCEDURE — 6370000000 HC RX 637 (ALT 250 FOR IP): Performed by: NURSE PRACTITIONER

## 2022-03-24 PROCEDURE — 99233 SBSQ HOSP IP/OBS HIGH 50: CPT | Performed by: INTERNAL MEDICINE

## 2022-03-24 PROCEDURE — 97110 THERAPEUTIC EXERCISES: CPT

## 2022-03-24 PROCEDURE — 1200000003 HC TELEMETRY R&B

## 2022-03-24 PROCEDURE — 6360000002 HC RX W HCPCS: Performed by: PHYSICIAN ASSISTANT

## 2022-03-24 PROCEDURE — 2580000003 HC RX 258: Performed by: STUDENT IN AN ORGANIZED HEALTH CARE EDUCATION/TRAINING PROGRAM

## 2022-03-24 PROCEDURE — 6370000000 HC RX 637 (ALT 250 FOR IP): Performed by: PHYSICIAN ASSISTANT

## 2022-03-24 PROCEDURE — 82948 REAGENT STRIP/BLOOD GLUCOSE: CPT

## 2022-03-24 RX ADMIN — INSULIN LISPRO 2 UNITS: 100 INJECTION, SOLUTION INTRAVENOUS; SUBCUTANEOUS at 16:02

## 2022-03-24 RX ADMIN — ASPIRIN 81 MG: 81 TABLET, COATED ORAL at 07:39

## 2022-03-24 RX ADMIN — GABAPENTIN 300 MG: 300 CAPSULE ORAL at 07:29

## 2022-03-24 RX ADMIN — MIDODRINE HYDROCHLORIDE 5 MG: 2.5 TABLET ORAL at 07:29

## 2022-03-24 RX ADMIN — ACETAMINOPHEN 650 MG: 325 TABLET ORAL at 07:38

## 2022-03-24 RX ADMIN — INSULIN LISPRO 2 UNITS: 100 INJECTION, SOLUTION INTRAVENOUS; SUBCUTANEOUS at 11:39

## 2022-03-24 RX ADMIN — GEMFIBROZIL 600 MG: 600 TABLET ORAL at 07:30

## 2022-03-24 RX ADMIN — PANTOPRAZOLE SODIUM 40 MG: 40 TABLET, DELAYED RELEASE ORAL at 07:39

## 2022-03-24 RX ADMIN — METOPROLOL TARTRATE 25 MG: 25 TABLET, FILM COATED ORAL at 07:30

## 2022-03-24 RX ADMIN — SODIUM CHLORIDE: 9 INJECTION, SOLUTION INTRAVENOUS at 07:29

## 2022-03-24 RX ADMIN — ENOXAPARIN SODIUM 40 MG: 100 INJECTION SUBCUTANEOUS at 07:39

## 2022-03-24 RX ADMIN — INSULIN LISPRO 2 UNITS: 100 INJECTION, SOLUTION INTRAVENOUS; SUBCUTANEOUS at 21:18

## 2022-03-24 RX ADMIN — INSULIN GLARGINE 20 UNITS: 100 INJECTION, SOLUTION SUBCUTANEOUS at 21:19

## 2022-03-24 RX ADMIN — GEMFIBROZIL 600 MG: 600 TABLET ORAL at 15:25

## 2022-03-24 RX ADMIN — GABAPENTIN 300 MG: 300 CAPSULE ORAL at 15:25

## 2022-03-24 RX ADMIN — SODIUM CHLORIDE, PRESERVATIVE FREE 10 ML: 5 INJECTION INTRAVENOUS at 21:03

## 2022-03-24 ASSESSMENT — PAIN SCALES - GENERAL
PAINLEVEL_OUTOF10: 0

## 2022-03-24 NOTE — CARE COORDINATION
3/24/22, 1:57 PM EDT    Orthostatics improved. Anticipate discharge to home today with wife. Denies needs. Patient goals/plan/ treatment preferences discussed by  and . Patient goals/plan/ treatment preferences reviewed with patient/ family. Patient/ family verbalize understanding of discharge plan and are in agreement with goal/plan/treatment preferences. Understanding was demonstrated using the teach back method. AVS provided by RN at time of discharge, which includes all necessary medical information pertaining to the patients current course of illness, treatment, post-discharge goals of care, and treatment preferences.

## 2022-03-24 NOTE — PROGRESS NOTES
Strengthening,Patient/Caregiver Education & Training,Balance Training,Functional Mobility Training,Endurance Training,Self-Care / ADL,Safety Education & Training    Patient Education  Patient Education: ADL's and safety with functional mobility and transfers. Goals  Short term goals  Time Frame for Short term goals: by discharge  Short term goal 1: pt will complete functional mobility within [place] distances mod-I, pt will complete sit<>stand transfer with mod-I without requiring cues to monitor for ortho static symoptoms, to access ADLs  Short term goal 2: pt will complete BADL routine mod-I to increase indep with self care tasks  Short term goal 3: pt will complete sit<>stand transfer with mod-I without requiring cues to monitor for ortho static symoptoms, in prep to perform ADLs    Following session, patient left in safe position with all fall risk precautions in place.

## 2022-03-24 NOTE — PLAN OF CARE
Problem: Falls - Risk of:  Goal: Will remain free from falls  Description: Will remain free from falls  3/24/2022 1211 by Johana Jackson RN  Outcome: Met This Shift     Problem: Pain:  Goal: Pain level will decrease  Description: Pain level will decrease  3/24/2022 1211 by Johana Jackson RN  Outcome: Met This Shift     Problem: Skin Integrity - Impaired:  Goal: Will show no infection signs and symptoms  Description: Will show no infection signs and symptoms  3/24/2022 1211 by Johana Jackson RN  Outcome: Met This Shift     Problem: Skin Integrity - Impaired:  Goal: Will show no infection signs and symptoms  Description: Will show no infection signs and symptoms  3/24/2022 1211 by Johana Jackson RN  Outcome: Met This Shift     Problem: Nutrition  Goal: Optimal nutrition therapy  3/24/2022 1211 by Johana Jackson RN  Outcome: Met This Shift     Problem: Respiratory:  Goal: Ability to maintain a clear airway will improve  Description: Ability to maintain a clear airway will improve  3/24/2022 1211 by Johana Jackson RN  Outcome: Met This Shift     Problem: Skin Integrity:  Goal: Will show no infection signs and symptoms  Description: Will show no infection signs and symptoms  3/24/2022 1211 by Johana Jackson RN  Outcome: Met This Shift     Problem: Discharge Planning:  Goal: Discharged to appropriate level of care  Description: Discharged to appropriate level of care  3/24/2022 1211 by Johana Jackson RN  Outcome: Ongoing

## 2022-03-24 NOTE — PROGRESS NOTES
[address] [address]  INPATIENT PHYSICAL THERAPY  DAILY NOTE  [facility] RENAL TELEMETRY 6K - 6K-15/015-A    Time In: 1003  Time Out: 1029  Timed Code Treatment Minutes: 26 Minutes  Minutes: 26          Date: 3/24/2022  Patient Name: Jose G Elliott,  Gender:  male        MRN: [id number]  : 1960  (57 y.o.)     Referring Practitioner: Charlesetta Baumgarten, MD  Diagnosis: Orthostatic hypotension  Additional Pertinent Hx: Per EMR 3/16: Jose G Elliott is a 57 y.o. male who presents to the emergency department for evaluation of LOC. Patient states that he has had increased odynophagia without dysphagia over the past couple weeks, has had no frequently decreased p.o. intake. Was not feeling well this morning, started driving home from work, stood up out of the car, got dizzy fell back striking his head. Complaining of right-sided rib pain only. Still gets dizzy whenever he tries to stand up. The patient does have a history of laryngeal cancer and has followed with [facility] ENT for this. During his work-up he was found to reportedly have right vocal cord paralysis as well. The patient opted to proceed with chemoradiation and gets it locally in [address] [place] [address]. The patient completed radiation on 2021. Prior Level of Function:  Lives With: Spouse  Type of Home:  ([place])  Home Layout: One level  Home Access: Stairs to enter without rails  Entrance Stairs - Number of Steps: 3  Home Equipment: Rolling walker,Electric scooter   Bathroom Shower/Tub: Walk-in shower  Bathroom Toilet: Handicap height  Bathroom Equipment: Shower chair,Grab bars in shower    Receives Help From: Family  ADL Assistance: Independent  Homemaking Assistance: Independent  Ambulation Assistance: Independent  Transfer Assistance: Independent  Active : Yes  Additional Comments: Pt and his wife live on the same property but in different locations. Pt indep with very rare use of RW, but him and his wife share the RW.  Pt bought a RW for use with going to the zoo but has not used it yet. Restrictions/Precautions:  Restrictions/Precautions: Fall Risk,General Precautions  Position Activity Restriction  Other position/activity restrictions: monitor ortho BP and dizziness, hx laryngeal cancer     SUBJECTIVE: RN approved session. Pt in recliner upon arrival and agrees to therapy. Pt pleasant and cooperative. PAIN: denies    Vitals: Orthostatic Blood Pressure:   Supine: 155/136  HR 69, rechecked 151/73 MAP 97 HR 69   Sittin/81 MAP 98 HR 72  Standin/80 MAP 98, asymptomatic    OBJECTIVE:  Bed Mobility:  Not Tested    Transfers:  Sit to Stand: Stand By Assistance  Stand to Sit:Stand By Assistance    Ambulation:  Stand By Assistance  Distance: 250ft 50ft  Surface: Level Tile  Device:No Device  Gait Deviations:  Decreased Arm Swing, Mild Path Deviations and Unsteady Gait    Stairs:  Stand By Assistance  Number of Steps: 5  Height: 6\" step with One Handrail  Reciprocal pattern, grossly steady    Balance:  static sit and stand for BP checks    Exercise:  Patient was guided in 1 set(s) 20 reps of exercise to both lower extremities. Glut sets, Seated marches, Seated hamstring curls, Seated heel/toe raises, Long arc quads, Seated isometric hip adduction and Seated abduction/adduction. Exercises were completed for increased independence with functional mobility. Functional Outcome Measures: Completed  AM-PAC Inpatient Mobility Raw Score : 18  AM-PAC Inpatient T-Scale Score : 43.63    ASSESSMENT:  Assessment: Patient progressing toward established goals. Activity Tolerance:  Patient tolerance of  treatment: good. Pt tolerated session well.  Pt demos decreased stability on feet at times, would benefit from cnt PT     Equipment Recommendations:Equipment Needed: No (continue to assess needs)  Discharge Recommendations: Home with Assist as Needed and Home with Home Health PT  Plan: Times per week: 5x GM  Current Treatment Recommendations: Strengthening,Neuromuscular Re-education,Home Exercise Program,Safety Education & Training,Balance Training,Endurance Training,Patient/Caregiver Education & Training,Functional Mobility Training,Equipment Evaluation, Education, & procurement,Transfer Training,Gait Training,Stair training    Patient Education  Patient Education: Plan of Care, Transfers, Gait, Stairs, Verbal Exercise Instruction    Goals:  Patient goals : for his ribs to get better  Short term goals  Time Frame for Short term goals: by hospital d/c  Short term goal 1: Pt to demo supine <->sit indep for ability to get in and out of bed easily  Short term goal 2: Pt to complete sit <->stand Indep for ability to get up from various surfaces easily  Short term goal 3: Pt to ambulate >=100' indep for ability to progress moving in his environment  Short term goal 4: Pt to ascend/descend 3 steps with no HR indep for ability to get in and out of his home  Short term goal 5: Pt to improve Tinetti score to 24/28 to progress to the low fall risk category  Long term goals  Time Frame for Long term goals : NA due to short ELOS    Following session, patient left in safe position with all fall risk precautions in place.

## 2022-03-24 NOTE — PROGRESS NOTES
Hospitalist Progress Note      Patient:  Minh Brown    Unit/Bed:6K-15/015-A  YOB: 1960  MRN: [id number]   Acct: [de-identified]   PCP: KATHRYN Pollard - NP  Date of Admission: 3/15/2022    Assessment/Plan:  Syncope: due to orthostatic hypotension. Persistently orthostatics positive. Initially thought to be due to dehydration in the setting of dysphagia/odynophafis and reduced PO intake. S/p IVF therapy including multiple boluses, continues to be orthostatic. Unclear etiology- patient has no history of known causes of postural hypotension. ECHO EF 65% with grade 1 DD (03/2022). No known history of arrhythmias but will consider a Cardiac monitor on discharge.    -On modified dysphagia diet per [facility] 3/17. On mIVF- will discontinue to assess improvement. On midodrine (dose increased to 15 mg TID) and Lopressor 25 mg BID on 3/21. Fall precautions. Stress test 3/23 per Cardiology consult. Saúl hose daily. Essential HTN: controlled. Discontinued Norvasc (may cause orthostatic hypotension) and HCTZ. Lopressor as above. IDDMII: HbA1c 8.3 on 3/16/2022. On Lantus 58H nightly, Trulicity, and Amaryl. PO meds held at this time. On Lantus 40 u nightly with SSI. Monitor BG with inpatient goal 140-180. 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 associated dysphagia and mild odynophagia present. Follows with Dr. Helga Dakins. On modified diet. ENT to follow up outpatient. Tele:   [x] yes             [] no    Code Status: Full Code    Fluids:  IVNS at 76 cc/hr  Diet:  ADULT DIET; Regular; 5 carb choices (75 gm/meal); Mildly Thick (Nectar);  No Drinking Straws  ADULT ORAL NUTRITION SUPPLEMENT; Breakfast, Lunch, Dinner; Frozen Oral Supplement    DVT prophylaxis: [x] Lovenox                                 [] SCDs                                 [] SQ Heparin                                 [] negative)     Medications:  Reviewed    Infusion Medications    sodium chloride 75 mL/hr at 03/24/22 0729    sodium chloride      dextrose      dextrose      Or    dextrose       Scheduled Medications    midodrine  5 mg Oral TID WC    metoprolol tartrate  25 mg Oral BID    insulin glargine  40 Units SubCUTAneous Nightly    lidocaine  1 patch TransDERmal Daily    aspirin  81 mg Oral Daily    atorvastatin  20 mg Oral Nightly    gabapentin  300 mg Oral TID    gemfibrozil  600 mg Oral BID AC    pantoprazole  40 mg Oral QAM AC    sodium chloride flush  10 mL IntraVENous 2 times per day    enoxaparin  40 mg SubCUTAneous Daily    insulin lispro  0-12 Units SubCUTAneous TID WC    insulin lispro  0-6 Units SubCUTAneous Nightly     PRN Meds: regadenoson, ibuprofen, melatonin, menthol, [Held by provider] prochlorperazine, sodium chloride flush, sodium chloride, ondansetron **OR** ondansetron, polyethylene glycol, acetaminophen **OR** acetaminophen, potassium chloride **OR** potassium alternative oral replacement **OR** potassium chloride, magnesium sulfate, glucagon (rDNA), dextrose, glucose, dextrose **OR** dextrose      Intake/Output Summary (Last 24 hours) at 3/24/2022 1524  Last data filed at 3/24/2022 0900  Gross per 24 hour   Intake 620 ml   Output 800 ml   Net -180 ml         Exam:  BP (!) 160/76   Pulse 72   Temp 98.6 °F (37 °C) (Oral)   Resp 16   Ht 5' 11\" (1.803 m)   Wt 155 lb 3.2 oz (70.4 kg)   SpO2 99%   BMI 21.65 kg/m²   General appearance: No apparent distress, appears stated age and cooperative. Thin and acutely ill appearing- appears better. HEENT: Conjunctivae/corneas clear. Muffled voice. Neck: Supple, with full range of motion. No jugular venous distention. Respiratory: Normal respiratory effort. Clear to auscultation, bilaterally without Rales/Wheezes/Rhonchi. Cardiovascular: Regular rate and rhythm with normal S1/S2 without murmurs, rubs or gallops.   Abdomen: Soft, non-tender, non-distended with normal bowel sounds. Musculoskeletal: passive and active ROM x 4 extremities. Skin: Skin color, texture, turgor normal.  No rashes or lesions. Neurologic: Neurovascularly intact without any focal sensory/motor deficits. Cranial nerves: II-XII intact, grossly non-focal.  Psychiatric: Alert and oriented, thought content appropriate, normal insight  Capillary Refill: Brisk,< 3 seconds   Peripheral Pulses: +2 palpable, equal bilaterally     Labs:   No results for input(s): WBC, HGB, HCT, PLT in the last 72 hours. No results for input(s): NA, K, CL, CO2, BUN, CREATININE, CALCIUM, PHOS in the last 72 hours. Invalid input(s): MAGNES  No results for input(s): AST, ALT, BILIDIR, BILITOT, ALKPHOS in the last 72 hours. No results for input(s): INR in the last 72 hours. No results for input(s): Ramila Cesar in the last 72 hours. Microbiology:    Blood culture #1: No results found for: BC    Blood culture #2:No results found for: Fouzia Hall    Organism:No results found for: ORG    No results found for: LABGRAM    MRSA culture only:No results found for: [facility]    Urine culture:   Lab Results   Component Value Date    LABURIN 100 mg 09/23/2014       Respiratory culture: No results found for: CULTRESP    Aerobic and Anaerobic :  No results found for: LABAERO  No results found for: LABANAE    Urinalysis:      Lab Results   Component Value Date    NITRU NEGATIVE 05/28/2015    WBCUA 0-2 05/28/2015    BACTERIA NONE 05/28/2015    RBCUA 0-2 05/28/2015    BLOODU NEGATIVE 05/28/2015    GLUCOSEU >= 1000 05/28/2015       Radiology:  FL MODIFIED BARIUM SWALLOW W VIDEO   Final Result   1. Laryngeal penetration of honey thick, nectar thick, thin, soft and solid barium with aspiration of honey thick and thin barium. 2. Additional recommendations from the speech therapist will follow. **This report has been created using voice recognition software.  It may contain minor errors which are inherent in voice recognition technology. **         Final report electronically signed by Dr. Lauren Oneill on 3/17/2022 10:51 AM      CT Head WO Contrast   Final Result      1. Mild atrophy. 2. Inflammatory changes involving the left frontal sinus, ethmoid and maxillary sinuses bilaterally. 3. Slight deformity of the right maxillary sinus posterolaterally. 4. Otherwise negative noncontrast CT scan of the brain. **This report has been created using voice recognition software. It may contain minor errors which are inherent in voice recognition technology. **      Final report electronically signed by DR Marco A Aguilar on 3/15/2022 12:47 PM      CT Cervical Spine WO Contrast   Final Result       1. Cervical spondylosis especially at C5-6 and C6-7.   2. No acute fracture noted. 3. Bilateral carotid artery calcification. 4. Otherwise negative CT scan of the cervical spine. .               **This report has been created using voice recognition software. It may contain minor errors which are inherent in voice recognition technology. **      Final report electronically signed by DR Marco A Aguilar on 3/15/2022 12:52 PM      XR RIBS BILATERAL (MIN 4 VIEWS)   Final Result       1. Slight irregularity involving the left sixth, seventh and eighth ribs laterally and right seventh rib laterally, please correlate clinically. 2. The remaining ribs are intact. 3. There is no pneumothorax. 4. There are small calcified granulomata in the right lower lobe and left upper lobe. .               **This report has been created using voice recognition software. It may contain minor errors which are inherent in voice recognition technology. **      Final report electronically signed by DR Marco A Aguilar on 3/15/2022 12:35 PM        XR RIBS BILATERAL (MIN 4 VIEWS)    Result Date: 3/15/2022  PROCEDURE: XR RIBS BILATERAL (MIN 4 VIEWS) CLINICAL INFORMATION: Fall, rib pain. COMPARISON: Chest x-ray dated 14 August 2018. Surinder Elise  TECHNIQUE: Two views of the bilateral ribs were obtained. A PA upright view of the chest was also obtained. FINDINGS: There is slight irregularity involving the right seventh rib laterally, left sixth, seventh and eighth ribs laterally laterally, please correlate clinically. The remaining ribs are intact. There is no displaced fracture. There is no pneumothorax. The heart size is normal.  The mediastinum is not widened. There are no pulmonary infiltrates or effusions. There are small calcified granulomata in the right lower lobe and left upper lobe. The pulmonary vascularity is normal.  There is thoracic spondylosis. .      1. Slight irregularity involving the left sixth, seventh and eighth ribs laterally and right seventh rib laterally, please correlate clinically. 2. The remaining ribs are intact. 3. There is no pneumothorax. 4. There are small calcified granulomata in the right lower lobe and left upper lobe. . **This report has been created using voice recognition software. It may contain minor errors which are inherent in voice recognition technology. ** Final report electronically signed by DR Adrienne Torres on 3/15/2022 12:35 PM    CT Head WO Contrast    Result Date: 3/15/2022  PROCEDURE: CT HEAD WO CONTRAST CLINICAL INFORMATION: fall. COMPARISON: No prior study. TECHNIQUE: Noncontrast 5 mm axial images were obtained through the brain. Sagittal and coronal reconstructions were obtained. All CT scans at this facility use dose modulation, iterative reconstruction, and/or weight-based dosing when appropriate to reduce radiation dose to as low as reasonably achievable. FINDINGS: There is mild atrophy There is no hemorrhage. There are no intra-or extra-axial collections. There is no hydrocephalus, midline shift or mass effect. The gray-white matter differentiation is preserved. There are inflammatory changes involving the left frontal sinus, ethmoid and maxillary sinuses bilaterally.  There is slight deformity of the right maxillary sinus posterolaterally. The remaining paranasal sinuses and mastoid air cells are normally aerated. There is no suspicious calvarial abnormality. 1. Mild atrophy. 2. Inflammatory changes involving the left frontal sinus, ethmoid and maxillary sinuses bilaterally. 3. Slight deformity of the right maxillary sinus posterolaterally. 4. Otherwise negative noncontrast CT scan of the brain. **This report has been created using voice recognition software. It may contain minor errors which are inherent in voice recognition technology. ** Final report electronically signed by DR Adrienne Torres on 3/15/2022 12:47 PM    CT Cervical Spine WO Contrast    Result Date: 3/15/2022  PROCEDURE: CT CERVICAL SPINE WO CONTRAST CLINICAL INFORMATION: fall. COMPARISON: No prior study. TECHNIQUE: 3 mm noncontrast axial images were obtained through the cervical spine with sagittal and coronal reconstructions. All CT scans at this facility use dose modulation, iterative reconstruction, and/or weight-based dosing when appropriate to reduce radiation dose to as low as reasonably achievable. FINDINGS: The cervical vertebral bodies are normally aligned. There is no fracture. There is no prevertebral soft tissue swelling. There is cervical spondylosis especially at C5-6 and C6-7. At C1-2, there is normal alignment of the dens relative to anterior arch of C1. At C2-3, there is no disc herniation, canal or foraminal stenosis. At C3-4, there is no disc herniation, canal or foraminal stenosis. At C4-5, there is no disc herniation, canal or foraminal stenosis. At C5-6, there is a 1.2 mm ventral extradural defect secondary to bulging disc and uncinate process hypertrophy. This causes mild canal and mild-to-moderate bilateral foraminal stenosis. At C6-7, there is 1.4 mm ventral extradural defect secondary to bulging disc and uncinate process hypertrophy. This causes mild canal, moderate right and mild-to-moderate left-sided foraminal stenosis.  At C7-T1, there is no disc herniation, canal or foraminal stenosis. There is bilateral carotid artery calcification. .  There are no suspicious findings in the lung apices. 1. Cervical spondylosis especially at C5-6 and C6-7. 2. No acute fracture noted. 3. Bilateral carotid artery calcification. 4. Otherwise negative CT scan of the cervical spine. . **This report has been created using voice recognition software. It may contain minor errors which are inherent in voice recognition technology. ** Final report electronically signed by DR So De La Vega on 3/15/2022 12:52 PM          Lenny Anders MD   PGY2, Internal Medicine   3/24/2022

## 2022-03-24 NOTE — PLAN OF CARE
Problem: Falls - Risk of:  Goal: Will remain free from falls  Description: Will remain free from falls  Outcome: Ongoing  Note: No falls noted this shift. Continue falling star program. Bed alarm on, bed in low position. Call light and personal belongings in reach. Patient uses call light appropriately. Problem: Pain:  Goal: Pain level will decrease  Description: Pain level will decrease  Outcome: Ongoing  Note: No pian voiced during this shift. Heating pad on for comfort. Problem: Discharge Planning:  Goal: Participates in care planning  Description: Participates in care planning  Outcome: Ongoing  Note: Patient plans to discharge back home with wife when medically stable. Problem: Skin Integrity - Impaired:  Goal: Absence of new skin breakdown  Description: Absence of new skin breakdown  Outcome: Ongoing  Note: No new signs or symptoms of skin breakdown noted this shift, encouraging patient to turn and reposition self in bed q2h     Care plan reviewed with patient. Patient verbalize understanding of the plan of care and contribute to goal setting.

## 2022-03-24 NOTE — FLOWSHEET NOTE
Mishel Mcnulty is in bed and he opened his eyes when his name was called but he is sleepy. This  had a quick prayer and then let him sleep.      03/24/22 160   Encounter Summary   Services provided to: Patient   Referral/Consult From: [place]   Continue Visiting Yes  (3/24 )   Complexity of Encounter Low   Length of Encounter 15 minutes   Spiritual/Nondenominational   Type Spiritual support   follow up later  Care Plan:  Continue spiritual and emotional care for patient and family. Including prayers.

## 2022-03-24 NOTE — PROGRESS NOTES
[address] [address]  SPEECH THERAPY MISSED TREATMENT NOTE  [facility] RENAL TELEMETRY 6K      Date: 3/24/2022  Patient Name: Ciro Caballero        MRN: [id number]    : 1960  (57 y.o.)    REASON FOR MISSED TREATMENT:  Attempted to see patient for treatment at 80. Celia Hurtado RN gave permission. Patient sleeping upon arrival.  Patient responded to auditory stimuli, but was unable to keep his eyes open. Patient then stated that he did not want to do therapy because he is \"tired. \"  Also stated, \"I don't sleep well. \"  Discussed whether he has any swallowing exercises that he has been completing. Patient did not think so, but still declined dysphagia treatment to complete exercises. Celia Hurtado RN reports the patient wanted to take his pills, 1 at a time, whole in his cream of wheat this morning and he tolerated it well.       Parviz Saenz M.S. [facility] [id number]

## 2022-03-25 LAB
ANION GAP SERPL CALCULATED.3IONS-SCNC: 10 MEQ/L (ref 8–16)
BUN BLDV-MCNC: 12 MG/DL (ref 7–22)
CALCIUM SERPL-MCNC: 9.2 MG/DL (ref 8.5–10.5)
CHLORIDE BLD-SCNC: 100 MEQ/L (ref 98–111)
CO2: 25 MEQ/L (ref 23–33)
CREAT SERPL-MCNC: 0.5 MG/DL (ref 0.4–1.2)
GFR SERPL CREATININE-BSD FRML MDRD: > 90 ML/MIN/1.73M2
GLUCOSE BLD-MCNC: 152 MG/DL (ref 70–108)
GLUCOSE BLD-MCNC: 155 MG/DL (ref 70–108)
GLUCOSE BLD-MCNC: 243 MG/DL (ref 70–108)
GLUCOSE BLD-MCNC: 67 MG/DL (ref 70–108)
GLUCOSE BLD-MCNC: 77 MG/DL (ref 70–108)
GLUCOSE BLD-MCNC: 78 MG/DL (ref 70–108)
MAGNESIUM: 1.7 MG/DL (ref 1.6–2.4)
POTASSIUM SERPL-SCNC: 3.8 MEQ/L (ref 3.5–5.2)
SODIUM BLD-SCNC: 135 MEQ/L (ref 135–145)

## 2022-03-25 PROCEDURE — 82948 REAGENT STRIP/BLOOD GLUCOSE: CPT

## 2022-03-25 PROCEDURE — 80048 BASIC METABOLIC PNL TOTAL CA: CPT

## 2022-03-25 PROCEDURE — 6370000000 HC RX 637 (ALT 250 FOR IP): Performed by: NURSE PRACTITIONER

## 2022-03-25 PROCEDURE — 6360000002 HC RX W HCPCS: Performed by: STUDENT IN AN ORGANIZED HEALTH CARE EDUCATION/TRAINING PROGRAM

## 2022-03-25 PROCEDURE — 99233 SBSQ HOSP IP/OBS HIGH 50: CPT | Performed by: INTERNAL MEDICINE

## 2022-03-25 PROCEDURE — 36415 COLL VENOUS BLD VENIPUNCTURE: CPT

## 2022-03-25 PROCEDURE — 6370000000 HC RX 637 (ALT 250 FOR IP): Performed by: STUDENT IN AN ORGANIZED HEALTH CARE EDUCATION/TRAINING PROGRAM

## 2022-03-25 PROCEDURE — 1200000003 HC TELEMETRY R&B

## 2022-03-25 PROCEDURE — 6370000000 HC RX 637 (ALT 250 FOR IP): Performed by: PHYSICIAN ASSISTANT

## 2022-03-25 PROCEDURE — 83735 ASSAY OF MAGNESIUM: CPT

## 2022-03-25 PROCEDURE — 2580000003 HC RX 258: Performed by: PHYSICIAN ASSISTANT

## 2022-03-25 PROCEDURE — 6360000002 HC RX W HCPCS: Performed by: PHYSICIAN ASSISTANT

## 2022-03-25 RX ORDER — MAGNESIUM SULFATE IN WATER 40 MG/ML
2000 INJECTION, SOLUTION INTRAVENOUS ONCE
Status: COMPLETED | OUTPATIENT
Start: 2022-03-25 | End: 2022-03-25

## 2022-03-25 RX ORDER — ATROPINE SULFATE 0.1 MG/ML
0.5 INJECTION INTRAVENOUS EVERY 5 MIN PRN
Status: ACTIVE | OUTPATIENT
Start: 2022-03-25 | End: 2022-03-26

## 2022-03-25 RX ORDER — SODIUM CHLORIDE 9 MG/ML
500 INJECTION, SOLUTION INTRAVENOUS CONTINUOUS PRN
Status: ACTIVE | OUTPATIENT
Start: 2022-03-25 | End: 2022-03-26

## 2022-03-25 RX ORDER — ALBUTEROL SULFATE 90 UG/1
2 AEROSOL, METERED RESPIRATORY (INHALATION) PRN
Status: ACTIVE | OUTPATIENT
Start: 2022-03-25 | End: 2022-03-26

## 2022-03-25 RX ORDER — NITROGLYCERIN 0.4 MG/1
0.4 TABLET SUBLINGUAL EVERY 5 MIN PRN
Status: ACTIVE | OUTPATIENT
Start: 2022-03-25 | End: 2022-03-26

## 2022-03-25 RX ORDER — METOPROLOL TARTRATE 5 MG/5ML
5 INJECTION INTRAVENOUS EVERY 5 MIN PRN
Status: ACTIVE | OUTPATIENT
Start: 2022-03-25 | End: 2022-03-26

## 2022-03-25 RX ORDER — FLUDROCORTISONE ACETATE 0.1 MG/1
0.1 TABLET ORAL DAILY
Status: DISCONTINUED | OUTPATIENT
Start: 2022-03-25 | End: 2022-03-28

## 2022-03-25 RX ORDER — SODIUM CHLORIDE 0.9 % (FLUSH) 0.9 %
5-40 SYRINGE (ML) INJECTION PRN
Status: ACTIVE | OUTPATIENT
Start: 2022-03-25 | End: 2022-03-26

## 2022-03-25 RX ORDER — AMINOPHYLLINE DIHYDRATE 25 MG/ML
50 INJECTION, SOLUTION INTRAVENOUS PRN
Status: ACTIVE | OUTPATIENT
Start: 2022-03-25 | End: 2022-03-26

## 2022-03-25 RX ORDER — INSULIN GLARGINE 100 [IU]/ML
35 INJECTION, SOLUTION SUBCUTANEOUS NIGHTLY
Status: DISCONTINUED | OUTPATIENT
Start: 2022-03-25 | End: 2022-03-28 | Stop reason: HOSPADM

## 2022-03-25 RX ADMIN — ATORVASTATIN CALCIUM 20 MG: 20 TABLET, FILM COATED ORAL at 20:33

## 2022-03-25 RX ADMIN — MAGNESIUM SULFATE HEPTAHYDRATE 2000 MG: 40 INJECTION, SOLUTION INTRAVENOUS at 07:35

## 2022-03-25 RX ADMIN — GABAPENTIN 300 MG: 300 CAPSULE ORAL at 17:20

## 2022-03-25 RX ADMIN — ACETAMINOPHEN 650 MG: 325 TABLET ORAL at 18:26

## 2022-03-25 RX ADMIN — INSULIN GLARGINE 35 UNITS: 100 INJECTION, SOLUTION SUBCUTANEOUS at 20:35

## 2022-03-25 RX ADMIN — GEMFIBROZIL 600 MG: 600 TABLET ORAL at 07:59

## 2022-03-25 RX ADMIN — MIDODRINE HYDROCHLORIDE 5 MG: 2.5 TABLET ORAL at 12:44

## 2022-03-25 RX ADMIN — METOPROLOL TARTRATE 25 MG: 25 TABLET, FILM COATED ORAL at 20:33

## 2022-03-25 RX ADMIN — ASPIRIN 81 MG: 81 TABLET, COATED ORAL at 07:58

## 2022-03-25 RX ADMIN — FLUDROCORTISONE ACETATE 0.1 MG: 0.1 TABLET ORAL at 12:49

## 2022-03-25 RX ADMIN — INSULIN LISPRO 1 UNITS: 100 INJECTION, SOLUTION INTRAVENOUS; SUBCUTANEOUS at 17:20

## 2022-03-25 RX ADMIN — GABAPENTIN 300 MG: 300 CAPSULE ORAL at 08:02

## 2022-03-25 RX ADMIN — PANTOPRAZOLE SODIUM 40 MG: 40 TABLET, DELAYED RELEASE ORAL at 07:58

## 2022-03-25 RX ADMIN — INSULIN LISPRO 1 UNITS: 100 INJECTION, SOLUTION INTRAVENOUS; SUBCUTANEOUS at 12:44

## 2022-03-25 RX ADMIN — ENOXAPARIN SODIUM 40 MG: 100 INJECTION SUBCUTANEOUS at 08:02

## 2022-03-25 RX ADMIN — SODIUM CHLORIDE, PRESERVATIVE FREE 10 ML: 5 INJECTION INTRAVENOUS at 07:58

## 2022-03-25 RX ADMIN — MIDODRINE HYDROCHLORIDE 5 MG: 2.5 TABLET ORAL at 17:20

## 2022-03-25 RX ADMIN — GEMFIBROZIL 600 MG: 600 TABLET ORAL at 17:20

## 2022-03-25 RX ADMIN — MIDODRINE HYDROCHLORIDE 5 MG: 2.5 TABLET ORAL at 08:02

## 2022-03-25 RX ADMIN — SODIUM CHLORIDE, PRESERVATIVE FREE 10 ML: 5 INJECTION INTRAVENOUS at 20:34

## 2022-03-25 RX ADMIN — INSULIN LISPRO 1 UNITS: 100 INJECTION, SOLUTION INTRAVENOUS; SUBCUTANEOUS at 20:35

## 2022-03-25 ASSESSMENT — PAIN SCALES - GENERAL
PAINLEVEL_OUTOF10: 0
PAINLEVEL_OUTOF10: 6

## 2022-03-25 NOTE — PLAN OF CARE
Problem: Falls - Risk of:  Goal: Will remain free from falls  Description: Will remain free from falls  Outcome: Met This Shift     Problem: Skin Integrity - Impaired:  Goal: Absence of new skin breakdown  Description: Absence of new skin breakdown  Outcome: Met This Shift     Problem: Pain:  Goal: Pain level will decrease  Description: Pain level will decrease  Outcome: Ongoing     Problem: Discharge Planning:  Goal: Discharged to appropriate level of care  Description: Discharged to appropriate level of care  Outcome: Ongoing     Problem: Nutrition  Goal: Optimal nutrition therapy  Outcome: Ongoing     Problem: Respiratory:  Goal: Ability to maintain a clear airway will improve  Description: Ability to maintain a clear airway will improve  Outcome: Ongoing     Problem: Fluid Volume:  Goal: Ability to achieve a balanced intake and output will improve  Description: Ability to achieve a balanced intake and output will improve  Outcome: Ongoing

## 2022-03-25 NOTE — FLOWSHEET NOTE
03/24/22 7621   Treatment Team Notification   Reason for Communication Evaluate   Team Member Name Dr. Roney Helms Team Role Resident   Method of Communication Secure Message   Response No new orders     Notified dr due to orthostatic supine 155/76 hr 79, sitting 121/72 hr 85, standing 98/62 hr 73. Pt had metoprolol due tonight. Pt refused other oral meds, asked dr if was okay to hold it due to bp standing does not meet pararmeters. Pt does have cindy aliza on, dr advised was okay to hold.

## 2022-03-25 NOTE — PROGRESS NOTES
[facility]  OCCUPATIONAL THERAPY MISSED TREATMENT NOTE  [facility] RENAL TELEMETRY 6K  6K-15/015-A      Date: 3/25/2022  Patient Name: Brayden Ramos        CSN: [id number]   : 1960  (57 y.o.)  Gender: male   Referring Practitioner: Ziyad Rose MD  Diagnosis: orthostatic hypertension         REASON FOR MISSED TREATMENT: OT treatment attempted Pt. Declined at this time would like to return rest prior to return home.

## 2022-03-25 NOTE — PROGRESS NOTES
Hospitalist Progress Note      Patient:  Minh Brown    Unit/Bed:6K-15/015-A  YOB: 1960  MRN: [id number]   Acct: [de-identified]   PCP: KATHRYN Pollard NP  Date of Admission: 3/15/2022    Assessment/Plan:  Syncope: due to orthostatic hypotension. Persistently orthostatics positive. Initially thought to be due to dehydration in the setting of dysphagia/odynophafis and reduced PO intake. S/p IVF therapy including multiple boluses, continues to be orthostatic. Unclear etiology- patient has no history of known causes of postural hypotension. ECHO EF 65% with grade 1 DD (03/2022). No known history of arrhythmias but will consider a Cardiac monitor on discharge.    -On modified dysphagia diet per MBS 3/17.   -On midodrine (dose increased to 15 mg TID) and Lopressor 25 mg BID on 3/21. Fall precautions. Stress test 3/23 per Cardiology consult. Saúl hose daily. Will add florinef. Will touch base with Cardiology. Essential HTN: controlled. Discontinued Norvasc (may cause orthostatic hypotension) and HCTZ. Lopressor as above. IDDMII: HbA1c 8.3 on 3/16/2022. On Lantus 91Q nightly, Trulicity, and Amaryl. PO meds held at this time. On Lantus 40 u nightly with SSI. Monitor BG with inpatient goal 140-180. 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 associated dysphagia and mild odynophagia present. Follows with Dr. Helga Dakins. On modified diet. ENT to follow up outpatient. Tele:   [x] yes             [] no    Code Status: Full Code    Fluids:  IVNS at 76 cc/hr  Diet:  ADULT DIET; Regular; 5 carb choices (75 gm/meal); Mildly Thick (Nectar); No Drinking Straws  ADULT ORAL NUTRITION SUPPLEMENT; Breakfast, Lunch, Dinner;  Other Oral Supplement; Vanilla Greek Yogurt    DVT prophylaxis: [x] Lovenox                                 [] SCDs                                 [] SQ Heparin [] Encourage ambulation                                 [] Already on Anticoagulation    Disposition:      [] TBD                             [x] Home - likely tomorrow if stays stable                             [] TCU                             [] Rehab                             [] Psych                             [] SNF                             [] [facility]                             [] Other-     Chief Complaint: loss of consciousness    Hospital Course:   56 yo M with history of Keratinizing SCC of larynx (diagnosed 08/2021), HTN, T2DM, Arthritis presented after an episode of syncope while going home from work due to associated dizziness/lightheadedness the day of presentation. Had associated trauma to right chest area from fall. ED course: Presented afebrile and hemodynamically stable. Labs generally unremarkable. WBC 17. 4. XR ribs showed slight irregularity involving the left 6th, 7th, and 8th ribs laterally and right 7th rib laterally. No pneumothorax. CT head wo contrast with inflammatory changes involving the left frontal sinus, ethmoid, and maxillary sinuses bilaterally, slight deformity of the right maxillary sinus posterolaterally. CT cervical spondylosis especially at C5-6 and C6-7. Admitted for further work up and management. Of note, has had issues with persistent cough and swallowing since radiation and lost his voice. Has not been able to take PO diabetic meds due to dysphagia so has had suboptimal control of BG. Has no known history of arrhythmias. No known cardiac history. Interval:   No acute events overnight. Continues to be orthostatic positive. Stress test 3/23 negative for ischemia. Subjective:   Reports to be less symptomatic. Wearing cindy hose. Working with PTOT. No new complaints. ROS (12 point review of systems completed. Pertinent positives noted.  Otherwise ROS is negative)     Medications:  Reviewed    Infusion Medications  sodium chloride      dextrose      dextrose      Or    dextrose       Scheduled Medications    midodrine  5 mg Oral TID WC    metoprolol tartrate  25 mg Oral BID    insulin glargine  40 Units SubCUTAneous Nightly    lidocaine  1 patch TransDERmal Daily    aspirin  81 mg Oral Daily    atorvastatin  20 mg Oral Nightly    gabapentin  300 mg Oral TID    gemfibrozil  600 mg Oral BID AC    pantoprazole  40 mg Oral QAM AC    sodium chloride flush  10 mL IntraVENous 2 times per day    enoxaparin  40 mg SubCUTAneous Daily    insulin lispro  0-12 Units SubCUTAneous TID WC    insulin lispro  0-6 Units SubCUTAneous Nightly     PRN Meds: regadenoson, ibuprofen, melatonin, menthol, [Held by provider] prochlorperazine, sodium chloride flush, sodium chloride, ondansetron **OR** ondansetron, polyethylene glycol, acetaminophen **OR** acetaminophen, potassium chloride **OR** potassium alternative oral replacement **OR** potassium chloride, magnesium sulfate, glucagon (rDNA), dextrose, glucose, dextrose **OR** dextrose      Intake/Output Summary (Last 24 hours) at 3/25/2022 1050  Last data filed at 3/24/2022 2345  Gross per 24 hour   Intake 4382.22 ml   Output 1200 ml   Net 3182.22 ml         Exam:  /71   Pulse 82   Temp 98.1 °F (36.7 °C) (Axillary)   Resp 16   Ht 5' 11\" (1.803 m)   Wt 155 lb 3.2 oz (70.4 kg)   SpO2 96%   BMI 21.65 kg/m²   General appearance: No apparent distress, appears stated age and cooperative. Thin and acutely ill appearing- appears better. HEENT: Conjunctivae/corneas clear. Muffled voice. Neck: Supple, with full range of motion. No jugular venous distention. Respiratory: Normal respiratory effort. Clear to auscultation, bilaterally without Rales/Wheezes/Rhonchi. Cardiovascular: Regular rate and rhythm with normal S1/S2 without murmurs, rubs or gallops. Abdomen: Soft, non-tender, non-distended with normal bowel sounds.   Musculoskeletal: passive and active ROM x 4 on 3/17/2022 10:51 AM      CT Head WO Contrast   Final Result      1. Mild atrophy. 2. Inflammatory changes involving the left frontal sinus, ethmoid and maxillary sinuses bilaterally. 3. Slight deformity of the right maxillary sinus posterolaterally. 4. Otherwise negative noncontrast CT scan of the brain. **This report has been created using voice recognition software. It may contain minor errors which are inherent in voice recognition technology. **      Final report electronically signed by DR Lauren Anderson on 3/15/2022 12:47 PM      CT Cervical Spine WO Contrast   Final Result       1. Cervical spondylosis especially at C5-6 and C6-7.   2. No acute fracture noted. 3. Bilateral carotid artery calcification. 4. Otherwise negative CT scan of the cervical spine. .               **This report has been created using voice recognition software. It may contain minor errors which are inherent in voice recognition technology. **      Final report electronically signed by DR Lauren Anderson on 3/15/2022 12:52 PM      XR RIBS BILATERAL (MIN 4 VIEWS)   Final Result       1. Slight irregularity involving the left sixth, seventh and eighth ribs laterally and right seventh rib laterally, please correlate clinically. 2. The remaining ribs are intact. 3. There is no pneumothorax. 4. There are small calcified granulomata in the right lower lobe and left upper lobe. .               **This report has been created using voice recognition software. It may contain minor errors which are inherent in voice recognition technology. **      Final report electronically signed by DR Lauren Anderson on 3/15/2022 12:35 PM      NM MYOCARDIAL SPECT REST EXERCISE OR RX    (Results Pending)     XR RIBS BILATERAL (MIN 4 VIEWS)    Result Date: 3/15/2022  PROCEDURE: XR RIBS BILATERAL (MIN 4 VIEWS) CLINICAL INFORMATION: Fall, rib pain. COMPARISON: Chest x-ray dated 14 August 2018. Sigifredo Case TECHNIQUE: Two views of the bilateral ribs were obtained.   A PA upright view of the chest was also obtained. FINDINGS: There is slight irregularity involving the right seventh rib laterally, left sixth, seventh and eighth ribs laterally laterally, please correlate clinically. The remaining ribs are intact. There is no displaced fracture. There is no pneumothorax. The heart size is normal.  The mediastinum is not widened. There are no pulmonary infiltrates or effusions. There are small calcified granulomata in the right lower lobe and left upper lobe. The pulmonary vascularity is normal.  There is thoracic spondylosis. .      1. Slight irregularity involving the left sixth, seventh and eighth ribs laterally and right seventh rib laterally, please correlate clinically. 2. The remaining ribs are intact. 3. There is no pneumothorax. 4. There are small calcified granulomata in the right lower lobe and left upper lobe. . **This report has been created using voice recognition software. It may contain minor errors which are inherent in voice recognition technology. ** Final report electronically signed by DR Roxanna Tan on 3/15/2022 12:35 PM    CT Head WO Contrast    Result Date: 3/15/2022  PROCEDURE: CT HEAD WO CONTRAST CLINICAL INFORMATION: fall. COMPARISON: No prior study. TECHNIQUE: Noncontrast 5 mm axial images were obtained through the brain. Sagittal and coronal reconstructions were obtained. All CT scans at this facility use dose modulation, iterative reconstruction, and/or weight-based dosing when appropriate to reduce radiation dose to as low as reasonably achievable. FINDINGS: There is mild atrophy There is no hemorrhage. There are no intra-or extra-axial collections. There is no hydrocephalus, midline shift or mass effect. The gray-white matter differentiation is preserved. There are inflammatory changes involving the left frontal sinus, ethmoid and maxillary sinuses bilaterally. There is slight deformity of the right maxillary sinus posterolaterally.   The remaining paranasal sinuses and mastoid air cells are normally aerated. There is no suspicious calvarial abnormality. 1. Mild atrophy. 2. Inflammatory changes involving the left frontal sinus, ethmoid and maxillary sinuses bilaterally. 3. Slight deformity of the right maxillary sinus posterolaterally. 4. Otherwise negative noncontrast CT scan of the brain. **This report has been created using voice recognition software. It may contain minor errors which are inherent in voice recognition technology. ** Final report electronically signed by DR Ladonna Perez on 3/15/2022 12:47 PM    CT Cervical Spine WO Contrast    Result Date: 3/15/2022  PROCEDURE: CT CERVICAL SPINE WO CONTRAST CLINICAL INFORMATION: fall. COMPARISON: No prior study. TECHNIQUE: 3 mm noncontrast axial images were obtained through the cervical spine with sagittal and coronal reconstructions. All CT scans at this facility use dose modulation, iterative reconstruction, and/or weight-based dosing when appropriate to reduce radiation dose to as low as reasonably achievable. FINDINGS: The cervical vertebral bodies are normally aligned. There is no fracture. There is no prevertebral soft tissue swelling. There is cervical spondylosis especially at C5-6 and C6-7. At C1-2, there is normal alignment of the dens relative to anterior arch of C1. At C2-3, there is no disc herniation, canal or foraminal stenosis. At C3-4, there is no disc herniation, canal or foraminal stenosis. At C4-5, there is no disc herniation, canal or foraminal stenosis. At C5-6, there is a 1.2 mm ventral extradural defect secondary to bulging disc and uncinate process hypertrophy. This causes mild canal and mild-to-moderate bilateral foraminal stenosis. At C6-7, there is 1.4 mm ventral extradural defect secondary to bulging disc and uncinate process hypertrophy. This causes mild canal, moderate right and mild-to-moderate left-sided foraminal stenosis.  At C7-T1, there is no disc herniation, canal or foraminal stenosis. There is bilateral carotid artery calcification. .  There are no suspicious findings in the lung apices. 1. Cervical spondylosis especially at C5-6 and C6-7. 2. No acute fracture noted. 3. Bilateral carotid artery calcification. 4. Otherwise negative CT scan of the cervical spine. . **This report has been created using voice recognition software. It may contain minor errors which are inherent in voice recognition technology. ** Final report electronically signed by DR Abraham Monzon on 3/15/2022 12:52 PM          Zahraa Thompson MD   PGY2, Internal Medicine   3/25/2022

## 2022-03-26 LAB
GLUCOSE BLD-MCNC: 134 MG/DL (ref 70–108)
GLUCOSE BLD-MCNC: 162 MG/DL (ref 70–108)
GLUCOSE BLD-MCNC: 167 MG/DL (ref 70–108)
GLUCOSE BLD-MCNC: 178 MG/DL (ref 70–108)
GLUCOSE BLD-MCNC: 65 MG/DL (ref 70–108)

## 2022-03-26 PROCEDURE — 6370000000 HC RX 637 (ALT 250 FOR IP): Performed by: STUDENT IN AN ORGANIZED HEALTH CARE EDUCATION/TRAINING PROGRAM

## 2022-03-26 PROCEDURE — 94760 N-INVAS EAR/PLS OXIMETRY 1: CPT

## 2022-03-26 PROCEDURE — 6360000002 HC RX W HCPCS: Performed by: PHYSICIAN ASSISTANT

## 2022-03-26 PROCEDURE — 6370000000 HC RX 637 (ALT 250 FOR IP): Performed by: PHYSICIAN ASSISTANT

## 2022-03-26 PROCEDURE — 6370000000 HC RX 637 (ALT 250 FOR IP): Performed by: INTERNAL MEDICINE

## 2022-03-26 PROCEDURE — 82948 REAGENT STRIP/BLOOD GLUCOSE: CPT

## 2022-03-26 PROCEDURE — 1200000003 HC TELEMETRY R&B

## 2022-03-26 PROCEDURE — 6370000000 HC RX 637 (ALT 250 FOR IP): Performed by: NURSE PRACTITIONER

## 2022-03-26 PROCEDURE — 2580000003 HC RX 258: Performed by: PHYSICIAN ASSISTANT

## 2022-03-26 PROCEDURE — 99233 SBSQ HOSP IP/OBS HIGH 50: CPT | Performed by: INTERNAL MEDICINE

## 2022-03-26 RX ORDER — SODIUM CHLORIDE 1000 MG
1 TABLET, SOLUBLE MISCELLANEOUS
Status: DISCONTINUED | OUTPATIENT
Start: 2022-03-26 | End: 2022-03-26

## 2022-03-26 RX ORDER — FLUDROCORTISONE ACETATE 0.1 MG/1
0.1 TABLET ORAL DAILY
Qty: 30 TABLET | Refills: 0 | Status: SHIPPED | OUTPATIENT
Start: 2022-03-27 | End: 2022-03-28

## 2022-03-26 RX ORDER — SODIUM CHLORIDE 1000 MG
1 TABLET, SOLUBLE MISCELLANEOUS
Status: DISCONTINUED | OUTPATIENT
Start: 2022-03-26 | End: 2022-03-28 | Stop reason: HOSPADM

## 2022-03-26 RX ORDER — MIDODRINE HYDROCHLORIDE 5 MG/1
5 TABLET ORAL
Qty: 90 TABLET | Refills: 0 | Status: SHIPPED | OUTPATIENT
Start: 2022-03-26 | End: 2022-03-28 | Stop reason: HOSPADM

## 2022-03-26 RX ORDER — SODIUM CHLORIDE 1000 MG
1 TABLET, SOLUBLE MISCELLANEOUS
Qty: 90 TABLET | Refills: 3 | Status: SHIPPED | OUTPATIENT
Start: 2022-03-26

## 2022-03-26 RX ORDER — INSULIN GLARGINE 100 [IU]/ML
35 INJECTION, SOLUTION SUBCUTANEOUS NIGHTLY
Qty: 1 PEN | Refills: 0 | Status: SHIPPED | OUTPATIENT
Start: 2022-03-26

## 2022-03-26 RX ADMIN — PANTOPRAZOLE SODIUM 40 MG: 40 TABLET, DELAYED RELEASE ORAL at 05:27

## 2022-03-26 RX ADMIN — METOPROLOL TARTRATE 25 MG: 25 TABLET, FILM COATED ORAL at 20:13

## 2022-03-26 RX ADMIN — MIDODRINE HYDROCHLORIDE 5 MG: 2.5 TABLET ORAL at 11:41

## 2022-03-26 RX ADMIN — FLUDROCORTISONE ACETATE 0.1 MG: 0.1 TABLET ORAL at 08:18

## 2022-03-26 RX ADMIN — GEMFIBROZIL 600 MG: 600 TABLET ORAL at 17:00

## 2022-03-26 RX ADMIN — ENOXAPARIN SODIUM 40 MG: 100 INJECTION SUBCUTANEOUS at 08:18

## 2022-03-26 RX ADMIN — GABAPENTIN 300 MG: 300 CAPSULE ORAL at 20:13

## 2022-03-26 RX ADMIN — INSULIN LISPRO 1 UNITS: 100 INJECTION, SOLUTION INTRAVENOUS; SUBCUTANEOUS at 11:29

## 2022-03-26 RX ADMIN — ATORVASTATIN CALCIUM 20 MG: 20 TABLET, FILM COATED ORAL at 20:13

## 2022-03-26 RX ADMIN — ACETAMINOPHEN 650 MG: 325 TABLET ORAL at 20:13

## 2022-03-26 RX ADMIN — SODIUM CHLORIDE, PRESERVATIVE FREE 10 ML: 5 INJECTION INTRAVENOUS at 08:19

## 2022-03-26 RX ADMIN — ASPIRIN 81 MG: 81 TABLET, COATED ORAL at 08:18

## 2022-03-26 RX ADMIN — SODIUM CHLORIDE 1 G: 1 TABLET ORAL at 14:38

## 2022-03-26 RX ADMIN — SODIUM CHLORIDE 1 G: 1 TABLET ORAL at 17:00

## 2022-03-26 RX ADMIN — MIDODRINE HYDROCHLORIDE 5 MG: 2.5 TABLET ORAL at 17:00

## 2022-03-26 RX ADMIN — GEMFIBROZIL 600 MG: 600 TABLET ORAL at 06:31

## 2022-03-26 RX ADMIN — MIDODRINE HYDROCHLORIDE 5 MG: 2.5 TABLET ORAL at 08:18

## 2022-03-26 ASSESSMENT — PAIN DESCRIPTION - LOCATION
LOCATION: RIB CAGE
LOCATION: RIB CAGE

## 2022-03-26 ASSESSMENT — PAIN SCALES - GENERAL
PAINLEVEL_OUTOF10: 0
PAINLEVEL_OUTOF10: 5
PAINLEVEL_OUTOF10: 6
PAINLEVEL_OUTOF10: 5

## 2022-03-26 ASSESSMENT — PAIN DESCRIPTION - PAIN TYPE
TYPE: ACUTE PAIN
TYPE: ACUTE PAIN

## 2022-03-26 ASSESSMENT — PAIN DESCRIPTION - DESCRIPTORS: DESCRIPTORS: DISCOMFORT

## 2022-03-26 NOTE — PLAN OF CARE
Problem: Falls - Risk of:  Goal: Will remain free from falls  Description: Will remain free from falls  3/26/2022 0408 by Ashtyn Corona RN  Outcome: Ongoing  Note: Fall protocols in place     Problem: Pain:  Goal: Pain level will decrease  Description: Pain level will decrease  3/26/2022 0408 by Ashtyn Corona RN  Outcome: Ongoing  Note: No complaints of pain this shift

## 2022-03-26 NOTE — PROGRESS NOTES
Hospitalist Progress Note      Patient:  Thierno Garcia    Unit/Bed:6K-15/015-A  YOB: 1960  MRN: [id number]   Acct: [de-identified]   PCP: KATHRYN Hannon - NP  Date of Admission: 3/15/2022    Assessment/Plan:  Syncope: due to orthostatic hypotension. Persistently orthostatics positive. Initially thought to be due to dehydration in the setting of dysphagia/odynophafis and reduced PO intake. S/p IVF therapy including multiple boluses, continues to be orthostatic. Unclear etiology- patient has no history of known causes of postural hypotension. ECHO EF 65% with grade 1 DD (03/2022). No known history of arrhythmias but will consider a Cardiac monitor on discharge.    -On modified dysphagia diet per MBS 3/17.   -On midodrine 15 mg TID and Lopressor 25 mg BID on 3/21. Fall precautions. Stress test 3/23 per Cardiology consult. Saúl hose daily. Florinef and salt tablets added. Continue to monitor. Essential HTN: controlled. Discontinued Norvasc (may cause orthostatic hypotension) and HCTZ. Lopressor as above. IDDMII: HbA1c 8.3 on 3/16/2022. On Lantus 15D nightly, Trulicity, and Amaryl. PO meds held at this time. On Lantus (decreased to 35 u) nightly with SSI. Monitor BG with inpatient goal 140-180. 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 associated dysphagia and mild odynophagia present. Follows with Dr. Stefani Squires On modified diet. ENT to follow up outpatient. Tele:   [x] yes             [] no    Code Status: Full Code    Fluids:  none  Diet:  ADULT DIET; Regular; 5 carb choices (75 gm/meal); Mildly Thick (Nectar); No Drinking Straws  ADULT ORAL NUTRITION SUPPLEMENT; Breakfast, Lunch, Dinner;  Other Oral Supplement; Vanilla Greek Yogurt    DVT prophylaxis: [x] Lovenox                                 [] SCDs                                 [] SQ Heparin [] Encourage ambulation                                 [] Already on Anticoagulation    Disposition:      [] TBD                             [x] Home - likely tomorrow if stays stable                             [] TCU                             [] Rehab                             [] Psych                             [] SNF                             [] [place]                             [] Other-     Chief Complaint: loss of consciousness    Hospital Course:   58 yo M with history of Keratinizing SCC of larynx (diagnosed 08/2021), HTN, T2DM, Arthritis presented after an episode of syncope while going home from work due to associated dizziness/lightheadedness the day of presentation. Had associated trauma to right chest area from fall. ED course: Presented afebrile and hemodynamically stable. Labs generally unremarkable. WBC 17. 4. XR ribs showed slight irregularity involving the left 6th, 7th, and 8th ribs laterally and right 7th rib laterally. No pneumothorax. CT head wo contrast with inflammatory changes involving the left frontal sinus, ethmoid, and maxillary sinuses bilaterally, slight deformity of the right maxillary sinus posterolaterally. CT cervical spondylosis especially at C5-6 and C6-7. Admitted for further work up and management. Of note, has had issues with persistent cough and swallowing since radiation and lost his voice. Has not been able to take PO diabetic meds due to dysphagia so has had suboptimal control of BG. Has no known history of arrhythmias. No known cardiac history. Stress test 3/23 negative for ischemia. Interval:   No acute events overnight. Continues to be orthostatic positive. Subjective:   Reports feeling dizzy this morning when tried to get up. Otherwise no new complaints. Encouraged to wear cindy hose daily and be cautious with ambulation. Continues to deny home health services. States he has enough support at home.  Ok to go home today if improvement noted later in the day. ROS (12 point review of systems completed. Pertinent positives noted. Otherwise ROS is negative)     Medications:  Reviewed    Infusion Medications    sodium chloride      dextrose      dextrose      Or    dextrose       Scheduled Medications    sodium chloride  1 g Oral TID WC    fludrocortisone  0.1 mg Oral Daily    insulin glargine  35 Units SubCUTAneous Nightly    insulin lispro  0-6 Units SubCUTAneous TID WC    insulin lispro  0-3 Units SubCUTAneous Nightly    midodrine  5 mg Oral TID WC    metoprolol tartrate  25 mg Oral BID    lidocaine  1 patch TransDERmal Daily    aspirin  81 mg Oral Daily    atorvastatin  20 mg Oral Nightly    gabapentin  300 mg Oral TID    gemfibrozil  600 mg Oral BID AC    pantoprazole  40 mg Oral QAM AC    sodium chloride flush  10 mL IntraVENous 2 times per day    enoxaparin  40 mg SubCUTAneous Daily     PRN Meds: regadenoson, melatonin, menthol, [Held by provider] prochlorperazine, sodium chloride, ondansetron **OR** ondansetron, polyethylene glycol, acetaminophen **OR** acetaminophen, potassium chloride **OR** potassium alternative oral replacement **OR** potassium chloride, magnesium sulfate, glucagon (rDNA), dextrose, glucose, dextrose **OR** dextrose      Intake/Output Summary (Last 24 hours) at 3/26/2022 1431  Last data filed at 3/26/2022 0813  Gross per 24 hour   Intake 460 ml   Output 1450 ml   Net -990 ml       Exam:  /68   Pulse 74   Temp 99.2 °F (37.3 °C) (Oral)   Resp 16   Ht 5' 11\" (1.803 m)   Wt 155 lb 3.2 oz (70.4 kg)   SpO2 95%   BMI 21.65 kg/m²   General appearance: No apparent distress, appears stated age and cooperative. Thin and acutely ill appearing. HEENT: Conjunctivae/corneas clear. Muffled voice. Neck: Supple, with full range of motion. No jugular venous distention. Respiratory: Normal respiratory effort. Clear to auscultation, bilaterally without Rales/Wheezes/Rhonchi.   Cardiovascular: Regular rate and rhythm with normal S1/S2 without murmurs, rubs or gallops. Abdomen: Soft, non-tender, non-distended with normal bowel sounds. Musculoskeletal: passive and active ROM x 4 extremities. Skin: Skin color, texture, turgor normal.  No rashes or lesions. Neurologic: Neurovascularly intact without any focal sensory/motor deficits. Cranial nerves: II-XII intact, grossly non-focal.  Psychiatric: Alert and oriented, thought content appropriate, normal insight  Capillary Refill: Brisk,< 3 seconds   Peripheral Pulses: +2 palpable, equal bilaterally     Labs:   No results for input(s): WBC, HGB, HCT, PLT in the last 72 hours. Recent Labs     03/25/22  0543      K 3.8      CO2 25   BUN 12   CREATININE 0.5   CALCIUM 9.2     No results for input(s): AST, ALT, BILIDIR, BILITOT, ALKPHOS in the last 72 hours. No results for input(s): INR in the last 72 hours. No results for input(s): Chris Bradener in the last 72 hours. Microbiology:    Blood culture #1: No results found for: BC    Blood culture #2:No results found for: Lanrebecca Kearns    Organism:No results found for: ORG    No results found for: LABGRAM    MRSA culture only:No results found for: [facility]    Urine culture:   Lab Results   Component Value Date    LABURIN 100 mg 09/23/2014       Respiratory culture: No results found for: CULTRESP    Aerobic and Anaerobic :  No results found for: LABAERO  No results found for: LABANAE    Urinalysis:      Lab Results   Component Value Date    NITRU NEGATIVE 05/28/2015    WBCUA 0-2 05/28/2015    BACTERIA NONE 05/28/2015    RBCUA 0-2 05/28/2015    BLOODU NEGATIVE 05/28/2015    GLUCOSEU >= 1000 05/28/2015       Radiology:  FL MODIFIED BARIUM SWALLOW W VIDEO   Final Result   1. Laryngeal penetration of honey thick, nectar thick, thin, soft and solid barium with aspiration of honey thick and thin barium. 2. Additional recommendations from the speech therapist will follow.             **This report has been created using voice recognition software. It may contain minor errors which are inherent in voice recognition technology. **         Final report electronically signed by Dr. Roc Patel on 3/17/2022 10:51 AM      CT Head WO Contrast   Final Result      1. Mild atrophy. 2. Inflammatory changes involving the left frontal sinus, ethmoid and maxillary sinuses bilaterally. 3. Slight deformity of the right maxillary sinus posterolaterally. 4. Otherwise negative noncontrast CT scan of the brain. **This report has been created using voice recognition software. It may contain minor errors which are inherent in voice recognition technology. **      Final report electronically signed by DR Francia Severin on 3/15/2022 12:47 PM      CT Cervical Spine WO Contrast   Final Result       1. Cervical spondylosis especially at C5-6 and C6-7.   2. No acute fracture noted. 3. Bilateral carotid artery calcification. 4. Otherwise negative CT scan of the cervical spine. .               **This report has been created using voice recognition software. It may contain minor errors which are inherent in voice recognition technology. **      Final report electronically signed by DR Francia Severin on 3/15/2022 12:52 PM      XR RIBS BILATERAL (MIN 4 VIEWS)   Final Result       1. Slight irregularity involving the left sixth, seventh and eighth ribs laterally and right seventh rib laterally, please correlate clinically. 2. The remaining ribs are intact. 3. There is no pneumothorax. 4. There are small calcified granulomata in the right lower lobe and left upper lobe. .               **This report has been created using voice recognition software. It may contain minor errors which are inherent in voice recognition technology. **      Final report electronically signed by DR Francia Severin on 3/15/2022 12:35 PM      NM MYOCARDIAL SPECT REST EXERCISE OR RX    (Results Pending)     XR RIBS BILATERAL (MIN 4 VIEWS)    Result Date: 3/15/2022  PROCEDURE: XR RIBS BILATERAL (MIN 4 VIEWS) CLINICAL INFORMATION: Fall, rib pain. COMPARISON: Chest x-ray dated 14 August 2018. Laurence Kim TECHNIQUE: Two views of the bilateral ribs were obtained. A PA upright view of the chest was also obtained. FINDINGS: There is slight irregularity involving the right seventh rib laterally, left sixth, seventh and eighth ribs laterally laterally, please correlate clinically. The remaining ribs are intact. There is no displaced fracture. There is no pneumothorax. The heart size is normal.  The mediastinum is not widened. There are no pulmonary infiltrates or effusions. There are small calcified granulomata in the right lower lobe and left upper lobe. The pulmonary vascularity is normal.  There is thoracic spondylosis. .      1. Slight irregularity involving the left sixth, seventh and eighth ribs laterally and right seventh rib laterally, please correlate clinically. 2. The remaining ribs are intact. 3. There is no pneumothorax. 4. There are small calcified granulomata in the right lower lobe and left upper lobe. . **This report has been created using voice recognition software. It may contain minor errors which are inherent in voice recognition technology. ** Final report electronically signed by DR Char Ba on 3/15/2022 12:35 PM    CT Head WO Contrast    Result Date: 3/15/2022  PROCEDURE: CT HEAD WO CONTRAST CLINICAL INFORMATION: fall. COMPARISON: No prior study. TECHNIQUE: Noncontrast 5 mm axial images were obtained through the brain. Sagittal and coronal reconstructions were obtained. All CT scans at this facility use dose modulation, iterative reconstruction, and/or weight-based dosing when appropriate to reduce radiation dose to as low as reasonably achievable. FINDINGS: There is mild atrophy There is no hemorrhage. There are no intra-or extra-axial collections. There is no hydrocephalus, midline shift or mass effect. The gray-white matter differentiation is preserved. There are inflammatory changes involving the left frontal sinus, ethmoid and maxillary sinuses bilaterally. There is slight deformity of the right maxillary sinus posterolaterally. The remaining paranasal sinuses and mastoid air cells are normally aerated. There is no suspicious calvarial abnormality. 1. Mild atrophy. 2. Inflammatory changes involving the left frontal sinus, ethmoid and maxillary sinuses bilaterally. 3. Slight deformity of the right maxillary sinus posterolaterally. 4. Otherwise negative noncontrast CT scan of the brain. **This report has been created using voice recognition software. It may contain minor errors which are inherent in voice recognition technology. ** Final report electronically signed by DR Lacey Dorman on 3/15/2022 12:47 PM    CT Cervical Spine WO Contrast    Result Date: 3/15/2022  PROCEDURE: CT CERVICAL SPINE WO CONTRAST CLINICAL INFORMATION: fall. COMPARISON: No prior study. TECHNIQUE: 3 mm noncontrast axial images were obtained through the cervical spine with sagittal and coronal reconstructions. All CT scans at this facility use dose modulation, iterative reconstruction, and/or weight-based dosing when appropriate to reduce radiation dose to as low as reasonably achievable. FINDINGS: The cervical vertebral bodies are normally aligned. There is no fracture. There is no prevertebral soft tissue swelling. There is cervical spondylosis especially at C5-6 and C6-7. At C1-2, there is normal alignment of the dens relative to anterior arch of C1. At C2-3, there is no disc herniation, canal or foraminal stenosis. At C3-4, there is no disc herniation, canal or foraminal stenosis. At C4-5, there is no disc herniation, canal or foraminal stenosis. At C5-6, there is a 1.2 mm ventral extradural defect secondary to bulging disc and uncinate process hypertrophy. This causes mild canal and mild-to-moderate bilateral foraminal stenosis.  At C6-7, there is 1.4 mm ventral extradural defect secondary to bulging disc and uncinate process hypertrophy. This causes mild canal, moderate right and mild-to-moderate left-sided foraminal stenosis. At C7-T1, there is no disc herniation, canal or foraminal stenosis. There is bilateral carotid artery calcification. .  There are no suspicious findings in the lung apices. 1. Cervical spondylosis especially at C5-6 and C6-7. 2. No acute fracture noted. 3. Bilateral carotid artery calcification. 4. Otherwise negative CT scan of the cervical spine. . **This report has been created using voice recognition software. It may contain minor errors which are inherent in voice recognition technology. ** Final report electronically signed by DR Crystal Snow on 3/15/2022 12:52 PM          Emily Hair MD   PGY2, Internal Medicine   3/26/2022

## 2022-03-27 LAB
GLUCOSE BLD-MCNC: 123 MG/DL (ref 70–108)
GLUCOSE BLD-MCNC: 146 MG/DL (ref 70–108)
GLUCOSE BLD-MCNC: 158 MG/DL (ref 70–108)
GLUCOSE BLD-MCNC: 207 MG/DL (ref 70–108)

## 2022-03-27 PROCEDURE — 82948 REAGENT STRIP/BLOOD GLUCOSE: CPT

## 2022-03-27 PROCEDURE — 2580000003 HC RX 258: Performed by: INTERNAL MEDICINE

## 2022-03-27 PROCEDURE — 6370000000 HC RX 637 (ALT 250 FOR IP): Performed by: STUDENT IN AN ORGANIZED HEALTH CARE EDUCATION/TRAINING PROGRAM

## 2022-03-27 PROCEDURE — 6370000000 HC RX 637 (ALT 250 FOR IP): Performed by: INTERNAL MEDICINE

## 2022-03-27 PROCEDURE — 99232 SBSQ HOSP IP/OBS MODERATE 35: CPT | Performed by: INTERNAL MEDICINE

## 2022-03-27 PROCEDURE — 6370000000 HC RX 637 (ALT 250 FOR IP): Performed by: NURSE PRACTITIONER

## 2022-03-27 PROCEDURE — 6360000002 HC RX W HCPCS: Performed by: PHYSICIAN ASSISTANT

## 2022-03-27 PROCEDURE — 2580000003 HC RX 258: Performed by: PHYSICIAN ASSISTANT

## 2022-03-27 PROCEDURE — 1200000003 HC TELEMETRY R&B

## 2022-03-27 PROCEDURE — 6370000000 HC RX 637 (ALT 250 FOR IP): Performed by: PHYSICIAN ASSISTANT

## 2022-03-27 RX ORDER — MIDODRINE HYDROCHLORIDE 10 MG/1
10 TABLET ORAL
Status: DISCONTINUED | OUTPATIENT
Start: 2022-03-27 | End: 2022-03-28 | Stop reason: HOSPADM

## 2022-03-27 RX ORDER — 0.9 % SODIUM CHLORIDE 0.9 %
500 INTRAVENOUS SOLUTION INTRAVENOUS ONCE
Status: COMPLETED | OUTPATIENT
Start: 2022-03-27 | End: 2022-03-27

## 2022-03-27 RX ADMIN — MIDODRINE HYDROCHLORIDE 10 MG: 10 TABLET ORAL at 11:43

## 2022-03-27 RX ADMIN — ASPIRIN 81 MG: 81 TABLET, COATED ORAL at 08:10

## 2022-03-27 RX ADMIN — SODIUM CHLORIDE 500 ML: 9 INJECTION, SOLUTION INTRAVENOUS at 11:40

## 2022-03-27 RX ADMIN — Medication 4.5 MG: at 20:58

## 2022-03-27 RX ADMIN — INSULIN GLARGINE 35 UNITS: 100 INJECTION, SOLUTION SUBCUTANEOUS at 20:59

## 2022-03-27 RX ADMIN — METOPROLOL TARTRATE 25 MG: 25 TABLET, FILM COATED ORAL at 20:58

## 2022-03-27 RX ADMIN — SODIUM CHLORIDE 1 G: 1 TABLET ORAL at 16:06

## 2022-03-27 RX ADMIN — GEMFIBROZIL 600 MG: 600 TABLET ORAL at 16:06

## 2022-03-27 RX ADMIN — MIDODRINE HYDROCHLORIDE 5 MG: 2.5 TABLET ORAL at 08:10

## 2022-03-27 RX ADMIN — FLUDROCORTISONE ACETATE 0.1 MG: 0.1 TABLET ORAL at 08:09

## 2022-03-27 RX ADMIN — SODIUM CHLORIDE 1 G: 1 TABLET ORAL at 08:10

## 2022-03-27 RX ADMIN — ATORVASTATIN CALCIUM 20 MG: 20 TABLET, FILM COATED ORAL at 20:58

## 2022-03-27 RX ADMIN — SODIUM CHLORIDE 1 G: 1 TABLET ORAL at 11:44

## 2022-03-27 RX ADMIN — INSULIN LISPRO 1 UNITS: 100 INJECTION, SOLUTION INTRAVENOUS; SUBCUTANEOUS at 20:58

## 2022-03-27 RX ADMIN — ACETAMINOPHEN 650 MG: 325 TABLET ORAL at 13:51

## 2022-03-27 RX ADMIN — MIDODRINE HYDROCHLORIDE 10 MG: 10 TABLET ORAL at 16:06

## 2022-03-27 RX ADMIN — SODIUM CHLORIDE, PRESERVATIVE FREE 10 ML: 5 INJECTION INTRAVENOUS at 21:01

## 2022-03-27 RX ADMIN — ENOXAPARIN SODIUM 40 MG: 100 INJECTION SUBCUTANEOUS at 08:11

## 2022-03-27 RX ADMIN — GABAPENTIN 300 MG: 300 CAPSULE ORAL at 20:58

## 2022-03-27 RX ADMIN — GEMFIBROZIL 600 MG: 600 TABLET ORAL at 06:45

## 2022-03-27 RX ADMIN — PANTOPRAZOLE SODIUM 40 MG: 40 TABLET, DELAYED RELEASE ORAL at 06:45

## 2022-03-27 ASSESSMENT — PAIN DESCRIPTION - PAIN TYPE
TYPE: ACUTE PAIN
TYPE: ACUTE PAIN

## 2022-03-27 ASSESSMENT — PAIN DESCRIPTION - LOCATION
LOCATION: RIB CAGE
LOCATION: RIB CAGE

## 2022-03-27 ASSESSMENT — PAIN DESCRIPTION - FREQUENCY: FREQUENCY: CONTINUOUS

## 2022-03-27 ASSESSMENT — PAIN DESCRIPTION - PROGRESSION: CLINICAL_PROGRESSION: NOT CHANGED

## 2022-03-27 ASSESSMENT — PAIN - FUNCTIONAL ASSESSMENT: PAIN_FUNCTIONAL_ASSESSMENT: ACTIVITIES ARE NOT PREVENTED

## 2022-03-27 ASSESSMENT — PAIN DESCRIPTION - ONSET: ONSET: ON-GOING

## 2022-03-27 ASSESSMENT — PAIN DESCRIPTION - DESCRIPTORS: DESCRIPTORS: DISCOMFORT

## 2022-03-27 ASSESSMENT — PAIN DESCRIPTION - ORIENTATION
ORIENTATION: RIGHT
ORIENTATION: RIGHT

## 2022-03-27 ASSESSMENT — PAIN SCALES - GENERAL
PAINLEVEL_OUTOF10: 5
PAINLEVEL_OUTOF10: 5

## 2022-03-27 NOTE — PROGRESS NOTES
Hospitalist Progress Note      Patient:  Ketty Peguero    Unit/Bed:6K-15/015-A  YOB: 1960  MRN: [id number]   Acct: [de-identified]   PCP: KATHRYN Whitten NP  Date of Admission: 3/15/2022    Assessment/Plan:  Syncope: due to orthostatic hypotension. Persistently orthostatics positive. Initially thought to be due to dehydration in the setting of dysphagia/odynophafis and reduced PO intake. S/p IVF therapy including multiple boluses, continues to be orthostatic. Unclear etiology- patient has no history of known causes of postural hypotension. ECHO EF 65% with grade 1 DD (03/2022). No known history of arrhythmias but will consider a Cardiac monitor on discharge.    -On modified dysphagia diet per MBS 3/17.   -On midodrine 5 mg TID and Lopressor 25 mg BID on 3/21. Fall precautions. Stress test 3/23 per Cardiology consult. Saúl hose daily. Florinef and salt tablets added. Continue to monitor. 3.27.2022 Patient is asymptomatic currently with orthostatic hypotension-will dc home risk and benefits explained at length to the patient. Addendum became orthostatic will increase midodrine, fluid bolus    Essential HTN: controlled. Discontinued Norvasc (may cause orthostatic hypotension) and HCTZ. Lopressor as above. IDDMII: HbA1c 8.3 on 3/16/2022. On Lantus 47O nightly, Trulicity, and Amaryl. PO meds held at this time. On Lantus (decreased to 35 u) nightly with SSI. Monitor BG with inpatient goal 140-180. 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 associated dysphagia and mild odynophagia present. Follows with Dr. Almaz Simon On modified diet. ENT to follow up outpatient. Tele:   [x] yes             [] no    Code Status: Full Code    Fluids:  none  Diet:  ADULT DIET; Regular; 5 carb choices (75 gm/meal); Mildly Thick (Nectar);  No Drinking Straws  ADULT ORAL NUTRITION SUPPLEMENT; Breakfast, Lunch, Dinner; Other Oral Supplement; Vanilla Greek Yogurt    DVT prophylaxis: [x] Lovenox                                 [] SCDs                                 [] SQ Heparin                                 [] Encourage ambulation                                 [] Already on Anticoagulation    Disposition:      [] TBD                             [x] Home - likely tomorrow if stays stable                             [] TCU                             [] Rehab                             [] Psych                             [] SNF                             [] [place]                             [] Other-     Chief Complaint: loss of consciousness    Hospital Course:   56 yo M with history of Keratinizing SCC of larynx (diagnosed 08/2021), HTN, T2DM, Arthritis presented after an episode of syncope while going home from work due to associated dizziness/lightheadedness the day of presentation. Had associated trauma to right chest area from fall. ED course: Presented afebrile and hemodynamically stable. Labs generally unremarkable. WBC 17. 4. XR ribs showed slight irregularity involving the left 6th, 7th, and 8th ribs laterally and right 7th rib laterally. No pneumothorax. CT head wo contrast with inflammatory changes involving the left frontal sinus, ethmoid, and maxillary sinuses bilaterally, slight deformity of the right maxillary sinus posterolaterally. CT cervical spondylosis especially at C5-6 and C6-7. Admitted for further work up and management. Of note, has had issues with persistent cough and swallowing since radiation and lost his voice. Has not been able to take PO diabetic meds due to dysphagia so has had suboptimal control of BG. Has no known history of arrhythmias. No known cardiac history. Stress test 3/23 negative for ischemia. Interval:   No acute events overnight. Continues to be orthostatic positive.     Subjective:   Reports feeling dizzy this morning when tried to get up. Otherwise no new complaints. Encouraged to wear cindy hose daily and be cautious with ambulation. Continues to deny home health services. States he has enough support at home. Ok to go home today if improvement noted later in the day. ROS (12 point review of systems completed. Pertinent positives noted. Otherwise ROS is negative)     Medications:  Reviewed    Infusion Medications    sodium chloride      dextrose      dextrose      Or    dextrose       Scheduled Medications    sodium chloride  1 g Oral TID WC    fludrocortisone  0.1 mg Oral Daily    insulin glargine  35 Units SubCUTAneous Nightly    insulin lispro  0-6 Units SubCUTAneous TID WC    insulin lispro  0-3 Units SubCUTAneous Nightly    midodrine  5 mg Oral TID WC    metoprolol tartrate  25 mg Oral BID    lidocaine  1 patch TransDERmal Daily    aspirin  81 mg Oral Daily    atorvastatin  20 mg Oral Nightly    gabapentin  300 mg Oral TID    gemfibrozil  600 mg Oral BID AC    pantoprazole  40 mg Oral QAM AC    sodium chloride flush  10 mL IntraVENous 2 times per day    enoxaparin  40 mg SubCUTAneous Daily     PRN Meds: regadenoson, melatonin, menthol, [Held by provider] prochlorperazine, sodium chloride, ondansetron **OR** ondansetron, polyethylene glycol, acetaminophen **OR** acetaminophen, potassium chloride **OR** potassium alternative oral replacement **OR** potassium chloride, magnesium sulfate, glucagon (rDNA), dextrose, glucose, dextrose **OR** dextrose      Intake/Output Summary (Last 24 hours) at 3/27/2022 0820  Last data filed at 3/27/2022 0334  Gross per 24 hour   Intake 250 ml   Output 775 ml   Net -525 ml       Exam:  /71   Pulse 68   Temp 98 °F (36.7 °C)   Resp 16   Ht 5' 11\" (1.803 m)   Wt 144 lb 6.4 oz (65.5 kg)   SpO2 97%   BMI 20.14 kg/m²   General appearance: No apparent distress, appears stated age and cooperative. Thin and acutely ill appearing. HEENT: Conjunctivae/corneas clear.  Muffled voice.  Neck: Supple, with full range of motion. No jugular venous distention. Respiratory: Normal respiratory effort. Clear to auscultation, bilaterally without Rales/Wheezes/Rhonchi. Cardiovascular: Regular rate and rhythm with normal S1/S2 without murmurs, rubs or gallops. Abdomen: Soft, non-tender, non-distended with normal bowel sounds. Musculoskeletal: passive and active ROM x 4 extremities. Skin: Skin color, texture, turgor normal.  No rashes or lesions. Neurologic: Neurovascularly intact without any focal sensory/motor deficits. Cranial nerves: II-XII intact, grossly non-focal.  Psychiatric: Alert and oriented, thought content appropriate, normal insight  Capillary Refill: Brisk,< 3 seconds   Peripheral Pulses: +2 palpable, equal bilaterally     Labs:   No results for input(s): WBC, HGB, HCT, PLT in the last 72 hours. Recent Labs     03/25/22  0543      K 3.8      CO2 25   BUN 12   CREATININE 0.5   CALCIUM 9.2     No results for input(s): AST, ALT, BILIDIR, BILITOT, ALKPHOS in the last 72 hours. No results for input(s): INR in the last 72 hours. No results for input(s): Victoria Divine in the last 72 hours. Microbiology:    Blood culture #1: No results found for: BC    Blood culture #2:No results found for: Brenita Ek    Organism:No results found for: ORG    No results found for: LABGRAM    MRSA culture only:No results found for: [facility]    Urine culture:   Lab Results   Component Value Date    LABURIN 100 mg 09/23/2014       Respiratory culture: No results found for: CULTRESP    Aerobic and Anaerobic :  No results found for: LABAERO  No results found for: LABANAE    Urinalysis:      Lab Results   Component Value Date    NITRU NEGATIVE 05/28/2015    WBCUA 0-2 05/28/2015    BACTERIA NONE 05/28/2015    RBCUA 0-2 05/28/2015    BLOODU NEGATIVE 05/28/2015    GLUCOSEU >= 1000 05/28/2015       Radiology:  FL MODIFIED BARIUM SWALLOW W VIDEO   Final Result   1.  Laryngeal penetration of honey thick, nectar thick, thin, soft and solid barium with aspiration of honey thick and thin barium. 2. Additional recommendations from the speech therapist will follow. **This report has been created using voice recognition software. It may contain minor errors which are inherent in voice recognition technology. **         Final report electronically signed by Dr. Roc Patel on 3/17/2022 10:51 AM      CT Head WO Contrast   Final Result      1. Mild atrophy. 2. Inflammatory changes involving the left frontal sinus, ethmoid and maxillary sinuses bilaterally. 3. Slight deformity of the right maxillary sinus posterolaterally. 4. Otherwise negative noncontrast CT scan of the brain. **This report has been created using voice recognition software. It may contain minor errors which are inherent in voice recognition technology. **      Final report electronically signed by DR Francia Severin on 3/15/2022 12:47 PM      CT Cervical Spine WO Contrast   Final Result       1. Cervical spondylosis especially at C5-6 and C6-7.   2. No acute fracture noted. 3. Bilateral carotid artery calcification. 4. Otherwise negative CT scan of the cervical spine. .               **This report has been created using voice recognition software. It may contain minor errors which are inherent in voice recognition technology. **      Final report electronically signed by DR Francia Severin on 3/15/2022 12:52 PM      XR RIBS BILATERAL (MIN 4 VIEWS)   Final Result       1. Slight irregularity involving the left sixth, seventh and eighth ribs laterally and right seventh rib laterally, please correlate clinically. 2. The remaining ribs are intact. 3. There is no pneumothorax. 4. There are small calcified granulomata in the right lower lobe and left upper lobe. .               **This report has been created using voice recognition software. It may contain minor errors which are inherent in voice recognition technology. ** Final report electronically signed by DR Ton Fiore on 3/15/2022 12:35 PM      NM MYOCARDIAL SPECT REST EXERCISE OR RX    (Results Pending)     XR RIBS BILATERAL (MIN 4 VIEWS)    Result Date: 3/15/2022  PROCEDURE: XR RIBS BILATERAL (MIN 4 VIEWS) CLINICAL INFORMATION: Fall, rib pain. COMPARISON: Chest x-ray dated 14 August 2018. [facility] TECHNIQUE: Two views of the bilateral ribs were obtained. A PA upright view of the chest was also obtained. FINDINGS: There is slight irregularity involving the right seventh rib laterally, left sixth, seventh and eighth ribs laterally laterally, please correlate clinically. The remaining ribs are intact. There is no displaced fracture. There is no pneumothorax. The heart size is normal.  The mediastinum is not widened. There are no pulmonary infiltrates or effusions. There are small calcified granulomata in the right lower lobe and left upper lobe. The pulmonary vascularity is normal.  There is thoracic spondylosis. .      1. Slight irregularity involving the left sixth, seventh and eighth ribs laterally and right seventh rib laterally, please correlate clinically. 2. The remaining ribs are intact. 3. There is no pneumothorax. 4. There are small calcified granulomata in the right lower lobe and left upper lobe. . **This report has been created using voice recognition software. It may contain minor errors which are inherent in voice recognition technology. ** Final report electronically signed by DR Ton Fiore on 3/15/2022 12:35 PM    CT Head WO Contrast    Result Date: 3/15/2022  PROCEDURE: CT HEAD WO CONTRAST CLINICAL INFORMATION: fall. COMPARISON: No prior study. TECHNIQUE: Noncontrast 5 mm axial images were obtained through the brain. Sagittal and coronal reconstructions were obtained. All CT scans at this facility use dose modulation, iterative reconstruction, and/or weight-based dosing when appropriate to reduce radiation dose to as low as reasonably achievable.  FINDINGS: There is mild atrophy There is no hemorrhage. There are no intra-or extra-axial collections. There is no hydrocephalus, midline shift or mass effect. The gray-white matter differentiation is preserved. There are inflammatory changes involving the left frontal sinus, ethmoid and maxillary sinuses bilaterally. There is slight deformity of the right maxillary sinus posterolaterally. The remaining paranasal sinuses and mastoid air cells are normally aerated. There is no suspicious calvarial abnormality. 1. Mild atrophy. 2. Inflammatory changes involving the left frontal sinus, ethmoid and maxillary sinuses bilaterally. 3. Slight deformity of the right maxillary sinus posterolaterally. 4. Otherwise negative noncontrast CT scan of the brain. **This report has been created using voice recognition software. It may contain minor errors which are inherent in voice recognition technology. ** Final report electronically signed by DR Manisha Henriquez on 3/15/2022 12:47 PM    CT Cervical Spine WO Contrast    Result Date: 3/15/2022  PROCEDURE: CT CERVICAL SPINE WO CONTRAST CLINICAL INFORMATION: fall. COMPARISON: No prior study. TECHNIQUE: 3 mm noncontrast axial images were obtained through the cervical spine with sagittal and coronal reconstructions. All CT scans at this facility use dose modulation, iterative reconstruction, and/or weight-based dosing when appropriate to reduce radiation dose to as low as reasonably achievable. FINDINGS: The cervical vertebral bodies are normally aligned. There is no fracture. There is no prevertebral soft tissue swelling. There is cervical spondylosis especially at C5-6 and C6-7. At C1-2, there is normal alignment of the dens relative to anterior arch of C1. At C2-3, there is no disc herniation, canal or foraminal stenosis. At C3-4, there is no disc herniation, canal or foraminal stenosis. At C4-5, there is no disc herniation, canal or foraminal stenosis.  At C5-6, there is a 1.2 mm ventral extradural defect secondary to bulging disc and uncinate process hypertrophy. This causes mild canal and mild-to-moderate bilateral foraminal stenosis. At C6-7, there is 1.4 mm ventral extradural defect secondary to bulging disc and uncinate process hypertrophy. This causes mild canal, moderate right and mild-to-moderate left-sided foraminal stenosis. At C7-T1, there is no disc herniation, canal or foraminal stenosis. There is bilateral carotid artery calcification. .  There are no suspicious findings in the lung apices. 1. Cervical spondylosis especially at C5-6 and C6-7. 2. No acute fracture noted. 3. Bilateral carotid artery calcification. 4. Otherwise negative CT scan of the cervical spine. . **This report has been created using voice recognition software. It may contain minor errors which are inherent in voice recognition technology. ** Final report electronically signed by DR Meghna Triplett on 3/15/2022 12:52 PM          Delores Reyes DO     Internal Medicine   3/27/2022

## 2022-03-27 NOTE — PLAN OF CARE
Care plan reviewed with patient family. Patient and family verbalize understanding of the plan of care and contribute to goal setting. Problem: Infection:  Goal: Will remain free from infection  Description: Will remain free from infection  Outcome: Ongoing  Note: VSS, no fevers or signs of infection     Problem: Safety:  Goal: Free from accidental physical injury  Description: Free from accidental physical injury  Outcome: Ongoing  Note: No falls noted this shift. Continue falling star program. Bed alarm on, bed in low position. Call light and personal belongings in reach. Patient uses call light appropriately. Goal: Free from intentional harm  Description: Free from intentional harm  Outcome: Ongoing  Note: No falls noted this shift. Continue falling star program. Bed alarm on, bed in low position. Call light and personal belongings in reach. Patient uses call light appropriately. Problem: Daily Care:  Goal: Daily care needs are met  Description: Daily care needs are met  Outcome: Ongoing  Note: Patient aware and updated on plan of care       Problem: Pain:  Goal: Patient's pain/discomfort is manageable  Description: Patient's pain/discomfort is manageable  Outcome: Ongoing  Note: Patient rates pain 5/10 in right rib cage, lidocaine patch provided.  Pain goal 0/10, rest and repositioned     Problem: Skin Integrity:  Goal: Skin integrity will stabilize  Description: Skin integrity will stabilize  Outcome: Ongoing  Note: No new signs or symptoms of skin breakdown noted this shift, encouraging patient to turn and reposition self in bed q2h       Problem: Discharge Planning:  Goal: Patients continuum of care needs are met  Description: Patients continuum of care needs are met  Outcome: Ongoing  Note: Purposefully hourly rounding

## 2022-03-28 VITALS
RESPIRATION RATE: 18 BRPM | WEIGHT: 144.4 LBS | HEART RATE: 66 BPM | OXYGEN SATURATION: 98 % | HEIGHT: 71 IN | BODY MASS INDEX: 20.22 KG/M2 | TEMPERATURE: 97.7 F | SYSTOLIC BLOOD PRESSURE: 146 MMHG | DIASTOLIC BLOOD PRESSURE: 79 MMHG

## 2022-03-28 LAB
ANION GAP SERPL CALCULATED.3IONS-SCNC: 9 MEQ/L (ref 8–16)
BUN BLDV-MCNC: 18 MG/DL (ref 7–22)
CALCIUM SERPL-MCNC: 9.8 MG/DL (ref 8.5–10.5)
CHLORIDE BLD-SCNC: 101 MEQ/L (ref 98–111)
CO2: 30 MEQ/L (ref 23–33)
CREAT SERPL-MCNC: 0.7 MG/DL (ref 0.4–1.2)
GFR SERPL CREATININE-BSD FRML MDRD: > 90 ML/MIN/1.73M2
GLUCOSE BLD-MCNC: 126 MG/DL (ref 70–108)
GLUCOSE BLD-MCNC: 169 MG/DL (ref 70–108)
GLUCOSE BLD-MCNC: 186 MG/DL (ref 70–108)
MAGNESIUM: 1.9 MG/DL (ref 1.6–2.4)
POTASSIUM SERPL-SCNC: 5.1 MEQ/L (ref 3.5–5.2)
SODIUM BLD-SCNC: 140 MEQ/L (ref 135–145)

## 2022-03-28 PROCEDURE — 2580000003 HC RX 258: Performed by: PHYSICIAN ASSISTANT

## 2022-03-28 PROCEDURE — 6360000002 HC RX W HCPCS: Performed by: PHYSICIAN ASSISTANT

## 2022-03-28 PROCEDURE — 6370000000 HC RX 637 (ALT 250 FOR IP): Performed by: STUDENT IN AN ORGANIZED HEALTH CARE EDUCATION/TRAINING PROGRAM

## 2022-03-28 PROCEDURE — 99239 HOSP IP/OBS DSCHRG MGMT >30: CPT | Performed by: INTERNAL MEDICINE

## 2022-03-28 PROCEDURE — 93270 REMOTE 30 DAY ECG REV/REPORT: CPT

## 2022-03-28 PROCEDURE — 83735 ASSAY OF MAGNESIUM: CPT

## 2022-03-28 PROCEDURE — 80048 BASIC METABOLIC PNL TOTAL CA: CPT

## 2022-03-28 PROCEDURE — 36415 COLL VENOUS BLD VENIPUNCTURE: CPT

## 2022-03-28 PROCEDURE — 6370000000 HC RX 637 (ALT 250 FOR IP): Performed by: PHYSICIAN ASSISTANT

## 2022-03-28 PROCEDURE — 82948 REAGENT STRIP/BLOOD GLUCOSE: CPT

## 2022-03-28 PROCEDURE — 6370000000 HC RX 637 (ALT 250 FOR IP): Performed by: INTERNAL MEDICINE

## 2022-03-28 RX ORDER — FLUDROCORTISONE ACETATE 0.1 MG/1
0.2 TABLET ORAL DAILY
Status: DISCONTINUED | OUTPATIENT
Start: 2022-03-29 | End: 2022-03-28 | Stop reason: HOSPADM

## 2022-03-28 RX ORDER — MIDODRINE HYDROCHLORIDE 10 MG/1
10 TABLET ORAL
Qty: 30 TABLET | Refills: 0 | Status: ON HOLD | OUTPATIENT
Start: 2022-03-28 | End: 2022-04-12 | Stop reason: HOSPADM

## 2022-03-28 RX ORDER — FLUDROCORTISONE ACETATE 0.1 MG/1
0.2 TABLET ORAL DAILY
Qty: 60 TABLET | Refills: 0 | Status: SHIPPED | OUTPATIENT
Start: 2022-03-28 | End: 2022-04-27

## 2022-03-28 RX ADMIN — ACETAMINOPHEN 650 MG: 325 TABLET ORAL at 11:30

## 2022-03-28 RX ADMIN — INSULIN LISPRO 1 UNITS: 100 INJECTION, SOLUTION INTRAVENOUS; SUBCUTANEOUS at 11:30

## 2022-03-28 RX ADMIN — MIDODRINE HYDROCHLORIDE 10 MG: 10 TABLET ORAL at 07:54

## 2022-03-28 RX ADMIN — MIDODRINE HYDROCHLORIDE 10 MG: 10 TABLET ORAL at 11:27

## 2022-03-28 RX ADMIN — GEMFIBROZIL 600 MG: 600 TABLET ORAL at 06:16

## 2022-03-28 RX ADMIN — PANTOPRAZOLE SODIUM 40 MG: 40 TABLET, DELAYED RELEASE ORAL at 06:16

## 2022-03-28 RX ADMIN — GABAPENTIN 300 MG: 300 CAPSULE ORAL at 07:54

## 2022-03-28 RX ADMIN — FLUDROCORTISONE ACETATE 0.1 MG: 0.1 TABLET ORAL at 07:54

## 2022-03-28 RX ADMIN — SODIUM CHLORIDE 1 G: 1 TABLET ORAL at 11:27

## 2022-03-28 RX ADMIN — SODIUM CHLORIDE 1 G: 1 TABLET ORAL at 07:56

## 2022-03-28 RX ADMIN — SODIUM CHLORIDE, PRESERVATIVE FREE 10 ML: 5 INJECTION INTRAVENOUS at 07:56

## 2022-03-28 RX ADMIN — ASPIRIN 81 MG: 81 TABLET, COATED ORAL at 07:54

## 2022-03-28 RX ADMIN — ENOXAPARIN SODIUM 40 MG: 100 INJECTION SUBCUTANEOUS at 07:55

## 2022-03-28 RX ADMIN — METOPROLOL TARTRATE 25 MG: 25 TABLET, FILM COATED ORAL at 07:55

## 2022-03-28 RX ADMIN — GABAPENTIN 300 MG: 300 CAPSULE ORAL at 14:30

## 2022-03-28 ASSESSMENT — PAIN SCALES - GENERAL
PAINLEVEL_OUTOF10: 0
PAINLEVEL_OUTOF10: 4

## 2022-03-28 NOTE — PROGRESS NOTES
CLINICAL PHARMACY: DISCHARGE MED RECONCILIATION/REVIEW    [facility] ([facility]) Select Patient?: No  Total # of Interventions Recommended: 4 - Updated Order #: 4   -   Total # Interventions Accepted: 4  Intervention Severity:   - Level 1 Intervention Present?: No   - Level 2 #: 0   - Level 3 #: 4   Time Spent (min): [facility] PharmD 3/28/2022 2:57 PM

## 2022-03-28 NOTE — PROCEDURES
The skin was prepped and a 30 day cardiac event monitor was applied. The patient was instructed on the documentation of symptoms and the purpose of the monitor as well as the things to avoid while wearing the monitor. The patient was instructed to remove and return the monitor on 04/27/2022.   The serial number of the monitor that was applied is [id number]

## 2022-03-28 NOTE — PROGRESS NOTES
Discharge teaching and instructions for diagnosis/procedure of orthostatic hypotension completed with patient using teachback method. AVS reviewed. Printed prescriptions given to patient. Patient voiced understanding regarding prescriptions, follow up appointments, and care of self at home. Discharged in a wheelchair to  home with support per family.

## 2022-03-28 NOTE — PROGRESS NOTES
Orthostatic blood pressure completed with patient stating he does not feel dizzy when standing. Nurse informed Dr. Kimi Kumar. ABD binder ordered for patient and once available, will reassess orthostatic blood pressures. 1130: ABD binder in place. Orthostatic blood pressures completed. Patient has no complaints of feeling dizzy when standing.

## 2022-03-28 NOTE — DISCHARGE SUMMARY
Hospital Medicine Discharge Summary      Patient Identification:   Reginaldo Stewart   : 1960  MRN: [id number]   Account: [de-identified]      Patient's PCP: KATHRYN Carcamo NP    Admit Date: 3/15/2022     Discharge Date:   3/28/2022    Admitting Physician: Mansoor Escobedo DO     Discharge Physician: Aurelio Walters MD     Discharge Diagnoses:  Syncope: due to orthostatic hypotension. Persistently orthostatics positive. Initially thought to be due to dehydration in the setting of dysphagia/odynophafis and reduced PO intake. Unclear etiology- patient has no history of known causes of postural hypotension. ECHO EF 65% with grade 1 DD (2022). No known history of arrhythmias.   -On modified dysphagia diet per MBS 3/17.   -On midodrine  10 mg TID and Lopressor 25 mg BID on 3/21. Fall precautions. Stress test 3/23 per Cardiology consult. Saúl hose daily. Florinef and salt tablets added.   -Cardiac monitor on discharge.       Essential HTN: controlled. Discontinued Norvasc (may cause orthostatic hypotension) and HCTZ. Lopressor as above.     IDDMII: HbA1c 8.3 on 3/16/2022. On Lantus 64T nightly, Trulicity, and Amaryl. PO meds held at this time.   -discharged on Lantus 35u nightly due to multiple hypoglycemic episodes. Follow up with PCP.     Supraglottic Laryngeal Invasive Squamous cell carcinoma: S/p radiation therapy, last session 2021. associated dysphagia and mild odynophagia present. Follows with Dr. Ignacio Romo. On modified diet. ENT to follow up outpatient. The patient was seen and examined on day of discharge and this discharge summary is in conjunction with any daily progress note from day of discharge. Hospital Course:   Reginaldo Stewart is a 57 y.o. male admitted to [address] [place] [address] on 3/15/2022 for syncope.         58 yo M with history of Keratinizing SCC of larynx (diagnosed 2021), HTN, T2DM, Arthritis presented after an episode of syncope while going home from work due to associated dizziness/lightheadedness the day of presentation. Had associated trauma to right chest area from fall.      ED course: Presented afebrile and hemodynamically stable. Labs generally unremarkable. WBC 17. 4. XR ribs showed slight irregularity involving the left 6th, 7th, and 8th ribs laterally and right 7th rib laterally. No pneumothorax. CT head wo contrast with inflammatory changes involving the left frontal sinus, ethmoid, and maxillary sinuses bilaterally, slight deformity of the right maxillary sinus posterolaterally. CT cervical spondylosis especially at C5-6 and C6-7. Admitted for further work up and management.     Of note, has had issues with persistent cough and swallowing since radiation and lost his voice. Has not been able to take PO diabetic meds due to dysphagia so has had suboptimal control of BG. Has no known history of arrhythmias. No known cardiac history.      Stress test 3/23 negative for ischemia. Patient started on Midodrine, Lopressor, and Florinef. Continues to be orthostatic but asymptomatic this morning. Will place abdominal binder prior to DC. Will need to follow up with Cardiology. Will discharge on cardiac monitor given presentation of syncope. Of note, no arrhythmias noted on Tele during the hospitalization. Advised to stay hydrated and wear thigh high cindy hose. Otherwise, at baseline at this time.        Exam:     Vitals:  Vitals:    03/27/22 1520 03/27/22 1945 03/28/22 0414 03/28/22 0748   BP: (!) 142/79  136/78 125/72   Pulse: 68  66 76   Resp: 18 18 18 16   Temp: 98.2 °F (36.8 °C) 98.6 °F (37 °C) 98.5 °F (36.9 °C) 97.3 °F (36.3 °C)   TempSrc: Oral Oral Oral Oral   SpO2:  100% 98% 99%   Weight:       Height:         Weight: Weight: 144 lb 6.4 oz (65.5 kg)     24 hour intake/output:    Intake/Output Summary (Last 24 hours) at 3/28/2022 1003  Last data filed at 3/27/2022 1827  Gross per 24 hour   Intake 840 ml   Output 500 ml   Net 340 ml     General appearance: No apparent distress, appears stated age and cooperative. Thin and acutely ill appearing- improved. HEENT: Conjunctivae/corneas clear. Muffled voice. Neck: Supple, with full range of motion. No jugular venous distention. Respiratory: Normal respiratory effort. Clear to auscultation, bilaterally without Rales/Wheezes/Rhonchi. Cardiovascular: Regular rate and rhythm with normal S1/S2 without murmurs, rubs or gallops. Abdomen: Soft, non-tender, non-distended with normal bowel sounds. Musculoskeletal: passive and active ROM x 4 extremities. Skin: Skin color, texture, turgor normal.  No rashes or lesions. Neurologic: Neurovascularly intact without any focal sensory/motor deficits. Cranial nerves: II-XII intact, grossly non-focal.  Psychiatric: Alert and oriented, thought content appropriate, normal insight  Capillary Refill: Brisk,< 3 seconds   Peripheral Pulses: +2 palpable, equal bilaterally       Labs: For convenience and continuity at follow-up the following most recent labs are provided:      CBC:    Lab Results   Component Value Date    WBC 6.5 03/19/2022    HGB 11.0 03/19/2022    HCT 35.6 03/19/2022     03/19/2022       Renal:    Lab Results   Component Value Date     03/28/2022    K 5.1 03/28/2022    K 3.5 03/16/2022     03/28/2022    CO2 30 03/28/2022    BUN 18 03/28/2022    CREATININE 0.7 03/28/2022    CALCIUM 9.8 03/28/2022    PHOS 3.4 03/17/2022         Significant Diagnostic Studies    Radiology:   FL MODIFIED BARIUM SWALLOW W VIDEO   Final Result   1. Laryngeal penetration of honey thick, nectar thick, thin, soft and solid barium with aspiration of honey thick and thin barium. 2. Additional recommendations from the speech therapist will follow. **This report has been created using voice recognition software. It may contain minor errors which are inherent in voice recognition technology. **         Final report electronically signed by Dr. Shriley Christian on 3/17/2022 10:51 AM      CT Head WO Contrast   Final Result      1. Mild atrophy. 2. Inflammatory changes involving the left frontal sinus, ethmoid and maxillary sinuses bilaterally. 3. Slight deformity of the right maxillary sinus posterolaterally. 4. Otherwise negative noncontrast CT scan of the brain. **This report has been created using voice recognition software. It may contain minor errors which are inherent in voice recognition technology. **      Final report electronically signed by DR Rebekah Diaz on 3/15/2022 12:47 PM      CT Cervical Spine WO Contrast   Final Result       1. Cervical spondylosis especially at C5-6 and C6-7.   2. No acute fracture noted. 3. Bilateral carotid artery calcification. 4. Otherwise negative CT scan of the cervical spine. .               **This report has been created using voice recognition software. It may contain minor errors which are inherent in voice recognition technology. **      Final report electronically signed by DR Rebekah Diaz on 3/15/2022 12:52 PM      XR RIBS BILATERAL (MIN 4 VIEWS)   Final Result       1. Slight irregularity involving the left sixth, seventh and eighth ribs laterally and right seventh rib laterally, please correlate clinically. 2. The remaining ribs are intact. 3. There is no pneumothorax. 4. There are small calcified granulomata in the right lower lobe and left upper lobe. .               **This report has been created using voice recognition software. It may contain minor errors which are inherent in voice recognition technology. **      Final report electronically signed by DR Rebekah Diaz on 3/15/2022 12:35 PM      NM MYOCARDIAL SPECT REST EXERCISE OR RX    (Results Pending)          Consults:     PALLIATIVE CARE EVAL  IP CONSULT TO OTOLARYNGOLOGY  IP CONSULT TO CARDIOLOGY    Disposition: Home  Condition at Discharge: Stable    Code Status:  Full Code     Patient Instructions:    Discharge lab work:    Activity: activity as tolerated  Diet: ADULT DIET; daily (before meals). glimepiride 4 MG tablet  Commonly known as: AMARYL  TAKE ONE TABLET BY MOUTH TWICE DAILY     Insulin Pen Needle 31G X 6 MM Misc  Commonly known as: [employer] Pen [place]  by Does not apply route.     lidocaine viscous hcl 2 % Soln solution  Commonly known as: XYLOCAINE  Take 5 mLs by mouth as needed for Irritation     metFORMIN 1000 MG tablet  Commonly known as: GLUCOPHAGE  Take 1 tablets twice a day     MULTIPLE VITAMIN PO     NOVOLOG FLEXPEN SC     omeprazole 20 MG delayed release capsule  Commonly known as: PRILOSEC  Take 1 capsule by mouth daily.      prochlorperazine 10 MG tablet  Commonly known as: COMPAZINE  Take 1 tablet by mouth every 6 hours as needed (nausea)     SODIUM FLUORIDE (DENTAL GEL) 1.1 % Gel     TRULICITY SC     vitamin D 125 MCG (5000 UT) Caps capsule  Commonly known as: CHOLECALCIFEROL        STOP taking these medications    amLODIPine 5 MG tablet  Commonly known as: NORVASC     hydroCHLOROthiazide 25 MG tablet  Commonly known as: HYDRODIURIL     ibuprofen 600 MG tablet  Commonly known as: ADVIL;MOTRIN     meloxicam 7.5 MG tablet  Commonly known as: MOBIC           Where to Get Your Medications      These medications were sent to [address] [place] [address] [phone]  [address] [place] [id number]    Phone: [phone]   · fludrocortisone 0.1 MG tablet  · Lantus SoloStar 100 UNIT/ML injection pen  · metoprolol tartrate 25 MG tablet  · midodrine 10 MG tablet  · sodium chloride 1 g tablet         Discharge Medications:-      Medication List      START taking these medications    fludrocortisone 0.1 MG tablet  Commonly known as: FLORINEF  Take 2 tablets by mouth daily     metoprolol tartrate 25 MG tablet  Commonly known as: LOPRESSOR  Take 1 tablet by mouth 2 times daily     midodrine 10 MG tablet  Commonly known as: PROAMATINE  Take 1 tablet by mouth 3 times daily (with meals)     sodium chloride 1 g tablet  Take 1 tablet by mouth 3 times daily (with meals)        CHANGE how you take these medications    Lantus SoloStar 100 UNIT/ML injection pen  Generic drug: insulin glargine  Inject 35 Units into the skin nightly  What changed: how much to take        CONTINUE taking these medications    acetaminophen-codeine 300-30 MG per tablet  Commonly known as: TYLENOL/CODEINE #3  Take 1 tablet by mouth every 4 hours as needed for Pain     ACID REDUCER PO     aspirin 81 MG tablet  Take 1 tablet by mouth daily. atorvastatin 20 MG tablet  Commonly known as: Lipitor  Take 1 tablet by mouth daily. diphenhydrAMINE 25 MG tablet  Commonly known as: BENADRYL     gabapentin 300 MG capsule  Commonly known as: NEURONTIN     gemfibrozil 600 MG tablet  Commonly known as: LOPID  Take 1 tablet by mouth 2 times daily (before meals). glimepiride 4 MG tablet  Commonly known as: AMARYL  TAKE ONE TABLET BY MOUTH TWICE DAILY     Insulin Pen Needle 31G X 6 MM Misc  Commonly known as: [employer] Pen [place]  by Does not apply route.     lidocaine viscous hcl 2 % Soln solution  Commonly known as: XYLOCAINE  Take 5 mLs by mouth as needed for Irritation     metFORMIN 1000 MG tablet  Commonly known as: GLUCOPHAGE  Take 1 tablets twice a day     MULTIPLE VITAMIN PO     NOVOLOG FLEXPEN SC     omeprazole 20 MG delayed release capsule  Commonly known as: PRILOSEC  Take 1 capsule by mouth daily.      prochlorperazine 10 MG tablet  Commonly known as: COMPAZINE  Take 1 tablet by mouth every 6 hours as needed (nausea)     SODIUM FLUORIDE (DENTAL GEL) 1.1 % Gel     TRULICITY SC     vitamin D 125 MCG (5000 UT) Caps capsule  Commonly known as: CHOLECALCIFEROL        STOP taking these medications    amLODIPine 5 MG tablet  Commonly known as: NORVASC     hydroCHLOROthiazide 25 MG tablet  Commonly known as: HYDRODIURIL     ibuprofen 600 MG tablet  Commonly known as: ADVIL;MOTRIN     meloxicam 7.5 MG tablet  Commonly known as: MOBIC           Where to Get Your Medications      These medications were sent to [address] [address] [facility]  [address] [place] [address]    Phone: [phone]   · fludrocortisone 0.1 MG tablet  · Lantus SoloStar 100 UNIT/ML injection pen  · metoprolol tartrate 25 MG tablet  · midodrine 10 MG tablet  · sodium chloride 1 g tablet          Time Spent on discharge is more than 45 minutes in the examination, evaluation, counseling and review of medications and discharge plan.       Signed:    Electronically signed by Emily Hair MD on 3/28/2022 at 10:03 AM

## 2022-04-07 ENCOUNTER — OFFICE VISIT (OUTPATIENT)
Dept: CARDIOLOGY CLINIC | Age: 62
End: 2022-04-07
Payer: OTHER GOVERNMENT

## 2022-04-07 ENCOUNTER — HOSPITAL ENCOUNTER (OUTPATIENT)
Dept: NURSING | Age: 62
Discharge: HOME | End: 2022-04-07
Payer: OTHER GOVERNMENT

## 2022-04-07 VITALS
OXYGEN SATURATION: 95 % | RESPIRATION RATE: 20 BRPM | SYSTOLIC BLOOD PRESSURE: 132 MMHG | HEART RATE: 90 BPM | TEMPERATURE: 97.6 F | DIASTOLIC BLOOD PRESSURE: 70 MMHG

## 2022-04-07 VITALS
SYSTOLIC BLOOD PRESSURE: 92 MMHG | BODY MASS INDEX: 20.86 KG/M2 | HEIGHT: 71 IN | DIASTOLIC BLOOD PRESSURE: 66 MMHG | WEIGHT: 149 LBS | HEART RATE: 96 BPM

## 2022-04-07 DIAGNOSIS — E78.01 FAMILIAL HYPERCHOLESTEROLEMIA: ICD-10-CM

## 2022-04-07 DIAGNOSIS — I95.1 ORTHOSTATIC HYPOTENSION: Primary | ICD-10-CM

## 2022-04-07 DIAGNOSIS — R42 DIZZINESS: ICD-10-CM

## 2022-04-07 PROCEDURE — 2580000003 HC RX 258: Performed by: NUCLEAR MEDICINE

## 2022-04-07 PROCEDURE — 96360 HYDRATION IV INFUSION INIT: CPT

## 2022-04-07 PROCEDURE — 99214 OFFICE O/P EST MOD 30 MIN: CPT | Performed by: NUCLEAR MEDICINE

## 2022-04-07 PROCEDURE — 96361 HYDRATE IV INFUSION ADD-ON: CPT

## 2022-04-07 RX ORDER — SODIUM CHLORIDE 9 MG/ML
INJECTION, SOLUTION INTRAVENOUS CONTINUOUS
Status: DISCONTINUED | OUTPATIENT
Start: 2022-04-07 | End: 2022-04-08 | Stop reason: HOSPADM

## 2022-04-07 RX ADMIN — SODIUM CHLORIDE: 9 INJECTION, SOLUTION INTRAVENOUS at 09:27

## 2022-04-07 ASSESSMENT — PAIN SCALES - GENERAL: PAINLEVEL_OUTOF10: 0

## 2022-04-07 NOTE — PROGRESS NOTES
1132: IV hydration complete, patient tolerated well. AVS reviewed with patient, voiced understanding. Patient discharged in wheelchair.

## 2022-04-07 NOTE — PROGRESS NOTES
[address] [facility] [address]  [place] [address]  Dept: [phone]  Dept Fax: [phone]  Loc: [phone]    Visit Date: 4/7/2022    Jesús Arriaga is a 57 y.o. male who presents todayfor:  Chief Complaint   Patient presents with   [address] Patient    Establish Cardiologist    Hypertension    Diabetes    Hyperlipidemia    Hypotension   here for the first time  Was seen in the hospital   Known neuropathy   Was admitted for syncope  Severe orthostatic hypotension   Had multiple syncope episodes  Severe symptoms  On multiple meds  Known DM for many years  Stress test was okay   Echo was okay   No chest pain   Does have throat cancer  Not well hydrated at all         HPI:  HPI  Past Medical History:   Diagnosis Date    Arthritis     GERD (gastroesophageal reflux disease)     Hypertension     Keratinizing squamous cell carcinoma of larynx ([facility] [address] 08/30/2021    Type II or unspecified type diabetes mellitus without mention of complication, not stated as uncontrolled       History reviewed. No pertinent surgical history.   Family History   Problem Relation Age of Onset    Diabetes Mother     Stroke Mother     Cancer Maternal Grandfather         liver    Cancer Father      Social History     Tobacco Use    Smoking status: Never Smoker    Smokeless tobacco: Never Used    Tobacco comment: Recently started cigars but quit   Substance Use Topics    Alcohol use: Not Currently     Alcohol/week: 1.0 standard drink     Types: 1 Shots of liquor per week     Comment: occasionaly      Current Outpatient Medications   Medication Sig Dispense Refill    fludrocortisone (FLORINEF) 0.1 MG tablet Take 2 tablets by mouth daily 60 tablet 0    midodrine (PROAMATINE) 10 MG tablet Take 1 tablet by mouth 3 times daily (with meals) 30 tablet 0    metoprolol tartrate (LOPRESSOR) 25 MG tablet Take 1 tablet by mouth 2 times daily 60 tablet 0    insulin glargine (LANTUS SOLOSTAR) 100 UNIT/ML injection pen Inject 35 Units into the skin nightly 1 pen 0    sodium chloride 1 g tablet Take 1 tablet by mouth 3 times daily (with meals) 90 tablet 3    lidocaine viscous hcl (XYLOCAINE) 2 % SOLN solution Take 5 mLs by mouth as needed for Irritation 100 mL 3    prochlorperazine (COMPAZINE) 10 MG tablet Take 1 tablet by mouth every 6 hours as needed (nausea) 120 tablet 3    vitamin D (CHOLECALCIFEROL) 125 MCG (5000 UT) CAPS capsule Take 125 mcg by mouth daily      gabapentin (NEURONTIN) 300 MG capsule 300 mg 3 times daily.  SODIUM FLUORIDE, DENTAL GEL, 1.1 % GEL Apply 3-4 drops of gel directly to teeth. Do  Not eat or drink for 30min. Once daily at bedtime.  glimepiride (AMARYL) 4 MG tablet TAKE ONE TABLET BY MOUTH TWICE DAILY 30 tablet 0    metFORMIN (GLUCOPHAGE) 1000 MG tablet Take 1 tablets twice a day 60 tablet 3    aspirin 81 MG tablet Take 1 tablet by mouth daily. 30 tablet 11    atorvastatin (LIPITOR) 20 MG tablet Take 1 tablet by mouth daily. 30 tablet 6    omeprazole (PRILOSEC) 20 MG capsule Take 1 capsule by mouth daily. 30 capsule 6    gemfibrozil (LOPID) 600 MG tablet Take 1 tablet by mouth 2 times daily (before meals). 60 tablet 5    Insulin Pen Needle ([employer] PEN NEEDLES) 31G X 6 MM MISC by Does not apply route. 100 each 3    MULTIPLE VITAMIN PO Take 1 tablet by mouth daily. No current facility-administered medications for this visit.      Allergies   Allergen Reactions    Lisinopril Swelling     Angioedema of the face/lips  Other reaction(s): cough  Pt unsure but thinks he is allergic to this       Health Maintenance   Topic Date Due    Hepatitis C screen  Never done    Pneumococcal 0-64 years Vaccine (1 of 4 - PCV13) Never done    Depression Screen  Never done    COVID-19 Vaccine (1) Never done    HIV screen  Never done    DTaP/Tdap/Td vaccine (1 - Tdap) Never done    Shingles Vaccine (1 of 2) Never done    Diabetic retinal exam  01/01/2015    Diabetic foot exam  09/23/2015    Diabetic microalbuminuria test  09/23/2015    Lipid screen  10/14/2021    Colorectal Cancer Screen  03/10/2022    Flu vaccine (Season Ended) 09/01/2022    A1C test (Diabetic or Prediabetic)  03/16/2023    Hepatitis A vaccine  Aged Out    Hib vaccine  Aged Out    Meningococcal (ACWY) vaccine  Aged Out       Subjective:  Review of Systems  General:   No fever, no chills, some fatigue or weight loss  Pulmonary:    some dyspnea, no wheezing  Cardiac:    Denies recent chest pain,   GI:     No nausea or vomiting, no abdominal pain  Neuro:    No dizziness or light headedness,   Musculoskeletal:  No recent active issues  Extremities:   No edema, no obvious claudication       Objective:  Physical Exam  BP 92/66   Pulse 96   Ht 5' 11\" (1.803 m)   Wt 149 lb (67.6 kg)   BMI 20.78 kg/m²   General:   Well developed, well nourished  Lungs:   Clear to auscultation  Heart:    Normal S1 S2, Slight murmur. no rubs, no gallops  Abdomen:   Soft, non tender, no organomegalies, positive bowel sounds  Extremities:   No edema, no cyanosis, good peripheral pulses  Neurological:   Awake, alert, oriented. No obvious focal deficits  Musculoskelatal:  No obvious deformities    Assessment:      Diagnosis Orders   1. Orthostatic hypotension     2. Familial hypercholesterolemia     3. Dizziness     dehydration and neuropathy   Risk for CAD    Plan:  No follow-ups on file. Discussed  Needs hydration   Needs to follow with his throat cancer  Consider northera   Support hose  Continue risk factor modification and medical management  Thank you for allowing me to participate in the care of your patient. Please don't hesitate to contact me regarding any further issues related to the patient care    Orders Placed:  No orders of the defined types were placed in this encounter. Medications Prescribed:  No orders of the defined types were placed in this encounter.          Discussed use, benefit, and side effects of prescribed medications. All patient questions answered. Pt voicedunderstanding. Instructed to continue current medications, diet and exercise. Continue risk factor modification and medical management. Patient agreed with treatment plan. Follow up as directed.     Electronically signedby Carloz Elizalde MD on 4/7/2022 at 8:44 AM

## 2022-04-07 NOTE — PROGRESS NOTES
0920: Patient arrived in wheelchair with wife for IV hydration from Dr. Michel Runner office. PT RIGHTS AND RESPONSIBILITIES OFFERED TO PT. Provided with beverage. IV fluid infusing.                   _m___ Safety:       (Environmental)   [place] to environment   Ensure ID band is correct and in place/ allergy band as needed   Assess for fall risk   Initiate fall precautions as applicable (fall band, side rails, etc.)   Call light within reach   Bed in low position/ wheels locked    _m___ Pain:        Assess pain level and characteristics   Administer analgesics as ordered   Assess effectiveness of pain management and report to MD as needed    ____ Knowledge Deficit:   Assess baseline knowledge   Provide teaching at level of understanding   Provide teaching via preferred learning method   Evaluate teaching effectiveness    _m___ Hemodynamic/Respiratory Status:       (Pre and Post Procedure Monitoring)   Assess/Monitor vital signs and LOC   Assess Baseline SpO2 prior to any sedation   Obtain weight/height   Assess vital signs/ LOC until patient meets discharge criteria   Monitor procedure site and notify MD of any issues

## 2022-04-08 ENCOUNTER — APPOINTMENT (OUTPATIENT)
Dept: CT IMAGING | Age: 62
End: 2022-04-08
Payer: OTHER GOVERNMENT

## 2022-04-08 ENCOUNTER — HOSPITAL ENCOUNTER (INPATIENT)
Age: 62
LOS: 4 days | Discharge: HOME | DRG: 595 | End: 2022-04-12
Attending: EMERGENCY MEDICINE | Admitting: FAMILY MEDICINE
Payer: OTHER GOVERNMENT

## 2022-04-08 ENCOUNTER — OFFICE VISIT (OUTPATIENT)
Dept: ENT CLINIC | Age: 62
End: 2022-04-08
Payer: OTHER GOVERNMENT

## 2022-04-08 ENCOUNTER — HOSPITAL ENCOUNTER (EMERGENCY)
Age: 62
Discharge: HOME | End: 2022-04-08
Payer: OTHER GOVERNMENT

## 2022-04-08 ENCOUNTER — APPOINTMENT (OUTPATIENT)
Dept: GENERAL RADIOLOGY | Age: 62
End: 2022-04-08
Payer: OTHER GOVERNMENT

## 2022-04-08 VITALS
HEIGHT: 71 IN | OXYGEN SATURATION: 96 % | HEART RATE: 89 BPM | WEIGHT: 149 LBS | BODY MASS INDEX: 20.86 KG/M2 | TEMPERATURE: 96.6 F | RESPIRATION RATE: 14 BRPM | SYSTOLIC BLOOD PRESSURE: 118 MMHG | DIASTOLIC BLOOD PRESSURE: 60 MMHG

## 2022-04-08 VITALS
HEART RATE: 90 BPM | SYSTOLIC BLOOD PRESSURE: 126 MMHG | OXYGEN SATURATION: 96 % | HEIGHT: 71 IN | DIASTOLIC BLOOD PRESSURE: 71 MMHG | TEMPERATURE: 97.9 F | WEIGHT: 149 LBS | BODY MASS INDEX: 20.86 KG/M2 | RESPIRATION RATE: 15 BRPM

## 2022-04-08 DIAGNOSIS — J32.1 CHRONIC FRONTAL SINUSITIS: ICD-10-CM

## 2022-04-08 DIAGNOSIS — B02.9 HERPES ZOSTER WITHOUT COMPLICATION: ICD-10-CM

## 2022-04-08 DIAGNOSIS — C32.1 PRIMARY SQUAMOUS CELL CARCINOMA OF SUPRAGLOTTIS (HCC): ICD-10-CM

## 2022-04-08 DIAGNOSIS — Z92.21 STATUS POST CHEMORADIATION: ICD-10-CM

## 2022-04-08 DIAGNOSIS — C32.9 LARYNGEAL CANCER (HCC): ICD-10-CM

## 2022-04-08 DIAGNOSIS — E87.6 HYPOKALEMIA: Primary | ICD-10-CM

## 2022-04-08 DIAGNOSIS — J02.9 THROAT INFECTION: Primary | ICD-10-CM

## 2022-04-08 DIAGNOSIS — R05.8 COUGH PRODUCTIVE OF PURULENT SPUTUM: ICD-10-CM

## 2022-04-08 DIAGNOSIS — C32.1 PRIMARY SQUAMOUS CELL CARCINOMA OF SUPRAGLOTTIS (HCC): Primary | ICD-10-CM

## 2022-04-08 DIAGNOSIS — J32.0 CHRONIC MAXILLARY SINUSITIS: ICD-10-CM

## 2022-04-08 DIAGNOSIS — C32.9 RECURRENT SQUAMOUS CELL CARCINOMA OF LARYNX (HCC): ICD-10-CM

## 2022-04-08 DIAGNOSIS — J32.2 CHRONIC ETHMOIDAL SINUSITIS: ICD-10-CM

## 2022-04-08 DIAGNOSIS — E11.9 DIABETES MELLITUS TYPE 2 IN NONOBESE (HCC): ICD-10-CM

## 2022-04-08 DIAGNOSIS — Z92.3 STATUS POST CHEMORADIATION: ICD-10-CM

## 2022-04-08 DIAGNOSIS — I95.9 HYPOTENSION, UNSPECIFIED HYPOTENSION TYPE: ICD-10-CM

## 2022-04-08 PROBLEM — R06.00 DYSPNEA: Status: ACTIVE | Noted: 2022-04-08

## 2022-04-08 LAB
ALBUMIN SERPL-MCNC: 3.9 G/DL (ref 3.5–5.1)
ALP BLD-CCNC: 114 U/L (ref 38–126)
ALT SERPL-CCNC: 8 U/L (ref 11–66)
ANION GAP SERPL CALCULATED.3IONS-SCNC: 15 MEQ/L (ref 8–16)
AST SERPL-CCNC: 9 U/L (ref 5–40)
BASOPHILS # BLD: 0.7 %
BASOPHILS ABSOLUTE: 0 THOU/MM3 (ref 0–0.1)
BILIRUB SERPL-MCNC: 0.5 MG/DL (ref 0.3–1.2)
BILIRUBIN DIRECT: < 0.2 MG/DL (ref 0–0.3)
BUN BLDV-MCNC: 8 MG/DL (ref 7–22)
CALCIUM SERPL-MCNC: 9.9 MG/DL (ref 8.5–10.5)
CHLORIDE BLD-SCNC: 96 MEQ/L (ref 98–111)
CO2: 25 MEQ/L (ref 23–33)
CREAT SERPL-MCNC: 0.7 MG/DL (ref 0.4–1.2)
EKG ATRIAL RATE: 82 BPM
EKG ATRIAL RATE: 95 BPM
EKG P AXIS: 56 DEGREES
EKG P AXIS: 65 DEGREES
EKG P-R INTERVAL: 148 MS
EKG P-R INTERVAL: 154 MS
EKG Q-T INTERVAL: 392 MS
EKG Q-T INTERVAL: 426 MS
EKG QRS DURATION: 92 MS
EKG QRS DURATION: 92 MS
EKG QTC CALCULATION (BAZETT): 492 MS
EKG QTC CALCULATION (BAZETT): 497 MS
EKG R AXIS: 68 DEGREES
EKG R AXIS: 82 DEGREES
EKG T AXIS: 63 DEGREES
EKG T AXIS: 66 DEGREES
EKG VENTRICULAR RATE: 82 BPM
EKG VENTRICULAR RATE: 95 BPM
EOSINOPHIL # BLD: 1.7 %
EOSINOPHILS ABSOLUTE: 0.1 THOU/MM3 (ref 0–0.4)
ERYTHROCYTE [DISTWIDTH] IN BLOOD BY AUTOMATED COUNT: 13.3 % (ref 11.5–14.5)
ERYTHROCYTE [DISTWIDTH] IN BLOOD BY AUTOMATED COUNT: 39.8 FL (ref 35–45)
FLU A ANTIGEN: NEGATIVE
FLU B ANTIGEN: NEGATIVE
GFR SERPL CREATININE-BSD FRML MDRD: > 90 ML/MIN/1.73M2
GLUCOSE BLD-MCNC: 230 MG/DL (ref 70–108)
HCT VFR BLD CALC: 40.2 % (ref 42–52)
HEMOGLOBIN: 12.6 GM/DL (ref 14–18)
IMMATURE GRANS (ABS): 0.02 THOU/MM3 (ref 0–0.07)
IMMATURE GRANULOCYTES: 0.4 %
LACTIC ACID, SEPSIS: 2.1 MMOL/L (ref 0.5–1.9)
LIPASE: 16.4 U/L (ref 5.6–51.3)
LYMPHOCYTES # BLD: 20.9 %
LYMPHOCYTES ABSOLUTE: 1.1 THOU/MM3 (ref 1–4.8)
MCH RBC QN AUTO: 25.6 PG (ref 26–33)
MCHC RBC AUTO-ENTMCNC: 31.3 GM/DL (ref 32.2–35.5)
MCV RBC AUTO: 81.7 FL (ref 80–94)
MONOCYTES # BLD: 7.4 %
MONOCYTES ABSOLUTE: 0.4 THOU/MM3 (ref 0.4–1.3)
NUCLEATED RED BLOOD CELLS: 0 /100 WBC
OSMOLALITY CALCULATION: 277.6 MOSMOL/KG (ref 275–300)
PLATELET # BLD: 255 THOU/MM3 (ref 130–400)
PMV BLD AUTO: 8.9 FL (ref 9.4–12.4)
POTASSIUM REFLEX MAGNESIUM: 3.8 MEQ/L (ref 3.5–5.2)
PRO-BNP: 383.9 PG/ML (ref 0–900)
PROCALCITONIN: 0.06 NG/ML (ref 0.01–0.09)
RBC # BLD: 4.92 MILL/MM3 (ref 4.7–6.1)
SARS-COV-2, NAAT: NOT  DETECTED
SEG NEUTROPHILS: 68.9 %
SEGMENTED NEUTROPHILS ABSOLUTE COUNT: 3.7 THOU/MM3 (ref 1.8–7.7)
SODIUM BLD-SCNC: 136 MEQ/L (ref 135–145)
TOTAL PROTEIN: 8 G/DL (ref 6.1–8)
TROPONIN T: < 0.01 NG/ML
WBC # BLD: 5.4 THOU/MM3 (ref 4.8–10.8)

## 2022-04-08 PROCEDURE — 80076 HEPATIC FUNCTION PANEL: CPT

## 2022-04-08 PROCEDURE — 85025 COMPLETE CBC W/AUTO DIFF WBC: CPT

## 2022-04-08 PROCEDURE — 6360000002 HC RX W HCPCS: Performed by: NURSE PRACTITIONER

## 2022-04-08 PROCEDURE — 84145 PROCALCITONIN (PCT): CPT

## 2022-04-08 PROCEDURE — 71045 X-RAY EXAM CHEST 1 VIEW: CPT

## 2022-04-08 PROCEDURE — 87635 SARS-COV-2 COVID-19 AMP PRB: CPT

## 2022-04-08 PROCEDURE — 83605 ASSAY OF LACTIC ACID: CPT

## 2022-04-08 PROCEDURE — 96375 TX/PRO/DX INJ NEW DRUG ADDON: CPT

## 2022-04-08 PROCEDURE — 93010 ELECTROCARDIOGRAM REPORT: CPT | Performed by: INTERNAL MEDICINE

## 2022-04-08 PROCEDURE — 6360000002 HC RX W HCPCS: Performed by: PHYSICIAN ASSISTANT

## 2022-04-08 PROCEDURE — 93005 ELECTROCARDIOGRAM TRACING: CPT | Performed by: NURSE PRACTITIONER

## 2022-04-08 PROCEDURE — 83880 ASSAY OF NATRIURETIC PEPTIDE: CPT

## 2022-04-08 PROCEDURE — 96365 THER/PROPH/DIAG IV INF INIT: CPT

## 2022-04-08 PROCEDURE — 80048 BASIC METABOLIC PNL TOTAL CA: CPT

## 2022-04-08 PROCEDURE — 99285 EMERGENCY DEPT VISIT HI MDM: CPT

## 2022-04-08 PROCEDURE — 6360000004 HC RX CONTRAST MEDICATION: Performed by: NURSE PRACTITIONER

## 2022-04-08 PROCEDURE — 87804 INFLUENZA ASSAY W/OPTIC: CPT

## 2022-04-08 PROCEDURE — 2580000003 HC RX 258: Performed by: PHYSICIAN ASSISTANT

## 2022-04-08 PROCEDURE — 84484 ASSAY OF TROPONIN QUANT: CPT

## 2022-04-08 PROCEDURE — 70491 CT SOFT TISSUE NECK W/DYE: CPT

## 2022-04-08 PROCEDURE — 3052F HG A1C>EQUAL 8.0%<EQUAL 9.0%: CPT | Performed by: OTOLARYNGOLOGY

## 2022-04-08 PROCEDURE — 1200000000 HC SEMI PRIVATE

## 2022-04-08 PROCEDURE — 99222 1ST HOSP IP/OBS MODERATE 55: CPT | Performed by: PHYSICIAN ASSISTANT

## 2022-04-08 PROCEDURE — 87070 CULTURE OTHR SPECIMN AEROBIC: CPT

## 2022-04-08 PROCEDURE — 2500000003 HC RX 250 WO HCPCS: Performed by: NURSE PRACTITIONER

## 2022-04-08 PROCEDURE — 96376 TX/PRO/DX INJ SAME DRUG ADON: CPT

## 2022-04-08 PROCEDURE — 83690 ASSAY OF LIPASE: CPT

## 2022-04-08 PROCEDURE — 31575 DIAGNOSTIC LARYNGOSCOPY: CPT | Performed by: OTOLARYNGOLOGY

## 2022-04-08 PROCEDURE — 99283 EMERGENCY DEPT VISIT LOW MDM: CPT

## 2022-04-08 PROCEDURE — 99205 OFFICE O/P NEW HI 60 MIN: CPT | Performed by: OTOLARYNGOLOGY

## 2022-04-08 PROCEDURE — 87205 SMEAR GRAM STAIN: CPT

## 2022-04-08 PROCEDURE — 93005 ELECTROCARDIOGRAM TRACING: CPT | Performed by: STUDENT IN AN ORGANIZED HEALTH CARE EDUCATION/TRAINING PROGRAM

## 2022-04-08 PROCEDURE — 96367 TX/PROPH/DG ADDL SEQ IV INF: CPT

## 2022-04-08 RX ORDER — SODIUM CHLORIDE 0.9 % (FLUSH) 0.9 %
5-40 SYRINGE (ML) INJECTION EVERY 12 HOURS SCHEDULED
Status: DISCONTINUED | OUTPATIENT
Start: 2022-04-08 | End: 2022-04-12 | Stop reason: HOSPADM

## 2022-04-08 RX ORDER — INSULIN GLARGINE 100 [IU]/ML
35 INJECTION, SOLUTION SUBCUTANEOUS NIGHTLY
Status: DISCONTINUED | OUTPATIENT
Start: 2022-04-08 | End: 2022-04-11

## 2022-04-08 RX ORDER — GABAPENTIN 300 MG/1
300 CAPSULE ORAL 3 TIMES DAILY
Status: DISCONTINUED | OUTPATIENT
Start: 2022-04-08 | End: 2022-04-12 | Stop reason: HOSPADM

## 2022-04-08 RX ORDER — ONDANSETRON 2 MG/ML
4 INJECTION INTRAMUSCULAR; INTRAVENOUS ONCE
Status: COMPLETED | OUTPATIENT
Start: 2022-04-08 | End: 2022-04-08

## 2022-04-08 RX ORDER — MAGNESIUM SULFATE IN WATER 40 MG/ML
2000 INJECTION, SOLUTION INTRAVENOUS PRN
Status: DISCONTINUED | OUTPATIENT
Start: 2022-04-08 | End: 2022-04-12 | Stop reason: HOSPADM

## 2022-04-08 RX ORDER — MORPHINE SULFATE 2 MG/ML
4 INJECTION, SOLUTION INTRAMUSCULAR; INTRAVENOUS ONCE
Status: COMPLETED | OUTPATIENT
Start: 2022-04-08 | End: 2022-04-08

## 2022-04-08 RX ORDER — ONDANSETRON 4 MG/1
4 TABLET, ORALLY DISINTEGRATING ORAL EVERY 8 HOURS PRN
Status: DISCONTINUED | OUTPATIENT
Start: 2022-04-08 | End: 2022-04-12 | Stop reason: HOSPADM

## 2022-04-08 RX ORDER — FLUDROCORTISONE ACETATE 0.1 MG/1
0.2 TABLET ORAL DAILY
Status: DISCONTINUED | OUTPATIENT
Start: 2022-04-09 | End: 2022-04-12 | Stop reason: HOSPADM

## 2022-04-08 RX ORDER — SODIUM CHLORIDE 0.9 % (FLUSH) 0.9 %
5-40 SYRINGE (ML) INJECTION PRN
Status: DISCONTINUED | OUTPATIENT
Start: 2022-04-08 | End: 2022-04-12 | Stop reason: HOSPADM

## 2022-04-08 RX ORDER — GLIPIZIDE 10 MG/1
10 TABLET ORAL
Status: DISCONTINUED | OUTPATIENT
Start: 2022-04-09 | End: 2022-04-12 | Stop reason: HOSPADM

## 2022-04-08 RX ORDER — ADHESIVE BANDAGE 3/4"
BANDAGE TOPICAL
COMMUNITY
Start: 2022-02-16

## 2022-04-08 RX ORDER — ACETAMINOPHEN 325 MG/1
650 TABLET ORAL EVERY 6 HOURS PRN
Status: DISCONTINUED | OUTPATIENT
Start: 2022-04-08 | End: 2022-04-12 | Stop reason: HOSPADM

## 2022-04-08 RX ORDER — PANTOPRAZOLE SODIUM 40 MG/10ML
40 INJECTION, POWDER, LYOPHILIZED, FOR SOLUTION INTRAVENOUS DAILY
Status: DISCONTINUED | OUTPATIENT
Start: 2022-04-09 | End: 2022-04-12 | Stop reason: HOSPADM

## 2022-04-08 RX ORDER — CIPROFLOXACIN 2 MG/ML
400 INJECTION, SOLUTION INTRAVENOUS ONCE
Status: COMPLETED | OUTPATIENT
Start: 2022-04-08 | End: 2022-04-08

## 2022-04-08 RX ORDER — SODIUM CHLORIDE 9 MG/ML
INJECTION, SOLUTION INTRAVENOUS PRN
Status: DISCONTINUED | OUTPATIENT
Start: 2022-04-08 | End: 2022-04-12 | Stop reason: HOSPADM

## 2022-04-08 RX ORDER — POTASSIUM CHLORIDE 20 MEQ/1
40 TABLET, EXTENDED RELEASE ORAL PRN
Status: DISCONTINUED | OUTPATIENT
Start: 2022-04-08 | End: 2022-04-12 | Stop reason: HOSPADM

## 2022-04-08 RX ORDER — ONDANSETRON 2 MG/ML
4 INJECTION INTRAMUSCULAR; INTRAVENOUS EVERY 6 HOURS PRN
Status: DISCONTINUED | OUTPATIENT
Start: 2022-04-08 | End: 2022-04-12 | Stop reason: HOSPADM

## 2022-04-08 RX ORDER — POLYETHYLENE GLYCOL 3350 17 G/17G
17 POWDER, FOR SOLUTION ORAL DAILY PRN
Status: DISCONTINUED | OUTPATIENT
Start: 2022-04-08 | End: 2022-04-12 | Stop reason: HOSPADM

## 2022-04-08 RX ORDER — MIDODRINE HYDROCHLORIDE 10 MG/1
10 TABLET ORAL
Status: DISCONTINUED | OUTPATIENT
Start: 2022-04-09 | End: 2022-04-12

## 2022-04-08 RX ORDER — ACETAMINOPHEN 650 MG/1
650 SUPPOSITORY RECTAL EVERY 6 HOURS PRN
Status: DISCONTINUED | OUTPATIENT
Start: 2022-04-08 | End: 2022-04-12 | Stop reason: HOSPADM

## 2022-04-08 RX ORDER — ATORVASTATIN CALCIUM 20 MG/1
20 TABLET, FILM COATED ORAL DAILY
Status: DISCONTINUED | OUTPATIENT
Start: 2022-04-09 | End: 2022-04-12 | Stop reason: HOSPADM

## 2022-04-08 RX ORDER — POTASSIUM CHLORIDE 7.45 MG/ML
10 INJECTION INTRAVENOUS PRN
Status: DISCONTINUED | OUTPATIENT
Start: 2022-04-08 | End: 2022-04-12 | Stop reason: HOSPADM

## 2022-04-08 RX ORDER — ASPIRIN 81 MG/1
81 TABLET ORAL DAILY
Status: DISCONTINUED | OUTPATIENT
Start: 2022-04-09 | End: 2022-04-12 | Stop reason: HOSPADM

## 2022-04-08 RX ORDER — CLINDAMYCIN PHOSPHATE 900 MG/50ML
900 INJECTION INTRAVENOUS ONCE
Status: COMPLETED | OUTPATIENT
Start: 2022-04-08 | End: 2022-04-08

## 2022-04-08 RX ADMIN — ONDANSETRON 4 MG: 2 INJECTION INTRAMUSCULAR; INTRAVENOUS at 06:33

## 2022-04-08 RX ADMIN — MORPHINE SULFATE 4 MG: 2 INJECTION, SOLUTION INTRAMUSCULAR; INTRAVENOUS at 13:54

## 2022-04-08 RX ADMIN — SODIUM CHLORIDE, PRESERVATIVE FREE 10 ML: 5 INJECTION INTRAVENOUS at 21:30

## 2022-04-08 RX ADMIN — IOPAMIDOL 80 ML: 755 INJECTION, SOLUTION INTRAVENOUS at 07:29

## 2022-04-08 RX ADMIN — MORPHINE SULFATE 4 MG: 2 INJECTION, SOLUTION INTRAMUSCULAR; INTRAVENOUS at 06:33

## 2022-04-08 RX ADMIN — CIPROFLOXACIN 400 MG: 2 INJECTION, SOLUTION INTRAVENOUS at 13:01

## 2022-04-08 RX ADMIN — ACYCLOVIR SODIUM 700 MG: 1000 INJECTION, SOLUTION INTRAVENOUS at 21:28

## 2022-04-08 RX ADMIN — HYDROMORPHONE HYDROCHLORIDE 0.5 MG: 1 INJECTION, SOLUTION INTRAMUSCULAR; INTRAVENOUS; SUBCUTANEOUS at 21:22

## 2022-04-08 RX ADMIN — CLINDAMYCIN PHOSPHATE 900 MG: 900 INJECTION, SOLUTION INTRAVENOUS at 12:01

## 2022-04-08 ASSESSMENT — ENCOUNTER SYMPTOMS
ABDOMINAL PAIN: 0
EYE PAIN: 0
COUGH: 1
SINUS PRESSURE: 0
CONSTIPATION: 0
TROUBLE SWALLOWING: 1
VOMITING: 0
BLOOD IN STOOL: 0
RHINORRHEA: 0
WHEEZING: 0
SHORTNESS OF BREATH: 1
NAUSEA: 0
DIARRHEA: 0

## 2022-04-08 ASSESSMENT — PAIN DESCRIPTION - PAIN TYPE
TYPE: ACUTE PAIN

## 2022-04-08 ASSESSMENT — PAIN DESCRIPTION - FREQUENCY
FREQUENCY: INTERMITTENT
FREQUENCY: CONTINUOUS

## 2022-04-08 ASSESSMENT — PAIN DESCRIPTION - ORIENTATION
ORIENTATION: MID;LEFT
ORIENTATION: LEFT

## 2022-04-08 ASSESSMENT — PAIN SCALES - GENERAL
PAINLEVEL_OUTOF10: 10
PAINLEVEL_OUTOF10: 7
PAINLEVEL_OUTOF10: 10
PAINLEVEL_OUTOF10: 10
PAINLEVEL_OUTOF10: 5
PAINLEVEL_OUTOF10: 7
PAINLEVEL_OUTOF10: 10
PAINLEVEL_OUTOF10: 4
PAINLEVEL_OUTOF10: 8

## 2022-04-08 ASSESSMENT — PAIN - FUNCTIONAL ASSESSMENT
PAIN_FUNCTIONAL_ASSESSMENT: 0-10

## 2022-04-08 ASSESSMENT — PAIN DESCRIPTION - ONSET: ONSET: ON-GOING

## 2022-04-08 ASSESSMENT — PAIN DESCRIPTION - DESCRIPTORS
DESCRIPTORS: SHARP
DESCRIPTORS: ACHING
DESCRIPTORS: BURNING;SORE;TINGLING
DESCRIPTORS: ACHING

## 2022-04-08 ASSESSMENT — PAIN DESCRIPTION - LOCATION
LOCATION: THROAT
LOCATION: NECK

## 2022-04-08 NOTE — LETTER
[address] [facility] and [address]  Phone: [phone]  Fax: [address], [place]        April 8, 2022    Annie Fontana, APRN - [facility] [address]    Patient: Chelle Finney   MR Number: [id number]   YOB: 1960   Date of Visit: 4/8/2022     Dear Annie Fontana,    I recently saw your patient, Chelle Finney, regarding his laryngeal squamous cell carcinoma. He has persistent disease. He has a very productive cough with foul-smelling sputum, pansinusitis on the right side, and a severe case of shingles involving the entire left side of his neck. The rash with scattered pustules and marked erythema and severe pain beginning 2 days ago. This is not radiation dermatitis, but the radiation would reduce his resistance significantly. The shingles is likely entirely within the port for his radiation therapy. Below are the relevant portions of my assessment and plan of care. Assessment & Plan   Diagnoses and all orders for this visit:     Diagnosis Orders   1. Primary squamous cell carcinoma of supraglottis (HCC)     2. 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 Status post chemoradiation     4. Diabetes mellitus type 2 in nonobese (HCC)     5. Herpes zoster without complication, left neck, severe      Involves entire left neck and stops at midline. Location is that of his radiation port. 6. Foul-smelling cough productive of purulent sputum      Foul-smelling   7. Hypotension, unspecified hypotension type         The findings were explained and his questions were answered. He understands that he has persistent cancer and will need a laryngectomy. The history, dermatome distribution and appearance of the extremely painful eruption on his left neck is consistent with shingles. He needs the shingles treated with maximum doses of Valtrex and corticosteroids.   This skin has been radiated and has a very poor blood supply and resistance to infection. His productive cough and hypotension are sufficient to warrant admission. I returned the patient to the emergency room by wheelchair and spoke with Dr. Jerica Waters. I explained the above and he agreed to accept the patient back to the emergency room and we agreed that he would be admitted. His airway is stable. Patient stated he was interested in a laryngectomy. He will need a PET/CT. He needs a thorough medical work-up and treatment as needed, so he can withstand the general anesthetic for the surgery. I recommend the patient be placed on Afrin 2 puffs each nostril every 6 hours. He has a right-sided pansinusitis. He should get antibiotic coverage which would include coverage for staph aureus, possibly MRSA, and anaerobes based on the odor. Patient will be admitted to the hospitalist service. We will follow him. No \"consult\" is required, just have him put on our list.      If you have questions, please do not hesitate to call me. I look forward to following Leeroy Myers along with you.     Sincerely,      Maci Thomas MD

## 2022-04-08 NOTE — ED PROVIDER NOTES
[address]    Pt Name: Nel Aguilar  MRN: [id number]  [place] 1960  Date of evaluation: 9/12/20      I personally saw and examined the patient. I have reviewed and agree with the Resident findings, including all diagnostic interpretations and treatment plans as written. I was present for the key portion of any procedures performed and the inclusive time noted in any critical care statement. History: This patient was seen with Chelo Chu, resident physician. This is a 58-year-old male who was initially seen by one of our colleagues earlier this morning he had a rash on the left side of his neck and a history of laryngeal cancer with radiation. It was thought to maybe be a radiation related rash however he was noted to have a very abnormal breath odor. He was apparently sent to Dr. Brenda Dong office and he ended up getting a laryngoscope done at that time. Dr. Beata Canela is now sending him back to the ER. He has dropped his blood pressure while over there and it was reported as 98/58 in the office. He is probably little bit dry as well but Dr. Beata Canela and stacy's evaluators including myself feel that the rash looks more like a shingles eruption.   Doing intravenous acyclovir and fluids and we are arranging for admission with an ENT consultation      Diagnosis: Zoster infection of neck likely due to immune insufficiency, laryngeal cancer                DO Nicole Hernandez DO  04/08/22 1826

## 2022-04-08 NOTE — ED NOTES
acylovir started at 1830.  Unable to link medication order in computer at this time      Brandy Lamb, RN  04/08/22 1924

## 2022-04-08 NOTE — PROGRESS NOTES
[address] EAR, NOSE AND THROAT  [address] [place] [address]  [address] [facility],4[address] Floor  Dept: [phone]  Dept Fax: [phone]  Loc: [phone]    Carina Schmidt is a 57 y.o. male who was referred byNo ref. provider found for:  Chief Complaint   Patient presents with    Follow-up     patient is here from er    . HPI:     Carina Schmidt is a 57 y.o. male who presents today for evaluation of his airway. He was seen in the emergency room earlier today and a CT was obtained that showed a substantial mass in the right side of his glottis centered on the anterior right false vocal cord. Review of the chart revealed that he has had an immobile right true vocal cord since at least last summer. Early this last winter he was treated with chemoradiation for this cancer. He has been a patient of Dr. Fili Zacarias and Dr. Austin Pittman in [facility] at [address] [place] [address]. His radiation therapy physician is Dr. Govind Oneal here in [address] [place] [address]. He states that he has been having problems with his blood pressure falling for periods of time. He has had a productive cough now for some time. He has a rash on the left side of his neck that he states began 2 days ago and is extraordinarily painful. He states he was told it was from the radiation by the emergency room providers this morning. His neck has been fine since the radiation irritation subsided several months ago up until 2 days ago. He does not feel short of breath at this time although he did earlier today. My nurse practitioner saw him in the emergency room this morning and commented how foul-smelling his sputum was. He was bringing up large quantities of sputum reportedly. A portable chest x-ray was read as no acute disease. History:      Allergies   Allergen Reactions    Lisinopril Swelling     Angioedema of the face/lips  Other reaction(s): cough  Pt unsure but thinks he is allergic to this       No current facility-administered medications for this visit. Current Outpatient Medications   Medication Sig Dispense Refill    EAR DROPS 6.5 % otic solution instill 5-10 DROPS into each ear twice a day for 4 days      carbamide peroxide (DEBROX) 6.5 % otic solution Debrox 6.5% Otic Solution 02/16/2022 Provider: Chris Lees FNMALCOLM      fludrocortisone (FLORINEF) 0.1 MG tablet Take 2 tablets by mouth daily 60 tablet 0    midodrine (PROAMATINE) 10 MG tablet Take 1 tablet by mouth 3 times daily (with meals) 30 tablet 0    metoprolol tartrate (LOPRESSOR) 25 MG tablet Take 1 tablet by mouth 2 times daily 60 tablet 0    insulin glargine (LANTUS SOLOSTAR) 100 UNIT/ML injection pen Inject 35 Units into the skin nightly 1 pen 0    sodium chloride 1 g tablet Take 1 tablet by mouth 3 times daily (with meals) 90 tablet 3    lidocaine viscous hcl (XYLOCAINE) 2 % SOLN solution Take 5 mLs by mouth as needed for Irritation 100 mL 3    prochlorperazine (COMPAZINE) 10 MG tablet Take 1 tablet by mouth every 6 hours as needed (nausea) 120 tablet 3    vitamin D (CHOLECALCIFEROL) 125 MCG (5000 UT) CAPS capsule Take 125 mcg by mouth daily      gabapentin (NEURONTIN) 300 MG capsule 300 mg 3 times daily.  SODIUM FLUORIDE, DENTAL GEL, 1.1 % GEL Apply 3-4 drops of gel directly to teeth. Do  Not eat or drink for 30min. Once daily at bedtime.  glimepiride (AMARYL) 4 MG tablet TAKE ONE TABLET BY MOUTH TWICE DAILY 30 tablet 0    metFORMIN (GLUCOPHAGE) 1000 MG tablet Take 1 tablets twice a day 60 tablet 3    aspirin 81 MG tablet Take 1 tablet by mouth daily. 30 tablet 11    atorvastatin (LIPITOR) 20 MG tablet Take 1 tablet by mouth daily. 30 tablet 6    omeprazole (PRILOSEC) 20 MG capsule Take 1 capsule by mouth daily. 30 capsule 6    gemfibrozil (LOPID) 600 MG tablet Take 1 tablet by mouth 2 times daily (before meals). 60 tablet 5    Insulin Pen Needle ([employer] PEN NEEDLES) 31G X 6 MM MISC by Does not apply route.  100 each 3    MULTIPLE VITAMIN PO Take 1 tablet by mouth daily. Facility-Administered Medications Ordered in Other Visits   Medication Dose Route Frequency Provider Last Rate Last Admin    acyclovir (ZOVIRAX) 700 mg in dextrose 5 % 100 mL IVPB  10 mg/kg IntraVENous Q8H Melva Hernandez, DO         Past Medical History:   Diagnosis Date    Arthritis     GERD (gastroesophageal reflux disease)     Hypertension     Keratinizing squamous cell carcinoma of larynx ([facility] [address] 08/30/2021    Type II or unspecified type diabetes mellitus without mention of complication, not stated as uncontrolled       History reviewed. No pertinent surgical history. Family History   Problem Relation Age of Onset    Diabetes Mother     Stroke Mother     Cancer Maternal Grandfather         liver    Cancer Father      Social History     Tobacco Use    Smoking status: Never Smoker    Smokeless tobacco: Never Used    Tobacco comment: Recently started cigars but quit   Substance Use Topics    Alcohol use: Not Currently     Alcohol/week: 1.0 standard drink     Types: 1 Shots of liquor per week     Comment: occasionaly       Subjective:      Review of Systems    Objective:   /60 (Site: Right Upper Arm, Position: Sitting)   Pulse 89   Temp 96.6 °F (35.9 °C) (Infrared)   Resp 14   Ht 5' 11\" (1.803 m)   Wt 149 lb (67.6 kg)   SpO2 96%   BMI 20.78 kg/m²  Patient's blood pressure dropped during the visit to 98/50. Verified by an experienced CMA. Physical Exam   Elderly male reclining in no acute distress. No stridor. Voice is extremely hoarse and breathy. Frequent wet coughing. He could barely stand up to transfer from her exam table to the wheelchair. Neck: entire left neck has a very prominent erythematous rash consistent with shingles. There are scattered small pustules throughout. This extends from the midline to the entire length of the neck, extending posteriorly to the midline.     HIGH RESOLUTION FLEXIBLE VIDEOLARYNGOSOCPY    A fiberoptic laryngoscopy was performed under topical anesthesia, after using Afrin and Lidocaine spray in the nasal fossa. The nasal fossa, nasopharynx, hypopharynx and larynx were carefully examined. Base of tongue was symmetrical. Epiglottis appeared edematous and was not retrodisplaced. Left true vocal cord had normal mobility. Right false vocal cord was replaced with a bulging necrotic mass. No pooling in the pyriform sinuses. Patient was coughing up large quantities of sputum that was very foul-smelling, light yellow, and very thick. This is noted on the video, repeatedly. He had a strong gag reflex and the exam was somewhat challenging. The video can be viewed by clicking on the top entry which shows the larynx well. The next 1 down shows all the thick secretions he is coughing up. Some of it could be coming from the sinuses. Above image is a still from the video showing the necrotic mass on the right false vocal cord (to the Viewer's left)    Data:  All of the past medical history, past surgical history, family history,social history, allergies and current medications were reviewed with the patient. Assessment & Plan   Diagnoses and all orders for this visit:     Diagnosis Orders   1. Primary squamous cell carcinoma of supraglottis (HCC)  OK LARYNGOSCOPY FLEXIBLE DIAGNOSTIC   2. 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 Status post chemoradiation  OK LARYNGOSCOPY FLEXIBLE DIAGNOSTIC   4. Diabetes mellitus type 2 in nonobese (HCC)     5. Herpes zoster without complication, left neck, severe      Involves entire left neck and stops at midline. Location is that of his radiation port. 6. Foul-smelling cough productive of purulent sputum      Foul-smelling   7. Hypotension, unspecified hypotension type     8. Chronic frontal sinusitis right     9. Chronic ethmoidal sinusitis, right     10.  Chronic maxillary sinusitis, right         The findings were explained and his questions were answered. He understands that he has persistent cancer and will need a laryngectomy. The history, dermatome distribution and appearance of the extremely painful eruption on his left neck is consistent with shingles. He needs the shingles treated with maximum doses of Valtrex and corticosteroids. This skin has been radiated and has a very poor blood supply and resistance to infection. His productive cough and hypotension are sufficient to warrant admission. I returned the patient to the emergency room by wheelchair and spoke with Dr. Waldo Villa. I explained the above and he agreed to accept the patient back to the emergency room and we agreed that he would be admitted. His airway is stable. Patient stated he was interested in a laryngectomy. He will need a PET/CT. He needs a thorough medical work-up and treatment as needed, so he can withstand the general anesthetic for the surgery. I recommend the patient be placed on Afrin 2 puffs each nostril every 6 hours. He has a right-sided pansinusitis. He should get antibiotic coverage which would include coverage for staph aureus, possibly MRSA, and anaerobes based on the odor. Patient will be admitted to the hospitalist service. We will follow him. No \"consult\" is required, just have him put on our list.       Rosalind Tidwell. Raphael Hammer MD    **This report has been created using voice recognition software. It may contain minor errors which are inherent in voicerecognition technology. **

## 2022-04-08 NOTE — ED NOTES
Patient transferred back to ER from ENT office. Patient rash  On throat now diagnosed with shingles.  Patient sent to be admitted      Jasvir Pena RN  04/08/22 2643

## 2022-04-08 NOTE — ED NOTES
ED nurse-to-nurse bedside report    Chief Complaint   Patient presents with    Shortness of Breath      LOC: alert and orientated to name, place, date  Vital signs   Vitals:    04/08/22 0501 04/08/22 0632   BP: 131/78 (!) 151/78   Pulse: 94 94   Resp: 22 19   Temp: 97.9 °F (36.6 °C)    TempSrc: Axillary    SpO2: 100% 99%   Weight: 149 lb (67.6 kg)    Height: 5' 11\" (1.803 m)       Pain:    Pain Interventions: rest/reposition/medication  Pain Goal: 3  Oxygen: No    Current needs required 0   Telemetry: Yes  LDAs:   Peripheral IV 04/08/22 Right Antecubital (Active)   Site Assessment Clean;Dry; Intact 04/08/22 0633   Line Status Flushed 04/08/22 0633   Dressing Status Clean;Dry; Intact 04/08/22 1665   Dressing Intervention New 04/08/22 0514     Continuous Infusions:   Mobility: Requires assistance * 1  Mckay Fall Risk Score: No flowsheet data found. Fall Interventions: bed lowest position, side rails x2, call light in reach, spouse at bedside.    Report given to: Onelia Duarte 9, RN  04/08/22 2036

## 2022-04-08 NOTE — H&P
Hospitalist - History & Physical      Patient: Ton Acuna    Unit/Bed:5K-20/020-A  YOB: 1960  MRN: [id number]   Acct: [de-identified]   PCP: KATHRYN Perdomo CNP      Assessment and Plan:        1. Recurrent squamous cell carcinoma of larynx :   a. Has finished chemo  b. Still taking radiation  c. Dr. Evelyne Perez concerned following larygnoscopy and requested admission  d. Consult oncology as well  2. Sinusitis:  a. Treatment recommended by Dr. Evelyne Perez  3. Dyspnea:   a. Patient reports increasing  b. Good saturation, on room air  4. Herpes zoster:   a. CN V3 on the left  b. IV acyclovir q 8 hours  5. DM II:   a. Patient is NPO - lantus is held  b. Sliding scale q 6 hours  6. Severe malnutrition:   a. Patient states he has no problem swallowing solids but liquids are difficult  b. Patient coughed with meds  c. NPO at this time until further SLP recommendations are made      CC:  Dyspnea    HPI: Patient presents to the ED from ENT office for admission. The patient has recurrent laryngeal cancer and reported increasing shortness of breath with yellow sputum. The patient denies fevers. He does have a significant rash on the left side of his neck extending to his left ear. Prior to ED visit Dr. Evelyne Perez had some concerning findings on laryngoscopy. He recommended the patient be treated for sinusitis and he will follow in the hospital.    Patient admitted for further work up and potential laryngectomy and treatment for herpes zoster. ROS: Review of Systems   Constitutional: Positive for unexpected weight change. HENT: Positive for congestion, postnasal drip, rhinorrhea, sinus pressure and trouble swallowing. Eyes: Negative. Respiratory: Positive for cough and shortness of breath. Cardiovascular: Negative. Gastrointestinal: Negative. Endocrine: Negative. Genitourinary: Negative. Musculoskeletal: Negative. Skin: Negative. Allergic/Immunologic: Negative. Neurological: Negative. Hematological: Negative. Psychiatric/Behavioral: Negative. PMH:    Past Medical History:   Diagnosis Date    Arthritis     GERD (gastroesophageal reflux disease)     Hypertension     Keratinizing squamous cell carcinoma of larynx ([facility] [address] 08/30/2021    Type II or unspecified type diabetes mellitus without mention of complication, not stated as uncontrolled      SHX:    Social History     Socioeconomic History    Marital status:      Spouse name: Emani Gordon Number of children: 2    Years of education: 15    Highest education level: Not on file   Occupational History    Occupation: [employer]     Employer: [employer]   Tobacco Use    Smoking status: Never Smoker    Smokeless tobacco: Never Used    Tobacco comment: Recently started cigars but quit   Vaping Use    Vaping Use: Never used   Substance and Sexual Activity    Alcohol use: Not Currently     Alcohol/week: 1.0 standard drink     Types: 1 Shots of liquor per week     Comment: occasionaly    Drug use: No    Sexual activity: Not Currently   Other Topics Concern    Not on file   Social History Narrative    Not on file     Social Determinants of Health     Financial Resource Strain:     Difficulty of Paying Living Expenses: Not on file   Food Insecurity:     Worried About [address] in the Last Year: Not on file    Curtis of Food in the Last Year: Not on file   Transportation Needs:     Lack of Transportation (Medical): Not on file    Lack of Transportation (Non-Medical):  Not on file   Physical Activity:     Days of Exercise per Week: Not on file    Minutes of Exercise per Session: Not on file   Stress:     Feeling of Stress : Not on file   Social Connections:     Frequency of Communication with Friends and Family: Not on file    Frequency of Social Gatherings with Friends and Family: Not on file    Attends Confucianist Services: Not on file   [employer] of Clubs or Organizations: Not on file    Attends Club or Organization Meetings: Not on file    Marital Status: Not on file   Intimate Partner Violence:     Fear of Current or Ex-Partner: Not on file    Emotionally Abused: Not on file    Physically Abused: Not on file    Sexually Abused: Not on file   Housing Stability:     Unable to Pay for Housing in the Last Year: Not on file    Number of [place] in the Last Year: Not on file    Unstable Housing in the Last Year: Not on file     FHX:   Family History   Problem Relation Age of Onset    Diabetes Mother     Stroke Mother     Cancer Maternal Grandfather         liver    Cancer Father      Allergies:    Allergies   Allergen Reactions    Lisinopril Swelling     Angioedema of the face/lips  Other reaction(s): cough  Pt unsure but thinks he is allergic to this       Medications:     sodium chloride        acyclovir  10 mg/kg IntraVENous Q8H    [Held by provider] aspirin  81 mg Oral Daily    [Held by provider] atorvastatin  20 mg Oral Daily    [Held by provider] fludrocortisone  0.2 mg Oral Daily    [Held by provider] gabapentin  300 mg Oral TID    [Held by provider] glipiZIDE  10 mg Oral BID AC    [Held by provider] insulin glargine  35 Units SubCUTAneous Nightly    [Held by provider] metoprolol tartrate  25 mg Oral BID    [Held by provider] midodrine  10 mg Oral TID WC    [START ON 4/9/2022] pantoprazole  40 mg IntraVENous Daily    sodium chloride flush  5-40 mL IntraVENous 2 times per day    [START ON 4/9/2022] enoxaparin  40 mg SubCUTAneous Daily     sodium chloride flush, 5-40 mL, PRN  sodium chloride, , PRN  ondansetron, 4 mg, Q8H PRN   Or  ondansetron, 4 mg, Q6H PRN  polyethylene glycol, 17 g, Daily PRN  acetaminophen, 650 mg, Q6H PRN   Or  acetaminophen, 650 mg, Q6H PRN  potassium chloride, 40 mEq, PRN   Or  potassium alternative oral replacement, 40 mEq, PRN   Or  potassium chloride, 10 mEq, PRN  magnesium sulfate, 2,000 mg, PRN  HYDROmorphone, 0.5 mg, Q4H PRN        Labs:   Recent Results (from the past 24 hour(s))   EKG 12 Lead    Collection Time: 04/08/22  4:52 AM   Result Value Ref Range    Ventricular Rate 95 BPM    Atrial Rate 95 BPM    P-R Interval 148 ms    QRS Duration 92 ms    Q-T Interval 392 ms    QTc Calculation (Bazett) 492 ms    P Axis 65 degrees    R Axis 82 degrees    T Axis 66 degrees   CBC with Auto Differential    Collection Time: 04/08/22  5:10 AM   Result Value Ref Range    WBC 5.4 4.8 - 10.8 thou/mm3    RBC 4.92 4.70 - 6.10 mill/mm3    Hemoglobin 12.6 (L) 14.0 - 18.0 gm/dl    Hematocrit 40.2 (L) 42.0 - 52.0 %    MCV 81.7 80.0 - 94.0 fL    MCH 25.6 (L) 26.0 - 33.0 pg    MCHC 31.3 (L) 32.2 - 35.5 gm/dl    RDW-CV 13.3 11.5 - 14.5 %    RDW-SD 39.8 35.0 - 45.0 fL    Platelets 297 344 - 768 thou/mm3    MPV 8.9 (L) 9.4 - 12.4 fL    Seg Neutrophils 68.9 %    Lymphocytes 20.9 %    Monocytes 7.4 %    Eosinophils 1.7 %    Basophils 0.7 %    Immature Granulocytes 0.4 %    Segs Absolute 3.7 1.8 - 7.7 thou/mm3    Lymphocytes Absolute 1.1 1.0 - 4.8 thou/mm3    Monocytes Absolute 0.4 0.4 - 1.3 thou/mm3    Eosinophils Absolute 0.1 0.0 - 0.4 thou/mm3    Basophils Absolute 0.0 0.0 - 0.1 thou/mm3    Immature Grans (Abs) 0.02 0.00 - 0.07 thou/mm3    nRBC 0 /100 wbc   Basic Metabolic Panel w/ Reflex to MG    Collection Time: 04/08/22  5:10 AM   Result Value Ref Range    Sodium 136 135 - 145 meq/L    Potassium reflex Magnesium 3.8 3.5 - 5.2 meq/L    Chloride 96 (L) 98 - 111 meq/L    CO2 25 23 - 33 meq/L    Glucose 230 (H) 70 - 108 mg/dL    BUN 8 7 - 22 mg/dL    CREATININE 0.7 0.4 - 1.2 mg/dL    Calcium 9.9 8.5 - 10.5 mg/dL   Hepatic Function Panel    Collection Time: 04/08/22  5:10 AM   Result Value Ref Range    Albumin 3.9 3.5 - 5.1 g/dL    Total Bilirubin 0.5 0.3 - 1.2 mg/dL    Bilirubin, Direct <0.2 0.0 - 0.3 mg/dL    Alkaline Phosphatase 114 38 - 126 U/L    AST 9 5 - 40 U/L    ALT 8 (L) 11 - 66 U/L    Total Protein 8.0 6.1 - 8.0 g/dL   Troponin degrees         Vital Signs: T: 96.7F P: 97 RR: 20 B/P: 102/58: FiO2: RA: O2 Sat:93%: I/O: No intake or output data in the 24 hours ending 04/08/22 2108      General:   No acute distress, thin  HEENT:  normocephalic and atraumatic. No scleral icterus. PEARLA, mucous membranes moist, raspy quiet voice  Neck: supple. Trachea midline. No JVD. Full ROM, no meningismus. Lungs: clear to auscultation. No retractions, no accessory muscle use. Cardiac: RRR, no murmur, 2+ pulses  Abdomen: soft. Nontender. Bowel sounds active  Extremities:  No clubbing, cyanosis x 4, no edema    Vasculature: capillary refill < 3 seconds. Skin:  warm and dry. + visible rashes - red vesicular rash on the left neck extending from the midline to the and including the left ear  Psych:  Alert and oriented x3. Affect appropriate  Lymph:  No supraclavicular adenopathy. Neurologic:  CN II-XII grossly intact. No focal deficit. Data: (All radiographs, tracings, PFTs, and imaging are personally viewed and interpreted unless otherwise noted).     EKG: rhythm: normal sinus rhythm, rate=82 bpm, ew=648 ms, qrs=92 ms, sp=307 ms, axis=56 degrees        Electronically signed by  Robert Bonner PA-C

## 2022-04-08 NOTE — ED NOTES
Pt resting in bed with spouse at bedside. Respirations even and unlabored. Pt medicated per MAR.      Paulina Atkins RN  04/08/22 0844

## 2022-04-08 NOTE — ED TRIAGE NOTES
Pt presents to the ED with c/o shortness of breath. Pt states he has a history of throat cancer. Pt states he is done with treatment at this time. Pt states he always has a cough, that is unchanged at this time. Pt states he has been coughing up yellow sputum. Pt states he has had progressively worsening shortness of breath. Pt also reports chills, but unsure of any fevers.

## 2022-04-08 NOTE — ED PROVIDER NOTES
[facility] [address] [address] EMERGENCY DEPT  EMERGENCY MEDICINE     Pt Name: Lizzie Taylor  MRN: [id number]  [place] 1960  Date of evaluation: 4/8/2022  PCP:    KATHRYN Velazquez CNP  Provider: KATHRYN Cordon CNP    CHIEF COMPLAINT       Chief Complaint   Patient presents with    Shortness of Breath           HISTORY OF PRESENT ILLNESS    Lizzie Taylor is a 57 y.o. male patient that presents to ER with complaint of shortness of breath, productive cough with yellow sputum, rash on left side of his neck and swelling in his throat. Patient states he has a history of throat cancer for which she sees Dr. Anais Millan for. He states that he has completed his chemotherapy. He states that he has had worsening shortness of breath over the last several days and has been coughing up yellow sputum. He also complains of a red rash that is on the left side of his neck goes up to his ear. He also has lymphedema on the left side where the rashes. The rash is vesicular in nature and reddened. Triage notes and Nursing notes were reviewed by myself. Any discrepancies are addressed above. PAST MEDICAL HISTORY     Past Medical History:   Diagnosis Date    Arthritis     GERD (gastroesophageal reflux disease)     Hypertension     Keratinizing squamous cell carcinoma of larynx ([facility] [address] 08/30/2021    Type II or unspecified type diabetes mellitus without mention of complication, not stated as uncontrolled        SURGICAL HISTORY       History reviewed. No pertinent surgical history.     CURRENT MEDICATIONS       Discharge Medication List as of 4/8/2022 12:07 PM      CONTINUE these medications which have NOT CHANGED    Details   fludrocortisone (FLORINEF) 0.1 MG tablet Take 2 tablets by mouth daily, Disp-60 tablet, R-0Normal      midodrine (PROAMATINE) 10 MG tablet Take 1 tablet by mouth 3 times daily (with meals), Disp-30 tablet, R-0Normal      metoprolol tartrate (LOPRESSOR) 25 MG tablet Take 1 tablet by mouth 2 times daily, Disp-60 tablet, R-0Normal      insulin glargine (LANTUS SOLOSTAR) 100 UNIT/ML injection pen Inject 35 Units into the skin nightly, Disp-1 pen, R-0Normal      sodium chloride 1 g tablet Take 1 tablet by mouth 3 times daily (with meals), Disp-90 tablet, R-3Normal      lidocaine viscous hcl (XYLOCAINE) 2 % SOLN solution Take 5 mLs by mouth as needed for Irritation, Disp-100 mL, R-3He is to mix lidocaine with liquid maalox and liquid benadryl in equal parts. Take 5ml of mixture add 5ml water and swallow every 4 hours PRN. Can Swish and spit as many times a day as needed . Normal      prochlorperazine (COMPAZINE) 10 MG tablet Take 1 tablet by mouth every 6 hours as needed (nausea), Disp-120 tablet, R-3Normal      vitamin D (CHOLECALCIFEROL) 125 MCG (5000 UT) CAPS capsule Take 125 mcg by mouth dailyHistorical Med      gabapentin (NEURONTIN) 300 MG capsule 300 mg 3 times daily. Historical Med      SODIUM FLUORIDE, DENTAL GEL, 1.1 % GEL Apply 3-4 drops of gel directly to teeth. Do  Not eat or drink for 30min. Once daily at bedtime. Historical Med      glimepiride (AMARYL) 4 MG tablet TAKE ONE TABLET BY MOUTH TWICE DAILY, Disp-30 tablet, R-0NEEDS APPOINTMENT      metFORMIN (GLUCOPHAGE) 1000 MG tablet Take 1 tablets twice a day, Disp-60 tablet, R-3      aspirin 81 MG tablet Take 1 tablet by mouth daily. , Disp-30 tablet, R-11      atorvastatin (LIPITOR) 20 MG tablet Take 1 tablet by mouth daily. , Disp-30 tablet, R-6      omeprazole (PRILOSEC) 20 MG capsule Take 1 capsule by mouth daily. , Disp-30 capsule, R-6      gemfibrozil (LOPID) 600 MG tablet Take 1 tablet by mouth 2 times daily (before meals). , Disp-60 tablet, R-5      Insulin Pen Needle ([employer] PEN NEEDLES) 31G X 6 MM MISC Starting 8/15/2013, Until Discontinued, Disp-100 each, R-3, Normal      MULTIPLE VITAMIN PO Take 1 tablet by mouth daily.              ALLERGIES       Allergies   Allergen Reactions    Lisinopril Swelling     Angioedema of the face/lips  Other reaction(s): cough  Pt unsure but thinks he is allergic to this         FAMILY HISTORY       Family History   Problem Relation Age of Onset    Diabetes Mother     Stroke Mother     Cancer Maternal Grandfather         liver    Cancer Father         SOCIAL HISTORY       Social History     Socioeconomic History    Marital status:      Spouse name: Mary Jones Number of children: 2    Years of education: 15    Highest education level: None   Occupational History    Occupation: [employer]     Employer: [employer]   Tobacco Use    Smoking status: Never Smoker    Smokeless tobacco: Never Used    Tobacco comment: Recently started cigars but quit   Vaping Use    Vaping Use: Never used   Substance and Sexual Activity    Alcohol use: Not Currently     Alcohol/week: 1.0 standard drink     Types: 1 Shots of liquor per week     Comment: occasionaly    Drug use: No    Sexual activity: Not Currently   Other Topics Concern    None   Social History Narrative    None     Social Determinants of Health     Financial Resource Strain:     Difficulty of Paying Living Expenses: Not on file   Food Insecurity:     Worried About Running Out of Food in the Last Year: Not on file    Curtis of Food in the Last Year: Not on file   Transportation Needs:     Lack of Transportation (Medical): Not on file    Lack of Transportation (Non-Medical):  Not on file   Physical Activity:     Days of Exercise per Week: Not on file    Minutes of Exercise per Session: Not on file   Stress:     Feeling of Stress : Not on file   Social Connections:     Frequency of Communication with Friends and Family: Not on file    Frequency of Social Gatherings with Friends and Family: Not on file    Attends Synagogue Services: Not on file    Active Member of Clubs or Organizations: Not on file    Attends Club or Organization Meetings: Not on file    Marital Status: Not on file   Intimate Partner Violence:     Fear of Current or Ex-Partner: Not on file    Emotionally Abused: Not on file    Physically Abused: Not on file    Sexually Abused: Not on file   Housing Stability:     Unable to Pay for Housing in the Last Year: Not on file    Number of Places Lived in the Last Year: Not on file    Unstable Housing in the Last Year: Not on file       REVIEW OF SYSTEMS     Review of Systems   Constitutional: Negative for appetite change, chills, fatigue, fever and unexpected weight change. HENT: Positive for trouble swallowing. Negative for ear pain, rhinorrhea and sinus pressure. Eyes: Negative for pain and visual disturbance. Respiratory: Positive for cough and shortness of breath. Negative for wheezing. Cardiovascular: Negative for chest pain, palpitations and leg swelling. Gastrointestinal: Negative for abdominal pain, blood in stool, constipation, diarrhea, nausea and vomiting. Genitourinary: Negative for dysuria, frequency and hematuria. Musculoskeletal: Negative for arthralgias and joint swelling. Skin: Negative for rash. Neurological: Negative for dizziness, syncope, weakness, light-headedness and headaches. Hematological: Does not bruise/bleed easily. Except as noted above the remainder of the review of systems was reviewed and is negative. SCREENINGS        [place] Coma Scale  Eye Opening: Spontaneous  Best Verbal Response: Oriented  Best Motor Response: Obeys commands  [place] Coma Scale Score: 15               PHYSICAL EXAM    (up to 7 for level 4, 8 or more for level 5)     ED Triage Vitals [02/19/22 1512]   BP Temp Temp Source Pulse Resp SpO2 Height Weight   (!) 134/95 98.2 °F (36.8 °C) Oral 124 16 100 % -- --       Physical Exam  Vitals and nursing note reviewed. Constitutional:       General: He is not in acute distress. Appearance: He is well-developed. He is not diaphoretic. HENT:      Head: Normocephalic and atraumatic.    Eyes:      Conjunctiva/sclera: Conjunctivae normal.      Pupils: Pupils are equal, round, and reactive to light. Cardiovascular:      Rate and Rhythm: Normal rate and regular rhythm. Heart sounds: Normal heart sounds. No murmur heard. No gallop. Pulmonary:      Effort: Pulmonary effort is normal. No respiratory distress. Breath sounds: Examination of the right-lower field reveals decreased breath sounds. Examination of the left-lower field reveals decreased breath sounds. Decreased breath sounds present. No wheezing or rales. Abdominal:      General: Bowel sounds are normal.      Palpations: Abdomen is soft. Musculoskeletal:         General: Normal range of motion. Cervical back: Normal range of motion. Skin:     General: Skin is warm and dry. Neurological:      Mental Status: He is alert and oriented to person, place, and time. Left neck vesicular rash    DIAGNOSTIC RESULTS     EKG:(none if blank)  All EKGs are interpreted by the Emergency Department Physician who either signs or Co-signs this chart in the absence of a cardiologist.    Normal sinus rhythm with a ventricular rate of 95. RI interval 148 ms, QRS 82 ms,  ms and QTC is prolonged at 492 ms. EKG appears to be unchanged from previous EKG done 3/15/2022. RADIOLOGY: (none if blank)   I directly visualized the following images and reviewed the radiologist interpretations. Interpretation per the Radiologist below, if available at the time of this note:  CT SOFT TISSUE NECK W CONTRAST   Final Result       1. 2.4 x 2.5 x 1.2 cm area of abnormal soft tissue density in the supraglottic larynx suspicious for recurrent tumor. ENT correlation would be helpful. 2. Left vocal cord is displaced towards the midline. 3. Small lymph nodes especially in the posterior triangles the neck, inferior to the left parotid gland and in the suboccipital region on the left side. 4. Increased density in the skin and subcutaneous soft tissues possibly representing changes of radiation therapy. 5. Mild cervical spondylosis. 6. Opacification of the right maxillary sinus and right ethmoid air cells. 7. Bilateral carotid artery calcification. 8. Areas of increased density in the upper lung. It may represent areas of pneumonitis. **This report has been created using voice recognition software. It may contain minor errors which are inherent in voice recognition technology. **      Final report electronically signed by DR Magui Jain on 4/8/2022 7:57 AM      XR CHEST PORTABLE   Final Result   1. No acute disease. This document has been electronically signed by: Mari Reyes M.D. on    04/08/2022 06:02 AM          LABS:  Labs Reviewed   CBC WITH AUTO DIFFERENTIAL - Abnormal; Notable for the following components:       Result Value    Hemoglobin 12.6 (*)     Hematocrit 40.2 (*)     MCH 25.6 (*)     MCHC 31.3 (*)     MPV 8.9 (*)     All other components within normal limits   BASIC METABOLIC PANEL W/ REFLEX TO MG FOR LOW K - Abnormal; Notable for the following components:    Chloride 96 (*)     Glucose 230 (*)     All other components within normal limits   HEPATIC FUNCTION PANEL - Abnormal; Notable for the following components:    ALT 8 (*)     All other components within normal limits   LACTATE, SEPSIS - Abnormal; Notable for the following components:    Lactic Acid, Sepsis 2.1 (*)     All other components within normal limits   RAPID INFLUENZA A/B ANTIGENS   COVID-19, RAPID   CULTURE, RESPIRATORY    Narrative:     Source: Expectorated sputum       Site: cup          Current Antibiotics: not stated   TROPONIN   LIPASE   BRAIN NATRIURETIC PEPTIDE   PROCALCITONIN   ANION GAP   OSMOLALITY   GLOMERULAR FILTRATION RATE, ESTIMATED       All other labs were within normal range or not returned as of this dictation. Please note, any cultures that may have been sent were not resulted at the time of this patient visit.     EMERGENCY DEPARTMENT COURSE and Medical Decision Making:     Vitals: Vitals:    04/08/22 0827 04/08/22 1200 04/08/22 1300 04/08/22 1357   BP: (!) 147/76 96/64 129/71 126/71   Pulse: 95 99 97 90   Resp: 23 14 20 15   Temp:       TempSrc:       SpO2: 98% 96% 96% 96%   Weight:       Height:           PROCEDURES: (None if blank)  Procedures    ED Course as of 04/11/22 0332   Fri Apr 08, 2022   0830 Reviewed case with Dr. Galindo Mcclure Reviewed case with the patient. Updated him on results. [KJ]   [id number] Case is discussed with Dr. Maralee Litten the patient's radiation oncologist.  He will be over to see the patient. He requested that I call ENT and get recommendations from them. Message sent to Dr. Julia Sellers. [KJ]   [address] [place], CNP at the bedside. They believe the tumor is infected. Patient to receive cipro and clinda loading via IVPB and then be dc to Dr. Emiliana Skaggs office where he will scope him. Patient is updated and agreeable. If Dr. Maralee Litten has not been here before abx are complete, then call him for OK to DC.   [KJ]      ED Course User Index  [KJ] Dread Hernandez, APRN - CNP     MDM  Number of Diagnoses or Management Options  Primary squamous cell carcinoma of supraglottis ([facility] [address]): new, needed workup  Throat infection: new, needed workup     Amount and/or Complexity of Data Reviewed  Clinical lab tests: ordered and reviewed  Tests in the radiology section of CPT®: ordered and reviewed  Tests in the medicine section of CPT®: ordered and reviewed  Review and summarize past medical records: yes  Discuss the patient with other providers: yes  Independent visualization of images, tracings, or specimens: yes    Risk of Complications, Morbidity, and/or Mortality  Presenting problems: moderate  Diagnostic procedures: moderate  Management options: low    Presents to ER with complaint of shortness of breath, productive cough with yellow mucus, neck pain and vesicular rash on the left side of his neck. Patient does have a history of throat cancer.   Differential diagnosis includes but not limited to COVID-19, influenza, pneumonia, lymphadenopathy of the left side of the neck, shingles and less likely sepsis. She was handed off to [facility] for definitive care. Strict return precautions and follow up instructions were discussed with the patient with which the patient agrees    ED Medications administered this visit:    Medications   morphine (PF) injection 4 mg (4 mg IntraVENous Given 4/8/22 0633)   ondansetron (ZOFRAN) injection 4 mg (4 mg IntraVENous Given 4/8/22 0633)   iopamidol (ISOVUE-370) 76 % injection 80 mL (80 mLs IntraVENous Given 4/8/22 0729)   ciprofloxacin (CIPRO) IVPB 400 mg (0 mg IntraVENous Stopped 4/8/22 1401)   clindamycin (CLEOCIN) 900 mg in dextrose 5 % 50 mL IVPB (0 mg IntraVENous Stopped 4/8/22 1301)   morphine (PF) injection 4 mg (4 mg IntraVENous Given 4/8/22 1354)         FINAL IMPRESSION      1. Throat infection    2. Primary squamous cell carcinoma of supraglottis [facility])          DISPOSITION/PLAN   DISPOSITION        PATIENT REFERRED TO:  iDrk Wyman MD  [facility] [address]  [phone]    Go to   Go directly to Dr. Emiliana Skaggs office.       DISCHARGE MEDICATIONS:  Discharge Medication List as of 4/8/2022 12:07 PM                 KATHRYN Kothari CNP (electronically signed)           KATHRYN Kothari CNP  04/11/22 7769

## 2022-04-08 NOTE — ED NOTES
Pt given urinal at this time. Wife at bedside. Call light in reach. No concerns voiced.      Kirstin Turk RN  04/08/22 0733

## 2022-04-09 LAB
ANION GAP SERPL CALCULATED.3IONS-SCNC: 10 MEQ/L (ref 8–16)
BASOPHILS # BLD: 0.4 %
BASOPHILS ABSOLUTE: 0 THOU/MM3 (ref 0–0.1)
BUN BLDV-MCNC: 18 MG/DL (ref 7–22)
CALCIUM SERPL-MCNC: 9.1 MG/DL (ref 8.5–10.5)
CHLORIDE BLD-SCNC: 96 MEQ/L (ref 98–111)
CO2: 27 MEQ/L (ref 23–33)
CREAT SERPL-MCNC: 1.5 MG/DL (ref 0.4–1.2)
EOSINOPHIL # BLD: 1.3 %
EOSINOPHILS ABSOLUTE: 0.1 THOU/MM3 (ref 0–0.4)
ERYTHROCYTE [DISTWIDTH] IN BLOOD BY AUTOMATED COUNT: 13.5 % (ref 11.5–14.5)
ERYTHROCYTE [DISTWIDTH] IN BLOOD BY AUTOMATED COUNT: 40.5 FL (ref 35–45)
GFR SERPL CREATININE-BSD FRML MDRD: 47 ML/MIN/1.73M2
GLUCOSE BLD-MCNC: 150 MG/DL (ref 70–108)
GLUCOSE BLD-MCNC: 196 MG/DL (ref 70–108)
GLUCOSE BLD-MCNC: 211 MG/DL (ref 70–108)
GLUCOSE BLD-MCNC: 233 MG/DL (ref 70–108)
HCT VFR BLD CALC: 35.1 % (ref 42–52)
HEMOGLOBIN: 10.9 GM/DL (ref 14–18)
IMMATURE GRANS (ABS): 0.02 THOU/MM3 (ref 0–0.07)
IMMATURE GRANULOCYTES: 0.4 %
LYMPHOCYTES # BLD: 13.1 %
LYMPHOCYTES ABSOLUTE: 0.7 THOU/MM3 (ref 1–4.8)
MCH RBC QN AUTO: 25.6 PG (ref 26–33)
MCHC RBC AUTO-ENTMCNC: 31.1 GM/DL (ref 32.2–35.5)
MCV RBC AUTO: 82.4 FL (ref 80–94)
MONOCYTES # BLD: 10.9 %
MONOCYTES ABSOLUTE: 0.6 THOU/MM3 (ref 0.4–1.3)
NUCLEATED RED BLOOD CELLS: 0 /100 WBC
PLATELET # BLD: 216 THOU/MM3 (ref 130–400)
PMV BLD AUTO: 9.3 FL (ref 9.4–12.4)
POTASSIUM REFLEX MAGNESIUM: 4.2 MEQ/L (ref 3.5–5.2)
RBC # BLD: 4.26 MILL/MM3 (ref 4.7–6.1)
SEG NEUTROPHILS: 73.9 %
SEGMENTED NEUTROPHILS ABSOLUTE COUNT: 4 THOU/MM3 (ref 1.8–7.7)
SODIUM BLD-SCNC: 133 MEQ/L (ref 135–145)
WBC # BLD: 5.4 THOU/MM3 (ref 4.8–10.8)

## 2022-04-09 PROCEDURE — 6370000000 HC RX 637 (ALT 250 FOR IP): Performed by: OTOLARYNGOLOGY

## 2022-04-09 PROCEDURE — 92610 EVALUATE SWALLOWING FUNCTION: CPT

## 2022-04-09 PROCEDURE — 99232 SBSQ HOSP IP/OBS MODERATE 35: CPT | Performed by: OTOLARYNGOLOGY

## 2022-04-09 PROCEDURE — 85025 COMPLETE CBC W/AUTO DIFF WBC: CPT

## 2022-04-09 PROCEDURE — C9113 INJ PANTOPRAZOLE SODIUM, VIA: HCPCS | Performed by: PHYSICIAN ASSISTANT

## 2022-04-09 PROCEDURE — 1200000000 HC SEMI PRIVATE

## 2022-04-09 PROCEDURE — 6360000002 HC RX W HCPCS: Performed by: INTERNAL MEDICINE

## 2022-04-09 PROCEDURE — 6360000002 HC RX W HCPCS: Performed by: PHYSICIAN ASSISTANT

## 2022-04-09 PROCEDURE — 36415 COLL VENOUS BLD VENIPUNCTURE: CPT

## 2022-04-09 PROCEDURE — 82948 REAGENT STRIP/BLOOD GLUCOSE: CPT

## 2022-04-09 PROCEDURE — 2580000003 HC RX 258: Performed by: INTERNAL MEDICINE

## 2022-04-09 PROCEDURE — 80048 BASIC METABOLIC PNL TOTAL CA: CPT

## 2022-04-09 PROCEDURE — 6370000000 HC RX 637 (ALT 250 FOR IP): Performed by: PHYSICIAN ASSISTANT

## 2022-04-09 PROCEDURE — 99233 SBSQ HOSP IP/OBS HIGH 50: CPT | Performed by: INTERNAL MEDICINE

## 2022-04-09 PROCEDURE — 6370000000 HC RX 637 (ALT 250 FOR IP): Performed by: INTERNAL MEDICINE

## 2022-04-09 PROCEDURE — 2580000003 HC RX 258: Performed by: PHYSICIAN ASSISTANT

## 2022-04-09 RX ORDER — NICOTINE POLACRILEX 4 MG
15 LOZENGE BUCCAL PRN
Status: DISCONTINUED | OUTPATIENT
Start: 2022-04-09 | End: 2022-04-09 | Stop reason: CLARIF

## 2022-04-09 RX ORDER — MORPHINE SULFATE 2 MG/ML
1 INJECTION, SOLUTION INTRAMUSCULAR; INTRAVENOUS EVERY 4 HOURS PRN
Status: DISCONTINUED | OUTPATIENT
Start: 2022-04-09 | End: 2022-04-12 | Stop reason: HOSPADM

## 2022-04-09 RX ORDER — ACYCLOVIR 200 MG/1
400 CAPSULE ORAL 3 TIMES DAILY
Status: DISCONTINUED | OUTPATIENT
Start: 2022-04-09 | End: 2022-04-09

## 2022-04-09 RX ORDER — DEXTROSE MONOHYDRATE 50 MG/ML
100 INJECTION, SOLUTION INTRAVENOUS PRN
Status: DISCONTINUED | OUTPATIENT
Start: 2022-04-09 | End: 2022-04-12 | Stop reason: HOSPADM

## 2022-04-09 RX ORDER — OXYMETAZOLINE HYDROCHLORIDE 0.05 G/100ML
2 SPRAY NASAL EVERY 6 HOURS
Status: DISCONTINUED | OUTPATIENT
Start: 2022-04-09 | End: 2022-04-10 | Stop reason: SDUPTHER

## 2022-04-09 RX ORDER — SODIUM CHLORIDE 9 MG/ML
INJECTION, SOLUTION INTRAVENOUS CONTINUOUS
Status: DISCONTINUED | OUTPATIENT
Start: 2022-04-09 | End: 2022-04-12

## 2022-04-09 RX ORDER — DEXTROSE MONOHYDRATE 25 G/50ML
12.5 INJECTION, SOLUTION INTRAVENOUS PRN
Status: DISCONTINUED | OUTPATIENT
Start: 2022-04-09 | End: 2022-04-09 | Stop reason: CLARIF

## 2022-04-09 RX ORDER — LEVOFLOXACIN 5 MG/ML
750 INJECTION, SOLUTION INTRAVENOUS EVERY 24 HOURS
Status: DISCONTINUED | OUTPATIENT
Start: 2022-04-09 | End: 2022-04-12 | Stop reason: HOSPADM

## 2022-04-09 RX ORDER — DEXAMETHASONE SODIUM PHOSPHATE 4 MG/ML
10 INJECTION, SOLUTION INTRA-ARTICULAR; INTRALESIONAL; INTRAMUSCULAR; INTRAVENOUS; SOFT TISSUE DAILY
Status: DISCONTINUED | OUTPATIENT
Start: 2022-04-09 | End: 2022-04-11

## 2022-04-09 RX ORDER — DEXAMETHASONE 4 MG/1
6 TABLET ORAL DAILY
Status: DISCONTINUED | OUTPATIENT
Start: 2022-04-09 | End: 2022-04-09

## 2022-04-09 RX ADMIN — INSULIN LISPRO 4 UNITS: 100 INJECTION, SOLUTION INTRAVENOUS; SUBCUTANEOUS at 12:33

## 2022-04-09 RX ADMIN — METOPROLOL TARTRATE 25 MG: 25 TABLET, FILM COATED ORAL at 22:16

## 2022-04-09 RX ADMIN — OXYMETAZOLINE HYDROCHLORIDE 2 SPRAY: 0.05 SPRAY NASAL at 13:17

## 2022-04-09 RX ADMIN — MIDODRINE HYDROCHLORIDE 10 MG: 10 TABLET ORAL at 16:30

## 2022-04-09 RX ADMIN — HYDROMORPHONE HYDROCHLORIDE 0.5 MG: 1 INJECTION, SOLUTION INTRAMUSCULAR; INTRAVENOUS; SUBCUTANEOUS at 02:50

## 2022-04-09 RX ADMIN — ACYCLOVIR SODIUM 700 MG: 1000 INJECTION, SOLUTION INTRAVENOUS at 02:50

## 2022-04-09 RX ADMIN — GLIPIZIDE 10 MG: 10 TABLET ORAL at 16:30

## 2022-04-09 RX ADMIN — PANTOPRAZOLE SODIUM 40 MG: 40 INJECTION, POWDER, FOR SOLUTION INTRAVENOUS at 09:55

## 2022-04-09 RX ADMIN — PIPERACILLIN AND TAZOBACTAM 3375 MG: 3; .375 INJECTION, POWDER, LYOPHILIZED, FOR SOLUTION INTRAVENOUS at 22:39

## 2022-04-09 RX ADMIN — MORPHINE SULFATE 1 MG: 2 INJECTION, SOLUTION INTRAMUSCULAR; INTRAVENOUS at 12:29

## 2022-04-09 RX ADMIN — ACYCLOVIR SODIUM 315 MG: 500 INJECTION, SOLUTION INTRAVENOUS at 17:33

## 2022-04-09 RX ADMIN — SODIUM CHLORIDE, PRESERVATIVE FREE 10 ML: 5 INJECTION INTRAVENOUS at 10:54

## 2022-04-09 RX ADMIN — INSULIN GLARGINE 35 UNITS: 100 INJECTION, SOLUTION SUBCUTANEOUS at 22:23

## 2022-04-09 RX ADMIN — INSULIN LISPRO 8 UNITS: 100 INJECTION, SOLUTION INTRAVENOUS; SUBCUTANEOUS at 17:29

## 2022-04-09 RX ADMIN — ENOXAPARIN SODIUM 40 MG: 100 INJECTION SUBCUTANEOUS at 09:56

## 2022-04-09 RX ADMIN — MORPHINE SULFATE 1 MG: 2 INJECTION, SOLUTION INTRAMUSCULAR; INTRAVENOUS at 16:41

## 2022-04-09 RX ADMIN — PIPERACILLIN AND TAZOBACTAM 3375 MG: 3; .375 INJECTION, POWDER, LYOPHILIZED, FOR SOLUTION INTRAVENOUS at 13:22

## 2022-04-09 RX ADMIN — SODIUM CHLORIDE: 9 INJECTION, SOLUTION INTRAVENOUS at 09:58

## 2022-04-09 RX ADMIN — DEXAMETHASONE SODIUM PHOSPHATE 10 MG: 4 INJECTION, SOLUTION INTRA-ARTICULAR; INTRALESIONAL; INTRAMUSCULAR; INTRAVENOUS; SOFT TISSUE at 17:49

## 2022-04-09 RX ADMIN — LEVOFLOXACIN 750 MG: 5 INJECTION, SOLUTION INTRAVENOUS at 05:41

## 2022-04-09 RX ADMIN — HYDROMORPHONE HYDROCHLORIDE 0.5 MG: 1 INJECTION, SOLUTION INTRAMUSCULAR; INTRAVENOUS; SUBCUTANEOUS at 06:46

## 2022-04-09 RX ADMIN — OXYMETAZOLINE HYDROCHLORIDE 2 SPRAY: 0.05 SPRAY NASAL at 22:16

## 2022-04-09 RX ADMIN — GABAPENTIN 300 MG: 300 CAPSULE ORAL at 22:16

## 2022-04-09 RX ADMIN — INSULIN LISPRO 2 UNITS: 100 INJECTION, SOLUTION INTRAVENOUS; SUBCUTANEOUS at 17:29

## 2022-04-09 RX ADMIN — GABAPENTIN 300 MG: 300 CAPSULE ORAL at 16:31

## 2022-04-09 ASSESSMENT — PAIN SCALES - GENERAL
PAINLEVEL_OUTOF10: 6
PAINLEVEL_OUTOF10: 10
PAINLEVEL_OUTOF10: 4
PAINLEVEL_OUTOF10: 7
PAINLEVEL_OUTOF10: 7
PAINLEVEL_OUTOF10: 10

## 2022-04-09 ASSESSMENT — ENCOUNTER SYMPTOMS
GASTROINTESTINAL NEGATIVE: 1
TROUBLE SWALLOWING: 1
SINUS PRESSURE: 1
COUGH: 1
RHINORRHEA: 1
SHORTNESS OF BREATH: 1
EYES NEGATIVE: 1
ALLERGIC/IMMUNOLOGIC NEGATIVE: 1

## 2022-04-09 ASSESSMENT — PAIN DESCRIPTION - PROGRESSION
CLINICAL_PROGRESSION: GRADUALLY IMPROVING

## 2022-04-09 ASSESSMENT — PAIN DESCRIPTION - ORIENTATION
ORIENTATION: LEFT

## 2022-04-09 ASSESSMENT — PAIN - FUNCTIONAL ASSESSMENT
PAIN_FUNCTIONAL_ASSESSMENT: ACTIVITIES ARE NOT PREVENTED

## 2022-04-09 ASSESSMENT — PAIN DESCRIPTION - PAIN TYPE
TYPE: ACUTE PAIN

## 2022-04-09 ASSESSMENT — PAIN DESCRIPTION - LOCATION
LOCATION: HEAD;NECK

## 2022-04-09 ASSESSMENT — PAIN DESCRIPTION - DIRECTION: RADIATING_TOWARDS: HEAD AND NECK

## 2022-04-09 ASSESSMENT — PAIN DESCRIPTION - FREQUENCY
FREQUENCY: INTERMITTENT

## 2022-04-09 ASSESSMENT — PAIN DESCRIPTION - ONSET
ONSET: ON-GOING

## 2022-04-09 ASSESSMENT — PAIN DESCRIPTION - DESCRIPTORS
DESCRIPTORS: BURNING;SHARP;SHOOTING

## 2022-04-09 NOTE — PROGRESS NOTES
NO change. Discussed appropriate changes in meds with Dr Joselyn Renee. High dose antiviral IV and IV steroids indicated. Tissue with the shingles has been fully radiated and clearing this could be challenging. Anticipate at least 5 days of IV acyclovir and steroids, before switching to PO    Sputum culture pending. This could be partly drainage from his sinusitis, and long term abx are indicated to clear that. Zosyn is OK. The top two images from the videolaryngoscopy yesterday are the videos and are very instructive. They take a long time to load. I recommend all caregivers read my office note from yesterday 4/8. Afrin should be for a minimum of five days. This is not a head cold.  Order is mod

## 2022-04-09 NOTE — PLAN OF CARE
Problem: Pain:  Goal: Pain level will decrease  Description: Pain level will decrease  Outcome: Ongoing  Goal: Control of acute pain  Description: Control of acute pain  Outcome: Ongoing  Goal: Control of chronic pain  Description: Control of chronic pain  Outcome: Ongoing   Pt has dilaudid 0.5mg available every four hours as needed for pain first dose given on floor has helped so far  Problem: Infection:  Goal: Will remain free from infection  Description: Will remain free from infection  Outcome: Ongoing   No infection at this time   Problem: Safety:  Goal: Free from accidental physical injury  Description: Free from accidental physical injury  Outcome: Ongoing  Goal: Free from intentional harm  Description: Free from intentional harm  Outcome: Ongoing   Pt up with standby assist and uses call light appropriately  Problem: Discharge Planning:  Goal: Patients continuum of care needs are met  Description: Patients continuum of care needs are met  Outcome: Ongoing   Pt plans to return home with spouse when medically able

## 2022-04-09 NOTE — PROGRESS NOTES
Hospitalist Progress Note      Patient:  Carina Schmidt    Unit/Bed:5K-20/020-A  YOB: 1960  MRN: [id number]   Acct: [de-identified]     PCP: KATHRYN Alberto CNP  Date of Admission: 4/8/2022        Assessment and Plan:        1. Recurrent squamous cell carcinoma of larynx :   a. Has finished chemo  b. Still taking radiation  c. Dr. Bora Blackwell concerned following larygnoscopy and requested admission  d. Consult oncology as well  2. Sinusitis:  a. Treatment recommended by Dr. Bora Blackwell  b. Discussed with Dr. Luis Enrique Champion for 10 days    3. Herpes zoster:   a. CN V3 on the left  b. IV acyclovir 5 mg/kg every 8 hours as per the instruction of ENT physician, already started on IV Decadron  4. DM II:   a. We will increase insulin since the patient is on Decadron, continue Lantus, sliding scale as well as premeal insulin  5. Severe malnutrition:   a. Secondary to poor oral intake-did pass swallow eval      CC:  Dyspnea    HPI: Patient presents to the ED from ENT office for admission. The patient has recurrent laryngeal cancer and reported increasing shortness of breath with yellow sputum. The patient denies fevers. He does have a significant rash on the left side of his neck extending to his left ear. Prior to ED visit Dr. Bora Blackwell had some concerning findings on laryngoscopy. He recommended the patient be treated for sinusitis and he will follow in the hospital.    Patient admitted for further work up and potential laryngectomy and treatment for herpes zoster. Subjective:- (Last 24 hours)    Hoarse voice  Coughing up mucus  No chest pain shortness of breath improving    Past medical history, family history, social history and allergies reviewed again and is unchanged since admission. ROS (All review of systems completed. Pertinent positives noted.  Otherwise All other systems reviewed and negative.)     Scheduled Meds:   levofloxacin  750 mg IntraVENous Q24H    insulin lispro  0-12 Units SubCUTAneous TID WC    insulin lispro  0-6 Units SubCUTAneous Nightly    oxymetazoline  2 spray Each Nostril Q6H    piperacillin-tazobactam  3,375 mg IntraVENous Q8H    acyclovir  5 mg/kg IntraVENous Q8H    dexamethasone  10 mg IntraVENous Daily    insulin regular  8 Units SubCUTAneous TID AC    aspirin  81 mg Oral Daily    atorvastatin  20 mg Oral Daily    fludrocortisone  0.2 mg Oral Daily    gabapentin  300 mg Oral TID    glipiZIDE  10 mg Oral BID AC    insulin glargine  35 Units SubCUTAneous Nightly    metoprolol tartrate  25 mg Oral BID    midodrine  10 mg Oral TID WC    pantoprazole  40 mg IntraVENous Daily    sodium chloride flush  5-40 mL IntraVENous 2 times per day    enoxaparin  40 mg SubCUTAneous Daily     Continuous Infusions:   sodium chloride 75 mL/hr at 04/09/22 0958    dextrose      sodium chloride       PRN Meds:.morphine, glucagon (rDNA), dextrose, glucose, dextrose bolus (hypoglycemia) **OR** dextrose bolus (hypoglycemia), sodium chloride flush, sodium chloride, ondansetron **OR** ondansetron, polyethylene glycol, acetaminophen **OR** acetaminophen, potassium chloride **OR** potassium alternative oral replacement **OR** potassium chloride, magnesium sulfate       Labs:   Recent Results (from the past 24 hour(s))   EKG 12 Lead    Collection Time: 04/08/22  5:05 PM   Result Value Ref Range    Ventricular Rate 82 BPM    Atrial Rate 82 BPM    P-R Interval 154 ms    QRS Duration 92 ms    Q-T Interval 426 ms    QTc Calculation (Bazett) 497 ms    P Axis 56 degrees    R Axis 68 degrees    T Axis 63 degrees   Basic Metabolic Panel w/ Reflex to MG    Collection Time: 04/09/22  4:42 AM   Result Value Ref Range    Sodium 133 (L) 135 - 145 meq/L    Potassium reflex Magnesium 4.2 3.5 - 5.2 meq/L    Chloride 96 (L) 98 - 111 meq/L    CO2 27 23 - 33 meq/L    Glucose 211 (H) 70 - 108 mg/dL    BUN 18 7 - 22 mg/dL    CREATININE 1.5 (H) 0.4 - 1.2 mg/dL    Calcium 9.1 8.5 - 10.5 mg/dL   CBC with Auto Differential    Collection Time: 04/09/22  4:42 AM   Result Value Ref Range    WBC 5.4 4.8 - 10.8 thou/mm3    RBC 4.26 (L) 4.70 - 6.10 mill/mm3    Hemoglobin 10.9 (L) 14.0 - 18.0 gm/dl    Hematocrit 35.1 (L) 42.0 - 52.0 %    MCV 82.4 80.0 - 94.0 fL    MCH 25.6 (L) 26.0 - 33.0 pg    MCHC 31.1 (L) 32.2 - 35.5 gm/dl    RDW-CV 13.5 11.5 - 14.5 %    RDW-SD 40.5 35.0 - 45.0 fL    Platelets 863 332 - 664 thou/mm3    MPV 9.3 (L) 9.4 - 12.4 fL    Seg Neutrophils 73.9 %    Lymphocytes 13.1 %    Monocytes 10.9 %    Eosinophils 1.3 %    Basophils 0.4 %    Immature Granulocytes 0.4 %    Segs Absolute 4.0 1.8 - 7.7 thou/mm3    Lymphocytes Absolute 0.7 (L) 1.0 - 4.8 thou/mm3    Monocytes Absolute 0.6 0.4 - 1.3 thou/mm3    Eosinophils Absolute 0.1 0.0 - 0.4 thou/mm3    Basophils Absolute 0.0 0.0 - 0.1 thou/mm3    Immature Grans (Abs) 0.02 0.00 - 0.07 thou/mm3    nRBC 0 /100 wbc   Anion Gap    Collection Time: 04/09/22  4:42 AM   Result Value Ref Range    Anion Gap 10.0 8.0 - 16.0 meq/L   Glomerular Filtration Rate, Estimated    Collection Time: 04/09/22  4:42 AM   Result Value Ref Range    Est, Glom Filt Rate 47 (A) ml/min/1.73m2   POCT Glucose    Collection Time: 04/09/22 12:18 PM   Result Value Ref Range    POC Glucose 233 (H) 70 - 108 mg/dl         Vital Signs: T: 96.7F P: 97 RR: 20 B/P: 102/58: FiO2: RA: O2 Sat:93%: I/O:     Intake/Output Summary (Last 24 hours) at 4/9/2022 1425  Last data filed at 4/9/2022 1333  Gross per 24 hour   Intake 180 ml   Output 0 ml   Net 180 ml         General:   No acute distress, thin  HEENT:  normocephalic and atraumatic. No scleral icterus. PEARLA, mucous membranes moist, raspy quiet voice  Neck: supple. Trachea midline. No JVD. Full ROM, no meningismus. Lungs: clear to auscultation. No retractions, no accessory muscle use. Cardiac: RRR, no murmur, 2+ pulses  Abdomen: soft. Nontender.  Bowel sounds active  Extremities:  No clubbing, cyanosis x 4, no edema    Vasculature: capillary refill < 3 seconds. Skin:  warm and dry. + visible rashes - red vesicular rash on the left neck extending from the midline to the and including the left ear  Psych:  Alert and oriented x3. Affect appropriate  Lymph:  No supraclavicular adenopathy. Neurologic:  CN II-XII grossly intact. No focal deficit. Data: (All radiographs, tracings, PFTs, and imaging are personally viewed and interpreted unless otherwise noted).     EKG: rhythm: normal sinus rhythm, rate=82 bpm, wa=978 ms, qrs=92 ms, nr=626 ms, axis=56 degrees    Recent Results (from the past 72 hour(s))   EKG 12 Lead    Collection Time: 04/08/22  4:52 AM   Result Value Ref Range    Ventricular Rate 95 BPM    Atrial Rate 95 BPM    P-R Interval 148 ms    QRS Duration 92 ms    Q-T Interval 392 ms    QTc Calculation (Bazett) 492 ms    P Axis 65 degrees    R Axis 82 degrees    T Axis 66 degrees   CBC with Auto Differential    Collection Time: 04/08/22  5:10 AM   Result Value Ref Range    WBC 5.4 4.8 - 10.8 thou/mm3    RBC 4.92 4.70 - 6.10 mill/mm3    Hemoglobin 12.6 (L) 14.0 - 18.0 gm/dl    Hematocrit 40.2 (L) 42.0 - 52.0 %    MCV 81.7 80.0 - 94.0 fL    MCH 25.6 (L) 26.0 - 33.0 pg    MCHC 31.3 (L) 32.2 - 35.5 gm/dl    RDW-CV 13.3 11.5 - 14.5 %    RDW-SD 39.8 35.0 - 45.0 fL    Platelets 872 425 - 964 thou/mm3    MPV 8.9 (L) 9.4 - 12.4 fL    Seg Neutrophils 68.9 %    Lymphocytes 20.9 %    Monocytes 7.4 %    Eosinophils 1.7 %    Basophils 0.7 %    Immature Granulocytes 0.4 %    Segs Absolute 3.7 1.8 - 7.7 thou/mm3    Lymphocytes Absolute 1.1 1.0 - 4.8 thou/mm3    Monocytes Absolute 0.4 0.4 - 1.3 thou/mm3    Eosinophils Absolute 0.1 0.0 - 0.4 thou/mm3    Basophils Absolute 0.0 0.0 - 0.1 thou/mm3    Immature Grans (Abs) 0.02 0.00 - 0.07 thou/mm3    nRBC 0 /100 wbc   Basic Metabolic Panel w/ Reflex to MG    Collection Time: 04/08/22  5:10 AM   Result Value Ref Range    Sodium 136 135 - 145 meq/L    Potassium reflex Magnesium 3.8 3.5 - 5.2 meq/L    Chloride 96 (L) 98 - 111 meq/L    CO2 25 23 - 33 meq/L    Glucose 230 (H) 70 - 108 mg/dL    BUN 8 7 - 22 mg/dL    CREATININE 0.7 0.4 - 1.2 mg/dL    Calcium 9.9 8.5 - 10.5 mg/dL   Hepatic Function Panel    Collection Time: 04/08/22  5:10 AM   Result Value Ref Range    Albumin 3.9 3.5 - 5.1 g/dL    Total Bilirubin 0.5 0.3 - 1.2 mg/dL    Bilirubin, Direct <0.2 0.0 - 0.3 mg/dL    Alkaline Phosphatase 114 38 - 126 U/L    AST 9 5 - 40 U/L    ALT 8 (L) 11 - 66 U/L    Total Protein 8.0 6.1 - 8.0 g/dL   Troponin    Collection Time: 04/08/22  5:10 AM   Result Value Ref Range    Troponin T < 0.010 ng/ml   Lipase    Collection Time: 04/08/22  5:10 AM   Result Value Ref Range    Lipase 16.4 5.6 - 51.3 U/L   Brain Natriuretic Peptide    Collection Time: 04/08/22  5:10 AM   Result Value Ref Range    Pro-.9 0.0 - 900.0 pg/mL   Lactate, Sepsis    Collection Time: 04/08/22  5:10 AM   Result Value Ref Range    Lactic Acid, Sepsis 2.1 (H) 0.5 - 1.9 mmol/L   Procalcitonin    Collection Time: 04/08/22  5:10 AM   Result Value Ref Range    Procalcitonin 0.06 0.01 - 0.09 ng/mL   Anion Gap    Collection Time: 04/08/22  5:10 AM   Result Value Ref Range    Anion Gap 15.0 8.0 - 16.0 meq/L   Osmolality    Collection Time: 04/08/22  5:10 AM   Result Value Ref Range    Osmolality Calc 277.6 275.0 - 300.0 mOsmol/kg   Glomerular Filtration Rate, Estimated    Collection Time: 04/08/22  5:10 AM   Result Value Ref Range    Est, Glom Filt Rate >90 ml/min/1.73m2   Rapid influenza A/B antigens    Collection Time: 04/08/22  5:20 AM    Specimen: Nasopharyngeal   Result Value Ref Range    Flu A Antigen Negative NEGATIVE    Flu B Antigen Negative NEGATIVE   COVID-19, Rapid    Collection Time: 04/08/22  5:20 AM    Specimen: Nasopharyngeal Swab   Result Value Ref Range    SARS-CoV-2, NAAT NOT  DETECTED NOT DETECTED   Culture, Respiratory    Collection Time: 04/08/22  1:45 PM    Specimen: Sputum Expectorated   Result Value Ref Range    Respiratory Culture Normal amureen- preliminary      Gram Stain Result       Quality of sputum specimen: Specimen acceptable. Many segmented neutrophils observed. Rare epithelial cells observed. Many gram positive cocci in pairs and clusters. Few gram negative bacilli. Rare gram positive bacilli.     EKG 12 Lead    Collection Time: 04/08/22  5:05 PM   Result Value Ref Range    Ventricular Rate 82 BPM    Atrial Rate 82 BPM    P-R Interval 154 ms    QRS Duration 92 ms    Q-T Interval 426 ms    QTc Calculation (Bazett) 497 ms    P Axis 56 degrees    R Axis 68 degrees    T Axis 63 degrees   Basic Metabolic Panel w/ Reflex to MG    Collection Time: 04/09/22  4:42 AM   Result Value Ref Range    Sodium 133 (L) 135 - 145 meq/L    Potassium reflex Magnesium 4.2 3.5 - 5.2 meq/L    Chloride 96 (L) 98 - 111 meq/L    CO2 27 23 - 33 meq/L    Glucose 211 (H) 70 - 108 mg/dL    BUN 18 7 - 22 mg/dL    CREATININE 1.5 (H) 0.4 - 1.2 mg/dL    Calcium 9.1 8.5 - 10.5 mg/dL   CBC with Auto Differential    Collection Time: 04/09/22  4:42 AM   Result Value Ref Range    WBC 5.4 4.8 - 10.8 thou/mm3    RBC 4.26 (L) 4.70 - 6.10 mill/mm3    Hemoglobin 10.9 (L) 14.0 - 18.0 gm/dl    Hematocrit 35.1 (L) 42.0 - 52.0 %    MCV 82.4 80.0 - 94.0 fL    MCH 25.6 (L) 26.0 - 33.0 pg    MCHC 31.1 (L) 32.2 - 35.5 gm/dl    RDW-CV 13.5 11.5 - 14.5 %    RDW-SD 40.5 35.0 - 45.0 fL    Platelets 525 562 - 053 thou/mm3    MPV 9.3 (L) 9.4 - 12.4 fL    Seg Neutrophils 73.9 %    Lymphocytes 13.1 %    Monocytes 10.9 %    Eosinophils 1.3 %    Basophils 0.4 %    Immature Granulocytes 0.4 %    Segs Absolute 4.0 1.8 - 7.7 thou/mm3    Lymphocytes Absolute 0.7 (L) 1.0 - 4.8 thou/mm3    Monocytes Absolute 0.6 0.4 - 1.3 thou/mm3    Eosinophils Absolute 0.1 0.0 - 0.4 thou/mm3    Basophils Absolute 0.0 0.0 - 0.1 thou/mm3    Immature Grans (Abs) 0.02 0.00 - 0.07 thou/mm3    nRBC 0 /100 wbc   Anion Gap    Collection Time: 04/09/22  4:42 AM   Result Value Ref Range    Anion Gap 10.0 8.0 - 16.0 meq/L   Glomerular Filtration Rate, Estimated    Collection Time: 04/09/22  4:42 AM   Result Value Ref Range    Est, Glom Filt Rate 47 (A) ml/min/1.73m2   POCT Glucose    Collection Time: 04/09/22 12:18 PM   Result Value Ref Range    POC Glucose 233 (H) 70 - 108 mg/dl       CT SOFT TISSUE NECK W CONTRAST    Result Date: 4/8/2022  PROCEDURE: CT SOFT TISSUE NECK W CONTRAST CLINICAL INFORMATION: neck swelling with history of throat cancer. COMPARISON: PET scan dated 27th of October 2021. . TECHNIQUE: 3 mm axial images were obtained through the neck soft tissues after the administration of intravenous contrast. Sagittal and coronal reconstructions were obtained. All CT scans at this facility use dose modulation, iterative reconstruction, and/or weight-based dosing when appropriate to reduce radiation dose to as low as reasonably achievable. FINDINGS: There is a 2.4 x 2.5 x 1.2 cm area of abnormal soft tissue density in the supraglottic larynx suspicious for recurrent tumor. The left vocal cord appears be displaced towards the midline. There are small lymph nodes especially in the posterior triangles of the neck, left greater than right, inferior to the left parotid gland measuring 11 mm in size and in the suboccipital region on the left side measuring 7.2 mm in size. There is opacification of the right maxillary sinus and right ethmoid air cells consistent with inflammatory changes. There is slightly increased density in the soft tissues which may represent changes of radiation therapy The soft tissues of the nasopharynx are normal.  The oropharynx is within appropriate limits. The hypopharynx is normal. The epiglottis is normal.  The oral cavity and floor of mouth are normal.  The vocal cords and subglottic airway are within appropriate limits. The thyroid gland, submandibular glands and parotid glands are within acceptable limits. There are areas of increased density in the upper lung fields which may represent areas of pneumonitis. Lore Mcnamara There is straightening of the normal cervical lordosis and mild cervical spondylosis at C5-6 and C6-7. There are no gross abnormalities in the brain parenchyma. 1. 2.4 x 2.5 x 1.2 cm area of abnormal soft tissue density in the supraglottic larynx suspicious for recurrent tumor. ENT correlation would be helpful. 2. Left vocal cord is displaced towards the midline. 3. Small lymph nodes especially in the posterior triangles the neck, inferior to the left parotid gland and in the suboccipital region on the left side. 4. Increased density in the skin and subcutaneous soft tissues possibly representing changes of radiation therapy. 5. Mild cervical spondylosis. 6. Opacification of the right maxillary sinus and right ethmoid air cells. 7. Bilateral carotid artery calcification. 8. Areas of increased density in the upper lung. It may represent areas of pneumonitis. **This report has been created using voice recognition software. It may contain minor errors which are inherent in voice recognition technology. ** Final report electronically signed by DR Idalmis Burnett on 4/8/2022 7:57 AM    XR CHEST PORTABLE    Result Date: 4/8/2022  CHEST X-RAY FRONTAL VIEW COMPARISON: 8/14/2013. FINDINGS: A single frontal view of the chest was performed. Hardware projects over the left hemithorax. Cardiac size is within normal limits. A few small calcified granulomas are noted within the left upper lobe and right lung base. There is no focal infiltrate or consolidation. There is no pleural effusion or pneumothorax. 1. No acute disease.  This document has been electronically signed by: Morrell Boast, M.D. on 04/08/2022 06:02 AM    Electronically signed by Eleonora Roman MD on 4/9/2022 at 2:27 PM

## 2022-04-09 NOTE — PLAN OF CARE
Problem: Pain:  Goal: Pain level will decrease  Description: Pain level will decrease  Outcome: Ongoing  Goal: Control of acute pain  Description: Control of acute pain  Outcome: Ongoing  Goal: Control of chronic pain  Description: Control of chronic pain  Outcome: Ongoing     Problem: Safety:  Goal: Free from accidental physical injury  Description: Free from accidental physical injury  Outcome: Ongoing  Goal: Free from intentional harm  Description: Free from intentional harm  Outcome: Ongoing     Problem: Discharge Planning:  Goal: Patients continuum of care needs are met  Description: Patients continuum of care needs are met  Outcome: Ongoing

## 2022-04-09 NOTE — ED NOTES
ED to inpatient nurses report    No chief complaint on file. Present to ED from home  LOC: alert and orientated to name, place, date  Vital signs   Vitals:    04/08/22 1715 04/08/22 1826 04/08/22 1930   BP: 139/70 (!) 159/76 (!) 102/58   Pulse: 78 81 97   Resp: 20 18 20   Temp: 96.7 °F (35.9 °C)     TempSrc: Oral     SpO2: 99% 93% 97%   Weight:  148 lb (67.1 kg)    Height:  5' 11\" (1.803 m)       Oxygen Baseline RA    Current needs required RA   LDAs:   Peripheral IV 04/08/22 Antecubital (Active)   Site Assessment Clean; Intact;Dry 04/08/22 1931   Line Status Normal saline locked 04/08/22 1931   Dressing Status Clean;Dry; Intact 04/08/22 1931     Mobility: Requires assistance * 1  Pending ED orders: requesting pain meds - Dr Becky Kirby informed, no orders placed  Present condition: hypotensive, c/o pain    Electronically signed by Marium Bull RN on 4/8/2022 at 8:04 PM       Marium Bull RN  04/08/22 2005

## 2022-04-09 NOTE — ED NOTES
Patient transferred to [facility] room 020 nurse informed.      [facility]  04/08/22 2022

## 2022-04-09 NOTE — PROGRESS NOTES
[address] [address]  SPEECH THERAPY  [facility] ONC MED 5K  Clinical Swallow Evaluation      SLP Individual Minutes  Time In: 7948  Time Out: 9235  Minutes: 21  Timed Code Treatment Minutes: 0 Minutes       Date: 2022  Patient Name: Perla Jones      CSN: [id number]   : 1960  (57 y.o.)  Gender: male   Referring Physician: Allegra Beasley MD   Diagnosis: Dyspnea    History of Present Illness/Injury: Patient admit to [facility] with above medical dx. Per physician H&P, Patient presents to the ED from ENT office for admission. The patient has recurrent laryngeal cancer and reported increasing shortness of breath with yellow sputum. The patient denies fevers. He does have a significant rash on the left side of his neck extending to his left ear. Prior to ED visit Dr. Madeline Sanchez had some concerning findings on laryngoscopy. He recommended the patient be treated for sinusitis and he will follow in the hospital.     Patient admitted for further work up and potential laryngectomy and treatment for herpes zoster. MBS completed on 3/17/22 during prior hospitalization with recommendations for regular diet and mildly thick liquids with head turn to R. Per ENT note: Patient with immobile R VF with mass on R side of glottis centered on the anterior R false VF. Receiving chemoradiation early last winter. ENT recommendations for laryngectomy. ST consult for CSE to further assess and determine appropriateness for patient to resume PO means of nutrition. Past Medical History:   Diagnosis Date    Arthritis     GERD (gastroesophageal reflux disease)     Hypertension     Keratinizing squamous cell carcinoma of larynx ([facility] [address] 2021    Type II or unspecified type diabetes mellitus without mention of complication, not stated as uncontrolled        SUBJECTIVE:  Session approved by nursing staff as well as Ginger Harry CNP (ENT).  Per discussion with JOANIE Dubon recommendations for laryngectomy; however, surgery not yet scheduled. Question whether patient will undergo surgical intervention while at [facility] versus at [facility]. Patient seen resting in bed. Initially refusing assessment given fatigue. Agreeable with encouragement as well as inability for ST to return the next date. Patient alert and fully cooperative. OBJECTIVE:    Pain:  No pain reported. Current Diet: NPO     Respiratory Status:  Room Air    Behavioral Observation:  Alert and cooperative    Oral Mechanism Evaluation:      Facial / Labial WFL    Lingual Impaired Deviation to L   Dentition Impaired Missing some dentition   Velum Not Tested    Vocal Quality Impaired Hoarse/breathy/raspy    Sensation Not Tested    Cough Not Tested      Patient Evaluated Using: Mildly Thick Liquids, Puree and Coarse Solids    Oral Phase:  Impaired:  slow bolus formation     Pharyngeal Phase: Impaired:  Decreased Hyolaryngeal Elevation and Suspected Pharyngeal Residue    Signs and Symptoms of Laryngeal Penetration/Aspiration: No signs/symptoms of aspiration evident in this evaluation, but cannot rule out silent aspiration. Impressions: Patient presents with Mod I oral swallow function with inability to fully discern potential presence of pharyngeal phase deficits without formal instrumentation. Patient with slow; however, effective bolus formation when provided with extra time and use of liquid wash. Per MBS \"patient with moderate pharyngeal deficits present s/t decreased hyolaryngeal elevation and anterior excursion resulting in incomplete epiglottic inversion. Decreased thyrohyoid approximation leading to poor airway protection DURING the swallow. Along with decreased TBR and pharyngeal constriction with mild-moderate pharyngeal residue remaining. \" No overt s/s of airway invasion within CSE with utilization of head turn to R with consumption of mildly thick liquids by cup. Recommendations for resumption of regular diet and mildly thick liquids with head turn to R.  ST to follow up with ongoing dysphagia intervention/management. RECOMMENDATIONS/ASSESSMENT:  Instrumental Evaluation: Instrumental evaluation not indicated at this time. Diet Recommendations:  Regular diet and mildly thick liquids with head turn to R  Strategies:  Full Upright Position, Small Bite/Sip, Multiple Swallow, Alternate Solids and Liquids and head turn to R with liquid consumption    Rehabilitation Potential: good    EDUCATION:  Learner: Patient  Education:  Reviewed results and recommendations of this evaluation, Reviewed diet and strategies, Reviewed ST goals and Plan of Care and Reviewed recommendations for follow-up  Evaluation of Education: Verbalizes understanding and Family not present    PLAN:  Skilled SLP intervention on acute care 3-5 x per week or until goals met and/or pt plateaus in function. Specific interventions for next session may include: dysphagia intervention/management. PATIENT GOAL:    Return to prior level of function. SHORT TERM GOALS:  Short-term Goals  Timeframe for Short-term Goals: 2 weeks  Goal 1: Patient will consume a regular diet and mildly thick liquids with head turn to R + multiple swallows without overt s/s of airway invasion to meet nutritional/hydration measures safely. Goal 2: Patient will complete pharyngeal strengthening exercises (i.e. effortful, francisca, mendelsohn, shaker) x10 for improved laryngeal elevation, enhanced airway protection and reduced pharyngeal stasis. Goal 3: ST to provide laryngectomy education to prep patient for anticipated surgery. Goal 4: Patient will complete routine oral care completion every x2 hours given foul breath/phlegm and concerns for overall oral health.     LONG TERM GOALS:  No established LTG's given short RICH Shah M.S. [id number] [address] [id number] 4/9/2022

## 2022-04-10 ENCOUNTER — APPOINTMENT (OUTPATIENT)
Dept: ULTRASOUND IMAGING | Age: 62
DRG: 595 | End: 2022-04-10
Payer: OTHER GOVERNMENT

## 2022-04-10 LAB
ANION GAP SERPL CALCULATED.3IONS-SCNC: 13 MEQ/L (ref 8–16)
BUN BLDV-MCNC: 31 MG/DL (ref 7–22)
CALCIUM SERPL-MCNC: 9.1 MG/DL (ref 8.5–10.5)
CHLORIDE BLD-SCNC: 99 MEQ/L (ref 98–111)
CO2: 25 MEQ/L (ref 23–33)
CREAT SERPL-MCNC: 2.3 MG/DL (ref 0.4–1.2)
GFR SERPL CREATININE-BSD FRML MDRD: 29 ML/MIN/1.73M2
GLUCOSE BLD-MCNC: 125 MG/DL (ref 70–108)
GLUCOSE BLD-MCNC: 137 MG/DL (ref 70–108)
GLUCOSE BLD-MCNC: 142 MG/DL (ref 70–108)
GLUCOSE BLD-MCNC: 164 MG/DL (ref 70–108)
GLUCOSE BLD-MCNC: 191 MG/DL (ref 70–108)
GLUCOSE BLD-MCNC: 60 MG/DL (ref 70–108)
GLUCOSE BLD-MCNC: 85 MG/DL (ref 70–108)
GRAM STAIN RESULT: NORMAL
MAGNESIUM: 1.9 MG/DL (ref 1.6–2.4)
OSMOLALITY URINE: 330 MOSMOL/KG (ref 250–750)
POTASSIUM SERPL-SCNC: 3.8 MEQ/L (ref 3.5–5.2)
RESPIRATORY CULTURE: NORMAL
SODIUM BLD-SCNC: 137 MEQ/L (ref 135–145)
SODIUM URINE: 50 MEQ/L

## 2022-04-10 PROCEDURE — 80048 BASIC METABOLIC PNL TOTAL CA: CPT

## 2022-04-10 PROCEDURE — 76770 US EXAM ABDO BACK WALL COMP: CPT

## 2022-04-10 PROCEDURE — 84300 ASSAY OF URINE SODIUM: CPT

## 2022-04-10 PROCEDURE — C9113 INJ PANTOPRAZOLE SODIUM, VIA: HCPCS | Performed by: PHYSICIAN ASSISTANT

## 2022-04-10 PROCEDURE — 1200000000 HC SEMI PRIVATE

## 2022-04-10 PROCEDURE — 2580000003 HC RX 258: Performed by: INTERNAL MEDICINE

## 2022-04-10 PROCEDURE — 6360000002 HC RX W HCPCS: Performed by: INTERNAL MEDICINE

## 2022-04-10 PROCEDURE — 6370000000 HC RX 637 (ALT 250 FOR IP): Performed by: INTERNAL MEDICINE

## 2022-04-10 PROCEDURE — 36415 COLL VENOUS BLD VENIPUNCTURE: CPT

## 2022-04-10 PROCEDURE — 2500000003 HC RX 250 WO HCPCS: Performed by: INTERNAL MEDICINE

## 2022-04-10 PROCEDURE — 83735 ASSAY OF MAGNESIUM: CPT

## 2022-04-10 PROCEDURE — 6370000000 HC RX 637 (ALT 250 FOR IP): Performed by: PHYSICIAN ASSISTANT

## 2022-04-10 PROCEDURE — 6360000002 HC RX W HCPCS: Performed by: PHYSICIAN ASSISTANT

## 2022-04-10 PROCEDURE — 83935 ASSAY OF URINE OSMOLALITY: CPT

## 2022-04-10 PROCEDURE — 82948 REAGENT STRIP/BLOOD GLUCOSE: CPT

## 2022-04-10 PROCEDURE — 99233 SBSQ HOSP IP/OBS HIGH 50: CPT | Performed by: INTERNAL MEDICINE

## 2022-04-10 PROCEDURE — 99223 1ST HOSP IP/OBS HIGH 75: CPT | Performed by: INTERNAL MEDICINE

## 2022-04-10 PROCEDURE — 99232 SBSQ HOSP IP/OBS MODERATE 35: CPT | Performed by: OTOLARYNGOLOGY

## 2022-04-10 RX ORDER — HEPARIN SODIUM 5000 [USP'U]/ML
5000 INJECTION, SOLUTION INTRAVENOUS; SUBCUTANEOUS 2 TIMES DAILY
Status: DISCONTINUED | OUTPATIENT
Start: 2022-04-11 | End: 2022-04-12 | Stop reason: HOSPADM

## 2022-04-10 RX ORDER — FAMOTIDINE 20 MG/1
20 TABLET, FILM COATED ORAL DAILY
Status: DISCONTINUED | OUTPATIENT
Start: 2022-04-10 | End: 2022-04-12 | Stop reason: HOSPADM

## 2022-04-10 RX ORDER — CLINDAMYCIN PHOSPHATE 300 MG/50ML
300 INJECTION INTRAVENOUS EVERY 8 HOURS
Status: DISCONTINUED | OUTPATIENT
Start: 2022-04-10 | End: 2022-04-12 | Stop reason: HOSPADM

## 2022-04-10 RX ORDER — OXYMETAZOLINE HYDROCHLORIDE 0.05 G/100ML
2 SPRAY NASAL EVERY 6 HOURS
Status: COMPLETED | OUTPATIENT
Start: 2022-04-10 | End: 2022-04-11

## 2022-04-10 RX ADMIN — MIDODRINE HYDROCHLORIDE 10 MG: 10 TABLET ORAL at 09:06

## 2022-04-10 RX ADMIN — METOPROLOL TARTRATE 25 MG: 25 TABLET, FILM COATED ORAL at 20:28

## 2022-04-10 RX ADMIN — ASPIRIN 81 MG: 81 TABLET, COATED ORAL at 09:06

## 2022-04-10 RX ADMIN — GABAPENTIN 300 MG: 300 CAPSULE ORAL at 20:28

## 2022-04-10 RX ADMIN — INSULIN GLARGINE 35 UNITS: 100 INJECTION, SOLUTION SUBCUTANEOUS at 20:26

## 2022-04-10 RX ADMIN — MIDODRINE HYDROCHLORIDE 10 MG: 10 TABLET ORAL at 12:55

## 2022-04-10 RX ADMIN — MORPHINE SULFATE 1 MG: 2 INJECTION, SOLUTION INTRAMUSCULAR; INTRAVENOUS at 01:53

## 2022-04-10 RX ADMIN — ATORVASTATIN CALCIUM 20 MG: 20 TABLET, FILM COATED ORAL at 09:06

## 2022-04-10 RX ADMIN — OXYMETAZOLINE HYDROCHLORIDE 2 SPRAY: 0.05 SPRAY NASAL at 05:59

## 2022-04-10 RX ADMIN — GABAPENTIN 300 MG: 300 CAPSULE ORAL at 09:06

## 2022-04-10 RX ADMIN — GLIPIZIDE 10 MG: 10 TABLET ORAL at 05:59

## 2022-04-10 RX ADMIN — ENOXAPARIN SODIUM 40 MG: 100 INJECTION SUBCUTANEOUS at 09:00

## 2022-04-10 RX ADMIN — DEXAMETHASONE SODIUM PHOSPHATE 10 MG: 4 INJECTION, SOLUTION INTRA-ARTICULAR; INTRALESIONAL; INTRAMUSCULAR; INTRAVENOUS; SOFT TISSUE at 08:59

## 2022-04-10 RX ADMIN — ACYCLOVIR SODIUM 315 MG: 500 INJECTION, SOLUTION INTRAVENOUS at 09:04

## 2022-04-10 RX ADMIN — ACYCLOVIR SODIUM 315 MG: 500 INJECTION, SOLUTION INTRAVENOUS at 00:36

## 2022-04-10 RX ADMIN — OXYMETAZOLINE HYDROCHLORIDE 2 SPRAY: 0.05 SPRAY NASAL at 23:34

## 2022-04-10 RX ADMIN — PANTOPRAZOLE SODIUM 40 MG: 40 INJECTION, POWDER, FOR SOLUTION INTRAVENOUS at 09:00

## 2022-04-10 RX ADMIN — MORPHINE SULFATE 1 MG: 2 INJECTION, SOLUTION INTRAMUSCULAR; INTRAVENOUS at 20:37

## 2022-04-10 RX ADMIN — OXYMETAZOLINE HYDROCHLORIDE 2 SPRAY: 0.05 SPRAY NASAL at 12:55

## 2022-04-10 RX ADMIN — GABAPENTIN 300 MG: 300 CAPSULE ORAL at 12:56

## 2022-04-10 RX ADMIN — FAMOTIDINE 20 MG: 20 TABLET ORAL at 12:55

## 2022-04-10 RX ADMIN — INSULIN LISPRO 1 UNITS: 100 INJECTION, SOLUTION INTRAVENOUS; SUBCUTANEOUS at 00:41

## 2022-04-10 RX ADMIN — INSULIN LISPRO 8 UNITS: 100 INJECTION, SOLUTION INTRAVENOUS; SUBCUTANEOUS at 12:25

## 2022-04-10 RX ADMIN — DEXTROSE MONOHYDRATE 125 ML: 100 INJECTION, SOLUTION INTRAVENOUS at 16:49

## 2022-04-10 RX ADMIN — FLUDROCORTISONE ACETATE 0.2 MG: 0.1 TABLET ORAL at 09:06

## 2022-04-10 RX ADMIN — PIPERACILLIN AND TAZOBACTAM 3375 MG: 3; .375 INJECTION, POWDER, LYOPHILIZED, FOR SOLUTION INTRAVENOUS at 05:57

## 2022-04-10 RX ADMIN — OXYMETAZOLINE HYDROCHLORIDE 2 SPRAY: 0.05 SPRAY NASAL at 17:54

## 2022-04-10 RX ADMIN — INSULIN LISPRO 1 UNITS: 100 INJECTION, SOLUTION INTRAVENOUS; SUBCUTANEOUS at 20:26

## 2022-04-10 RX ADMIN — SODIUM CHLORIDE: 9 INJECTION, SOLUTION INTRAVENOUS at 23:35

## 2022-04-10 RX ADMIN — LEVOFLOXACIN 750 MG: 5 INJECTION, SOLUTION INTRAVENOUS at 05:58

## 2022-04-10 RX ADMIN — MORPHINE SULFATE 1 MG: 2 INJECTION, SOLUTION INTRAMUSCULAR; INTRAVENOUS at 07:20

## 2022-04-10 RX ADMIN — SODIUM CHLORIDE: 9 INJECTION, SOLUTION INTRAVENOUS at 05:56

## 2022-04-10 RX ADMIN — METOPROLOL TARTRATE 25 MG: 25 TABLET, FILM COATED ORAL at 09:07

## 2022-04-10 RX ADMIN — INSULIN LISPRO 8 UNITS: 100 INJECTION, SOLUTION INTRAVENOUS; SUBCUTANEOUS at 09:07

## 2022-04-10 RX ADMIN — CLINDAMYCIN PHOSPHATE 300 MG: 300 INJECTION, SOLUTION INTRAVENOUS at 17:54

## 2022-04-10 ASSESSMENT — PAIN DESCRIPTION - ONSET: ONSET: PROGRESSIVE

## 2022-04-10 ASSESSMENT — PAIN SCALES - GENERAL
PAINLEVEL_OUTOF10: 0
PAINLEVEL_OUTOF10: 0
PAINLEVEL_OUTOF10: 7
PAINLEVEL_OUTOF10: 7
PAINLEVEL_OUTOF10: 0
PAINLEVEL_OUTOF10: 7
PAINLEVEL_OUTOF10: 8
PAINLEVEL_OUTOF10: 3

## 2022-04-10 ASSESSMENT — PAIN DESCRIPTION - PAIN TYPE
TYPE: ACUTE PAIN
TYPE: ACUTE PAIN

## 2022-04-10 ASSESSMENT — PAIN DESCRIPTION - ORIENTATION: ORIENTATION: LEFT

## 2022-04-10 ASSESSMENT — PAIN DESCRIPTION - DESCRIPTORS: DESCRIPTORS: ACHING;BURNING

## 2022-04-10 ASSESSMENT — PAIN DESCRIPTION - LOCATION
LOCATION: THROAT
LOCATION: EAR;NECK

## 2022-04-10 ASSESSMENT — PAIN - FUNCTIONAL ASSESSMENT: PAIN_FUNCTIONAL_ASSESSMENT: ACTIVITIES ARE NOT PREVENTED

## 2022-04-10 NOTE — CONSULTS
Nephrology Consult Note  Patient's Name: Nghia Davalos  10:57 AM  4/10/2022    Nephrologist: Abebe Pacheco    Reason for Consult: Acute kidney injury  Requesting Physician:  Aure Rodriguez MD  PCP: KATHRYN Christensen CNP    Chief Complaint: None  Assessment  1. Acute kidney injury likely due to combination of  recent intravenous contrast studies and recent episodes of hypotension with subsequent decreased renal perfusion. Possibility of  interstitial nephritis is also considered in view of pantoprazole and antibiotic. Possibility of acyclovir as a contributory factor is considered  2. Hypotension resolved  3. Diabetes mellitus type II  4. Hypertension  5. Normocytic anemia   6. Recently mildly elevated PSA level  7. Recurrent laryngeal cancer squamous cell  8. Cervical lymphadenopathy  9. Cutaneous herpes zoster cervical spine dermatome    Plan    1. I discussed my thoughts at length with the patient  2. He understood well  3. I addressed his questions  4. Chest x-ray report reviewed  5. CT of soft tissues of the neck report reviewed  6. Kidney ultrasound is pending  7. Increase intravenous fluid to 125 mill per hour. 8. Discontinue pantoprazole  9. Famotidine 20 mg once a day  10. If patient has  symptoms of gastroesophageal reflux disease with this, we will go back to pantoprazole  11. We will decrease acyclovir to every 12 hours consistent with current Kidney function if need be and if resumed  12. Care plan discussed with the patient  13. Monitor kidney function closely  14. We will follow      History Obtained From: Patient, staff and electronic medical record  History of Present Ilness:    Nghia Davalos is a 57 y.o. male with history of diabetes mellitus type 2, hypertension, gastroesophageal reflux disease admitted through the emergency department because of shortness of breath and possible recurrence squamous cell carcinoma of the larynx. .  Patient had an outpatient laryngoscopy done by Dr. Forest Jaramillo from otolaryngology head and neck surgery. There after he requested admission to the hospital and patient was sent to the emergency department from his office. No chest pain however. Shortness of breath is better now. It was exacerbated with activities and relieved by rest.  No fever chills. No headaches. No blurry visions. No abdominal pain. No diarrhea constipation. No joint pain or joint ache. No difficulties with urination. Appetite has been good. .  Patient was diagnosed with squamous cell carcinoma of the larynx in April 2021. He was given option of radiation therapy versus surgery. He choose radiation therapy. The tumor however is felt to be recurrent now. Patient had difficulties with swallowing. Has been on both chemo and radiation therapy respectively. Gurpreet Cotto His baseline serum creatinine level has been normal until yesterday when he went from 0.7 mg/dL a day before to 1.5 mg/dL. Today it went up to 2.3 mg/dL. As a result we were asked to see him. He had  intravenous contrast procedure done 2 days ago. His blood pressure has been low as well. He also did develop  rash around the neck that is sometimes painful few days ago. Past Medical History:   Diagnosis Date    Arthritis     GERD (gastroesophageal reflux disease)     Hypertension     Keratinizing squamous cell carcinoma of larynx ([facility] [address] 08/30/2021    Type II or unspecified type diabetes mellitus without mention of complication, not stated as uncontrolled        History reviewed. No pertinent surgical history. Family History   Problem Relation Age of Onset    Diabetes Mother     Stroke Mother     Cancer Maternal Grandfather         liver    Cancer Father         reports that he has never smoked. He has never used smokeless tobacco. He reports previous alcohol use of about 1.0 standard drink of alcohol per week. He reports that he does not use drugs.     Allergies:  Lisinopril    Current Medications:    levoFLOXacin (LEVAQUIN) 750 MG/150ML infusion 750 mg, Q24H  0.9 % sodium chloride infusion, Continuous  morphine (PF) injection 1 mg, Q4H PRN  insulin lispro (HUMALOG) injection vial 0-12 Units, TID WC  insulin lispro (HUMALOG) injection vial 0-6 Units, Nightly  glucagon (rDNA) injection 1 mg, PRN  dextrose 5 % solution, PRN  glucose chewable tablet 4 each, PRN  dextrose bolus (hypoglycemia) 10% 125 mL, PRN   Or  dextrose bolus (hypoglycemia) 10% 250 mL, PRN  oxymetazoline (AFRIN) 0.05 % nasal spray 2 spray, Q6H  acyclovir (ZOVIRAX) 315 mg in dextrose 5 % 50 mL IVPB, Q8H  dexamethasone (DECADRON) injection 10 mg, Daily  insulin lispro (HUMALOG) injection vial 8 Units, TID AC  aspirin EC tablet 81 mg, Daily  atorvastatin (LIPITOR) tablet 20 mg, Daily  fludrocortisone (FLORINEF) tablet 0.2 mg, Daily  gabapentin (NEURONTIN) capsule 300 mg, TID  glipiZIDE (GLUCOTROL) tablet 10 mg, BID AC  insulin glargine (LANTUS) injection vial 35 Units, Nightly  metoprolol tartrate (LOPRESSOR) tablet 25 mg, BID  midodrine (PROAMATINE) tablet 10 mg, TID WC  pantoprazole (PROTONIX) injection 40 mg, Daily  sodium chloride flush 0.9 % injection 5-40 mL, 2 times per day  sodium chloride flush 0.9 % injection 5-40 mL, PRN  0.9 % sodium chloride infusion, PRN  enoxaparin (LOVENOX) injection 40 mg, Daily  ondansetron (ZOFRAN-ODT) disintegrating tablet 4 mg, Q8H PRN   Or  ondansetron (ZOFRAN) injection 4 mg, Q6H PRN  polyethylene glycol (GLYCOLAX) packet 17 g, Daily PRN  acetaminophen (TYLENOL) tablet 650 mg, Q6H PRN   Or  acetaminophen (TYLENOL) suppository 650 mg, Q6H PRN  potassium chloride (KLOR-CON M) extended release tablet 40 mEq, PRN   Or  potassium bicarb-citric acid (EFFER-K) effervescent tablet 40 mEq, PRN   Or  potassium chloride 10 mEq/100 mL IVPB (Peripheral Line), PRN  magnesium sulfate 2000 mg in 50 mL IVPB premix, PRN        Review of Systems:   Review of Systems   Pertinent positives stated above in HPI.  All other systems were reviewed and were negative. ROS: As in HPI    Physical exam:   Physical Exam   Constitutional: Well-developed middle-age gentleman very awake and very alert in no distress  Vitals:  Vitals:    04/10/22 0602   BP: 135/76   Pulse: 69   Resp: 16   Temp: 97.4 °F (36.3 °C)   SpO2: 92%       Skin: Maculopapular rash without erythema noted in the cervical dermatome and back of the skull, turgor is normal  Heent: Pupils are reactive to light. Throat is clear. Oral mucosa is moist.  Neck: no bruits or jvd noted. Fullness of the neck noted. Questionable cervical lymphadenopathies noted but could not be adequately discerned due to fullness of the neck  Cardiovascular:  S1, S2 without murmur   Respiratory: Clear with no wheezes,rhonchi or rales   Abdomen: soft,non tender,good bowel sound and no palpable mass  Ext: No lower extremity edema  Psychiatric: mood and affect appropriate  Musculoskeletal:  Rom, muscular strength intact   CNS: Very awake and very alert. Well-oriented. Normal speech. Good motor strength. No obvious focal deficit.     Data:   Labs:  Lab Results   Component Value Date     04/10/2022     (L) 04/09/2022     04/08/2022    K 3.8 04/10/2022    K 4.2 04/09/2022    K 3.8 04/08/2022    CL 99 04/10/2022    CO2 25 04/10/2022    CO2 27 04/09/2022    CO2 25 04/08/2022    CREATININE 2.3 (H) 04/10/2022    CREATININE 1.5 (H) 04/09/2022    CREATININE 0.7 04/08/2022    BUN 31 (H) 04/10/2022    BUN 18 04/09/2022    BUN 8 04/08/2022    GLUCOSE 125 (H) 04/10/2022    GLUCOSE 211 (H) 04/09/2022    GLUCOSE 230 (H) 04/08/2022    PHOS 3.4 03/17/2022    WBC 5.4 04/09/2022    WBC 5.4 04/08/2022    WBC 6.5 03/19/2022    HGB 10.9 (L) 04/09/2022    HGB 12.6 (L) 04/08/2022    HGB 11.0 (L) 03/19/2022    HCT 35.1 (L) 04/09/2022    HCT 40.2 (L) 04/08/2022    HCT 35.6 (L) 03/19/2022    MCV 82.4 04/09/2022     04/09/2022     {Labs reviewed    Imaging:  CXR results: Diagnostic images reports reviewed        Thank you Dr. Fausto Madera Stechschulte, APRN - CNP for allowing us to participate in care of Shanice Shultz   **This report has been created using voice recognition software. It maycontain minor  errors which are inherent in voice recognition technology. **    Electronically signed by Bethany Falcon MD on 4/10/2022 at 10:57 AM

## 2022-04-10 NOTE — PROGRESS NOTES
Appreciate input from nephrology and Dr Sarah Lopes. Shingles are much better. Could switch to po acyclovir given the rise in Cr. Rec adding anaerobic coverage with clindamycin. Once his kidneys recover we will finish w/up as outpt. Will need PET/CT. Tissue   Dx.      Electronically signed by Saundra Rowe MD on 4/10/2022 at 3:01 PM

## 2022-04-10 NOTE — PROGRESS NOTES
Hospitalist Progress Note      Patient:  Wojciech Ohara    Unit/Bed:5K-20/020-A  YOB: 1960  MRN: [id number]   Acct: [de-identified]     PCP: KATHRYN Stovall CNP  Date of Admission: 4/8/2022        Assessment and Plan:        1. Recurrent squamous cell carcinoma of larynx :   a. Has finished chemo  b. Still taking radiation  c. Dr. Julia Sellers concerned following larygnoscopy and requested admission  d. Consult oncology as well  2. Sinusitis:  a. Treatment recommended by Dr. Julia Sellers  b. Discussed with Dr. Gifford Hammans for 10 days    3. Herpes zoster:   a. CN V3 on the left  b. IV acyclovir 5 mg/kg every 8 hours as per the instruction of ENT physician, already started on IV Decadron, ENT physician did not want to switch to oral acyclovir as yet.  c. 4/9-JADIEL worsening discussed with ENT as well as nephrologist-holding IV acyclovir for now  4. DM II:   a. We will increase insulin since the patient is on Decadron, continue Lantus, sliding scale as well as premeal insulin  5. Severe malnutrition:   a. Secondary to poor oral intake-did pass swallow eval    6. Acute kidney injury-since 4/9-on acyclovir IV, had recent contrast however it is still less than 72 hours since the contrast, had hypotension 2-creatinine worsening-discussed with Dr. Rush Duty) and the nephrologist-holding acyclovir for now-increasing IV fluids, renal ultrasound ordered, urine lites ordered      CC:  Dyspnea    HPI: Patient presents to the ED from ENT office for admission. The patient has recurrent laryngeal cancer and reported increasing shortness of breath with yellow sputum. The patient denies fevers. He does have a significant rash on the left side of his neck extending to his left ear. Prior to ED visit Dr. Julia Sellers had some concerning findings on laryngoscopy.   He recommended the patient be treated for sinusitis and he will follow in the hospital.    Patient admitted for further work up and potential laryngectomy and treatment for herpes zoster. Subjective:- (Last 24 hours)    Hoarse voice  No nausea no vomiting  No chest pain shortness of breath improving    Past medical history, family history, social history and allergies reviewed again and is unchanged since admission. ROS (All review of systems completed. Pertinent positives noted.  Otherwise All other systems reviewed and negative.)     Scheduled Meds:   [START ON 4/11/2022] heparin (porcine)  5,000 Units SubCUTAneous BID    famotidine  20 mg Oral Daily    levofloxacin  750 mg IntraVENous Q24H    insulin lispro  0-12 Units SubCUTAneous TID WC    insulin lispro  0-6 Units SubCUTAneous Nightly    oxymetazoline  2 spray Each Nostril Q6H    [Held by provider] acyclovir  5 mg/kg IntraVENous Q8H    dexamethasone  10 mg IntraVENous Daily    insulin lispro  8 Units SubCUTAneous TID AC    aspirin  81 mg Oral Daily    atorvastatin  20 mg Oral Daily    fludrocortisone  0.2 mg Oral Daily    gabapentin  300 mg Oral TID    glipiZIDE  10 mg Oral BID AC    insulin glargine  35 Units SubCUTAneous Nightly    metoprolol tartrate  25 mg Oral BID    midodrine  10 mg Oral TID WC    [Held by provider] pantoprazole  40 mg IntraVENous Daily    sodium chloride flush  5-40 mL IntraVENous 2 times per day     Continuous Infusions:   sodium chloride 125 mL/hr at 04/10/22 1159    dextrose      sodium chloride Stopped (04/10/22 0155)     PRN Meds:.morphine, glucagon (rDNA), dextrose, glucose, dextrose bolus (hypoglycemia) **OR** dextrose bolus (hypoglycemia), sodium chloride flush, sodium chloride, ondansetron **OR** ondansetron, polyethylene glycol, acetaminophen **OR** acetaminophen, potassium chloride **OR** potassium alternative oral replacement **OR** potassium chloride, magnesium sulfate       Labs:   Recent Results (from the past 24 hour(s))   POCT Glucose    Collection Time: 04/09/22  5:05 PM   Result Value Ref Range    POC Glucose 196 (H) 70 - 108 mg/dl   POCT Glucose    Collection Time: 04/09/22  7:20 PM   Result Value Ref Range    POC Glucose 150 (H) 70 - 108 mg/dl   POCT Glucose    Collection Time: 04/10/22  7:50 AM   Result Value Ref Range    POC Glucose 137 (H) 70 - 108 mg/dl   Basic Metabolic Panel    Collection Time: 04/10/22  8:17 AM   Result Value Ref Range    Sodium 137 135 - 145 meq/L    Potassium 3.8 3.5 - 5.2 meq/L    Chloride 99 98 - 111 meq/L    CO2 25 23 - 33 meq/L    Glucose 125 (H) 70 - 108 mg/dL    BUN 31 (H) 7 - 22 mg/dL    CREATININE 2.3 (H) 0.4 - 1.2 mg/dL    Calcium 9.1 8.5 - 10.5 mg/dL   Magnesium    Collection Time: 04/10/22  8:17 AM   Result Value Ref Range    Magnesium 1.9 1.6 - 2.4 mg/dL   Anion Gap    Collection Time: 04/10/22  8:17 AM   Result Value Ref Range    Anion Gap 13.0 8.0 - 16.0 meq/L   Glomerular Filtration Rate, Estimated    Collection Time: 04/10/22  8:17 AM   Result Value Ref Range    Est, Glom Filt Rate 29 (A) ml/min/1.73m2   Sodium, urine, random    Collection Time: 04/10/22 11:15 AM   Result Value Ref Range    Sodium, Ur 50 meq/l   Osmolality, urine    Collection Time: 04/10/22 11:15 AM   Result Value Ref Range    Osmolality, Ur 330 250 - 750 mosmol/kg   POCT Glucose    Collection Time: 04/10/22 11:58 AM   Result Value Ref Range    POC Glucose 85 70 - 108 mg/dl         Vital Signs: T: 96.7F P: 97 RR: 20 B/P: 102/58: FiO2: RA: O2 Sat:93%: I/O:     Intake/Output Summary (Last 24 hours) at 4/10/2022 1225  Last data filed at 4/10/2022 1117  Gross per 24 hour   Intake 480 ml   Output 300 ml   Net 180 ml         General:   No acute distress, thin  HEENT:  normocephalic and atraumatic. No scleral icterus. PEARLA, mucous membranes moist, raspy quiet voice  Neck: supple. Trachea midline. No JVD. Full ROM, no meningismus. Lungs: clear to auscultation. No retractions, no accessory muscle use. Cardiac: RRR, no murmur, 2+ pulses  Abdomen: soft. Nontender.  Bowel sounds active  Extremities:  No clubbing, cyanosis x 4, no edema Vasculature: capillary refill < 3 seconds. Skin:  warm and dry. + visible rashes - red vesicular rash on the left neck extending from the midline to the and including the left ear  Psych:  Alert and oriented x3. Affect appropriate  Lymph:  No supraclavicular adenopathy. Neurologic:  CN II-XII grossly intact. No focal deficit. Data: (All radiographs, tracings, PFTs, and imaging are personally viewed and interpreted unless otherwise noted).     EKG: rhythm: normal sinus rhythm, rate=82 bpm, pv=248 ms, qrs=92 ms, rz=120 ms, axis=56 degrees    Recent Results (from the past 72 hour(s))   EKG 12 Lead    Collection Time: 04/08/22  4:52 AM   Result Value Ref Range    Ventricular Rate 95 BPM    Atrial Rate 95 BPM    P-R Interval 148 ms    QRS Duration 92 ms    Q-T Interval 392 ms    QTc Calculation (Bazett) 492 ms    P Axis 65 degrees    R Axis 82 degrees    T Axis 66 degrees   CBC with Auto Differential    Collection Time: 04/08/22  5:10 AM   Result Value Ref Range    WBC 5.4 4.8 - 10.8 thou/mm3    RBC 4.92 4.70 - 6.10 mill/mm3    Hemoglobin 12.6 (L) 14.0 - 18.0 gm/dl    Hematocrit 40.2 (L) 42.0 - 52.0 %    MCV 81.7 80.0 - 94.0 fL    MCH 25.6 (L) 26.0 - 33.0 pg    MCHC 31.3 (L) 32.2 - 35.5 gm/dl    RDW-CV 13.3 11.5 - 14.5 %    RDW-SD 39.8 35.0 - 45.0 fL    Platelets 817 997 - 162 thou/mm3    MPV 8.9 (L) 9.4 - 12.4 fL    Seg Neutrophils 68.9 %    Lymphocytes 20.9 %    Monocytes 7.4 %    Eosinophils 1.7 %    Basophils 0.7 %    Immature Granulocytes 0.4 %    Segs Absolute 3.7 1.8 - 7.7 thou/mm3    Lymphocytes Absolute 1.1 1.0 - 4.8 thou/mm3    Monocytes Absolute 0.4 0.4 - 1.3 thou/mm3    Eosinophils Absolute 0.1 0.0 - 0.4 thou/mm3    Basophils Absolute 0.0 0.0 - 0.1 thou/mm3    Immature Grans (Abs) 0.02 0.00 - 0.07 thou/mm3    nRBC 0 /100 wbc   Basic Metabolic Panel w/ Reflex to MG    Collection Time: 04/08/22  5:10 AM   Result Value Ref Range    Sodium 136 135 - 145 meq/L    Potassium reflex Magnesium 3.8 3.5 - 5.2 meq/L    Chloride 96 (L) 98 - 111 meq/L    CO2 25 23 - 33 meq/L    Glucose 230 (H) 70 - 108 mg/dL    BUN 8 7 - 22 mg/dL    CREATININE 0.7 0.4 - 1.2 mg/dL    Calcium 9.9 8.5 - 10.5 mg/dL   Hepatic Function Panel    Collection Time: 04/08/22  5:10 AM   Result Value Ref Range    Albumin 3.9 3.5 - 5.1 g/dL    Total Bilirubin 0.5 0.3 - 1.2 mg/dL    Bilirubin, Direct <0.2 0.0 - 0.3 mg/dL    Alkaline Phosphatase 114 38 - 126 U/L    AST 9 5 - 40 U/L    ALT 8 (L) 11 - 66 U/L    Total Protein 8.0 6.1 - 8.0 g/dL   Troponin    Collection Time: 04/08/22  5:10 AM   Result Value Ref Range    Troponin T < 0.010 ng/ml   Lipase    Collection Time: 04/08/22  5:10 AM   Result Value Ref Range    Lipase 16.4 5.6 - 51.3 U/L   Brain Natriuretic Peptide    Collection Time: 04/08/22  5:10 AM   Result Value Ref Range    Pro-.9 0.0 - 900.0 pg/mL   Lactate, Sepsis    Collection Time: 04/08/22  5:10 AM   Result Value Ref Range    Lactic Acid, Sepsis 2.1 (H) 0.5 - 1.9 mmol/L   Procalcitonin    Collection Time: 04/08/22  5:10 AM   Result Value Ref Range    Procalcitonin 0.06 0.01 - 0.09 ng/mL   Anion Gap    Collection Time: 04/08/22  5:10 AM   Result Value Ref Range    Anion Gap 15.0 8.0 - 16.0 meq/L   Osmolality    Collection Time: 04/08/22  5:10 AM   Result Value Ref Range    Osmolality Calc 277.6 275.0 - 300.0 mOsmol/kg   Glomerular Filtration Rate, Estimated    Collection Time: 04/08/22  5:10 AM   Result Value Ref Range    Est, Glom Filt Rate >90 ml/min/1.73m2   Rapid influenza A/B antigens    Collection Time: 04/08/22  5:20 AM    Specimen: Nasopharyngeal   Result Value Ref Range    Flu A Antigen Negative NEGATIVE    Flu B Antigen Negative NEGATIVE   COVID-19, Rapid    Collection Time: 04/08/22  5:20 AM    Specimen: Nasopharyngeal Swab   Result Value Ref Range    SARS-CoV-2, NAAT NOT  DETECTED NOT DETECTED   Culture, Respiratory    Collection Time: 04/08/22  1:45 PM    Specimen: Sputum Expectorated   Result Value Ref Range Respiratory Culture Normal maureen- preliminary Normal maureen      Gram Stain Result       Quality of sputum specimen: Specimen acceptable. Many segmented neutrophils observed. Rare epithelial cells observed. Many gram positive cocci in pairs and clusters. Few gram negative bacilli. Rare gram positive bacilli.     EKG 12 Lead    Collection Time: 04/08/22  5:05 PM   Result Value Ref Range    Ventricular Rate 82 BPM    Atrial Rate 82 BPM    P-R Interval 154 ms    QRS Duration 92 ms    Q-T Interval 426 ms    QTc Calculation (Bazett) 497 ms    P Axis 56 degrees    R Axis 68 degrees    T Axis 63 degrees   Basic Metabolic Panel w/ Reflex to MG    Collection Time: 04/09/22  4:42 AM   Result Value Ref Range    Sodium 133 (L) 135 - 145 meq/L    Potassium reflex Magnesium 4.2 3.5 - 5.2 meq/L    Chloride 96 (L) 98 - 111 meq/L    CO2 27 23 - 33 meq/L    Glucose 211 (H) 70 - 108 mg/dL    BUN 18 7 - 22 mg/dL    CREATININE 1.5 (H) 0.4 - 1.2 mg/dL    Calcium 9.1 8.5 - 10.5 mg/dL   CBC with Auto Differential    Collection Time: 04/09/22  4:42 AM   Result Value Ref Range    WBC 5.4 4.8 - 10.8 thou/mm3    RBC 4.26 (L) 4.70 - 6.10 mill/mm3    Hemoglobin 10.9 (L) 14.0 - 18.0 gm/dl    Hematocrit 35.1 (L) 42.0 - 52.0 %    MCV 82.4 80.0 - 94.0 fL    MCH 25.6 (L) 26.0 - 33.0 pg    MCHC 31.1 (L) 32.2 - 35.5 gm/dl    RDW-CV 13.5 11.5 - 14.5 %    RDW-SD 40.5 35.0 - 45.0 fL    Platelets 258 911 - 251 thou/mm3    MPV 9.3 (L) 9.4 - 12.4 fL    Seg Neutrophils 73.9 %    Lymphocytes 13.1 %    Monocytes 10.9 %    Eosinophils 1.3 %    Basophils 0.4 %    Immature Granulocytes 0.4 %    Segs Absolute 4.0 1.8 - 7.7 thou/mm3    Lymphocytes Absolute 0.7 (L) 1.0 - 4.8 thou/mm3    Monocytes Absolute 0.6 0.4 - 1.3 thou/mm3    Eosinophils Absolute 0.1 0.0 - 0.4 thou/mm3    Basophils Absolute 0.0 0.0 - 0.1 thou/mm3    Immature Grans (Abs) 0.02 0.00 - 0.07 thou/mm3    nRBC 0 /100 wbc   Anion Gap    Collection Time: 04/09/22  4:42 AM   Result Value Ref Range    Anion Gap 10.0 8.0 - 16.0 meq/L   Glomerular Filtration Rate, Estimated    Collection Time: 04/09/22  4:42 AM   Result Value Ref Range    Est, Glom Filt Rate 47 (A) ml/min/1.73m2   POCT Glucose    Collection Time: 04/09/22 12:18 PM   Result Value Ref Range    POC Glucose 233 (H) 70 - 108 mg/dl   POCT Glucose    Collection Time: 04/09/22  5:05 PM   Result Value Ref Range    POC Glucose 196 (H) 70 - 108 mg/dl   POCT Glucose    Collection Time: 04/09/22  7:20 PM   Result Value Ref Range    POC Glucose 150 (H) 70 - 108 mg/dl   POCT Glucose    Collection Time: 04/10/22  7:50 AM   Result Value Ref Range    POC Glucose 137 (H) 70 - 108 mg/dl   Basic Metabolic Panel    Collection Time: 04/10/22  8:17 AM   Result Value Ref Range    Sodium 137 135 - 145 meq/L    Potassium 3.8 3.5 - 5.2 meq/L    Chloride 99 98 - 111 meq/L    CO2 25 23 - 33 meq/L    Glucose 125 (H) 70 - 108 mg/dL    BUN 31 (H) 7 - 22 mg/dL    CREATININE 2.3 (H) 0.4 - 1.2 mg/dL    Calcium 9.1 8.5 - 10.5 mg/dL   Magnesium    Collection Time: 04/10/22  8:17 AM   Result Value Ref Range    Magnesium 1.9 1.6 - 2.4 mg/dL   Anion Gap    Collection Time: 04/10/22  8:17 AM   Result Value Ref Range    Anion Gap 13.0 8.0 - 16.0 meq/L   Glomerular Filtration Rate, Estimated    Collection Time: 04/10/22  8:17 AM   Result Value Ref Range    Est, Glom Filt Rate 29 (A) ml/min/1.73m2   Sodium, urine, random    Collection Time: 04/10/22 11:15 AM   Result Value Ref Range    Sodium, Ur 50 meq/l   Osmolality, urine    Collection Time: 04/10/22 11:15 AM   Result Value Ref Range    Osmolality, Ur 330 250 - 750 mosmol/kg   POCT Glucose    Collection Time: 04/10/22 11:58 AM   Result Value Ref Range    POC Glucose 85 70 - 108 mg/dl       CT SOFT TISSUE NECK W CONTRAST    Result Date: 4/8/2022  PROCEDURE: CT SOFT TISSUE NECK W CONTRAST CLINICAL INFORMATION: neck swelling with history of throat cancer. COMPARISON: PET scan dated 27th of October 2021. . TECHNIQUE: 3 mm axial images were obtained through the neck soft tissues after the administration of intravenous contrast. Sagittal and coronal reconstructions were obtained. All CT scans at this facility use dose modulation, iterative reconstruction, and/or weight-based dosing when appropriate to reduce radiation dose to as low as reasonably achievable. FINDINGS: There is a 2.4 x 2.5 x 1.2 cm area of abnormal soft tissue density in the supraglottic larynx suspicious for recurrent tumor. The left vocal cord appears be displaced towards the midline. There are small lymph nodes especially in the posterior triangles of the neck, left greater than right, inferior to the left parotid gland measuring 11 mm in size and in the suboccipital region on the left side measuring 7.2 mm in size. There is opacification of the right maxillary sinus and right ethmoid air cells consistent with inflammatory changes. There is slightly increased density in the soft tissues which may represent changes of radiation therapy The soft tissues of the nasopharynx are normal.  The oropharynx is within appropriate limits. The hypopharynx is normal. The epiglottis is normal.  The oral cavity and floor of mouth are normal.  The vocal cords and subglottic airway are within appropriate limits. The thyroid gland, submandibular glands and parotid glands are within acceptable limits. There are areas of increased density in the upper lung fields which may represent areas of pneumonitis. . There is straightening of the normal cervical lordosis and mild cervical spondylosis at C5-6 and C6-7. There are no gross abnormalities in the brain parenchyma. 1. 2.4 x 2.5 x 1.2 cm area of abnormal soft tissue density in the supraglottic larynx suspicious for recurrent tumor. ENT correlation would be helpful. 2. Left vocal cord is displaced towards the midline.  3. Small lymph nodes especially in the posterior triangles the neck, inferior to the left parotid gland and in the suboccipital region on the left side. 4. Increased density in the skin and subcutaneous soft tissues possibly representing changes of radiation therapy. 5. Mild cervical spondylosis. 6. Opacification of the right maxillary sinus and right ethmoid air cells. 7. Bilateral carotid artery calcification. 8. Areas of increased density in the upper lung. It may represent areas of pneumonitis. **This report has been created using voice recognition software. It may contain minor errors which are inherent in voice recognition technology. ** Final report electronically signed by DR Magui Jain on 4/8/2022 7:57 AM    XR CHEST PORTABLE    Result Date: 4/8/2022  CHEST X-RAY FRONTAL VIEW COMPARISON: 8/14/2013. FINDINGS: A single frontal view of the chest was performed. Hardware projects over the left hemithorax. Cardiac size is within normal limits. A few small calcified granulomas are noted within the left upper lobe and right lung base. There is no focal infiltrate or consolidation. There is no pleural effusion or pneumothorax. 1. No acute disease.  This document has been electronically signed by: Mari Reyes M.D. on 04/08/2022 06:02 AM    Electronically signed by Naveed Obrien MD on 4/10/2022 at 12:25 PM

## 2022-04-11 ENCOUNTER — TELEPHONE (OUTPATIENT)
Dept: ENT CLINIC | Age: 62
End: 2022-04-11

## 2022-04-11 DIAGNOSIS — C32.9 LARYNGEAL SQUAMOUS CELL CARCINOMA (HCC): Primary | ICD-10-CM

## 2022-04-11 DIAGNOSIS — Z92.21 STATUS POST CHEMORADIATION: ICD-10-CM

## 2022-04-11 DIAGNOSIS — Z92.3 STATUS POST CHEMORADIATION: ICD-10-CM

## 2022-04-11 LAB
ANION GAP SERPL CALCULATED.3IONS-SCNC: 10 MEQ/L (ref 8–16)
BUN BLDV-MCNC: 30 MG/DL (ref 7–22)
CALCIUM SERPL-MCNC: 8.7 MG/DL (ref 8.5–10.5)
CHLORIDE BLD-SCNC: 104 MEQ/L (ref 98–111)
CO2: 24 MEQ/L (ref 23–33)
CREAT SERPL-MCNC: 1.3 MG/DL (ref 0.4–1.2)
ERYTHROCYTE [DISTWIDTH] IN BLOOD BY AUTOMATED COUNT: 13.5 % (ref 11.5–14.5)
ERYTHROCYTE [DISTWIDTH] IN BLOOD BY AUTOMATED COUNT: 39.8 FL (ref 35–45)
GFR SERPL CREATININE-BSD FRML MDRD: 56 ML/MIN/1.73M2
GLUCOSE BLD-MCNC: 181 MG/DL (ref 70–108)
GLUCOSE BLD-MCNC: 193 MG/DL (ref 70–108)
GLUCOSE BLD-MCNC: 219 MG/DL (ref 70–108)
GLUCOSE BLD-MCNC: 266 MG/DL (ref 70–108)
GLUCOSE BLD-MCNC: 67 MG/DL (ref 70–108)
GLUCOSE BLD-MCNC: 67 MG/DL (ref 70–108)
GLUCOSE BLD-MCNC: 69 MG/DL (ref 70–108)
GLUCOSE BLD-MCNC: 86 MG/DL (ref 70–108)
HCT VFR BLD CALC: 32.3 % (ref 42–52)
HEMOGLOBIN: 10.1 GM/DL (ref 14–18)
MCH RBC QN AUTO: 25.5 PG (ref 26–33)
MCHC RBC AUTO-ENTMCNC: 31.3 GM/DL (ref 32.2–35.5)
MCV RBC AUTO: 81.6 FL (ref 80–94)
PLATELET # BLD: 283 THOU/MM3 (ref 130–400)
PMV BLD AUTO: 9.1 FL (ref 9.4–12.4)
POTASSIUM SERPL-SCNC: 4 MEQ/L (ref 3.5–5.2)
RBC # BLD: 3.96 MILL/MM3 (ref 4.7–6.1)
SODIUM BLD-SCNC: 138 MEQ/L (ref 135–145)
WBC # BLD: 8 THOU/MM3 (ref 4.8–10.8)

## 2022-04-11 PROCEDURE — 6370000000 HC RX 637 (ALT 250 FOR IP): Performed by: STUDENT IN AN ORGANIZED HEALTH CARE EDUCATION/TRAINING PROGRAM

## 2022-04-11 PROCEDURE — 6370000000 HC RX 637 (ALT 250 FOR IP)

## 2022-04-11 PROCEDURE — 6360000002 HC RX W HCPCS: Performed by: PHYSICIAN ASSISTANT

## 2022-04-11 PROCEDURE — 99232 SBSQ HOSP IP/OBS MODERATE 35: CPT | Performed by: INTERNAL MEDICINE

## 2022-04-11 PROCEDURE — 6370000000 HC RX 637 (ALT 250 FOR IP): Performed by: PHYSICIAN ASSISTANT

## 2022-04-11 PROCEDURE — 85027 COMPLETE CBC AUTOMATED: CPT

## 2022-04-11 PROCEDURE — 99233 SBSQ HOSP IP/OBS HIGH 50: CPT | Performed by: NURSE PRACTITIONER

## 2022-04-11 PROCEDURE — 2500000003 HC RX 250 WO HCPCS: Performed by: INTERNAL MEDICINE

## 2022-04-11 PROCEDURE — 80048 BASIC METABOLIC PNL TOTAL CA: CPT

## 2022-04-11 PROCEDURE — 36415 COLL VENOUS BLD VENIPUNCTURE: CPT

## 2022-04-11 PROCEDURE — 6370000000 HC RX 637 (ALT 250 FOR IP): Performed by: INTERNAL MEDICINE

## 2022-04-11 PROCEDURE — 6360000002 HC RX W HCPCS: Performed by: INTERNAL MEDICINE

## 2022-04-11 PROCEDURE — 82948 REAGENT STRIP/BLOOD GLUCOSE: CPT

## 2022-04-11 PROCEDURE — 6360000002 HC RX W HCPCS: Performed by: NURSE PRACTITIONER

## 2022-04-11 PROCEDURE — 1200000000 HC SEMI PRIVATE

## 2022-04-11 RX ORDER — OXYMETAZOLINE HYDROCHLORIDE 0.05 G/100ML
2 SPRAY NASAL 2 TIMES DAILY
Refills: 3 | COMMUNITY
Start: 2022-04-11 | End: 2022-04-14

## 2022-04-11 RX ORDER — VALACYCLOVIR HYDROCHLORIDE 500 MG/1
500 TABLET, FILM COATED ORAL 2 TIMES DAILY
Qty: 14 TABLET | Refills: 0 | Status: SHIPPED | OUTPATIENT
Start: 2022-04-11 | End: 2022-04-18

## 2022-04-11 RX ORDER — ACYCLOVIR 200 MG/1
200 CAPSULE ORAL
Status: DISCONTINUED | OUTPATIENT
Start: 2022-04-11 | End: 2022-04-12 | Stop reason: HOSPADM

## 2022-04-11 RX ORDER — CLINDAMYCIN HYDROCHLORIDE 300 MG/1
300 CAPSULE ORAL 3 TIMES DAILY
Qty: 30 CAPSULE | Refills: 0 | Status: SHIPPED | OUTPATIENT
Start: 2022-04-11 | End: 2022-04-21

## 2022-04-11 RX ORDER — DEXAMETHASONE SODIUM PHOSPHATE 4 MG/ML
6 INJECTION, SOLUTION INTRA-ARTICULAR; INTRALESIONAL; INTRAMUSCULAR; INTRAVENOUS; SOFT TISSUE DAILY
Status: DISCONTINUED | OUTPATIENT
Start: 2022-04-11 | End: 2022-04-12 | Stop reason: HOSPADM

## 2022-04-11 RX ORDER — INSULIN GLARGINE 100 [IU]/ML
15 INJECTION, SOLUTION SUBCUTANEOUS NIGHTLY
Status: DISCONTINUED | OUTPATIENT
Start: 2022-04-11 | End: 2022-04-12 | Stop reason: HOSPADM

## 2022-04-11 RX ADMIN — CLINDAMYCIN PHOSPHATE 300 MG: 300 INJECTION, SOLUTION INTRAVENOUS at 17:25

## 2022-04-11 RX ADMIN — INSULIN LISPRO 6 UNITS: 100 INJECTION, SOLUTION INTRAVENOUS; SUBCUTANEOUS at 11:50

## 2022-04-11 RX ADMIN — CLINDAMYCIN PHOSPHATE 300 MG: 300 INJECTION, SOLUTION INTRAVENOUS at 03:24

## 2022-04-11 RX ADMIN — GLIPIZIDE 10 MG: 10 TABLET ORAL at 07:02

## 2022-04-11 RX ADMIN — INSULIN LISPRO 8 UNITS: 100 INJECTION, SOLUTION INTRAVENOUS; SUBCUTANEOUS at 11:51

## 2022-04-11 RX ADMIN — INSULIN LISPRO 1 UNITS: 100 INJECTION, SOLUTION INTRAVENOUS; SUBCUTANEOUS at 22:25

## 2022-04-11 RX ADMIN — HEPARIN SODIUM 5000 UNITS: 5000 INJECTION INTRAVENOUS; SUBCUTANEOUS at 22:25

## 2022-04-11 RX ADMIN — FLUDROCORTISONE ACETATE 0.2 MG: 0.1 TABLET ORAL at 08:53

## 2022-04-11 RX ADMIN — CLINDAMYCIN PHOSPHATE 300 MG: 300 INJECTION, SOLUTION INTRAVENOUS at 10:11

## 2022-04-11 RX ADMIN — OXYMETAZOLINE HYDROCHLORIDE 2 SPRAY: 0.05 SPRAY NASAL at 16:38

## 2022-04-11 RX ADMIN — INSULIN LISPRO 8 UNITS: 100 INJECTION, SOLUTION INTRAVENOUS; SUBCUTANEOUS at 09:51

## 2022-04-11 RX ADMIN — INSULIN LISPRO 2 UNITS: 100 INJECTION, SOLUTION INTRAVENOUS; SUBCUTANEOUS at 10:02

## 2022-04-11 RX ADMIN — MORPHINE SULFATE 1 MG: 2 INJECTION, SOLUTION INTRAMUSCULAR; INTRAVENOUS at 07:53

## 2022-04-11 RX ADMIN — ACYCLOVIR 200 MG: 200 CAPSULE ORAL at 20:29

## 2022-04-11 RX ADMIN — ATORVASTATIN CALCIUM 20 MG: 20 TABLET, FILM COATED ORAL at 08:53

## 2022-04-11 RX ADMIN — HEPARIN SODIUM 5000 UNITS: 5000 INJECTION INTRAVENOUS; SUBCUTANEOUS at 08:54

## 2022-04-11 RX ADMIN — LEVOFLOXACIN 750 MG: 5 INJECTION, SOLUTION INTRAVENOUS at 06:13

## 2022-04-11 RX ADMIN — DEXAMETHASONE SODIUM PHOSPHATE 6 MG: 4 INJECTION, SOLUTION INTRA-ARTICULAR; INTRALESIONAL; INTRAMUSCULAR; INTRAVENOUS; SOFT TISSUE at 07:54

## 2022-04-11 RX ADMIN — INSULIN GLARGINE 15 UNITS: 100 INJECTION, SOLUTION SUBCUTANEOUS at 22:26

## 2022-04-11 RX ADMIN — METOPROLOL TARTRATE 25 MG: 25 TABLET, FILM COATED ORAL at 22:25

## 2022-04-11 RX ADMIN — METOPROLOL TARTRATE 25 MG: 25 TABLET, FILM COATED ORAL at 08:53

## 2022-04-11 RX ADMIN — ASPIRIN 81 MG: 81 TABLET, COATED ORAL at 08:53

## 2022-04-11 RX ADMIN — GABAPENTIN 300 MG: 300 CAPSULE ORAL at 08:53

## 2022-04-11 RX ADMIN — FAMOTIDINE 20 MG: 20 TABLET ORAL at 08:53

## 2022-04-11 RX ADMIN — GABAPENTIN 300 MG: 300 CAPSULE ORAL at 16:38

## 2022-04-11 RX ADMIN — OXYMETAZOLINE HYDROCHLORIDE 2 SPRAY: 0.05 SPRAY NASAL at 05:23

## 2022-04-11 RX ADMIN — ACYCLOVIR 200 MG: 200 CAPSULE ORAL at 16:38

## 2022-04-11 RX ADMIN — GABAPENTIN 300 MG: 300 CAPSULE ORAL at 22:24

## 2022-04-11 RX ADMIN — MORPHINE SULFATE 1 MG: 2 INJECTION, SOLUTION INTRAMUSCULAR; INTRAVENOUS at 13:55

## 2022-04-11 ASSESSMENT — PAIN SCALES - GENERAL
PAINLEVEL_OUTOF10: 3
PAINLEVEL_OUTOF10: 8
PAINLEVEL_OUTOF10: 8
PAINLEVEL_OUTOF10: 5
PAINLEVEL_OUTOF10: 10
PAINLEVEL_OUTOF10: 4

## 2022-04-11 ASSESSMENT — PAIN DESCRIPTION - LOCATION
LOCATION: NECK
LOCATION: NECK;EAR;THROAT
LOCATION: THROAT;NECK;EAR

## 2022-04-11 ASSESSMENT — PAIN DESCRIPTION - FREQUENCY
FREQUENCY: CONTINUOUS

## 2022-04-11 ASSESSMENT — PAIN DESCRIPTION - ONSET
ONSET: PROGRESSIVE
ONSET: PROGRESSIVE

## 2022-04-11 ASSESSMENT — PAIN DESCRIPTION - DIRECTION
RADIATING_TOWARDS: HEAD AND NECK
RADIATING_TOWARDS: HEAD AND NECK

## 2022-04-11 ASSESSMENT — PAIN DESCRIPTION - ORIENTATION
ORIENTATION: LEFT
ORIENTATION: LEFT

## 2022-04-11 ASSESSMENT — PAIN DESCRIPTION - PAIN TYPE
TYPE: ACUTE PAIN

## 2022-04-11 ASSESSMENT — PAIN DESCRIPTION - DESCRIPTORS
DESCRIPTORS: ACHING;DULL
DESCRIPTORS: SHARP;ACHING;DULL

## 2022-04-11 ASSESSMENT — PAIN DESCRIPTION - PROGRESSION
CLINICAL_PROGRESSION: GRADUALLY WORSENING
CLINICAL_PROGRESSION: GRADUALLY WORSENING

## 2022-04-11 NOTE — PLAN OF CARE
Problem: Discharge Planning:  Goal: Discharged to appropriate level of care  Description: Discharged to appropriate level of care  Outcome: Completed  Goal: Discharged to appropriate level of care  Description: Discharged to appropriate level of care  Outcome: Completed   See  note 4/11/22

## 2022-04-11 NOTE — PLAN OF CARE
Problem: Pain:  Goal: Pain level will decrease  Description: Pain level will decrease  Outcome: Ongoing  Note: Patient reporting pain 8/10 with a goal of 0/10. Patient satisfied with current interventions including rest and repositioning. Pain assessment ongoing. Problem: Infection:  Goal: Will remain free from infection  Description: Will remain free from infection  Outcome: Ongoing  Note: No signs of infection this shift. Problem: Discharge Planning:  Goal: Patients continuum of care needs are met  Description: Patients continuum of care needs are met  Outcome: Ongoing  Note: Discharge plans pending discussed with patient. Problem: Nutrition  Goal: Optimal nutrition therapy  4/11/2022 1045 by Lyudmila Allred RN  Outcome: Ongoing  Note: Patient tolerating nectar thick diet. Problem: Falls - Risk of:  Goal: Will remain free from falls  Description: Will remain free from falls  Outcome: Ongoing  Note: Patient free from falls this shift. Patient uses call light appropriately, bed in lowest position, nonskid socks on, bed rails up x2, fall sign posted and call light in reach. Patient at risk due to generalized weakness. Care plan reviewed with patient. Patient verbalizes understanding of the plan of care and contribute to goal setting.

## 2022-04-11 NOTE — PLAN OF CARE
Problem: Nutrition  Goal: Optimal nutrition therapy  Outcome: Ongoing  Nutrition Problem #1: Severe malnutrition  Intervention: Food and/or Nutrient Delivery: Continue Current Diet,Start Oral Nutrition Supplement  Nutritional Goals: Pt. will tolerate and consume 75% or more of meals and experience no signficant weight loss during LOS.

## 2022-04-11 NOTE — PLAN OF CARE
Problem: Discharge Planning:  Goal: Discharged to appropriate level of care  Description: Discharged to appropriate level of care  Outcome: Completed   See SW note 4/11/22

## 2022-04-11 NOTE — PROGRESS NOTES
Pt is sitting in bed and appears to be resting comfortably. Pt is A&O x 4 with appropriate speech. Pt c/o pain behind left ear which pt stated is chronic pain. Pt also describes numbness in lower extremities. Pt bedside table and call light within reach.

## 2022-04-11 NOTE — PROGRESS NOTES
Department of Otolaryngology  Progress Note    Chief Complaint:  Neck pain    SUBJECTIVE:  Patient sitting up in chair, reports feeling significantly better over th last few days. No neck pain at this time. Creatinine improved; 1.3 today. A complete multi-organ review of systems was performed using a new patient questionnaire, and reviewed by me. ENT:  negative except as noted in HPI  CONSTITUTIONAL:  negative except as noted in HPI  EYES:  negative except as noted in HPI  RESPIRATORY:  negative except as noted in HPI  CARDIOVASCULAR:  negative except as noted in HPI  GASTROINTESTINAL:  negative except as noted in HPI  GENITOURINARY:  negative except as noted in HPI  MUSCULOSKELETAL:  negative except as noted in HPI  SKIN:  negative except as noted in HPI  ENDOCRINE/METABOLIC: negative except as noted in HPI  HEMATOLOGIC/LYMPHATIC:  negative except as noted in HPI  ALLERGY/IMMUN: negative except as noted in HPI  NEUROLOGICAL:  negative except as noted in HPI  BEHAVIOR/PSYCH:  negative except as noted in HPI    OBJECTIVE      Physical  VITALS:  BP (!) 140/69   Pulse 68   Temp 97.5 °F (36.4 °C) (Oral)   Resp 16   Ht 5' 11\" (1.803 m)   Wt 146 lb 8 oz (66.5 kg)   SpO2 97%   BMI 20.43 kg/m²     Sitting up in bedside chair. Conversive; hoarse and breathy vocal quality. Left anterolateral neck with discoloration, but vesicles have resolved. Left auricle less swollen; crusted lesion above tragus. Frequent productive cough; foul halitosis with malodorous sputum.      Data  CBC with Differential:    Lab Results   Component Value Date    WBC 8.0 04/11/2022    RBC 3.96 04/11/2022    HGB 10.1 04/11/2022    HCT 32.3 04/11/2022     04/11/2022    MCV 81.6 04/11/2022    MCH 25.5 04/11/2022    MCHC 31.3 04/11/2022    RDW 13.0 09/29/2021    NRBC 0 04/09/2022    SEGSPCT 73.9 04/09/2022    LYMPHOPCT 21 10/14/2020    MONOPCT 10.9 04/09/2022    BASOPCT 1 10/14/2020    MONOSABS 0.6 04/09/2022    LYMPHSABS 0.7 04/09/2022    EOSABS 0.1 04/09/2022    BASOSABS 0.0 04/09/2022    DIFFTYPE NOT REPORTED 10/14/2020     BMP:    Lab Results   Component Value Date     04/11/2022    K 4.0 04/11/2022    K 4.2 04/09/2022     04/11/2022    CO2 24 04/11/2022    BUN 30 04/11/2022    LABALBU 3.9 04/08/2022    CREATININE 1.3 04/11/2022    CALCIUM 8.7 04/11/2022    GFRAA >60 10/14/2020    LABGLOM 56 04/11/2022    GLUCOSE 193 04/11/2022       Inpatient Medications  Current Facility-Administered Medications: dexamethasone (DECADRON) injection 6 mg, 6 mg, IntraVENous, Daily  heparin (porcine) injection 5,000 Units, 5,000 Units, SubCUTAneous, BID  famotidine (PEPCID) tablet 20 mg, 20 mg, Oral, Daily  oxymetazoline (AFRIN) 0.05 % nasal spray 2 spray, 2 spray, Each Nostril, Q6H  clindamycin (CLEOCIN) IVPB 300 mg, 300 mg, IntraVENous, Q8H  levoFLOXacin (LEVAQUIN) 750 MG/150ML infusion 750 mg, 750 mg, IntraVENous, Q24H  0.9 % sodium chloride infusion, , IntraVENous, Continuous  morphine (PF) injection 1 mg, 1 mg, IntraVENous, Q4H PRN  insulin lispro (HUMALOG) injection vial 0-12 Units, 0-12 Units, SubCUTAneous, TID WC  insulin lispro (HUMALOG) injection vial 0-6 Units, 0-6 Units, SubCUTAneous, Nightly  glucagon (rDNA) injection 1 mg, 1 mg, IntraMUSCular, PRN  dextrose 5 % solution, 100 mL/hr, IntraVENous, PRN  glucose chewable tablet 4 each, 4 tablet, Oral, PRN  dextrose bolus (hypoglycemia) 10% 125 mL, 125 mL, IntraVENous, PRN **OR** dextrose bolus (hypoglycemia) 10% 250 mL, 250 mL, IntraVENous, PRN  [Held by provider] acyclovir (ZOVIRAX) 315 mg in dextrose 5 % 50 mL IVPB, 5 mg/kg, IntraVENous, Q8H  insulin lispro (HUMALOG) injection vial 8 Units, 8 Units, SubCUTAneous, TID AC  aspirin EC tablet 81 mg, 81 mg, Oral, Daily  atorvastatin (LIPITOR) tablet 20 mg, 20 mg, Oral, Daily  fludrocortisone (FLORINEF) tablet 0.2 mg, 0.2 mg, Oral, Daily  gabapentin (NEURONTIN) capsule 300 mg, 300 mg, Oral, TID  glipiZIDE (GLUCOTROL) tablet 10 mg, 10 mg, Oral, BID AC  insulin glargine (LANTUS) injection vial 35 Units, 35 Units, SubCUTAneous, Nightly  metoprolol tartrate (LOPRESSOR) tablet 25 mg, 25 mg, Oral, BID  midodrine (PROAMATINE) tablet 10 mg, 10 mg, Oral, TID WC  [Held by provider] pantoprazole (PROTONIX) injection 40 mg, 40 mg, IntraVENous, Daily  sodium chloride flush 0.9 % injection 5-40 mL, 5-40 mL, IntraVENous, 2 times per day  sodium chloride flush 0.9 % injection 5-40 mL, 5-40 mL, IntraVENous, PRN  0.9 % sodium chloride infusion, , IntraVENous, PRN  ondansetron (ZOFRAN-ODT) disintegrating tablet 4 mg, 4 mg, Oral, Q8H PRN **OR** ondansetron (ZOFRAN) injection 4 mg, 4 mg, IntraVENous, Q6H PRN  polyethylene glycol (GLYCOLAX) packet 17 g, 17 g, Oral, Daily PRN  acetaminophen (TYLENOL) tablet 650 mg, 650 mg, Oral, Q6H PRN **OR** acetaminophen (TYLENOL) suppository 650 mg, 650 mg, Rectal, Q6H PRN  potassium chloride (KLOR-CON M) extended release tablet 40 mEq, 40 mEq, Oral, PRN **OR** potassium bicarb-citric acid (EFFER-K) effervescent tablet 40 mEq, 40 mEq, Oral, PRN **OR** potassium chloride 10 mEq/100 mL IVPB (Peripheral Line), 10 mEq, IntraVENous, PRN  magnesium sulfate 2000 mg in 50 mL IVPB premix, 2,000 mg, IntraVENous, PRN    ASSESSMENT AND PLAN    Right glottic mass concerning for persistent SCC tumor; Chronic pansinusitis with purulent sinus drainage; Foul smelling productive cough; Herpes zoster left neck and auricle  - Okay for discharge from ENT standpoint on clindamycin 300mg TID x10 days and valacyclovir 500mg BID for 7 days (CrCl 53.96 based on today's Cr 1.3). Continue Afrin for 3 days, inclusive of today. Discharge med rec completed for these medications. - Encourage oral fluids  - PET/CT outpatient with ENT follow up as soon as possible. Will likely need biopsy of right glottic mass. Patient will initially follow up with Dr Isai Chen. If biopsy-proven persistent SCC, then Dr Wendy Morelos willing to discuss laryngectomy with patient. Patient is free to choose tertiary care with Dr Ranjit Cuba and Dr Walter Hill if he wishes; he indicated that it would be better if he could receive care locally. PET orders placed as outpatient and ENT office will arrange. Management of patient's care was collaborated with Dr Jodie Kaur today.       Electronically signed by KATHRYN Gayle CNP on 4/11/2022 at 1:25 PM

## 2022-04-11 NOTE — PROGRESS NOTES
Physician Progress Note      PATIENT:               Shiloh Ngo  CSN #:                  [id number]  :                       1960  ADMIT DATE:       2022 5:05 PM  [address] [place] DATE:  RESPONDING  PROVIDER #:        Rebecca Hung MD          QUERY TEXT:    Pt admitted with possible recurrent cancer of larynx and noted documentation   of severe malnutrition in dietician assessment note. If possible, please   document in progress notes and discharge summary:    The medical record reflects the following:  Risk Factors: chronic illness  Clinical Indicators: ASPEN criteria met for severe malnutrition: Energy   Intake:  Mild decrease in energy intake (Comment)  Weight Loss:  7 - 20% over 1 year (-26.6% in 7 months)  Body Fat Loss:  7 - Severe body fat loss Orbital  Muscle Mass Loss:  7 - Severe muscle mass loss Clavicles (pectoralis &   deltoids),Thigh (quadraceps),Calf (gastrocnemius)  Treatment: Dietician consult, once diet advanced, will send Ensure/Boost TID,   declines magic cups, yogurt TID  Options provided:  -- Severe malnutrition confirmed present on admission  -- Severe malnutrition confirmed not present on admission  -- Severe malnutrition ruled out  -- Other - I will add my own diagnosis  -- Disagree - Not applicable / Not valid  -- Disagree - Clinically unable to determine / Unknown  -- Refer to Clinical Documentation Reviewer    PROVIDER RESPONSE TEXT:    The diagnosis of severe malnutrition was confirmed as present on admission.     Query created by: Dajuan Manzanares on 2022 10:55 AM      Electronically signed by:  Rebecca Hung MD 2022   11:01 AM

## 2022-04-11 NOTE — PROGRESS NOTES
Student nurse notified this RN of blood sugar at 67. Patient denies cold, clammy or dizzy. Orange juice given. Will recheck soon. 1635- blood sugar 69. Orange juice given. 1650- BS 87. Will continue to monitor.

## 2022-04-11 NOTE — CARE COORDINATION
04/11/22 1508   Readmission Assessment   Number of Days since last admission? 8-30 days   Previous Disposition Home with Family   Who is being Interviewed Patient   What was the patient's/caregiver's perception as to why they think they needed to return back to the hospital?   (ENT follow-up from ER visit. Direct admit from office.)   Did you visit your Primary Care Physician after you left the hospital, before you returned this time? No   Why weren't you able to visit your PCP? Did not have an appointment   Did you see a specialist, such as Cardiac, Pulmonary, Orthopedic Physician, etc. after you left the hospital? Yes   Who advised the patient to return to the hospital? Physician   Does the patient report anything that got in the way of taking their medications? No   In our efforts to provide the best possible care to you and others like you, can you think of anything that we could have done to help you after you left the hospital the first time, so that you might not have needed to return so soon? Other (Comment)  Hardy Better plans to return home at discharge.  He denies needs for services of DME.)

## 2022-04-11 NOTE — CARE COORDINATION
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 Pt reports their home is \"caving in\" and needs to be fixed before they could get back in the home. Pt reports they have 2 children, daughter lives locally. Pt reports he uses propane heat and wife uses electric. Pt reports they do not have running water, they uses \"jugs\" for themselves, the dogs and chickens. Pt reports they use buckets for toileting, they shower at daughter's home. Pt reports he and wife have been living this way for about 6 months  Current Services including food security, transportation and housekeeping: reports he has been working at [employer] up until a month ago, reports he is able to drive himself. Current Equipment:none  Payment Source:  Concerns or Barriers to Discharge: none  Post acute provider list with quality measures, geographic area and applicable managed care information provided. Questions regarding selection process answered:declines    Teach Back Method used with Harpal Ron regarding care plan and needs  Patient verbalize understanding of the plan of care and contribute to goal setting. Patient goals, treatment preferences and discharge plan: Pt plans to return to living in his truck at discharge, reports his family will provided transportation home    Pt has been living in the area for 6 years, however has not gotten connected with the [facility]. SAW did provide information on contacting them. SAW did try contacting SW there as well, SW not in today. Pt did discuss his monthly income, would be over income for Medicaid. SAW did verify qualification for Medicaid with Sue Montoya in [employer]  Pt reports he has been taking protein shakes, reports they \"can't get the good stuff\" meal wise due to finances. SAW did provide pt with resources on [address], Find a Ride, Area Agency on Aging, [employer] for [employer], Meals on [address] did call [facility] with Outpatient Oncology patient navigation, she is checking into getting coupons for proteins shakes, SAW did update pt on this.      Electronically signed by JUN Khan on 4/11/2022 at 2:14 PM

## 2022-04-11 NOTE — PROGRESS NOTES
Renal Progress Note    Assessment and Plan:   1. Acute kidney injury much improved  2. Diabetes mellitus type 2  3. Primary hypertension  4. 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 Normocytic anemia  6. Cutaneous herpes zoster  7. Deconditioning    PLAN:  1. Labs reviewed. 2. Result discussed with the patient  3. Serum creatinine is much improved  4. Medications reviewed  5. Continue intravenous fluid  6. Labs in the morning  7.  We will follow    Patient Active Problem List:     Diabetes mellitus type 2 in nonobese (HCC)     GERD (gastroesophageal reflux disease)     Hyperlipidemia     Cholecystitis, acute     Rib pain on right side     Cardiac syndrome X (HCC)     Angioedema     HTN (hypertension)     Diabetes mellitus type II, uncontrolled (HCC)     Primary squamous cell carcinoma of supraglottis (HCC)     Dehydration     Orthostatic hypotension     Severe malnutrition (HCC)     Syncope and collapse     Recurrent squamous cell carcinoma of larynx (HCC)     Status post chemoradiation     Herpes zoster without complication, left neck, severe     Dyspnea      Subjective:   Admit Date: 4/8/2022    Interval History:   Seen for acute kidney injury  Awake and alert  Feels better with no complaint  No issues from staff  Blood pressure is mostly good  Urine output is not completely documented      Medications:   Scheduled Meds:   dexamethasone  6 mg IntraVENous Daily    heparin (porcine)  5,000 Units SubCUTAneous BID    famotidine  20 mg Oral Daily    oxymetazoline  2 spray Each Nostril Q6H    clindamycin (CLEOCIN) IV  300 mg IntraVENous Q8H    levofloxacin  750 mg IntraVENous Q24H    insulin lispro  0-12 Units SubCUTAneous TID WC    insulin lispro  0-6 Units SubCUTAneous Nightly    [Held by provider] acyclovir  5 mg/kg IntraVENous Q8H    insulin lispro  8 Units SubCUTAneous TID AC    aspirin  81 mg Oral Daily    atorvastatin  20 mg Oral Daily    fludrocortisone  0.2 mg Oral Daily    gabapentin 300 mg Oral TID    glipiZIDE  10 mg Oral BID AC    insulin glargine  35 Units SubCUTAneous Nightly    metoprolol tartrate  25 mg Oral BID    midodrine  10 mg Oral TID WC    [Held by provider] pantoprazole  40 mg IntraVENous Daily    sodium chloride flush  5-40 mL IntraVENous 2 times per day     Continuous Infusions:   sodium chloride 125 mL/hr at 04/10/22 2335    dextrose      sodium chloride Stopped (04/10/22 0155)       CBC:   Recent Labs     04/09/22  0442 04/11/22  0534   WBC 5.4 8.0   HGB 10.9* 10.1*    283     CMP:    Recent Labs     04/09/22  0442 04/10/22  0817 04/11/22  0534   * 137 138   K 4.2 3.8 4.0   CL 96* 99 104   CO2 27 25 24   BUN 18 31* 30*   CREATININE 1.5* 2.3* 1.3*   GLUCOSE 211* 125* 193*   CALCIUM 9.1 9.1 8.7   LABGLOM 47* 29* 56*     Troponin: No results for input(s): TROPONINI in the last 72 hours. BNP: No results for input(s): BNP in the last 72 hours. INR: No results for input(s): INR in the last 72 hours. Lipids: No results for input(s): CHOL, LDLDIRECT, TRIG, HDL, AMYLASE, LIPASE in the last 72 hours. Liver: No results for input(s): AST, ALT, ALKPHOS, PROT, LABALBU, BILITOT in the last 72 hours. Invalid input(s): BILDIR  Iron:  No results for input(s): IRONS, FERRITIN in the last 72 hours. Invalid input(s): LABIRONS  US RENAL COMPLETE   Final Result   Both kidneys are somewhat of a \"swollen\" appearance with what appears to be marked thickening of the renal parenchyma bilaterally, suggesting diffuse edema in the kidneys or possibly bilateral pyelonephritis. Clinical correlation recommended. **This report has been created using voice recognition software. It may contain minor errors which are inherent in voice recognition technology. **      Final report electronically signed by Dr. Yanet Xiong on 4/10/2022 2:24 PM            Objective:   Vitals: BP (!) 140/69   Pulse 68   Temp 97.5 °F (36.4 °C) (Oral)   Resp 16   Ht 5' 11\" (1.803 m)   Wt 146 lb 8 oz (66.5 kg)   SpO2 97%   BMI 20.43 kg/m²    Wt Readings from Last 3 Encounters:   04/11/22 146 lb 8 oz (66.5 kg)   04/08/22 149 lb (67.6 kg)   04/08/22 149 lb (67.6 kg)      24HR INTAKE/OUTPUT:      Intake/Output Summary (Last 24 hours) at 4/11/2022 1156  Last data filed at 4/11/2022 8548  Gross per 24 hour   Intake 240 ml   Output 2350 ml   Net -2110 ml       Constitutional: Well-developed middle-age gentleman alert, awake, no apparent distress   Skin: Maculopapular rash cervical dermatome noted  HEENT:Pupils are reactive . Throat is clear . Oral mucosa is moist   Neck:supple with no thyromegaly or carotid bruit  Cardiovascular:  S1, S2 without murmur or rubs   Respiratory:  Clear to ausculation without wheezes, rhonchi or rales. Abdomen: +bs, soft, no tenderness to palpation and no palpable mass. No abdominal bruit   Ext: No LE edema  Musculoskeletal:Intact  Neuro:Alert and awake. Well oriented  with no focal deficit. Speech is normal      Electronically signed by Carleen Tristan MD on 4/11/2022 at 11:56 AM  **This report has been created using voice recognition software. It maycontain minor  errors which are inherent in voice recognition technology. **

## 2022-04-11 NOTE — PROGRESS NOTES
Hospitalist Progress Note    Patient:  Kassie Vale      Unit/Bed:5K-20/020-A    YOB: 1960    MRN: [id number]       Acct: [de-identified]     PCP: KATHRYN Lees - CNP    Date of Admission: 4/8/2022    Assessment/Plan:    1. Recurrent squamous cell carcinoma of larynx: The patient was previously on chemotherapy for this and is still on radiation; the patient is followed by Dr. Leno De La Torre who advised the patient to come to the hospital after the patient was complaining of cough with yellow sputum and after finding concerning findings on laryngoscopy; oncology was consulted by the admitting team but no note currently present from oncology  -ENT consulted; appreciate recs  -OK to discharge from ENT standpoint on clindamycin 300 mg TID for 10 days and valacyclovir 500 mg BID for 7 days  -Currently on Afrin for 3 days  -Encouragement of oral fluids  -Plan for PET/CT outpatient with ENT and biopsy of right glottic mass  -Will likely need laryngectomy outpatient depending on biopsy results  2. JADIEL likely secondary to acyclovir use: The patient was getting IV acyclovir since admission for new onset herpes zoster which caused creatinine to bump up to 2.3; patient was then given IV fluids which ended up resolving the creatinine to 1.3; renal ultrasound ordered showed marked thickening of the renal parenchyma bilaterally; urine lites within normal limits  -Nephrology consulted; appreciate recs  -Will repeat a BMP tomorrow 4/12/22  3. Sinusitis:   -Continue Afrin  4. Herpes Zoster: This is present in the CNV V3 distribution on the left  -Transitioned from IV acyclovir to PO acyclovir; will receive valacyclovir on discharge per ENT  -Also currently on clindamycin for anaerobic infection coverage  -Continue IV dexamethasone 6 mg  5. Type 2 Diabetes Mellitus:   -Patient is on Lantus, sliding scale, and premeal insulin  -Insulin dose increased due to dexamethasone use  6.  Severe malnutrition present on admission: This is likely secondary to poor oral intake  -Patient did pass swallow evaluation  -Currently on a regular; mildly thick diet        Expected discharge date:  1-2 days    Disposition:    [x] Home       [] TCU       [] Rehab       [] Psych       [] SNF       [] [facility]       [] Other-    Chief Complaint: dyspnea    Hospital Course: This is a 57year old male PMH squamous cell of carcinoma currently on radiation and followed by Dr. Zachariah Aburto, type 2 diabetes mellitus, who is coming in as an admission from the ENT office. The patient has a history of recurrent laryngeal cancer which is managed and followed by oncology and Dr. Zachariah Aburto from ENT. The patient was reported to have increased shortness of breath and yellow sputum and no fevers and was told to come to the ER. He was also noted to have a significant rash on the left side of his neck extending to his left ear. The patient also had a laryngoscopy done by Dr. Zachariah Aburto recently that showed concerning findings as well. It was recommended that the patient be admitted and treated for sinusitis and herpes zoster per Dr. Zachariah Aburto. The patient was started on IV acyclovir treatment which was held on 4/10/22 due to JADIEL with creatinine of 2.3. Nephrology ws consulted due to this JADIEL. The creatinine was eventually relieved with IV fluids. The patient had a renal ultrasound that showed marked thickening of the renal parenchyma bilaterally with diffuse edema. Creatinine ended up improving to 1.3 on 4/11/22. Subjective (past 24 hours): The patient does not report any symptoms at this time and wants to go home. ROS (12 point review of systems completed. Pertinent positives noted. Otherwise ROS is negative).     Medications:  Reviewed    Infusion Medications    sodium chloride 125 mL/hr at 04/10/22 0495    dextrose      sodium chloride Stopped (04/10/22 0155)     Scheduled Medications    dexamethasone  6 mg IntraVENous Daily    acyclovir 200 mg Oral 5x Daily    heparin (porcine)  5,000 Units SubCUTAneous BID    famotidine  20 mg Oral Daily    oxymetazoline  2 spray Each Nostril Q6H    clindamycin (CLEOCIN) IV  300 mg IntraVENous Q8H    levofloxacin  750 mg IntraVENous Q24H    insulin lispro  0-12 Units SubCUTAneous TID WC    insulin lispro  0-6 Units SubCUTAneous Nightly    insulin lispro  8 Units SubCUTAneous TID AC    aspirin  81 mg Oral Daily    atorvastatin  20 mg Oral Daily    fludrocortisone  0.2 mg Oral Daily    gabapentin  300 mg Oral TID    glipiZIDE  10 mg Oral BID AC    insulin glargine  35 Units SubCUTAneous Nightly    metoprolol tartrate  25 mg Oral BID    midodrine  10 mg Oral TID WC    [Held by provider] pantoprazole  40 mg IntraVENous Daily    sodium chloride flush  5-40 mL IntraVENous 2 times per day     PRN Meds: morphine, glucagon (rDNA), dextrose, glucose, dextrose bolus (hypoglycemia) **OR** dextrose bolus (hypoglycemia), sodium chloride flush, sodium chloride, ondansetron **OR** ondansetron, polyethylene glycol, acetaminophen **OR** acetaminophen, potassium chloride **OR** potassium alternative oral replacement **OR** potassium chloride, magnesium sulfate      Intake/Output Summary (Last 24 hours) at 4/11/2022 1411  Last data filed at 4/11/2022 1348  Gross per 24 hour   Intake 450 ml   Output 2275 ml   Net -1825 ml       Diet:  ADULT DIET; Regular; Mildly Thick (Nectar)  ADULT ORAL NUTRITION SUPPLEMENT; Breakfast, Lunch, Dinner; Other Oral Supplement; Yogurt TID    Exam:  BP (!) 140/69   Pulse 68   Temp 97.5 °F (36.4 °C) (Oral)   Resp 16   Ht 5' 11\" (1.803 m)   Wt 146 lb 8 oz (66.5 kg)   SpO2 97%   BMI 20.43 kg/m²     General appearance: Thin-appearing but no acute distress  HEENT: Pupils equal, round, and reactive to light. Conjunctivae/corneas clear. Neck: Supple, with full range of motion. No jugular venous distention. Trachea midline. Respiratory:  Normal respiratory effort.  Clear to auscultation, bilaterally without Rales/Wheezes/Rhonchi. Cardiovascular: Regular rate and rhythm with normal S1/S2 without murmurs, rubs or gallops. Abdomen: Soft, non-tender, non-distended with normal bowel sounds. Musculoskeletal: passive and active ROM x 4 extremities. Skin: Warm and dry with visible red vesicular rash noted on the left neck extending from the midline to including the left ear  Neurologic:  Neurovascularly intact without any focal sensory/motor deficits. Cranial nerves: II-XII intact, grossly non-focal.  Psychiatric: Alert and oriented, thought content appropriate, normal insight  Capillary Refill: Brisk,< 3 seconds   Peripheral Pulses: +2 palpable, equal bilaterally       Labs:   Recent Labs     04/09/22  0442 04/11/22  0534   WBC 5.4 8.0   HGB 10.9* 10.1*   HCT 35.1* 32.3*    283     Recent Labs     04/09/22  0442 04/10/22  0817 04/11/22  0534   * 137 138   K 4.2 3.8 4.0   CL 96* 99 104   CO2 27 25 24   BUN 18 31* 30*   CREATININE 1.5* 2.3* 1.3*   CALCIUM 9.1 9.1 8.7     No results for input(s): AST, ALT, BILIDIR, BILITOT, ALKPHOS in the last 72 hours. No results for input(s): INR in the last 72 hours. No results for input(s): Jadene Moh in the last 72 hours. Microbiology:      Urinalysis:      Lab Results   Component Value Date    NITRU NEGATIVE 05/28/2015    WBCUA 0-2 05/28/2015    BACTERIA NONE 05/28/2015    RBCUA 0-2 05/28/2015    BLOODU NEGATIVE 05/28/2015    GLUCOSEU >= 1000 05/28/2015       Radiology:  US RENAL COMPLETE   Final Result   Both kidneys are somewhat of a \"swollen\" appearance with what appears to be marked thickening of the renal parenchyma bilaterally, suggesting diffuse edema in the kidneys or possibly bilateral pyelonephritis. Clinical correlation recommended. **This report has been created using voice recognition software. It may contain minor errors which are inherent in voice recognition technology. **      Final report electronically signed by Dr. Zach Teixeira on 4/10/2022 2:24 PM          DVT prophylaxis: [] Lovenox                                 [x] SCDs                                 [] SQ Heparin                                 [] Encourage ambulation           [] Already on Anticoagulation     Code Status: Full Code    PT/OT Eval Status: none    Tele:   [x] yes             [] no    Electronically signed by Marsha Matos DO on 4/11/2022 at 2:11 PM

## 2022-04-11 NOTE — PROGRESS NOTES
Type and Reason for Visit:    Initial,Positive Nutrition Screen (Swallowing difficulty w/ food, Weight loss)     Nutrition Recommendations/Plan:   Recommend diet as per SLP. Will send yogurt TID per pt. Request.  Declines Magic cups as dislikes. Drinks Ensure/Boost TID pta (but u/a to send at present d/t thickened liquids). Encouraged po, ONS - small, frequent meals at best efforts. Consider medication adjustments for blood sugar control instead of diet restrictions d/t limited po intake. Nutrition Assessment:      Pt. severely malnourished AEB criteria listed below. At risk for further nutritional compromise r/t catabolic illness, difficulty swallowing - laryngeal cancer and underlying medical condition (hx GERD, DM, layrngeal cancer, chemotherapy). Malnutrition Assessment:  Malnutrition Status:   Severe malnutrition    Context:    Chronic Illness  Findings of the 6 clinical characteristics of malnutrition:  Energy Intake:  Mild decrease in energy intake (Comment)  Weight Loss:  7 - 20% over 1 year (-26.6% in 7 months)     Body Fat Loss:  7 - Severe body fat loss Orbital   Muscle Mass Loss:  7 - Severe muscle mass loss Clavicles (pectoralis & deltoids),Thigh (quadraceps),Calf (gastrocnemius)  Fluid Accumulation:  Unable to assess     Strength:  Not Performed       Estimated Daily Nutrient Needs:  Energy (kcal):  2396-3510 kcals (30-32); Weight Used for Energy Requirements:   (63 kgm)     Protein (g):  76-88 gm (1.2-1.4); Weight Used for Protein Requirements:   (63 kgm)       Nutrition Related Findings:      Treatments/Miscellaneous: pt. Seen - appears very thin, frail; reports some trouble with swallowing d/t laryngeal cancer; s/p SLP evaluation - recommend regular diet, mildly thickened liquids, head turn to the right; pt.  Reports decreased intake; states consumes maybe 2 meals/day; reports drinking 3 Boost/Ensure daily; admits to being on fixed income and doesn't eat as much d/t food costs; aware that soup vera, food pantries available but has trouble getting there - d/w ; pt. States blood sugars are running higher in hospital than pta  GI Status: no BM noted  Pertinent Labs: 4/11: Glucose 193, BUN 30, Cr 1.3  Pertinent Meds: Decadron, Insulin, ATB, Glycolax, Zofran      Wounds:    None     Current Nutrition Therapies:    ADULT DIET; Regular; Mildly Thick (Nectar)  ADULT ORAL NUTRITION SUPPLEMENT; Breakfast, Lunch, Dinner; Other Oral Supplement; Yogurt TID     Anthropometric Measures:  · Height:    5' 11\" (180.3 cm)  · Current Body Weight:   139 lb 1.6 oz (63.1 kg) (4/8 no edema)  · Admission Body Weight:    139 lb 1.6 oz (63.1 kg) (4/8 no edema)  · Usual Body Weight:      (per pt. weighed 160# when received cancer treatments; per EMR: 9/29/21: 189# 9.6 oz, 11/24/21: 178# 2.1 oz, 12/30/21: 153# 7 oz)  · Ideal Body Weight:     172 lbs   · BMI:   19.4  · BMI Categories:    Normal Weight (BMI 18.5-24. 9)     Nutrition Diagnosis:   · Severe malnutrition  related to  catabolic illness,inadequate protein-energy intake  as evidenced by   weight loss,severe loss of subcutaneous fat,severe muscle loss        Nutrition Interventions:   Food and/or Nutrient Delivery:    Continue Current Diet,Start Oral Nutrition Supplement  Nutrition Education/Counseling:    Education initiated (4/11 Encouraged po, ONS at best efforts. Encouraged small, frequent meals.)  Coordination of Nutrition Care:   Continue to monitor while inpatient     Goals:  Pt. will tolerate and consume 75% or more of meals and experience no signficant weight loss during LOS.      Nutrition Monitoring and Evaluation:   Food/Nutrient Intake Outcomes:    Diet Advancement/Tolerance,Food and Nutrient Intake,Supplement Intake  Physical Signs/Symptoms Outcomes:    Biochemical Data,Chewing or Swallowing,GI Status,Fluid Status or Edema,Nutrition Focused Physical Findings,Skin,Weight     Discharge Planning:    RD following     Ephriam Bud Toni Miramontes RD, LD:    Contact Number: [phone]

## 2022-04-11 NOTE — CARE COORDINATION
4/11/22, 2:21 PM EDT  DISCHARGE PLANNING EVALUATION:    Perla Jones       Admitted: 4/8/2022/ [address] day: 3   Location: [facility]20/020-A Reason for admit: Laryngeal cancer ([facility] [address] [C32.9]  Dyspnea [R06.00]  Herpes zoster without complication [K57.6]   PMH:  has a past medical history of Arthritis, GERD (gastroesophageal reflux disease), Hypertension, Keratinizing squamous cell carcinoma of larynx ([facility] [address] and Type II or unspecified type diabetes mellitus without mention of complication, not stated as uncontrolled. Procedure: N/A  Barriers to Discharge:  Patient presented to ER from ENT's office. Gilbert Silver has a history of squamous cell carcinoma of the Larynx. He follows at [facility] for chemotherapy and locally for radiation. Creatinine 1.3. Consults to ENT, Nephrology, and Oncology. IV fluids, oral Zovirax, oral Levaquin, IV Cleocin q 8 hr., IV Decadron daily, Heparin subq, DM management, Afrin q 6 hr. X 3 days, prn medications and Zofran, Magnesium and Potassium replacement protocol, daily BMP, regular diet, mildly thick liquids, telemetry, up with assistance. PCP: KATHRYN Oquendo CNP  Readmission Risk Score: 22.3 ( )%    Patient Goals/Plan/Treatment Preferences: Met with Gilbert Silver. He resides at home with his wife. Their living conditions don't appear to be the best, however they do manage and they choose to live that way. Gilbert Silver verifies his  coverage. He is able to afford his medications and denies a need for DME. He will have transportation to home at discharge. Gilbert Silver and his wife are managing their health care needs independently. SS following for community resources. Transportation/Food Security/Housekeeping Addressed:  No issues identified.

## 2022-04-11 NOTE — FLOWSHEET NOTE
[place] [address] PROGRESS NOTE      Patient: Kassie Vale  Room #: 1P-57/829-P            YOB: 1960  Age: 57 y.o. Gender: male            Admit Date & Time: 4/8/2022  5:05 PM    Assessment:  Lewis Zepeda is a 57year old male who is being cared for on . At the time of my visit, Lewis Zepeda was sitting up in bed and eating. His voice is weak. He told me that he has cancer in or near his throat that he will need to have a surgery for. Lewis Zepeda is hopeful and shared about his [employer] kenan and how he wants to be with Michael - and at the same time he is praying for healing. Interventions:  I said a prayer for healing with joni. Outcomes:  Lewis Zepeda expressed gratitude for the prayer and invited future  visits. Plan:    1. Continued spiritual support through prayer and reflecting with Lewis Zepeda about his kenan.     Electronically signed by Phoebe Poole on 4/11/2022 at 5:59 PM.  Joe Seals  [phone]

## 2022-04-12 VITALS
BODY MASS INDEX: 20.51 KG/M2 | RESPIRATION RATE: 18 BRPM | DIASTOLIC BLOOD PRESSURE: 78 MMHG | SYSTOLIC BLOOD PRESSURE: 138 MMHG | OXYGEN SATURATION: 98 % | HEART RATE: 58 BPM | HEIGHT: 71 IN | TEMPERATURE: 98.4 F | WEIGHT: 146.5 LBS

## 2022-04-12 LAB
ANION GAP SERPL CALCULATED.3IONS-SCNC: 8 MEQ/L (ref 8–16)
BUN BLDV-MCNC: 20 MG/DL (ref 7–22)
CALCIUM SERPL-MCNC: 8.5 MG/DL (ref 8.5–10.5)
CHLORIDE BLD-SCNC: 105 MEQ/L (ref 98–111)
CO2: 26 MEQ/L (ref 23–33)
CREAT SERPL-MCNC: 0.8 MG/DL (ref 0.4–1.2)
EKG ATRIAL RATE: 48 BPM
EKG P AXIS: 65 DEGREES
EKG P-R INTERVAL: 176 MS
EKG Q-T INTERVAL: 500 MS
EKG QRS DURATION: 112 MS
EKG QTC CALCULATION (BAZETT): 446 MS
EKG R AXIS: 70 DEGREES
EKG T AXIS: 60 DEGREES
EKG VENTRICULAR RATE: 48 BPM
GFR SERPL CREATININE-BSD FRML MDRD: > 90 ML/MIN/1.73M2
GLUCOSE BLD-MCNC: 173 MG/DL (ref 70–108)
GLUCOSE BLD-MCNC: 182 MG/DL (ref 70–108)
MAGNESIUM: 1.8 MG/DL (ref 1.6–2.4)
POTASSIUM REFLEX MAGNESIUM: 3.4 MEQ/L (ref 3.5–5.2)
POTASSIUM SERPL-SCNC: 3.3 MEQ/L (ref 3.5–5.2)
POTASSIUM SERPL-SCNC: 3.4 MEQ/L (ref 3.5–5.2)
SODIUM BLD-SCNC: 139 MEQ/L (ref 135–145)

## 2022-04-12 PROCEDURE — 36415 COLL VENOUS BLD VENIPUNCTURE: CPT

## 2022-04-12 PROCEDURE — 6370000000 HC RX 637 (ALT 250 FOR IP): Performed by: INTERNAL MEDICINE

## 2022-04-12 PROCEDURE — 84132 ASSAY OF SERUM POTASSIUM: CPT

## 2022-04-12 PROCEDURE — 6360000002 HC RX W HCPCS: Performed by: NURSE PRACTITIONER

## 2022-04-12 PROCEDURE — 99239 HOSP IP/OBS DSCHRG MGMT >30: CPT | Performed by: INTERNAL MEDICINE

## 2022-04-12 PROCEDURE — 80048 BASIC METABOLIC PNL TOTAL CA: CPT

## 2022-04-12 PROCEDURE — 82948 REAGENT STRIP/BLOOD GLUCOSE: CPT

## 2022-04-12 PROCEDURE — 6370000000 HC RX 637 (ALT 250 FOR IP): Performed by: PHYSICIAN ASSISTANT

## 2022-04-12 PROCEDURE — 6370000000 HC RX 637 (ALT 250 FOR IP): Performed by: STUDENT IN AN ORGANIZED HEALTH CARE EDUCATION/TRAINING PROGRAM

## 2022-04-12 PROCEDURE — 6370000000 HC RX 637 (ALT 250 FOR IP): Performed by: NURSE PRACTITIONER

## 2022-04-12 PROCEDURE — 6360000002 HC RX W HCPCS: Performed by: INTERNAL MEDICINE

## 2022-04-12 PROCEDURE — 2500000003 HC RX 250 WO HCPCS: Performed by: INTERNAL MEDICINE

## 2022-04-12 PROCEDURE — 99232 SBSQ HOSP IP/OBS MODERATE 35: CPT | Performed by: NURSE PRACTITIONER

## 2022-04-12 PROCEDURE — 83735 ASSAY OF MAGNESIUM: CPT

## 2022-04-12 PROCEDURE — 6360000002 HC RX W HCPCS: Performed by: PHYSICIAN ASSISTANT

## 2022-04-12 PROCEDURE — 93005 ELECTROCARDIOGRAM TRACING: CPT | Performed by: INTERNAL MEDICINE

## 2022-04-12 PROCEDURE — 93010 ELECTROCARDIOGRAM REPORT: CPT | Performed by: INTERNAL MEDICINE

## 2022-04-12 PROCEDURE — 6370000000 HC RX 637 (ALT 250 FOR IP)

## 2022-04-12 RX ORDER — POTASSIUM CHLORIDE 20 MEQ/1
20 TABLET, EXTENDED RELEASE ORAL ONCE
Status: DISCONTINUED | OUTPATIENT
Start: 2022-04-12 | End: 2022-04-12

## 2022-04-12 RX ORDER — POTASSIUM CHLORIDE 20 MEQ/1
40 TABLET, EXTENDED RELEASE ORAL ONCE
Status: COMPLETED | OUTPATIENT
Start: 2022-04-12 | End: 2022-04-12

## 2022-04-12 RX ORDER — POTASSIUM CHLORIDE 20 MEQ/1
20 TABLET, EXTENDED RELEASE ORAL 2 TIMES DAILY
Qty: 6 TABLET | Refills: 0 | Status: SHIPPED | OUTPATIENT
Start: 2022-04-12 | End: 2022-04-15

## 2022-04-12 RX ORDER — POTASSIUM CHLORIDE 20 MEQ/1
20 TABLET, EXTENDED RELEASE ORAL DAILY
Qty: 3 TABLET | Refills: 0 | Status: SHIPPED | OUTPATIENT
Start: 2022-04-13 | End: 2022-04-12 | Stop reason: SDUPTHER

## 2022-04-12 RX ORDER — MIDODRINE HYDROCHLORIDE 5 MG/1
5 TABLET ORAL
Qty: 90 TABLET | Refills: 2 | Status: SHIPPED | OUTPATIENT
Start: 2022-04-12

## 2022-04-12 RX ORDER — POTASSIUM CHLORIDE 750 MG/1
10 TABLET, EXTENDED RELEASE ORAL DAILY
Qty: 3 TABLET | Refills: 0 | Status: CANCELLED | OUTPATIENT
Start: 2022-04-12

## 2022-04-12 RX ORDER — MIDODRINE HYDROCHLORIDE 5 MG/1
5 TABLET ORAL
Status: DISCONTINUED | OUTPATIENT
Start: 2022-04-12 | End: 2022-04-12 | Stop reason: HOSPADM

## 2022-04-12 RX ADMIN — ASPIRIN 81 MG: 81 TABLET, COATED ORAL at 08:31

## 2022-04-12 RX ADMIN — ACYCLOVIR 200 MG: 200 CAPSULE ORAL at 00:07

## 2022-04-12 RX ADMIN — HEPARIN SODIUM 5000 UNITS: 5000 INJECTION INTRAVENOUS; SUBCUTANEOUS at 08:31

## 2022-04-12 RX ADMIN — FLUDROCORTISONE ACETATE 0.2 MG: 0.1 TABLET ORAL at 08:31

## 2022-04-12 RX ADMIN — ACYCLOVIR 200 MG: 200 CAPSULE ORAL at 06:34

## 2022-04-12 RX ADMIN — METOPROLOL TARTRATE 25 MG: 25 TABLET, FILM COATED ORAL at 08:31

## 2022-04-12 RX ADMIN — FAMOTIDINE 20 MG: 20 TABLET ORAL at 08:31

## 2022-04-12 RX ADMIN — GABAPENTIN 300 MG: 300 CAPSULE ORAL at 08:31

## 2022-04-12 RX ADMIN — MORPHINE SULFATE 1 MG: 2 INJECTION, SOLUTION INTRAMUSCULAR; INTRAVENOUS at 00:06

## 2022-04-12 RX ADMIN — POTASSIUM CHLORIDE 40 MEQ: 20 TABLET, EXTENDED RELEASE ORAL at 08:31

## 2022-04-12 RX ADMIN — LEVOFLOXACIN 750 MG: 5 INJECTION, SOLUTION INTRAVENOUS at 06:33

## 2022-04-12 RX ADMIN — ATORVASTATIN CALCIUM 20 MG: 20 TABLET, FILM COATED ORAL at 08:31

## 2022-04-12 RX ADMIN — DEXAMETHASONE SODIUM PHOSPHATE 6 MG: 4 INJECTION, SOLUTION INTRA-ARTICULAR; INTRALESIONAL; INTRAMUSCULAR; INTRAVENOUS; SOFT TISSUE at 08:30

## 2022-04-12 RX ADMIN — GLIPIZIDE 10 MG: 10 TABLET ORAL at 08:31

## 2022-04-12 RX ADMIN — CLINDAMYCIN PHOSPHATE 300 MG: 300 INJECTION, SOLUTION INTRAVENOUS at 02:46

## 2022-04-12 RX ADMIN — INSULIN LISPRO 1 UNITS: 100 INJECTION, SOLUTION INTRAVENOUS; SUBCUTANEOUS at 08:24

## 2022-04-12 ASSESSMENT — PAIN SCALES - GENERAL
PAINLEVEL_OUTOF10: 4
PAINLEVEL_OUTOF10: 7
PAINLEVEL_OUTOF10: 0

## 2022-04-12 ASSESSMENT — PAIN DESCRIPTION - LOCATION: LOCATION: NECK

## 2022-04-12 ASSESSMENT — PAIN DESCRIPTION - PAIN TYPE: TYPE: ACUTE PAIN

## 2022-04-12 NOTE — PROGRESS NOTES
IVs removed. Patient belongings gathered and given to patient. Educated on discharge instructions. Patient would like to set up own follow up appointment with PCP. All questions answered at this time. Wheeled down to discharge doors.

## 2022-04-12 NOTE — PROGRESS NOTES
Pt is resting comfortably in bed. Pt ambulates to bathroom on own and performed hygiene for self. Pt is A&O x 4. Pt complains of being in bed, wants to get up and walk hallways. RN notified and will check status on being able too because of droplet precautions. Pt Blood sugar was 219 RN notified. Watching watching TV with bedside table and call light within reach.

## 2022-04-12 NOTE — CARE COORDINATION
Patient is discharged to home today with no services added/requested. 4/12/22, 10:10 AM EDT    Patient goals/plan/ treatment preferences discussed by  and . Patient goals/plan/ treatment preferences reviewed with patient/ family. Patient/ family verbalize understanding of discharge plan and are in agreement with goal/plan/treatment preferences. Understanding was demonstrated using the teach back method. AVS provided by RN at time of discharge, which includes all necessary medical information pertaining to the patients current course of illness, treatment, post-discharge goals of care, and treatment preferences. IMM Letter  IMM Letter given to Patient/Family/Significant other/Guardian/POA/by<IPAJAIMER>  ( Rights delivered by risa Ojeda 4/11/2022 at 14:40 p.m.)

## 2022-04-12 NOTE — DISCHARGE SUMMARY
Hospital Medicine Discharge Summary      Patient Identification:   Evelin Rascon  : 1960  MRN: [id number]   Account: [de-identified]      Patient's PCP: KATHRYN Gregory CNP    Admit Date: 2022     Discharge Date: 2022     Admitting Physician: German Kunz MD     Discharge Physician: Cecile Canales DO     HPI and Summarized Hospital Course:   Evelin Rascon is a 57 y.o. male PMH squamous cell of carcinoma currently on radiation and followed by Dr. Zachariah Aburto, type 2 diabetes mellitus, who is coming in as an admission from the ENT office. The patient has a history of recurrent laryngeal cancer which is managed and followed by oncology and Dr. Zachariah Aburto from ENT. The patient was reported to have increased shortness of breath and yellow sputum and no fevers and was told to come to the ER. He was also noted to have a significant rash on the left side of his neck extending to his left ear. The patient also had a laryngoscopy done by Dr. Zachariah Aburto recently that showed concerning findings as well. It was recommended that the patient be admitted and treated for sinusitis and herpes zoster per Dr. Zachariah Aburto. The patient was started on IV acyclovir treatment which was held on 4/10/22 due to JADIEL with creatinine of 2.3. Nephrology ws consulted due to this JADIEL. The creatinine was eventually relieved with IV fluids. The patient had a renal ultrasound that showed marked thickening of the renal parenchyma bilaterally with diffuse edema. Creatinine ended up improving to 1.3 on 22. The patient was discharged on 22 with a 3 day supply of potassium as the patient's potassium had dropped to 3.4 and was still 3.3 after receiving 40 mEQ of potassium, oxymetazoline nasal spray, a BMP tomorrow, clindamycin for 10 days, and valacyclovir for 7 times. I spoke with Dr. Zachariah Aburto who did not feel an acute need to give oral dexamethasone for the patient's sinusitis.  The patient will also follow-up with PCP and ENT as well.       The patient was stable for discharge - all consultants were contacted and in agreement with plan for discharge. Appropriate follow up appointment was arranged prior to discharge. Please see below or view chart for more details from hospital course. Hospital Diagnoses:    1. Please view chart for detailed course and treatment of above hospital diagnoses. The patient was seen and examined on day of discharge and this discharge summary is in conjunction with any daily progress note from day of discharge. Exam:     Vitals:  Vitals:    04/11/22 2000 04/12/22 0300 04/12/22 0815 04/12/22 0945   BP: (!) 172/83 130/78 (!) 171/77 138/78   Pulse: 63 56 58    Resp: 16 18 18    Temp: 98 °F (36.7 °C) 98.6 °F (37 °C) 98.4 °F (36.9 °C)    TempSrc: Oral Oral Oral    SpO2: 97% 96% 98%    Weight:       Height:         Weight: Weight: 146 lb 8 oz (66.5 kg)     24 hour intake/output:    Intake/Output Summary (Last 24 hours) at 4/12/2022 1130  Last data filed at 4/12/2022 1025  Gross per 24 hour   Intake 1050 ml   Output 1075 ml   Net -25 ml       Constitutional: Thin appearing but no acute distress  HENT:   Head: Normocephalic and atraumatic. Mouth/Throat: Oropharynx is clear and moist.  No oral thrush. Eyes: Conjunctivae are normal. Pupils are equal, round, and reactive to light. No scleral icterus. Neck: Neck supple. No JVD present. No tracheal deviation present. Cardiovascular: Normal rate, regular rhythm, normal heart sounds. No murmur heard. Pulmonary/Chest: Effort normal and breath sounds normal. No stridor. No respiratory distress. No wheezes. No rales. Patient exhibits no tenderness. Abdominal: Soft. Patient exhibits no distension. No tenderness. Bowel sounds present. Musculoskeletal: Normal range of motion. Extremities: Patient exhibits no edema and no tenderness. Lymphadenopathy: No cervical adenopathy. Neurological: Patient is alert and oriented to person, place, and time. Skin: Skin is warm and dry. Patient is not diaphoretic. No rashes or lesions. Psychiatric: Patient has a normal mood and affect. Patient behavior is normal.      Labs: For convenience and continuity at follow-up the following most recent labs are provided:    CBC:    Lab Results   Component Value Date    WBC 8.0 04/11/2022    HGB 10.1 04/11/2022    HCT 32.3 04/11/2022     04/11/2022       Renal:    Lab Results   Component Value Date     04/12/2022    K 3.3 04/12/2022    K 3.4 04/12/2022     04/12/2022    CO2 26 04/12/2022    BUN 20 04/12/2022    CREATININE 0.8 04/12/2022    CALCIUM 8.5 04/12/2022    PHOS 3.4 03/17/2022       Significant Diagnostic Studies    Radiology:   US RENAL COMPLETE   Final Result   Both kidneys are somewhat of a \"swollen\" appearance with what appears to be marked thickening of the renal parenchyma bilaterally, suggesting diffuse edema in the kidneys or possibly bilateral pyelonephritis. Clinical correlation recommended. **This report has been created using voice recognition software. It may contain minor errors which are inherent in voice recognition technology. **      Final report electronically signed by Dr. Mehnaz Conner on 4/10/2022 2:24 PM           Consults:     IP CONSULT TO OTOLARYNGOLOGY  IP CONSULT TO OTOLARYNGOLOGY  IP CONSULT TO ONCOLOGY  IP CONSULT TO NEPHROLOGY  IP CONSULT TO SOCIAL WORK    Disposition:    [x] Home        [] TCU       [] Rehab       [] Psych       [] SNF       [] [facility]       [] Other-    Condition at Discharge: Stable    Code Status:  Full Code     Patient Instructions:    Discharge lab work: [facility] tomorrow  Activity: as tolerated  Diet: ADULT DIET; Regular; Mildly Thick (Nectar)  ADULT ORAL NUTRITION SUPPLEMENT; Breakfast, Lunch, Dinner; Other Oral Supplement;  Yogurt TID      Follow-up visits:   KATHRYN Roberson CNP  [address]  [phone]    Call in 1 week         Discharge mouth 2 times daily     MULTIPLE VITAMIN PO     omeprazole 20 MG delayed release capsule  Commonly known as: PRILOSEC  Take 1 capsule by mouth daily. prochlorperazine 10 MG tablet  Commonly known as: COMPAZINE  Take 1 tablet by mouth every 6 hours as needed (nausea)     sodium chloride 1 g tablet  Take 1 tablet by mouth 3 times daily (with meals)     SODIUM FLUORIDE (DENTAL GEL) 1.1 % Gel     vitamin D 125 MCG (5000 UT) Caps capsule  Commonly known as: CHOLECALCIFEROL         * This list has 2 medication(s) that are the same as other medications prescribed for you. Read the directions carefully, and ask your doctor or other care provider to review them with you. Where to Get Your Medications      These medications were sent to [address] [place] [address] Way    Phone: [phone]   · clindamycin 300 MG capsule  · midodrine 5 MG tablet  · potassium chloride 20 MEQ extended release tablet  · valACYclovir 500 MG tablet     You can get these medications from any pharmacy    You don't need a prescription for these medications  · oxymetazoline 0.05 % nasal spray         Time Spent on discharge is roughly > 30 minutes in the examination, evaluation, counseling and review of medications and discharge plan. Thank you KATHRYN Oquendo CNP for the opportunity to be involved in this patient's care. Please do not hesitate to reach out to me for any questions or clarifications needed regarding this patient's care.     Signed:    Electronically signed by Ector Fernandez DO on 4/12/2022  Internal Medicine Resident   [id number]

## 2022-04-12 NOTE — PROGRESS NOTES
Nephrology Progress Note    Patient - Jayashree Riley   MRN -  [id number]   Rajiv Garcia # - [de-identified]      - 1960    57 y.o. Admit Date: 2022  Hospital Day: 4  Location: 5--A  Date of evaluation -  2022    Subjective:   CC: shortness of breath  Denies shortness of breath on Room air   UOP 67554/24  BP varied, up to 172/  BP Range: Systolic (58JDM), FKF:588 , Min:130 , IWR:256      Diastolic (68ZPZ), QQI:42, Min:57, Max:83      Objective:   VITALS:  /78   Pulse 56   Temp 98.6 °F (37 °C) (Oral)   Resp 18   Ht 5' 11\" (1.803 m)   Wt 146 lb 8 oz (66.5 kg)   SpO2 96%   BMI 20.43 kg/m²    Patient Vitals for the past 24 hrs:   BP Temp Temp src Pulse Resp SpO2 Weight   22 0300 130/78 98.6 °F (37 °C) Oral 56 18 96 % --   22 2000 (!) 172/83 98 °F (36.7 °C) Oral 63 16 97 % --   22 1611 -- 98.3 °F (36.8 °C) Oral -- -- -- --   22 1600 133/68 -- -- 64 16 97 % --   22 1355 -- 97.7 °F (36.5 °C) -- -- -- -- --   22 1115 (!) 140/69 -- -- 68 16 -- --   22 1030 -- -- -- -- -- -- 146 lb 8 oz (66.5 kg)   22 0930 -- 99 °F (37.2 °C) Oral -- -- -- --   22 0800 (!) 176/57 -- -- 57 16 97 % --          Intake/Output Summary (Last 24 hours) at 2022 0750  Last data filed at 2022 0008  Gross per 24 hour   Intake 450 ml   Output 1475 ml   Net -1025 ml       Admission weight: 148 lb (67.1 kg) Body mass index is 20.43 kg/m². Patient Vitals for the past 96 hrs (Last 3 readings):   Weight   22 1030 146 lb 8 oz (66.5 kg)   22 2050 139 lb 1.6 oz (63.1 kg)   22 1826 148 lb (67.1 kg)       EXAM:  CONSTITUTIONAL:  No acute distress. Pleasant  HEENT:  Head is normocephalic, Extraocular movement intact. Neck is supple. Voice is hoarse. CARDIOVASCULAR:  S1, S2  regular rate and rhythm. RESPIRATORY: Clear to ausculation bilaterally. Equal breath sounds. No wheezes.  No shortness of breath noted at rest.  ABDOMEN: soft, non tender  NEUROLOGICAL: Patient is alert and oriented to person, place, and time. Recent and remote memory intact. Thought is coherant. SKIN: no rash, No significant bruises on exposed surfaces  MUSCULOSKELETAL: Movement is coordinated. Moves all extremities   EXTREMITIES: Distal lower extremity temp is warm, Nlower extremity edema. PSYCHIATRIC: mood and affect appropriate. Medications:   Med reviewed  Scheduled Meds:   dexamethasone  6 mg IntraVENous Daily    acyclovir  200 mg Oral 5x Daily    insulin glargine  15 Units SubCUTAneous Nightly    insulin lispro  0-6 Units SubCUTAneous TID WC    insulin lispro  0-3 Units SubCUTAneous Nightly    heparin (porcine)  5,000 Units SubCUTAneous BID    famotidine  20 mg Oral Daily    clindamycin (CLEOCIN) IV  300 mg IntraVENous Q8H    levofloxacin  750 mg IntraVENous Q24H    [Held by provider] insulin lispro  8 Units SubCUTAneous TID AC    aspirin  81 mg Oral Daily    atorvastatin  20 mg Oral Daily    fludrocortisone  0.2 mg Oral Daily    gabapentin  300 mg Oral TID    glipiZIDE  10 mg Oral BID AC    metoprolol tartrate  25 mg Oral BID    midodrine  10 mg Oral TID WC    [Held by provider] pantoprazole  40 mg IntraVENous Daily    sodium chloride flush  5-40 mL IntraVENous 2 times per day     Labs:   Labs reviewed    Recent Labs     04/11/22  0534   WBC 8.0   HGB 10.1*   HCT 32.3*          Recent Labs     04/10/22  0817 04/10/22  0817 04/11/22  0534 04/12/22  0528     --  138 139   K 3.8   < > 4.0 3.4*  3.4*   CL 99  --  104 105   CO2 25  --  24 26   BUN 31*  --  30* 20   CREATININE 2.3*  --  1.3* 0.8   CALCIUM 9.1  --  8.7 8.5   ANIONGAP 13.0  --  10.0 8.0    < > = values in this interval not displayed. CT SOFT TISSUE NECK W CONTRAST  Result Date: 4/8/2022   1. 2.4 x 2.5 x 1.2 cm area of abnormal soft tissue density in the supraglottic larynx suspicious for recurrent tumor. ENT correlation would be helpful. 2. Left vocal cord is displaced towards the midline. 3. Small lymph nodes especially in the posterior triangles the neck, inferior to the left parotid gland and in the suboccipital region on the left side. 4. Increased density in the skin and subcutaneous soft tissues possibly representing changes of radiation therapy. 5. Mild cervical spondylosis. 6. Opacification of the right maxillary sinus and right ethmoid air cells. 7. Bilateral carotid artery calcification. 8. Areas of increased density in the upper lung. It may represent areas of pneumonitis. US RENAL COMPLETE  Result Date: 4/10/2022  Both kidneys are somewhat of a \"swollen\" appearance with what appears to be marked thickening of the renal parenchyma bilaterally, suggesting diffuse edema in the kidneys or possibly bilateral pyelonephritis. Clinical correlation recommended. ASSESSMENT:  1. Acute Kidney Injury 2nd to renal hypoperfusion from hypotension compounded by recent IV contrast. Consider Acyclovir contributing. Resolving  2. Recurrent squamous cell carcinoma of larynx  3. Hypokalemia, Give K 40 meq PO x 1 today  4. Herpes Zoster  5. 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  Priscilla Isabel, KATHRYN - CNP 7:50 AM 4/12/2022

## 2022-04-13 ASSESSMENT — ENCOUNTER SYMPTOMS
COLOR CHANGE: 0
NAUSEA: 0
COUGH: 1
RHINORRHEA: 0
EYE DISCHARGE: 0
SINUS PAIN: 0
CONSTIPATION: 0
EYE PAIN: 0
DIARRHEA: 0
CHEST TIGHTNESS: 0
ABDOMINAL DISTENTION: 0
SINUS PRESSURE: 0
VOMITING: 0
EYE REDNESS: 0
ABDOMINAL PAIN: 0
SHORTNESS OF BREATH: 0
EYE ITCHING: 0
VOICE CHANGE: 1

## 2022-04-13 NOTE — PROGRESS NOTES
CLINICAL PHARMACY NOTE: MEDS TO BEDS    Total # of Prescriptions Filled: 1   The following medications were delivered to the patient:  Potassium Chloride ER 20    Additional Documentation:

## 2022-04-13 NOTE — ED PROVIDER NOTES
[facility] ENCOUNTER          Pt Name: Chelle Finney  MRN: [id number]  [place] 1960  Date of evaluation: 4/8/2022  Treating Resident Physician: Humberto Gutierrez DO  Supervising Physician: Jaun Wallace DO    CHIEF COMPLAINT     No chief complaint on file. History obtained from chart review and the patient. HISTORY OF PRESENT ILLNESS    HPI  Chelle Finney is a 57 y.o. male with a past medical history of primary squamous cell carcinoma of the supraglottis status post chemoradiation who presents to the emergency department for evaluation of throat infection and rash. The patient was seen and evaluated in the emergency department earlier today for shortness of breath and had a CT soft tissue neck with contrast with findings suspicious for recurrent tumor in the supraglottic larynx and the left vocal cord displaced towards the midline. He was noted to have a foul-smelling breath odor. The patient was seen by the ENT service and the emergency department who believed that the tumor was infected and the patient was started on ciprofloxacin and clindamycin and then discharged the patient with immediate follow-up with Dr. Char Whyte. Dr. Char Whyte performed a video laryngoscopy in the office which showed persistent cancer. He also was noted to have a rash that was consistent with shingles. He was then transferred to the emergency department for admission, evaluation for possible laryngectomy, and to be started on Valtrex. In the emergency department the patient is complaining of a chronic cough productive of yellowish sputum. The patient has no other acute complaints at this time. REVIEW OF SYSTEMS   Review of Systems   Constitutional: Negative for fever. HENT: Positive for voice change. Negative for ear pain, rhinorrhea, sinus pressure and sinus pain. Eyes: Negative for pain, discharge, redness and itching. Respiratory: Positive for cough. Negative for chest tightness and shortness of breath. Cardiovascular: Negative for chest pain and palpitations. Gastrointestinal: Negative for abdominal distention, abdominal pain, constipation, diarrhea, nausea and vomiting. Genitourinary: Negative for dysuria, frequency, hematuria and urgency. Musculoskeletal: Negative for arthralgias. Skin: Negative for color change. Neurological: Negative for dizziness, syncope and light-headedness. PAST MEDICAL AND SURGICAL HISTORY     Past Medical History:   Diagnosis Date    Arthritis     GERD (gastroesophageal reflux disease)     Hypertension     Keratinizing squamous cell carcinoma of larynx ([address] 08/30/2021    Type II or unspecified type diabetes mellitus without mention of complication, not stated as uncontrolled      History reviewed. No pertinent surgical history. MEDICATIONS   No current facility-administered medications for this encounter.     Current Outpatient Medications:     midodrine (PROAMATINE) 5 MG tablet, Take 1 tablet by mouth 3 times daily (with meals), Disp: 90 tablet, Rfl: 2    potassium chloride (KLOR-CON M) 20 MEQ extended release tablet, Take 1 tablet by mouth 2 times daily for 3 days, Disp: 6 tablet, Rfl: 0    oxymetazoline (AFRIN) 0.05 % nasal spray, 2 sprays by Nasal route 2 times daily for 3 days, Disp: , Rfl: 3    valACYclovir (VALTREX) 500 MG tablet, Take 1 tablet by mouth 2 times daily for 7 days, Disp: 14 tablet, Rfl: 0    clindamycin (CLEOCIN) 300 MG capsule, Take 1 capsule by mouth 3 times daily for 10 days, Disp: 30 capsule, Rfl: 0    EAR DROPS 6.5 % otic solution, instill 5-10 DROPS into each ear twice a day for 4 days, Disp: , Rfl:     carbamide peroxide (DEBROX) 6.5 % otic solution, Debrox 6.5% Otic Solution 02/16/2022 Provider: Eduard MALIK, Disp: , Rfl:     fludrocortisone (FLORINEF) 0.1 MG tablet, Take 2 tablets by mouth daily, Disp: 60 tablet, Rfl: 0    metoprolol tartrate (LOPRESSOR) used   Substance Use Topics    Alcohol use: Not Currently     Alcohol/week: 1.0 standard drink     Types: 1 Shots of liquor per week     Comment: occasionaly    Drug use: No         ALLERGIES     Allergies   Allergen Reactions    Lisinopril Swelling     Angioedema of the face/lips  Other reaction(s): cough  Pt unsure but thinks he is allergic to this           FAMILY HISTORY     Family History   Problem Relation Age of Onset    Diabetes Mother     Stroke Mother     Cancer Maternal Grandfather         liver    Cancer Father          PREVIOUS RECORDS   Previous records reviewed        PHYSICAL EXAM     ED Triage Vitals   BP Temp Temp Source Pulse Resp SpO2 Height Weight   04/08/22 1715 04/08/22 1715 04/08/22 1715 04/08/22 1715 04/08/22 1715 04/08/22 1715 04/08/22 1826 04/08/22 1826   139/70 96.7 °F (35.9 °C) Oral 78 20 99 % 5' 11\" (1.803 m) 148 lb (67.1 kg)     Initial vital signs and nursing assessment reviewed and normal. Body mass index is 20.43 kg/m². Pulsoximetry is normal per my interpretation. Additional Vital Signs:  Vitals:    04/12/22 0945   BP: 138/78   Pulse:    Resp:    Temp:    SpO2:        Physical Exam  Constitutional:       General: He is not in acute distress. Appearance: Normal appearance. He is ill-appearing. Comments: The patient appears chronically ill and fatigued. HENT:      Head: Normocephalic and atraumatic. Nose: Nose normal. No congestion or rhinorrhea. Mouth/Throat:      Mouth: Mucous membranes are moist.      Pharynx: Oropharynx is clear. No oropharyngeal exudate or posterior oropharyngeal erythema. Eyes:      Extraocular Movements: Extraocular movements intact. Conjunctiva/sclera: Conjunctivae normal.      Pupils: Pupils are equal, round, and reactive to light. Comments: No signs of conjunctival/corneal irritation. Cardiovascular:      Rate and Rhythm: Normal rate and regular rhythm. Pulses: Normal pulses.       Heart sounds: Normal heart sounds. Pulmonary:      Effort: Pulmonary effort is normal. No respiratory distress. Breath sounds: Normal breath sounds. Comments: The patient has a hoarse voice. Abdominal:      General: Abdomen is flat. There is no distension. Palpations: Abdomen is soft. Tenderness: There is no abdominal tenderness. Musculoskeletal:         General: No swelling or tenderness. Normal range of motion. Cervical back: Normal range of motion and neck supple. Right lower leg: No edema. Left lower leg: No edema. Skin:     General: Skin is warm and dry. Capillary Refill: Capillary refill takes less than 2 seconds. Findings: Rash present. Comments: There is a vesicular rash on the left side of the face along the distribution of the mandibular branch of the 5th cranial nerve. Neurological:      General: No focal deficit present. Mental Status: He is alert and oriented to person, place, and time. Mental status is at baseline. MEDICAL DECISION MAKING   Initial Assessment:   3 53-year-old male presenting to the emergency department for evaluation of recurrent throat cancer and herpes zoster. 2. Labs ordered on earlier emergency department visit. Plan:    IV acyclovir.  Admission to the hospital.    I discussed this plan with the patient who verbalized understanding and all questions were answered.   This case was discussed with the admitting hospitalist, Dr. Betty Deleon, who accepted the patient for admission to the hospital.      ED RESULTS   Laboratory results:  Labs Reviewed   BASIC METABOLIC PANEL W/ REFLEX TO MG FOR LOW K - Abnormal; Notable for the following components:       Result Value    Sodium 133 (*)     Chloride 96 (*)     Glucose 211 (*)     CREATININE 1.5 (*)     All other components within normal limits   CBC WITH AUTO DIFFERENTIAL - Abnormal; Notable for the following components:    RBC 4.26 (*)     Hemoglobin 10.9 (*)     Hematocrit 35.1 (*)     [facility] 25.6 (*)     MCHC 31.1 (*)     MPV 9.3 (*)     Lymphocytes Absolute 0.7 (*)     All other components within normal limits   GLOMERULAR FILTRATION RATE, ESTIMATED - Abnormal; Notable for the following components:    Est, Glom Filt Rate 47 (*)     All other components within normal limits   BASIC METABOLIC PANEL - Abnormal; Notable for the following components:    Glucose 125 (*)     BUN 31 (*)     CREATININE 2.3 (*)     All other components within normal limits   GLOMERULAR FILTRATION RATE, ESTIMATED - Abnormal; Notable for the following components:    Est, Glom Filt Rate 29 (*)     All other components within normal limits   CBC - Abnormal; Notable for the following components:    RBC 3.96 (*)     Hemoglobin 10.1 (*)     Hematocrit 32.3 (*)     MCH 25.5 (*)     MCHC 31.3 (*)     MPV 9.1 (*)     All other components within normal limits   BASIC METABOLIC PANEL - Abnormal; Notable for the following components:    Glucose 193 (*)     BUN 30 (*)     CREATININE 1.3 (*)     All other components within normal limits   GLOMERULAR FILTRATION RATE, ESTIMATED - Abnormal; Notable for the following components:    Est, Glom Filt Rate 56 (*)     All other components within normal limits   BASIC METABOLIC PANEL - Abnormal; Notable for the following components:    Potassium 3.4 (*)     Glucose 182 (*)     All other components within normal limits   BASIC METABOLIC PANEL W/ REFLEX TO MG FOR LOW K - Abnormal; Notable for the following components:    Potassium reflex Magnesium 3.4 (*)     All other components within normal limits   POTASSIUM - Abnormal; Notable for the following components:    Potassium 3.3 (*)     All other components within normal limits   POCT GLUCOSE - Abnormal; Notable for the following components:    POC Glucose 233 (*)     All other components within normal limits   POCT GLUCOSE - Abnormal; Notable for the following components:    POC Glucose 196 (*)     All other components within normal limits   POCT GLUCOSE - Abnormal; Notable for the following components:    POC Glucose 150 (*)     All other components within normal limits   POCT GLUCOSE - Abnormal; Notable for the following components:    POC Glucose 137 (*)     All other components within normal limits   POCT GLUCOSE - Abnormal; Notable for the following components:    POC Glucose 60 (*)     All other components within normal limits   POCT GLUCOSE - Abnormal; Notable for the following components:    POC Glucose 191 (*)     All other components within normal limits   POCT GLUCOSE - Abnormal; Notable for the following components:    POC Glucose 164 (*)     All other components within normal limits   POCT GLUCOSE - Abnormal; Notable for the following components:    POC Glucose 181 (*)     All other components within normal limits   POCT GLUCOSE - Abnormal; Notable for the following components:    POC Glucose 142 (*)     All other components within normal limits   POCT GLUCOSE - Abnormal; Notable for the following components:    POC Glucose 266 (*)     All other components within normal limits   POCT GLUCOSE - Abnormal; Notable for the following components:    POC Glucose 67 (*)     All other components within normal limits   POCT GLUCOSE - Abnormal; Notable for the following components:    POC Glucose 67 (*)     All other components within normal limits   POCT GLUCOSE - Abnormal; Notable for the following components:    POC Glucose 69 (*)     All other components within normal limits   POCT GLUCOSE - Abnormal; Notable for the following components:    POC Glucose 219 (*)     All other components within normal limits   POCT GLUCOSE - Abnormal; Notable for the following components:    POC Glucose 173 (*)     All other components within normal limits   ANION GAP   MAGNESIUM   ANION GAP   SODIUM, URINE, RANDOM   OSMOLALITY, URINE   ANION GAP   ANION GAP   GLOMERULAR FILTRATION RATE, ESTIMATED   MAGNESIUM   POCT GLUCOSE   POCT GLUCOSE       Radiologic studies results:  US RENAL COMPLETE   Final Result   Both kidneys are somewhat of a \"swollen\" appearance with what appears to be marked thickening of the renal parenchyma bilaterally, suggesting diffuse edema in the kidneys or possibly bilateral pyelonephritis. Clinical correlation recommended. **This report has been created using voice recognition software. It may contain minor errors which are inherent in voice recognition technology. **      Final report electronically signed by Dr. Quan Or on 4/10/2022 2:24 PM          ED Medications administered this visit:   Medications   oxymetazoline (AFRIN) 0.05 % nasal spray 2 spray (2 sprays Each Nostril Given 4/11/22 1638)   potassium chloride (KLOR-CON M) extended release tablet 40 mEq (40 mEq Oral Given 4/12/22 0831)         ED COURSE          MEDICATION CHANGES     Discharge Medication List as of 4/12/2022 10:25 AM      START taking these medications    Details   potassium chloride (KLOR-CON M) 20 MEQ extended release tablet Take 1 tablet by mouth daily for 3 days, Disp-3 tablet, R-0Normal      oxymetazoline (AFRIN) 0.05 % nasal spray 2 sprays by Nasal route 2 times daily for 3 days, R-3OTC      valACYclovir (VALTREX) 500 MG tablet Take 1 tablet by mouth 2 times daily for 7 days, Disp-14 tablet, R-0CrCl 53. 96Normal      clindamycin (CLEOCIN) 300 MG capsule Take 1 capsule by mouth 3 times daily for 10 days, Disp-30 capsule, R-0Normal               FINAL DISPOSITION     Final diagnoses:   Herpes zoster without complication   Laryngeal cancer (HCC)     Condition: condition: stable  Dispo: Admit to med/surg floor      This transcription was electronically signed. Parts of this transcriptions may have been dictated by use of voice recognition software and electronically transcribed, and parts may have been transcribed with the assistance of an ED scribe. The transcription may contain errors not detected in proofreading.   Please refer to my supervising physician's documentation if my documentation differs.     Electronically Signed: Lisbeth Colvin DO, 04/13/22, 4:27 PM          Lisbeth Colvin DO  Resident  04/13/22 7450

## 2022-04-14 NOTE — PROCEDURES
[address] [id number]                                 EVENT MONITOR    PATIENT NAME: Winferd Cushing                      :        1960  MED REC NO:   [id number]                           ROOM:       0015  ACCOUNT NO:   [de-identified]                           ADMIT DATE: 03/15/2022  PROVIDER:     Alona Jean Baptiste M.D.    TEST TYPE:  Event monitor. CLINICAL HISTORY AND INDICATION:  This is a patient with syncope. EVENT MONITOR DESCRIPTION:  Event monitor was attached to the patient  between 2020 and 04/10/2022. EVENT MONITOR FINDINGS:  Baseline rhythm showed sinus rhythm. No  obvious arrhythmias detected on this event monitor. CONCLUSION:  Unremarkable event monitor with no significant arrhythmias.         Laura Dee M.D.    D: 2022 16:39:58       T: 2022 16:42:14     [id number]  Job#: [id number]     Doc#: [id number]    CC:

## 2022-04-22 NOTE — ED PROVIDER NOTES
[address]     Pt Name: Jesús Arriaga  MRN: [id number]  [place] 1960  Date of evaluation: 4/21/22        Mid-level provider Note:    I have personally performed and/or participated in the history, exam and medical decision making and agree with all pertinent clinical information as noted by the previous provider. I have also reviewed and agree with the past medical, family and social history unless otherwise noted. I have personally performed a face to face diagnostic evaluation on this patient. I have reviewed the previous provider's findings and agree. Evaluation: Patient seen evaluated emergency room by my colleague, Aaliyah Powell. I assumed care of him. I discussed the case with Dr. Augustine Mascorro and then Dr. Kyrie Norton. Dr. Augustine Mascorro and Dr. Rosalina Posey DNP came to the bedside. Patient was prescribed antibiotics per Felicity aDvid DNP recommendations and then he was sent to Dr. Nitesh Kent office for further evaluation. Patient was updated and they were agreeable to plan of care       ED Course as of 04/21/22 2041 Fri Apr 08, 2022   0830 Reviewed case with Dr. Donaldo Cardenas Reviewed case with the patient. Updated him on results. [KJ]   [id number] Case is discussed with Dr. Augustine Mascorro the patient's radiation oncologist.  He will be over to see the patient. He requested that I call ENT and get recommendations from them. Message sent to Dr. Kyrie Norton. [KJ]   [address], CNP at the bedside. They believe the tumor is infected. Patient to receive cipro and clinda loading via IVPB and then be dc to Dr. Nitesh Kent office where he will scope him. Patient is updated and agreeable.   If Dr. Augustine Mascorro has not been here before abx are complete, then call him for OK to DC.   [KJ]      ED Course User Index  [KJ] KATHRYN Jensen - CNP       CT SOFT TISSUE NECK W CONTRAST    Result Date: 4/8/2022  PROCEDURE: CT SOFT TISSUE NECK W CONTRAST CLINICAL INFORMATION: neck swelling with history of throat cancer. COMPARISON: PET scan dated 27th of October 2021. . TECHNIQUE: 3 mm axial images were obtained through the neck soft tissues after the administration of intravenous contrast. Sagittal and coronal reconstructions were obtained. All CT scans at this facility use dose modulation, iterative reconstruction, and/or weight-based dosing when appropriate to reduce radiation dose to as low as reasonably achievable. FINDINGS: There is a 2.4 x 2.5 x 1.2 cm area of abnormal soft tissue density in the supraglottic larynx suspicious for recurrent tumor. The left vocal cord appears be displaced towards the midline. There are small lymph nodes especially in the posterior triangles of the neck, left greater than right, inferior to the left parotid gland measuring 11 mm in size and in the suboccipital region on the left side measuring 7.2 mm in size. There is opacification of the right maxillary sinus and right ethmoid air cells consistent with inflammatory changes. There is slightly increased density in the soft tissues which may represent changes of radiation therapy The soft tissues of the nasopharynx are normal.  The oropharynx is within appropriate limits. The hypopharynx is normal. The epiglottis is normal.  The oral cavity and floor of mouth are normal.  The vocal cords and subglottic airway are within appropriate limits. The thyroid gland, submandibular glands and parotid glands are within acceptable limits. There are areas of increased density in the upper lung fields which may represent areas of pneumonitis. . There is straightening of the normal cervical lordosis and mild cervical spondylosis at C5-6 and C6-7. There are no gross abnormalities in the brain parenchyma. 1. 2.4 x 2.5 x 1.2 cm area of abnormal soft tissue density in the supraglottic larynx suspicious for recurrent tumor. ENT correlation would be helpful. 2. Left vocal cord is displaced towards the midline.  3. Small lymph nodes especially in the posterior triangles the neck, inferior to the left parotid gland and in the suboccipital region on the left side. 4. Increased density in the skin and subcutaneous soft tissues possibly representing changes of radiation therapy. 5. Mild cervical spondylosis. 6. Opacification of the right maxillary sinus and right ethmoid air cells. 7. Bilateral carotid artery calcification. 8. Areas of increased density in the upper lung. It may represent areas of pneumonitis. **This report has been created using voice recognition software. It may contain minor errors which are inherent in voice recognition technology. ** Final report electronically signed by DR Maxx Correa on 4/8/2022 7:57 AM    US RENAL COMPLETE    Result Date: 4/10/2022  BILATERAL RENAL ULTRASOUND: CLINICAL INFORMATION: Reagan. Squamous cell carcinoma of the supraglottis. COMPARISON: No comparison available. TECHNIQUE: Multiple sonographic images of both kidneys were obtained. Images of the urinary bladder were also obtained. FINDINGS: RIGHT KIDNEY - 11.59 x 7.62 x 5.94 cm Resistive Index - 0.69 Cortical Thickness - 2.23 cm LEFT KIDNEY - 11.19 x 6.60 x 6.06 cm Resistive Index - 0.67 Cortical Thickness - 1.79 cm URINARY BLADDER Pre-Void - 393 mL Post-Void - 25 mL  KIDNEYS:  Both kidneys are normal in size and contour, however, there appears to be significant thickening of the renal parenchyma bilaterally. Diffuse edema of either kidney or possibly bilateral pyelonephritis cannot excluded. The resistive indices are normal.  MASS/CYST: No solid or cystic lesion of either kidney is seen. HYDRONEPHROSIS:  There is no hydronephrosis. CALCULI:  No calculi are seen. BLADDER:  The urinary bladder is unremarkable. .     Both kidneys are somewhat of a \"swollen\" appearance with what appears to be marked thickening of the renal parenchyma bilaterally, suggesting diffuse edema in the kidneys or possibly bilateral pyelonephritis.  Clinical correlation recommended. **This report has been created using voice recognition software. It may contain minor errors which are inherent in voice recognition technology. ** Final report electronically signed by Dr. Manuel Black on 4/10/2022 2:24 PM    XR CHEST PORTABLE    Result Date: 4/8/2022  CHEST X-RAY FRONTAL VIEW COMPARISON: 8/14/2013. FINDINGS: A single frontal view of the chest was performed. Hardware projects over the left hemithorax. Cardiac size is within normal limits. A few small calcified granulomas are noted within the left upper lobe and right lung base. There is no focal infiltrate or consolidation. There is no pleural effusion or pneumothorax. 1. No acute disease. This document has been electronically signed by: Agnieszka Clifton M.D. on 04/08/2022 06:02 AM        DIAGNOSIS  1. Throat infection    2. Primary squamous cell carcinoma of supraglottis [facility])           DISPOSITION/PLAN  DISPOSITION Decision To Discharge 04/08/2022 03:10:38 PM      PATIENT REFERRED TO:  Donna Starr MD  [facility] [address] [facility] [phone]    Go to   Go directly to Dr. Spencer Marie office.     DISCHARGE MEDICATIONS:  Discharge Medication List as of 4/8/2022 12:07 PM            KATHRYN Arambula CNP, APRN - CNP  04/21/22 2046

## 2022-05-05 ENCOUNTER — HOSPITAL ENCOUNTER (OUTPATIENT)
Dept: PET IMAGING | Age: 62
Discharge: HOME | End: 2022-05-05
Payer: OTHER GOVERNMENT

## 2022-05-05 DIAGNOSIS — C32.1 PRIMARY SQUAMOUS CELL CARCINOMA OF SUPRAGLOTTIS (HCC): ICD-10-CM

## 2022-05-05 PROCEDURE — A9552 F18 FDG: HCPCS | Performed by: RADIOLOGY

## 2022-05-05 PROCEDURE — 3430000000 HC RX DIAGNOSTIC RADIOPHARMACEUTICAL: Performed by: RADIOLOGY

## 2022-05-05 PROCEDURE — 78815 PET IMAGE W/CT SKULL-THIGH: CPT

## 2022-05-05 RX ORDER — FLUDEOXYGLUCOSE F 18 200 MCI/ML
15.8 INJECTION, SOLUTION INTRAVENOUS
Status: COMPLETED | OUTPATIENT
Start: 2022-05-05 | End: 2022-05-05

## 2022-05-05 RX ADMIN — FLUDEOXYGLUCOSE F 18 15.8 MILLICURIE: 200 INJECTION, SOLUTION INTRAVENOUS at 08:09

## 2022-05-06 ENCOUNTER — OFFICE VISIT (OUTPATIENT)
Dept: ENT CLINIC | Age: 62
End: 2022-05-06
Payer: OTHER GOVERNMENT

## 2022-05-06 VITALS
SYSTOLIC BLOOD PRESSURE: 92 MMHG | OXYGEN SATURATION: 100 % | WEIGHT: 148.8 LBS | BODY MASS INDEX: 20.83 KG/M2 | HEART RATE: 98 BPM | HEIGHT: 71 IN | DIASTOLIC BLOOD PRESSURE: 60 MMHG | TEMPERATURE: 98.1 F

## 2022-05-06 DIAGNOSIS — Z92.21 STATUS POST CHEMORADIATION: ICD-10-CM

## 2022-05-06 DIAGNOSIS — C32.9 LARYNGEAL SQUAMOUS CELL CARCINOMA (HCC): ICD-10-CM

## 2022-05-06 DIAGNOSIS — Z92.3 STATUS POST CHEMORADIATION: ICD-10-CM

## 2022-05-06 DIAGNOSIS — C32.9 RECURRENT SQUAMOUS CELL CARCINOMA OF LARYNX (HCC): ICD-10-CM

## 2022-05-06 DIAGNOSIS — C32.1 PRIMARY SQUAMOUS CELL CARCINOMA OF SUPRAGLOTTIS (HCC): Primary | ICD-10-CM

## 2022-05-06 PROCEDURE — 99214 OFFICE O/P EST MOD 30 MIN: CPT | Performed by: OTOLARYNGOLOGY

## 2022-05-06 ASSESSMENT — ENCOUNTER SYMPTOMS
RHINORRHEA: 0
FACIAL SWELLING: 0
SINUS PRESSURE: 0
TROUBLE SWALLOWING: 0
VOMITING: 0
ABDOMINAL PAIN: 0
CHOKING: 0
COLOR CHANGE: 0
SHORTNESS OF BREATH: 0
CHEST TIGHTNESS: 0
NAUSEA: 0
APNEA: 0
DIARRHEA: 0
COUGH: 0
WHEEZING: 0
VOICE CHANGE: 0
STRIDOR: 0
SORE THROAT: 0

## 2022-05-06 NOTE — LETTER
[address] [facility] and [address]  Phone: [phone]  Fax: [address], [place]        May 15, 2022    Saud Caballero, APRN [address] [address]    Patient: Nel Aguilar   MR Number: [id number]   YOB: 1960   Date of Visit: 5/6/2022     Dear Saud Caballero,    I recently saw your patient, Nel Aguilar, regarding current recurrent supraglottic carcinoma which you had biopsied prior to his chemoradiation. I believe he has a recurrence and we are requesting that she see him ASAP. We have a PET/CT completed which shows increased uptake in that area. .  He had initially presented with increasing hoarseness difficulty swallowing and extreme pain from severe shingles covering the entire left side of his neck. This promptly responded to appropriate antiviral therapy. He was instructed to make an appointment with you but showed up in my office recently. His airway is stable. At this point we suggested he simply contact your office since he is your patient. We have not as yet obtained tissue to show that this is invasive carcinoma instead of just, say, a fungus. Below are the relevant portions of my assessment and plan of care. Assessment & Plan   Diagnoses and all orders for this visit:     Diagnosis Orders   1. Primary squamous cell carcinoma of supraglottis (HCC)     2. Laryngeal squamous cell carcinoma (HCC)     3. Status post chemoradiation     4. Recurrent squamous cell carcinoma of larynx ([facility] [address]         The findings were explained and his questions were answered. He has had chemoradiation following his initial biopsy and partial debulking of his right supraglottic squamous cell cancer. Dr. Alida Jerez and I recommend that his salvage surgery following chemoradiation be performed at a [facility] such as [facility].   Options were discussed including the patient calling [facility] to set up an appointment with Dr. Sebastian Ignacio. He agreed. He has not yet had his recurrence/persistence biopsied. Return as needed. Such as for routine surveillance follow-up once he has had his salvage surgery. If you have questions, please do not hesitate to call me. I look forward to following Americo Jaeger along with you.     Sincerely,      Saundra Rowe MD

## 2022-05-10 ENCOUNTER — TELEPHONE (OUTPATIENT)
Dept: ONCOLOGY | Age: 62
End: 2022-05-10

## 2022-05-20 ENCOUNTER — HOSPITAL ENCOUNTER (OUTPATIENT)
Dept: RADIATION ONCOLOGY | Age: 62
Discharge: HOME | End: 2022-05-20
Payer: OTHER GOVERNMENT

## 2022-05-20 VITALS
SYSTOLIC BLOOD PRESSURE: 115 MMHG | RESPIRATION RATE: 16 BRPM | WEIGHT: 144 LBS | HEART RATE: 103 BPM | OXYGEN SATURATION: 98 % | TEMPERATURE: 98.1 F | DIASTOLIC BLOOD PRESSURE: 69 MMHG | BODY MASS INDEX: 20.08 KG/M2

## 2022-05-20 PROCEDURE — 99213 OFFICE O/P EST LOW 20 MIN: CPT | Performed by: RADIOLOGY

## 2022-05-20 PROCEDURE — 99212 OFFICE O/P EST SF 10 MIN: CPT | Performed by: RADIOLOGY

## 2022-05-20 ASSESSMENT — ENCOUNTER SYMPTOMS
ABDOMINAL PAIN: 0
FACIAL SWELLING: 0
RECTAL PAIN: 0
COUGH: 1
SORE THROAT: 0
TROUBLE SWALLOWING: 0
BLOOD IN STOOL: 0
BACK PAIN: 0
SINUS PAIN: 0
SINUS PRESSURE: 0
SHORTNESS OF BREATH: 0
VOICE CHANGE: 1
NAUSEA: 0

## 2022-05-20 NOTE — PROGRESS NOTES
[address] [facility] [address], [address]        [place],  [facility]        Marian Joseph: [phone]        F: [phone]       [employer]            FOLLOW UP NOTE    Date of Service: 2022  Patient ID: Mathews Landau   : 1960  MRN: [id number]   Acct Number: [de-identified]       DATE OF SERVICE: 2022   LOCATION: [facility]  PROVIDER: Deepak Fernandez MD MS    FOLLOW UP PHYSICIANS: Dr. Dona Williamson ([facility])    ASSESSMENT AND PLAN:  Cancer Staging  Primary squamous cell carcinoma of supraglottis ([facility] [address]  Staging form: Larynx - Supraglottis, AJCC 8th Edition  - Clinical stage from 2021: Stage III (cT3, cN0, cM0) - Signed by Amanda Razo MD on 2021      -Mr. Luz Sanches presents today for regularly scheduled follow-up visit doing reasonably well overall having completed definitive chemoradiation therapy 2021  -Continues to improve well overall, notes good p.o. intake, taste improving, continues to have dry mouth thick saliva  -No pain with swallowing, however states intermittently food gets stuck however this is improving  -Seen by speech-language pathology last month, has instructions for exercises going forward  -No concerning features for recurrent disease noted on a exam at today's visit  -Recommend continue dental care and follow-up  -Recent PET/CT imaging noted potential for persistent disease, however prior to scans noted to have an infection in the treatment area, s/p antibiotics, unsure if this is related to noted imaging findings  -Recently seen by ENT at [facility] (after PET/CT), no evidence of disease on exam, will return for close follow-up going forward to determine need for re-biopsy vs repeat imaging, vs continued surveillance  -Continue regular follow-up visits with all other treating physicians    The patient will return to clinic in 6 months or sooner if clinically indicated.  They have our clinic number to call C5-6 and C6-7.   2. No acute fracture noted. 3. Bilateral carotid artery calcification. 4. Otherwise negative CT scan of the cervical spine. Paula Soriano 04/08/2022 CT Soft Tissue Neck W Cons    Impression       1. 2.4 x 2.5 x 1.2 cm area of abnormal soft tissue density in the supraglottic larynx suspicious for recurrent tumor. ENT correlation would be helpful. 2. Left vocal cord is displaced towards the midline. 3. Small lymph nodes especially in the posterior triangles the neck, inferior to the left parotid gland and in the suboccipital region on the left side. 4. Increased density in the skin and subcutaneous soft tissues possibly representing changes of radiation therapy. 5. Mild cervical spondylosis. 6. Opacification of the right maxillary sinus and right ethmoid air cells. 7. Bilateral carotid artery calcification. 8. Areas of increased density in the upper lung. It may represent areas of pneumonitis. 05/05/2022 PET CT SKULL BASE MID THIGH RESTAGE    Impression       Mildly FDG avid right supraglottic soft tissue density suspicious for malignancy. 05/11/2022 ENT VISIT [facility] (Dr. Vaughn Medley)                         Primary squamous cell carcinoma of supraglottis ([address]   9/21/2021 Initial Diagnosis    Primary squamous cell carcinoma of supraglottis ([address]     11/10/2021 - 12/30/2021 Radiation    Treatment Course Number: 1    Treatment Site (s) Modality Dose (cGy) Fractions Elapsed Days   Supraglottic larynx Primary, High Risk/Regional Lymph Nodes IMRT/SIB 7000/6300/5400 35 50     Cumulative Dose: 7000 cGy    Radiation therapy delivered under the care of Dr. Imer Calzada MD MS ([facility])        Chemotherapy    Systemic therapy given with concurrent radiation therapy under the care of Kaitlyn Bray ([facility])         INTERVAL HISTORY:  Debbie Murrell is a 57 y.o. male is presenting today for regularly scheduled follow up regarding the above mentioned oncologic history.     Review of Systems Constitutional: Positive for fatigue. Negative for activity change, appetite change and unexpected weight change. HENT: Positive for ear pain (Left), tinnitus (Left) and voice change. Negative for congestion, facial swelling, hearing loss, nosebleeds, sinus pressure, sinus pain, sore throat and trouble swallowing. Eyes: Negative for visual disturbance. Respiratory: Positive for cough (Clear mucous). Negative for shortness of breath. Cardiovascular: Negative for chest pain. Gastrointestinal: Negative for abdominal pain, blood in stool, nausea and rectal pain. Genitourinary: Negative for dysuria, frequency and urgency. Musculoskeletal: Positive for neck pain (Little with movement). Negative for arthralgias, back pain and neck stiffness. Neurological: Positive for dizziness, light-headedness and numbness (Toes/Fingertips). Negative for facial asymmetry, speech difficulty, weakness and headaches. Hematological: Negative for adenopathy. Psychiatric/Behavioral: Negative for confusion. A complete review of systems was performed and found to be negative except as presented above.     CHAPERONE: Declined    PAIN: 0/10    LABORATORY STUDIES:  Onc labs:   Lab Results   Component Value Date    PSA 1.06 09/29/2021       MEDICATIONS:   Current Outpatient Medications   Medication Sig Dispense Refill    midodrine (PROAMATINE) 5 MG tablet Take 1 tablet by mouth 3 times daily (with meals) 90 tablet 2    potassium chloride (KLOR-CON M) 20 MEQ extended release tablet Take 1 tablet by mouth 2 times daily for 3 days 6 tablet 0    EAR DROPS 6.5 % otic solution instill 5-10 DROPS into each ear twice a day for 4 days      carbamide peroxide (DEBROX) 6.5 % otic solution Debrox 6.5% Otic Solution 02/16/2022 Provider: Kathy MALIK      metoprolol tartrate (LOPRESSOR) 25 MG tablet Take 1 tablet by mouth 2 times daily (Patient not taking: Reported on 5/20/2022) 60 tablet 0    insulin glargine (LANTUS SOLOSTAR) 100 UNIT/ML injection pen Inject 35 Units into the skin nightly 1 pen 0    sodium chloride 1 g tablet Take 1 tablet by mouth 3 times daily (with meals) 90 tablet 3    lidocaine viscous hcl (XYLOCAINE) 2 % SOLN solution Take 5 mLs by mouth as needed for Irritation 100 mL 3    prochlorperazine (COMPAZINE) 10 MG tablet Take 1 tablet by mouth every 6 hours as needed (nausea) 120 tablet 3    vitamin D (CHOLECALCIFEROL) 125 MCG (5000 UT) CAPS capsule Take 125 mcg by mouth daily      gabapentin (NEURONTIN) 300 MG capsule 300 mg 3 times daily.  SODIUM FLUORIDE, DENTAL GEL, 1.1 % GEL Apply 3-4 drops of gel directly to teeth. Do  Not eat or drink for 30min. Once daily at bedtime.  glimepiride (AMARYL) 4 MG tablet TAKE ONE TABLET BY MOUTH TWICE DAILY 30 tablet 0    metFORMIN (GLUCOPHAGE) 1000 MG tablet Take 1 tablets twice a day 60 tablet 3    aspirin 81 MG tablet Take 1 tablet by mouth daily. 30 tablet 11    atorvastatin (LIPITOR) 20 MG tablet Take 1 tablet by mouth daily. 30 tablet 6    omeprazole (PRILOSEC) 20 MG capsule Take 1 capsule by mouth daily. 30 capsule 6    gemfibrozil (LOPID) 600 MG tablet Take 1 tablet by mouth 2 times daily (before meals). 60 tablet 5    Insulin Pen Needle ([employer] PEN NEEDLES) 31G X 6 MM MISC by Does not apply route. 100 each 3    MULTIPLE VITAMIN PO Take 1 tablet by mouth daily. No current facility-administered medications for this encounter. PHYSICAL EXAMINATION:  VITAL SIGNS: /69   Pulse 103   Temp 98.1 °F (36.7 °C) (Infrared)   Resp 16   Wt 144 lb (65.3 kg)   SpO2 98%   BMI 20.08 kg/m²     ECO - Symptomatic but completely ambulatory (Restricted in physically strenuous activity but ambulatory and able to carry out work of a light or sedentary nature. For example, light housework, office work)    Physical Exam  Constitutional:       General: He is not in acute distress. Appearance: Normal appearance.    HENT:      Head: Normocephalic and atraumatic. Mouth/Throat:      Tongue: No lesions. Pharynx: Oropharynx is clear. Cardiovascular:      Rate and Rhythm: Normal rate and regular rhythm. Pulmonary:      Effort: Pulmonary effort is normal. No respiratory distress. Abdominal:      General: Abdomen is flat. There is no distension. Palpations: Abdomen is soft. Tenderness: There is no abdominal tenderness. Musculoskeletal:         General: Normal range of motion. Lymphadenopathy:      Cervical: No cervical adenopathy. Neurological:      General: No focal deficit present. Mental Status: He is alert and oriented to person, place, and time. Motor: No weakness. Gait: Gait normal.         Electronically signed by Tatiana Fernandez MD MS on 5/20/22 at 10:49 AM EDT     ATTESTATION: 20 minutes were spent with the patient at today's visit reviewing pertinent information related to their oncologic diagnosis, including any recent labs, imaging, follow ups and plan of care going forward.     CC:Dr. Ulisses Mejia ([facility])  ACC:[facility]

## 2022-07-12 ENCOUNTER — HOSPITAL ENCOUNTER (OUTPATIENT)
Dept: INFUSION THERAPY | Age: 62
Discharge: HOME | End: 2022-07-12
Payer: OTHER GOVERNMENT

## 2022-07-12 VITALS
SYSTOLIC BLOOD PRESSURE: 133 MMHG | HEART RATE: 91 BPM | RESPIRATION RATE: 16 BRPM | WEIGHT: 129.25 LBS | OXYGEN SATURATION: 93 % | TEMPERATURE: 98.4 F | DIASTOLIC BLOOD PRESSURE: 74 MMHG | BODY MASS INDEX: 18.5 KG/M2 | HEIGHT: 70 IN

## 2022-07-12 DIAGNOSIS — C32.1 MALIGNANT NEOPLASM OF SUPRAGLOTTIS (HCC): Primary | ICD-10-CM

## 2022-07-12 PROCEDURE — 96360 HYDRATION IV INFUSION INIT: CPT

## 2022-07-12 PROCEDURE — 96361 HYDRATE IV INFUSION ADD-ON: CPT

## 2022-07-12 PROCEDURE — 2580000003 HC RX 258: Performed by: RADIOLOGY

## 2022-07-12 RX ORDER — HEPARIN SODIUM (PORCINE) LOCK FLUSH IV SOLN 100 UNIT/ML 100 UNIT/ML
500 SOLUTION INTRAVENOUS PRN
OUTPATIENT
Start: 2022-07-12

## 2022-07-12 RX ORDER — 0.9 % SODIUM CHLORIDE 0.9 %
1000 INTRAVENOUS SOLUTION INTRAVENOUS ONCE
Status: COMPLETED | OUTPATIENT
Start: 2022-07-12 | End: 2022-07-12

## 2022-07-12 RX ORDER — 0.9 % SODIUM CHLORIDE 0.9 %
1000 INTRAVENOUS SOLUTION INTRAVENOUS ONCE
Status: CANCELLED | OUTPATIENT
Start: 2022-07-12 | End: 2022-07-12

## 2022-07-12 RX ORDER — SODIUM CHLORIDE 0.9 % (FLUSH) 0.9 %
5-40 SYRINGE (ML) INJECTION PRN
OUTPATIENT
Start: 2022-07-12

## 2022-07-12 RX ORDER — SODIUM CHLORIDE 9 MG/ML
5-250 INJECTION, SOLUTION INTRAVENOUS PRN
OUTPATIENT
Start: 2022-07-12

## 2022-07-12 RX ORDER — SODIUM CHLORIDE 9 MG/ML
5-250 INJECTION, SOLUTION INTRAVENOUS PRN
Status: CANCELLED | OUTPATIENT
Start: 2022-07-12

## 2022-07-12 RX ORDER — HEPARIN SODIUM (PORCINE) LOCK FLUSH IV SOLN 100 UNIT/ML 100 UNIT/ML
500 SOLUTION INTRAVENOUS PRN
Status: CANCELLED | OUTPATIENT
Start: 2022-07-12

## 2022-07-12 RX ORDER — SODIUM CHLORIDE 0.9 % (FLUSH) 0.9 %
5-40 SYRINGE (ML) INJECTION PRN
Status: CANCELLED | OUTPATIENT
Start: 2022-07-12

## 2022-07-12 RX ADMIN — SODIUM CHLORIDE 1000 ML: 9 INJECTION, SOLUTION INTRAVENOUS at 11:20

## 2022-07-12 NOTE — PROGRESS NOTES
Patient here for IV hydration of 1 liter 0.9 NS IV bolus over 2 hours. Due to inability to take in adequate PO hydration from radiation treatents.   Per Dr. Nagi Purdy

## 2022-07-12 NOTE — PROGRESS NOTES
Patient tolerated 1 liter 0.9 NS IV bolus over 2 hours without any complications. Denies dizziness, lightheadedness, acute nausea or vomiting, headache, heart palpitations, rash/itching or increased SOB. Last vital signs  /74   Pulse 91   Temp 98.4 °F (36.9 °C) (Oral)   Resp 16   Ht 5' 10\" (1.778 m)   Wt 129 lb 4 oz (58.6 kg)   SpO2 93%   BMI 18.55 kg/m²     Patient instructed if they experience any of the above symptoms following today's visit, he/she is to notify the Physician or go to the Emergency Dept. Discharge instructions given to patient, Verbalizes understanding. Ambulated off unit per self in stable condition with all belongings.

## 2022-07-12 NOTE — PLAN OF CARE
Problem: Intellectual/Education/Knowledge Deficit  Goal: Teaching initiated upon admission  Outcome: Adequate for Discharge  Note: Patient verbalizes understanding to verbal information given on 1 liter 0.9 NS IV bolus over 2 hours,action and possible side effects. Aware to call MD if develop complications. Intervention: Verbal/written education provided  Note: Patient received 1 liter 0.9 NS IV bolus over 2 hours Patient drank some water while in OP oncology. Encouraged patient to drink 48 to 64 ounces of fluids everyday. Problem: Discharge Planning  Goal: Knowledge of discharge instructions  Description: Knowledge of discharge instructions  Outcome: Adequate for Discharge     Care plan reviewed with patient and spouse. Patient and spouse verbalize understanding of the plan of care and contribute to goal setting.

## 2022-11-29 ENCOUNTER — HOSPITAL ENCOUNTER (OUTPATIENT)
Dept: RADIATION ONCOLOGY | Age: 62
Discharge: HOME | End: 2022-11-29
Payer: OTHER GOVERNMENT

## 2022-11-29 VITALS
RESPIRATION RATE: 18 BRPM | BODY MASS INDEX: 22.44 KG/M2 | HEART RATE: 93 BPM | SYSTOLIC BLOOD PRESSURE: 145 MMHG | OXYGEN SATURATION: 97 % | TEMPERATURE: 98 F | WEIGHT: 156.4 LBS | DIASTOLIC BLOOD PRESSURE: 78 MMHG

## 2022-11-29 PROCEDURE — 99212 OFFICE O/P EST SF 10 MIN: CPT

## 2022-11-29 PROCEDURE — 99213 OFFICE O/P EST LOW 20 MIN: CPT | Performed by: RADIOLOGY

## 2022-11-29 ASSESSMENT — ENCOUNTER SYMPTOMS
SORE THROAT: 0
RECTAL PAIN: 0
SHORTNESS OF BREATH: 0
BACK PAIN: 0
TROUBLE SWALLOWING: 0
COUGH: 0
NAUSEA: 0
ABDOMINAL PAIN: 0
BLOOD IN STOOL: 0
SINUS PAIN: 0
VOICE CHANGE: 0
SINUS PRESSURE: 0
FACIAL SWELLING: 0

## 2022-11-29 NOTE — PROGRESS NOTES
[address] [facility] [address], [address]        [facility], [address] [facility]        Meseret Jordan: [phone]        F: [phone]       [email or web address]            FOLLOW UP NOTE    Date of Service: 2022  Patient ID: Kem Avilez   : 1960  MRN: [id number]   Acct Number: [de-identified]       DATE OF SERVICE: 2022   LOCATION: [facility]  PROVIDER: Kenneth Munoz MD MS    FOLLOW UP PHYSICIANS: Dr. Kareen Rajan ([facility])    ASSESSMENT AND PLAN:  Cancer Staging  Primary squamous cell carcinoma of supraglottis ([facility] [address]  Staging form: Larynx - Supraglottis, AJCC 8th Edition  - Clinical stage from 2021: Stage III (cT3, cN0, cM0) - Signed by Jamila Xiong MD on 2021    - Mr. Roberto Johnston presents today for follow up for his supraglottic cancer  - He appears to be doing well, improving overall. He reports improving taste derangements, dry mouth, and hoarseness. He reports continued saliva buildup with associated occasional mild choking but denies choking or dysphagia otherwise. He also notes some numbness/tingling of left face/neck since shingles infection. Denies neck mobility issues. He also denies symptoms of hypothyroidism and would not like any thyroid labs drawn currently  - PET scan in May 2022 showed avid supraglottic density but scope and biopsy in 2022, as well as scope in 2022, suggested no cancer. Dr. Samara Echeverria in his note on 22 states there is no evidence of disease  - No concerning features on exam today, noted soft voice character stable  - The patient continues to follow with [facility] Dr. Samara Echeverria. Continue to follow with other providers. We will defer to [facility] for any further imaging for now; we may consider further imaging after his upcoming appointment with [facility]    The patient will return to clinic in 4 months, following ENT appointment, or sooner if clinically indicated.  They have our clinic number to call with any questions or concerns if needed. Thank you for allowing us to be a part of their care. HPI:  Oncology History Overview Note   Ranjan Su is a 59 y.o. male    Initially presented with a history significant for dysphonia for a period of approximately 8 months. He present to Dr. Juan Biggs (ENT [facility]) on 08/25/21, with further examination demonstrating a Right sided supraglottic laryngeal mass, noting an immobile right vocal fold (left vocal fold mobile) with mild interarytenoid pachydermia and post cricoid edema. CT neck was performed prior to this visit on 07/29/2021 which demonstrated a Right sided laryngeal mass, 2.8 cm, that extended to midline. He was taken for a DML on 08/30/21 with biopsy, intraoperative findings demonstrated a firm lesion of the right larynx, with submucosal fullness fo the right aryepiglottic fold posteriorly, extending down to the ipsilateral false cord, ventricle, and right true vocal cord, the lesion was noted to be necrotic in the deep aspect, supraglottic component mildly debulked, no apparent involvement of the right pyriform sinus. Pathology returned as invasive squamous cell carcinoma (SCC). 02/03/21 CT Abd/Pelv W Con    Impression:  No acute findings in the abdomen or pelvis        07/29/21 CT ST Neck W Con        10/27/2021 PET CT SKULL BASE TO MID THIGH    Impression   1. FDG avid thickening of the right vocal cord corresponding to known malignancy. Activity slightly inferior to the left vocal cord is also likely due to known malignancy. 2. No FDG avid metastatic disease. 03/15/2022 CT Head WO Cons    Impression       1. Mild atrophy. 2. Inflammatory changes involving the left frontal sinus, ethmoid and maxillary sinuses bilaterally. 3. Slight deformity of the right maxillary sinus posterolaterally. 4. Otherwise negative noncontrast CT scan of the brain. 03/15/2022 CT Cervical Spine WO Cons    Impression       1.  Cervical spondylosis especially at C5-6 and C6-7.   2. No acute fracture noted. 3. Bilateral carotid artery calcification. 4. Otherwise negative CT scan of the cervical spine. Estefania Turk 04/08/2022 CT Soft Tissue Neck W Cons    Impression       1. 2.4 x 2.5 x 1.2 cm area of abnormal soft tissue density in the supraglottic larynx suspicious for recurrent tumor. ENT correlation would be helpful. 2. Left vocal cord is displaced towards the midline. 3. Small lymph nodes especially in the posterior triangles the neck, inferior to the left parotid gland and in the suboccipital region on the left side. 4. Increased density in the skin and subcutaneous soft tissues possibly representing changes of radiation therapy. 5. Mild cervical spondylosis. 6. Opacification of the right maxillary sinus and right ethmoid air cells. 7. Bilateral carotid artery calcification. 8. Areas of increased density in the upper lung. It may represent areas of pneumonitis. 05/05/2022 PET CT SKULL BASE MID THIGH RESTAGE    Impression       Mildly FDG avid right supraglottic soft tissue density suspicious for malignancy. 05/11/2022 ENT VISIT [facility] (Dr. Sena Christian)                         Primary squamous cell carcinoma of supraglottis ([address]   9/21/2021 Initial Diagnosis    Primary squamous cell carcinoma of supraglottis ([address]     11/10/2021 - 12/30/2021 Radiation    Treatment Course Number: 1    Treatment Site (s) Modality Dose (cGy) Fractions Elapsed Days   Supraglottic larynx Primary, High Risk/Regional Lymph Nodes IMRT/SIB 7000/6300/5400 35 50   Cumulative Dose: 7000 cGy    Radiation therapy delivered under the care of Dr. Patricia Ortiz MD MS ([facility])        Chemotherapy    Systemic therapy given with concurrent radiation therapy under the care of Mj Joy ([address])     7/13/2022 Surgery    PROCEDURE:   1.  Direct laryngoscopy with biopsy    SURGEON: Jose J Palmer MD    ASSISTANT: Fiorella Henderson MD    ANESTHESIA: General    BLOOD LOSS: Minimal    COMPLICATIONS: None     SPECIMENS: None    INTRAOPERATIVE FINDINGS:   1. Expected post-radiation changes to the supraglottis, mild edema throughout  2. Right infraglottic region with cavitary area, though without lesions or masses concerning for recurrence, biopsied      Clinical History     Preop Diagnosis: Laryngeal cancer. Medical History: Diabetes mellitus. Essential hypertension, benign. Vocal cord cancer. Hyperlipidemia. GERD (gastroesophageal reflux disease). Pathologic Diagnosis     A. Vocal cord, right, biopsy:   Hyperplastic squamous mucosa with focal acute inflammation. COMMENT: GMS is negative. All controls show appropriate reactivity. All immunohistochemistry, in situ hybridization, and histochemical tests were developed by and are performed at the [address] [place] [address], [address]. All tests reported here, except those addressing HER2 overexpression as a predictive marker, have not been cleared by or approved by the [employer] and Drug Administration (FDA). The laboratory is regulated under CLIA as qualified to perform high-complexity testing. The tests are used for clinical purposes. They should not be regarded as investigational or for research. Electronically signed by Viola Martinez MD on 7/15/2022 at 11:59 AM    Microscopic Description     A microscopic examination was performed. Gross Description     The specimen is received in one properly labeled container with the patient's name and accession number. A.       The specimen is designated \"right vocal cord\" and consists of two irregularly-shaped tan soft tissue fragments measuring 0.3 and 0.7 cm in the greatest dimensions. TE 1         Malignant neoplasm of supraglottis ([address]   7/12/2022 Initial Diagnosis    Malignant neoplasm of supraglottis ([address]     7/13/2022 Surgery    PROCEDURE:   1.  Direct laryngoscopy with biopsy    SURGEON: Christina Del Angel MD    ASSISTANT: Dennise Carranza Cornelia Villalobos MD    ANESTHESIA: General    BLOOD LOSS: Minimal    COMPLICATIONS: None     SPECIMENS: None    INTRAOPERATIVE FINDINGS:   1. Expected post-radiation changes to the supraglottis, mild edema throughout  2. Right infraglottic region with cavitary area, though without lesions or masses concerning for recurrence, biopsied      Clinical History     Preop Diagnosis: Laryngeal cancer. Medical History: Diabetes mellitus. Essential hypertension, benign. Vocal cord cancer. Hyperlipidemia. GERD (gastroesophageal reflux disease). Pathologic Diagnosis     A. Vocal cord, right, biopsy:   Hyperplastic squamous mucosa with focal acute inflammation. COMMENT: GMS is negative. All controls show appropriate reactivity. All immunohistochemistry, in situ hybridization, and histochemical tests were developed by and are performed at the [address] [place] [address], [facility] [address]. All tests reported here, except those addressing HER2 overexpression as a predictive marker, have not been cleared by or approved by the [employer] and Drug Administration (FDA). The laboratory is regulated under CLIA as qualified to perform high-complexity testing. The tests are used for clinical purposes. They should not be regarded as investigational or for research. Electronically signed by Lazarus Martyr, MD on 7/15/2022 at 11:59 AM    Microscopic Description     A microscopic examination was performed. Gross Description     The specimen is received in one properly labeled container with the patient's name and accession number. A.       The specimen is designated \"right vocal cord\" and consists of two irregularly-shaped tan soft tissue fragments measuring 0.3 and 0.7 cm in the greatest dimensions. TE 1             INTERVAL HISTORY:  Soledad Boucher is a 57 y.o. male is presenting today for regularly scheduled follow up regarding the above mentioned oncologic history.     Review of Systems   Constitutional:  Positive for unexpected weight change (Weight gain with improved appetite). Negative for activity change, appetite change and fatigue. HENT:  Negative for congestion, ear pain, facial swelling, hearing loss, nosebleeds, sinus pressure, sinus pain, sore throat, tinnitus, trouble swallowing and voice change. Eyes:  Negative for visual disturbance. Respiratory:  Negative for cough and shortness of breath. Cardiovascular:  Negative for chest pain. Gastrointestinal:  Negative for abdominal pain, blood in stool, nausea and rectal pain. Genitourinary:  Negative for dysuria, frequency and urgency. Musculoskeletal:  Negative for arthralgias, back pain, neck pain and neck stiffness. Neurological:  Negative for dizziness, facial asymmetry, speech difficulty, weakness, light-headedness, numbness and headaches. Hematological:  Negative for adenopathy. Psychiatric/Behavioral:  Negative for confusion. A complete review of systems was performed and found to be negative except as presented above.     CHAPERONE: na    PAIN: 0/10    LABORATORY STUDIES:  Onc labs:   Lab Results   Component Value Date/Time    PSA 1.06 09/29/2021 02:03 PM       MEDICATIONS:   Current Outpatient Medications   Medication Sig Dispense Refill    midodrine (PROAMATINE) 5 MG tablet Take 1 tablet by mouth 3 times daily (with meals) 90 tablet 2    potassium chloride (KLOR-CON M) 20 MEQ extended release tablet Take 1 tablet by mouth 2 times daily for 3 days 6 tablet 0    EAR DROPS 6.5 % otic solution instill 5-10 DROPS into each ear twice a day for 4 days      carbamide peroxide (DEBROX) 6.5 % otic solution Debrox 6.5% Otic Solution 02/16/2022 Provider: Mirian MALIK      metoprolol tartrate (LOPRESSOR) 25 MG tablet Take 1 tablet by mouth 2 times daily (Patient not taking: Reported on 5/20/2022) 60 tablet 0    insulin glargine (LANTUS SOLOSTAR) 100 UNIT/ML injection pen Inject 35 Units into the skin nightly (Patient taking differently: Inject 30 Units into the skin nightly) 1 pen 0    sodium chloride 1 g tablet Take 1 tablet by mouth 3 times daily (with meals) 90 tablet 3    lidocaine viscous hcl (XYLOCAINE) 2 % SOLN solution Take 5 mLs by mouth as needed for Irritation 100 mL 3    prochlorperazine (COMPAZINE) 10 MG tablet Take 1 tablet by mouth every 6 hours as needed (nausea) 120 tablet 3    vitamin D (CHOLECALCIFEROL) 125 MCG (5000 UT) CAPS capsule Take 125 mcg by mouth daily      gabapentin (NEURONTIN) 300 MG capsule 300 mg 3 times daily. glimepiride (AMARYL) 4 MG tablet TAKE ONE TABLET BY MOUTH TWICE DAILY 30 tablet 0    metFORMIN (GLUCOPHAGE) 1000 MG tablet Take 1 tablets twice a day 60 tablet 3    aspirin 81 MG tablet Take 1 tablet by mouth daily. 30 tablet 11    atorvastatin (LIPITOR) 20 MG tablet Take 1 tablet by mouth daily. 30 tablet 6    omeprazole (PRILOSEC) 20 MG capsule Take 1 capsule by mouth daily. 30 capsule 6    gemfibrozil (LOPID) 600 MG tablet Take 1 tablet by mouth 2 times daily (before meals). 60 tablet 5    Insulin Pen Needle ([employer] PEN NEEDLES) 31G X 6 MM MISC by Does not apply route. 100 each 3    MULTIPLE VITAMIN PO Take 1 tablet by mouth daily. No current facility-administered medications for this encounter. PHYSICAL EXAMINATION:  VITAL SIGNS: BP (!) 145/78   Pulse 93   Temp 98 °F (36.7 °C) (Infrared)   Resp 18   Wt 156 lb 6.4 oz (70.9 kg)   SpO2 97%   BMI 22.44 kg/m²     ECO - Asymptomatic (Fully active, able to carry on all pre-disease activities without restriction)    Physical Exam  Constitutional:       General: He is not in acute distress. Appearance: Normal appearance. HENT:      Head: Normocephalic and atraumatic. Comments: Oral mucosa without lesions or exudate  Neck:      Comments: Scar tissue present over left neck  Pulmonary:      Effort: Pulmonary effort is normal. No respiratory distress.    Abdominal:      General: Abdomen is flat.   Lymphadenopathy:      Cervical: No cervical adenopathy. Neurological:      Mental Status: He is alert and oriented to person, place, and time. Electronically signed by Mireya Decker MD MS on 11/29/22 at 12:50 PM EST     ATTESTATION: 20 minutes were spent with the patient at today's visit reviewing pertinent information related to their oncologic diagnosis, including any recent labs, imaging, follow ups and plan of care going forward.     CC:Dr. Siva Roman ([facility])  ACC:. [facility]

## 2023-03-24 ENCOUNTER — HOSPITAL ENCOUNTER (OUTPATIENT)
Dept: RADIATION ONCOLOGY | Age: 63
Discharge: HOME | End: 2023-03-24

## 2024-04-30 ENCOUNTER — HOSPITAL ENCOUNTER (OUTPATIENT)
Dept: RADIATION ONCOLOGY | Age: 64
Discharge: HOME | End: 2024-04-30
Payer: OTHER GOVERNMENT

## 2024-04-30 VITALS
DIASTOLIC BLOOD PRESSURE: 92 MMHG | WEIGHT: 164 LBS | SYSTOLIC BLOOD PRESSURE: 170 MMHG | BODY MASS INDEX: 23.53 KG/M2 | TEMPERATURE: 98.1 F | OXYGEN SATURATION: 98 % | RESPIRATION RATE: 18 BRPM | HEART RATE: 83 BPM

## 2024-04-30 DIAGNOSIS — C32.1 PRIMARY SQUAMOUS CELL CARCINOMA OF SUPRAGLOTTIS (HCC): Primary | ICD-10-CM

## 2024-04-30 PROCEDURE — 99213 OFFICE O/P EST LOW 20 MIN: CPT

## 2024-04-30 PROCEDURE — 99212 OFFICE O/P EST SF 10 MIN: CPT

## 2024-04-30 ASSESSMENT — ENCOUNTER SYMPTOMS
NAUSEA: 0
TROUBLE SWALLOWING: 0
SINUS PRESSURE: 0
RECTAL PAIN: 0
SINUS PAIN: 0
FACIAL SWELLING: 0
BLOOD IN STOOL: 0
SORE THROAT: 1
VOICE CHANGE: 0
SHORTNESS OF BREATH: 0
BACK PAIN: 0
ABDOMINAL PAIN: 0
COUGH: 1

## 2024-04-30 NOTE — PROGRESS NOTES
[facility]           [address], [address]        [place] [id number]        O: [phone]        F: [phone]       [employer]            FOLLOW UP NOTE    Date of Service: 2024  Patient ID: Catracho Melchor   : 1960  MRN: [id number]   Acct Number: [id number]       DATE OF SERVICE: 2024   LOCATION: [facility]  PROVIDER: Cheri Wilson PA-C    FOLLOW UP PHYSICIANS:  Dr. Rohith Baker ([facility])    ASSESSMENT AND PLAN:   Cancer Staging   Primary squamous cell carcinoma of supraglottis (HCC)  Staging form: Larynx - Supraglottis, AJCC 8th Edition  - Clinical stage from 2021: Stage III (cT3, cN0, cM0) - Signed by Jasson Lassiter MD on 2021    - Catracho Melchor is a 64 y.o. male who presents today for regularly-scheduled follow-up for his supraglottic cancer. He completed radiation to his supraglottis and lymph nodes on 21  - The patient appears to be doing well. He notes a sore throat, nasal congestion, and neck tenderness this week secondary to a URI, denies these symptoms otherwise. He states his dysphagia, voice quality, foreign body sensation of the throat, taste derangements, dry mouth, thick secretions with cough are all chronic and improving since radiation. He denies lumps, shortness of breath, chest pain, fatigue, weight changes, loss of appetite, or any other symptoms on complete review of systems  - No concerning findings for recurrence on limited head and neck exam today  - The patient's most recent scope on 23 showed no concerning findings per ENT note  - Continue to follow with all other medical providers. Patient has not been following closely with [facility], he requests local ENT referral for easier follow up. Referral placed. Recommended he follow with dentist as well  - We will order CT neck, CT chest, and TSH with reflex T4. I will see him back in the office or call him to go over these

## 2024-05-16 ENCOUNTER — OFFICE VISIT (OUTPATIENT)
Dept: ENT CLINIC | Age: 64
End: 2024-05-16
Payer: MEDICARE

## 2024-05-16 VITALS
HEIGHT: 70 IN | OXYGEN SATURATION: 98 % | DIASTOLIC BLOOD PRESSURE: 70 MMHG | TEMPERATURE: 97.1 F | RESPIRATION RATE: 18 BRPM | SYSTOLIC BLOOD PRESSURE: 138 MMHG | WEIGHT: 168.6 LBS | BODY MASS INDEX: 24.14 KG/M2 | HEART RATE: 85 BPM

## 2024-05-16 DIAGNOSIS — Z92.3 STATUS POST CHEMORADIATION: ICD-10-CM

## 2024-05-16 DIAGNOSIS — Z92.21 STATUS POST CHEMORADIATION: ICD-10-CM

## 2024-05-16 DIAGNOSIS — C32.1 PRIMARY SQUAMOUS CELL CARCINOMA OF SUPRAGLOTTIS (HCC): Primary | ICD-10-CM

## 2024-05-16 PROCEDURE — 99214 OFFICE O/P EST MOD 30 MIN: CPT | Performed by: OTOLARYNGOLOGY

## 2024-05-16 PROCEDURE — 3075F SYST BP GE 130 - 139MM HG: CPT | Performed by: OTOLARYNGOLOGY

## 2024-05-16 PROCEDURE — 3078F DIAST BP <80 MM HG: CPT | Performed by: OTOLARYNGOLOGY

## 2024-05-16 PROCEDURE — 31575 DIAGNOSTIC LARYNGOSCOPY: CPT | Performed by: OTOLARYNGOLOGY

## 2024-05-16 PROCEDURE — 1123F ACP DISCUSS/DSCN MKR DOCD: CPT | Performed by: OTOLARYNGOLOGY

## 2024-06-17 ENCOUNTER — HOSPITAL ENCOUNTER (OUTPATIENT)
Dept: CT IMAGING | Age: 64
Discharge: HOME | End: 2024-06-17
Payer: MEDICARE

## 2024-06-17 DIAGNOSIS — C32.1 PRIMARY SQUAMOUS CELL CARCINOMA OF SUPRAGLOTTIS (HCC): ICD-10-CM

## 2024-06-17 LAB — POC CREATININE WHOLE BLOOD: 0.9 MG/DL (ref 0.5–1.2)

## 2024-06-17 PROCEDURE — 70491 CT SOFT TISSUE NECK W/DYE: CPT

## 2024-06-17 PROCEDURE — 6360000004 HC RX CONTRAST MEDICATION

## 2024-06-17 PROCEDURE — 71260 CT THORAX DX C+: CPT

## 2024-06-17 PROCEDURE — 82565 ASSAY OF CREATININE: CPT

## 2024-06-17 RX ADMIN — IOPAMIDOL 85 ML: 755 INJECTION, SOLUTION INTRAVENOUS at 14:08

## 2024-06-19 DIAGNOSIS — D71 GRANULOMATOUS INTERSTITIAL LUNG DISEASE (HCC): ICD-10-CM

## 2024-06-19 DIAGNOSIS — C32.1 MALIGNANT NEOPLASM OF SUPRAGLOTTIS (HCC): Primary | ICD-10-CM

## 2024-06-19 DIAGNOSIS — J84.89 GRANULOMATOUS INTERSTITIAL LUNG DISEASE (HCC): ICD-10-CM

## 2024-07-01 ENCOUNTER — HOSPITAL ENCOUNTER (OUTPATIENT)
Dept: PET IMAGING | Age: 64
Discharge: HOME | End: 2024-07-01
Payer: MEDICARE

## 2024-07-01 DIAGNOSIS — C32.1 MALIGNANT NEOPLASM OF SUPRAGLOTTIS (HCC): ICD-10-CM

## 2024-07-01 PROCEDURE — 3430000000 HC RX DIAGNOSTIC RADIOPHARMACEUTICAL

## 2024-07-01 PROCEDURE — 78815 PET IMAGE W/CT SKULL-THIGH: CPT

## 2024-07-01 PROCEDURE — A9609 HC RX DIAGNOSTIC RADIOPHARMACEUTICAL: HCPCS

## 2024-07-01 RX ORDER — FLUDEOXYGLUCOSE F 18 200 MCI/ML
12.1 INJECTION, SOLUTION INTRAVENOUS
Status: COMPLETED | OUTPATIENT
Start: 2024-07-01 | End: 2024-07-01

## 2024-07-01 RX ADMIN — FLUDEOXYGLUCOSE F 18 12.1 MILLICURIE: 200 INJECTION, SOLUTION INTRAVENOUS at 08:12

## 2024-07-03 DIAGNOSIS — R91.1 PULMONARY NODULE: Primary | ICD-10-CM

## 2024-11-04 ENCOUNTER — LAB (OUTPATIENT)
Dept: LAB | Age: 64
End: 2024-11-04

## 2024-11-04 ENCOUNTER — OFFICE VISIT (OUTPATIENT)
Dept: PULMONOLOGY | Age: 64
End: 2024-11-04
Payer: MEDICARE

## 2024-11-04 VITALS
DIASTOLIC BLOOD PRESSURE: 68 MMHG | WEIGHT: 168.2 LBS | OXYGEN SATURATION: 94 % | SYSTOLIC BLOOD PRESSURE: 134 MMHG | HEIGHT: 70 IN | BODY MASS INDEX: 24.08 KG/M2 | TEMPERATURE: 98.3 F | HEART RATE: 93 BPM

## 2024-11-04 DIAGNOSIS — R93.89 ABNORMAL CT OF THE CHEST: ICD-10-CM

## 2024-11-04 DIAGNOSIS — Z92.3 STATUS POST CHEMORADIATION: ICD-10-CM

## 2024-11-04 DIAGNOSIS — Z92.21 STATUS POST CHEMORADIATION: ICD-10-CM

## 2024-11-04 DIAGNOSIS — R91.1 PULMONARY NODULE: ICD-10-CM

## 2024-11-04 DIAGNOSIS — R91.1 PULMONARY NODULE: Primary | ICD-10-CM

## 2024-11-04 DIAGNOSIS — C32.1 MALIGNANT NEOPLASM OF SUPRAGLOTTIS (HCC): ICD-10-CM

## 2024-11-04 LAB — RHEUMATOID FACT SERPL-ACNC: < 10 IU/ML (ref 0–13)

## 2024-11-04 PROCEDURE — 3078F DIAST BP <80 MM HG: CPT | Performed by: INTERNAL MEDICINE

## 2024-11-04 PROCEDURE — 3075F SYST BP GE 130 - 139MM HG: CPT | Performed by: INTERNAL MEDICINE

## 2024-11-04 PROCEDURE — 1159F MED LIST DOCD IN RCRD: CPT | Performed by: INTERNAL MEDICINE

## 2024-11-04 PROCEDURE — 99204 OFFICE O/P NEW MOD 45 MIN: CPT | Performed by: INTERNAL MEDICINE

## 2024-11-04 PROCEDURE — 1123F ACP DISCUSS/DSCN MKR DOCD: CPT | Performed by: INTERNAL MEDICINE

## 2024-11-04 NOTE — PATIENT INSTRUCTIONS
Recommendations/Plan:  -Will send following connective serology as a part of connective tissue work up for ILD today  -Send Serum Antinuclear antibody (MORTEZA).  -Send Rheumatoid factor(RA).  -Send Fungal serologies.  -Send urine for histoplasma antigen.  -Send serum c-ANCA and p-ANCA.  -Send Quantiferon Gold test to further evaluate for latent Tuberculosis Infection.  -Schedule patient for full pulmonary function tests before clinic visit.  -He is currently waiting for CT scan of chest with IV contrast ordered by his radiation oncologist Ms. Cheri Wilson PA-C on 16th November 2024 to follow the above described 1.3 cm right upper lobe lung nodule.  He was advised to keep his scheduled appointment for the planned CT scan of chest without fail  -Schedule patient for follow up with my clinic in 2 to 3 weeks with recommended test/s I.e CT scan of chest with contrast scheduled on 16 November 2024, above lab work and full PFTs. Patient advised to make early appointment if needed.   -Catracho Melchor was advised to make early appointment with my clinic if he develops any constitutional symptoms including loss of weight, poor appetite or hemoptysis. He verbalizes under standing.  - Patient educated about my impression and plan. Patient verbalizes understanding.

## 2024-11-04 NOTE — PROGRESS NOTES
[facility]  Pulmonary medicine clinic initial consult note.      Patient: Catracho Melchor  : 1960      Chief complaint/[place]:     Catracho Melchor is a 64 y.o. old male came for further evaluation regarding abnormal chest Xray/CT chest  with referral from Cheri Stein PA-C      He underwent chest Xray/CT chest on:  The above chest Xray was performed at:      He is having shortness of breath: Yes  Onset: Gradual  Duration:{NUMBERS 0-12:}{DURATION:}.  Diurnal variation:  None.  Worse {Time; morning/afternoon/evenin::\"in the morning\"}  Functional status prior to beginning of symptoms: Distance he can walk on level ground: {NUMBERS; 100-1000 :25881} feet.  Current functional capacity on level ground: Distance he can walk on level ground: {NUMBERS; 100-1000 :03619} feet.  He can climb steps: {YES/NO:::\"No\"}  Flights of steps he can climb: {NUMBERS; 0-10:5044}  He gives a history of orthopnea:{YES/NO:::\"Yes\"}  He gives a history of paroxysmal nocturnal dyspnea:{YES/NO:::\"Yes\"}     He is having cough: {YES/NO:::\"Yes\"}  Duration of cough in Days: {NUMBERS 1-30:458448910}   His cough is associated with sputum production: {YES/NO:::\"Yes\"}   The sputum color: {COLOR:1916136230::\"clear\"}  Hemoptysis:{YES/NO:::\"No\"}  Diurnal variation: None.     Worse {Time; morning/afternoon/evenin::\"in the morning\"}    He was ever treated with Amiodarone,Methotrexate or Macrobid (Nitrofurantoin) in the past: {YES/NO:}.  He ever exposed to farm/grain dust in the past:{YES/NO:}.  He ever diagnosed with connective tissue diseases including Systemic lupus Erythematosus, Rheumatoid arthritis or Sarcoidosis etc in the past:{YES/NO:}.  He ever exposed to birds or exotic animals or old furniture in the past:{YES/NO:}.  He ever exposed to Silicon or Asbestos in the past:{YES/NO:}.  His family member ever 
Neck Circumference -   14.75 inches  Mallampati - 4    Lung Nodule Screening     [] Qualifies    [x] Does not qualify   [] Declined    [] Completed    
Silicon or Asbestos in the past:Yes.  His family member ever diagnosed with interstitial lung disease in the past:No.  He ever gave a history suggestive of Aspiration for food or liquid in the past:No.  He ever underwent radiation therapy in the past:Yes.  He ever diagnosed with COVID-19 infection in the past:Yes.    He was ever seen by a pulmonologist in the past: yes - a year ago  Ever underwent bronchoscopy with biopsy in the past: no  Ever underwent open lung biopsy in the past: no.    He is currently using any oxygen supplementation at rest, exercise or during sleep/at night time: No    He was ever diagnosed with following pulmonary diseases:  Bronchial asthma: NO  COPD:NO  Pulmonary fibrosis:NO  Sarcoidosis:NO  Pulmonary embolism: NO  History of DVT in the past: NO     Pulmonary hypertension:NO  Pleural effusion/s in the past:NO    He ever diagnosed with pulmonary tuberculosis in the past:NO  He ever recently exposed to any patients with tuberculosis:NO  He ever recently travelled to endemic places of tuberculosis:NO  His ever tested for PPD in the past: NO        Social History:  Occupation:  He is current working: Yes  Type of profession: , mows lawns.                     Social History     Tobacco Use    Smoking status: Never     Passive exposure: Never    Smokeless tobacco: Never    Tobacco comments:     Recently started cigars but quit   Vaping Use    Vaping status: Never Used   Substance Use Topics    Alcohol use: Yes     Alcohol/week: 1.0 standard drink of alcohol     Types: 1 Shots of liquor per week     Comment: occasionaly    Drug use: No       History of recreational or IV drug use in the past:NO  History of Alcohol use in the past:Yes     History of exposure to coal mines/coal dust: NO  History of exposure to foundry dust/welding: NO  History of exposure to quarry/silica/sandblasting: NO  History of exposure to asbestos/working with breaks/ships: NO  History of exposure to farm dust:

## 2024-11-06 LAB
ANCA AB PATTERN SER IF-IMP: NORMAL
ANCA IGG TITR SER IF: NORMAL {TITER}
GAMMA INTERFERON BACKGROUND BLD IA-ACNC: 0.52 IU/ML
H CAPSUL AG UR QL IA: NOT DETECTED
H CAPSUL AG UR-MCNC: NOT DETECTED NG/ML
M TB IFN-G BLD-IMP: NEGATIVE
M TB IFN-G CD4+ BCKGRND COR BLD-ACNC: 0 IU/ML
M TB IFN-G CD4+CD8+ BCKGRND COR BLD-ACNC: 0.01 IU/ML
MITOGEN IGNF BCKGRD COR BLD-ACNC: 9.48 IU/ML
NUCLEAR IGG SER QL IA: DETECTED

## 2024-11-07 LAB
ANA PAT SER IF-IMP: ABNORMAL
ANA PAT SER IF-IMP: ABNORMAL
NUCLEAR IGG SER QL IF: DETECTED
NUCLEAR IGG TITR SER IF: ABNORMAL {TITER}

## 2024-11-09 LAB
ASPERGILLUS AB TITR SER CF: ABNORMAL {TITER}
B DERMAT AB SER-ACNC: 1.4 IV
COCCIDIOIDES AB TITR SER CF: ABNORMAL {TITER}
H CAPSUL MYC AB TITR SER CF: ABNORMAL {TITER}
H CAPSUL YST AB TITR SER CF: ABNORMAL {TITER}

## 2024-11-15 ENCOUNTER — HOSPITAL ENCOUNTER (OUTPATIENT)
Dept: PULMONOLOGY | Age: 64
Discharge: HOME | End: 2024-11-15
Payer: MEDICARE

## 2024-11-15 DIAGNOSIS — R91.1 PULMONARY NODULE: ICD-10-CM

## 2024-11-15 PROCEDURE — 94729 DIFFUSING CAPACITY: CPT

## 2024-11-15 PROCEDURE — 94726 PLETHYSMOGRAPHY LUNG VOLUMES: CPT

## 2024-11-15 PROCEDURE — 94060 EVALUATION OF WHEEZING: CPT

## 2024-11-16 ENCOUNTER — HOSPITAL ENCOUNTER (OUTPATIENT)
Dept: CT IMAGING | Age: 64
Discharge: HOME | End: 2024-11-16
Payer: MEDICARE

## 2024-11-16 DIAGNOSIS — R91.1 PULMONARY NODULE: ICD-10-CM

## 2024-11-16 LAB — POC CREATININE WHOLE BLOOD: 1 MG/DL (ref 0.5–1.2)

## 2024-11-16 PROCEDURE — 71260 CT THORAX DX C+: CPT

## 2024-11-16 PROCEDURE — 82565 ASSAY OF CREATININE: CPT

## 2024-11-16 PROCEDURE — 6360000004 HC RX CONTRAST MEDICATION

## 2024-11-16 RX ORDER — IOPAMIDOL 755 MG/ML
80 INJECTION, SOLUTION INTRAVASCULAR
Status: COMPLETED | OUTPATIENT
Start: 2024-11-16 | End: 2024-11-16

## 2024-11-16 RX ADMIN — IOPAMIDOL 80 ML: 755 INJECTION, SOLUTION INTRAVENOUS at 11:04

## 2024-11-19 ENCOUNTER — OFFICE VISIT (OUTPATIENT)
Dept: ENT CLINIC | Age: 64
End: 2024-11-19

## 2024-11-19 VITALS
DIASTOLIC BLOOD PRESSURE: 82 MMHG | TEMPERATURE: 97.5 F | HEART RATE: 92 BPM | SYSTOLIC BLOOD PRESSURE: 150 MMHG | BODY MASS INDEX: 24.12 KG/M2 | RESPIRATION RATE: 18 BRPM | WEIGHT: 168.5 LBS | OXYGEN SATURATION: 97 % | HEIGHT: 70 IN

## 2024-11-19 DIAGNOSIS — Z92.3 STATUS POST CHEMORADIATION: ICD-10-CM

## 2024-11-19 DIAGNOSIS — C32.1 PRIMARY SQUAMOUS CELL CARCINOMA OF SUPRAGLOTTIS (HCC): Primary | ICD-10-CM

## 2024-11-19 DIAGNOSIS — Z92.21 STATUS POST CHEMORADIATION: ICD-10-CM

## 2024-11-19 NOTE — PROGRESS NOTES
[facility]  [facility]  [address]  [address]  [facility] [address]  Dept: [phone]  Dept Fax: [phone]  Loc: [phone]    Catracho Melchor is a 64 y.o. male who was referred by No ref. provider found for:  Chief Complaint   Patient presents with    Follow-up     Patient is here today for a 6 mo f/u. Patient states that things have not been bad he has just been having a problem with excess phlegm. He is not sure if its from his lungs or his throat and states that his throat has been a little sore.    .    HPI:     Catracho Melchor is a 64 y.o. male who presents today for follow-up on his laryngeal carcinoma.  We have incomplete records available.  We did perform a video laryngoscopy 6 months ago.  He does have to clear his throat a lot.,  He is hoarse, and immobile right true vocal cord was noted a couple of years ago.  He had a PET/CT in July of this year which did not show increased activity in the region of the larynx or neck.    History:     Allergies   Allergen Reactions    Lisinopril Swelling     Angioedema of the face/lips  Other reaction(s): cough  Pt unsure but thinks he is allergic to this       Current Outpatient Medications   Medication Sig Dispense Refill    Insulin Aspart (NOVOLOG IJ) Inject as directed      insulin glargine (LANTUS SOLOSTAR) 100 UNIT/ML injection pen Inject 35 Units into the skin nightly (Patient taking differently: Inject 30 Units into the skin nightly) 1 pen 0    gemfibrozil (LOPID) 600 MG tablet Take 1 tablet by mouth 2 times daily (before meals). 60 tablet 5    Insulin Pen Needle ([employer] PEN NEEDLES) 31G X 6 MM MISC by Does not apply route. 100 each 3    Dulaglutide (TRULICITY SC) Inject into the skin Once weekly      vitamin D (CHOLECALCIFEROL) 125 MCG (5000 UT) CAPS capsule Take 125 mcg by mouth daily      gabapentin (NEURONTIN) 300 MG capsule 1 capsule 3 times daily.      aspirin 81 MG tablet Take 1 tablet by

## 2024-11-20 ENCOUNTER — HOSPITAL ENCOUNTER (OUTPATIENT)
Dept: RADIATION ONCOLOGY | Age: 64
Discharge: HOME | End: 2024-11-20
Payer: MEDICARE

## 2024-11-20 VITALS
HEART RATE: 95 BPM | DIASTOLIC BLOOD PRESSURE: 64 MMHG | OXYGEN SATURATION: 96 % | SYSTOLIC BLOOD PRESSURE: 142 MMHG | RESPIRATION RATE: 18 BRPM | WEIGHT: 166 LBS | BODY MASS INDEX: 23.82 KG/M2 | TEMPERATURE: 98.4 F

## 2024-11-20 DIAGNOSIS — C32.1 MALIGNANT NEOPLASM OF SUPRAGLOTTIS (HCC): Primary | ICD-10-CM

## 2024-11-20 DIAGNOSIS — R91.1 LUNG NODULE: ICD-10-CM

## 2024-11-20 PROCEDURE — 99214 OFFICE O/P EST MOD 30 MIN: CPT

## 2024-11-20 PROCEDURE — 99212 OFFICE O/P EST SF 10 MIN: CPT

## 2024-11-20 ASSESSMENT — ENCOUNTER SYMPTOMS
SINUS PAIN: 0
BLOOD IN STOOL: 0
SHORTNESS OF BREATH: 0
TROUBLE SWALLOWING: 1
SORE THROAT: 1
FACIAL SWELLING: 0
NAUSEA: 0
RECTAL PAIN: 0
BACK PAIN: 0
SINUS PRESSURE: 0
COUGH: 1
ABDOMINAL PAIN: 0
VOICE CHANGE: 1

## 2024-11-20 NOTE — PROGRESS NOTES
mesenteric, retroperitoneal, pelvic or inguinal lymphadenopathy.  Degenerative changes are present in the lumbar spine and pelvis without evidence  of hypermetabolic osseous metastatic disease.     IMPRESSION:  New, mildly FDG avid right upper lobe lung nodule. The degree of activity is  suggestive of a benign process. However, low-grade or slow-growing malignancies  may demonstrate this level of activity and follow-up CT of the chest in 3 months  is recommended.     11/16/2024 Imaging    CT chest    IMPRESSION:  1. The previously described nodule in the right upper lobe has totally resolved.  2. Evidence for old healed granulomatous disease. There are a few tiny  subcentimeter nodules in the left lower lobe, likely poorly calcified  granulomas. There is also a new 11 mm semisolid nodule in the right lower lobe,  likely an inflammatory nodule.. Mild atelectasis/pneumonitis left lower lobe.  3. Follow-up CT thorax recommended in 6 months to assess for nodule stability.         INTERVAL HISTORY:  Catracho Melchor is a 64 y.o. male is presenting today for regularly scheduled follow up regarding the above mentioned oncologic history.    Review of Systems   Constitutional:  Negative for activity change, appetite change, fatigue and unexpected weight change.   HENT:  Positive for sore throat, trouble swallowing and voice change (Baseline hoarseness, improving). Negative for congestion, ear pain, facial swelling, hearing loss, nosebleeds, sinus pressure, sinus pain and tinnitus.    Eyes:  Negative for visual disturbance.   Respiratory:  Positive for cough. Negative for shortness of breath.    Cardiovascular:  Negative for chest pain.   Gastrointestinal:  Negative for abdominal pain, blood in stool, nausea and rectal pain.   Genitourinary:  Positive for frequency and urgency. Negative for dysuria.   Musculoskeletal:  Positive for neck stiffness. Negative for arthralgias, back pain and neck pain.   Neurological:  Positive for

## 2024-11-20 NOTE — PROGRESS NOTES
[facility]  Pulmonary medicine clinic follow-up note.      Patient: Catracho Melchor  : 1960      Chief complaint/[place]:     Catracho Melchor is a 64 y.o. 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     He was initially referred from Cheri Stein PA-C.    He had a history of dysphonia - first evaluated on 21 by Dr. Gilliam at [facility]. At that time a right submucosal mass of the right AE fold was noted along with right vocal fold fixation. 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    On today's questioning:  He admits to cough with yellow sputum production for >10 days  He denies hemoptysis.  He denies fever or chills.   He denies recent hospitalizations or emergency room visits.     He is currently not using any inhalers    He denies recent loss of weight or appetite changes.   He denies recent decline in functional status.    He is currently using any oxygen supplementation at rest, exercise or during sleep/at night time: No    He was ever diagnosed with following pulmonary diseases:  Bronchial asthma: NO  COPD:NO  Pulmonary fibrosis:NO  Sarcoidosis:NO  Pulmonary embolism: NO  History of DVT in the past: NO     Pulmonary hypertension:NO  Pleural effusion/s in the past:NO    He ever diagnosed with pulmonary tuberculosis in the past:NO  He ever recently exposed to any patients with tuberculosis:NO  He ever recently travelled to endemic places of tuberculosis:NO  His ever tested for PPD in the past: NO        Social History:  Occupation:  He is current working: Yes  Type of profession: , mows lawns.                     Social History     Tobacco Use    Smoking status: Never     Passive exposure: Never    Smokeless tobacco:

## 2024-11-21 NOTE — PROGRESS NOTES
Neck Circumference -   14.5 inches  Mallampati - 4    Lung Nodule Screening     [] Qualifies    [x] Does not qualify   [] Declined    [] Completed

## 2024-11-25 ENCOUNTER — OFFICE VISIT (OUTPATIENT)
Dept: PULMONOLOGY | Age: 64
End: 2024-11-25
Payer: MEDICARE

## 2024-11-25 VITALS
HEART RATE: 86 BPM | SYSTOLIC BLOOD PRESSURE: 180 MMHG | WEIGHT: 171 LBS | BODY MASS INDEX: 24.48 KG/M2 | DIASTOLIC BLOOD PRESSURE: 104 MMHG | TEMPERATURE: 97.7 F | HEIGHT: 70 IN | OXYGEN SATURATION: 96 %

## 2024-11-25 DIAGNOSIS — R91.8 MULTIPLE LUNG NODULES ON CT: Primary | ICD-10-CM

## 2024-11-25 DIAGNOSIS — J18.9 PNEUMONIA OF LEFT LOWER LOBE DUE TO INFECTIOUS ORGANISM: ICD-10-CM

## 2024-11-25 DIAGNOSIS — C32.1 MALIGNANT NEOPLASM OF SUPRAGLOTTIS (HCC): ICD-10-CM

## 2024-11-25 DIAGNOSIS — R93.89 ABNORMAL CT OF THE CHEST: ICD-10-CM

## 2024-11-25 DIAGNOSIS — R91.1 INCIDENTAL LUNG NODULE, GREATER THAN OR EQUAL TO 8MM: ICD-10-CM

## 2024-11-25 PROCEDURE — 3077F SYST BP >= 140 MM HG: CPT | Performed by: INTERNAL MEDICINE

## 2024-11-25 PROCEDURE — 99214 OFFICE O/P EST MOD 30 MIN: CPT | Performed by: INTERNAL MEDICINE

## 2024-11-25 PROCEDURE — 3080F DIAST BP >= 90 MM HG: CPT | Performed by: INTERNAL MEDICINE

## 2024-11-25 PROCEDURE — 1159F MED LIST DOCD IN RCRD: CPT | Performed by: INTERNAL MEDICINE

## 2024-11-25 PROCEDURE — 1123F ACP DISCUSS/DSCN MKR DOCD: CPT | Performed by: INTERNAL MEDICINE

## 2024-11-25 RX ORDER — AZITHROMYCIN 250 MG/1
TABLET, FILM COATED ORAL
Qty: 6 TABLET | Refills: 0 | Status: SHIPPED | OUTPATIENT
Start: 2024-11-25 | End: 2024-12-05

## 2024-11-25 RX ORDER — CEFDINIR 300 MG/1
300 CAPSULE ORAL 2 TIMES DAILY
Qty: 14 CAPSULE | Refills: 0 | Status: SHIPPED | OUTPATIENT
Start: 2024-11-25 | End: 2024-12-02

## 2024-11-25 NOTE — PATIENT INSTRUCTIONS
Recommendations/Plan:  -Will treat patient with Omnicef 300 mg p.o. twice daily for 7 days along with a Z-Nito for his left lower lobe ?  Atypical pneumonia.  -Schedule patient for Infectious Disease consultation with Dr. Carloz Decker MD as soon as possible for further evaluation and management of Slightly elevated serum Blastomyces titer. His urine histoplasma antigen is negative- ? Significance.  He was found to have a new 11 mm lung nodule right upper lobe lung nodule  -Schedule patient for Rheumatology Consultation with Dr. Juana Gordillo MD/[facility] Rheumatology service as soon as possible for further evaluation of positive antinuclear antibody with a speckled pattern and a Titer 1: 160-elevated  -He is currently waiting for CT scan of chest with IV contrast ordered by his radiation oncologist Ms. Cheri Wilson PA-C on 13 May 2025 to follow the above described 11 mm right upper lobe semisolid lung nodule.  He was advised to keep his scheduled appointment for the planned CT scan of chest without fail  -Schedule patient for follow up with my clinic in last week of May 2025 with recommended test/s I.e CT scan of chest with contrast scheduled on 13 May 2025. Patient advised to make early appointment if needed.   -Catarcho SHAHNAZ Melchor was advised to make early appointment with my clinic if he develops any constitutional symptoms including loss of weight, poor appetite or hemoptysis. He verbalizes under standing.  -Patient and his wife were educated about my impression and plan.  They verbalizes understanding.

## 2024-12-04 ENCOUNTER — TELEPHONE (OUTPATIENT)
Dept: INFECTIOUS DISEASES | Age: 64
End: 2024-12-04

## 2024-12-16 ENCOUNTER — TELEPHONE (OUTPATIENT)
Dept: ENT CLINIC | Age: 64
End: 2024-12-16

## 2024-12-16 ENCOUNTER — TELEPHONE (OUTPATIENT)
Dept: INFECTIOUS DISEASES | Age: 64
End: 2024-12-16

## 2024-12-16 NOTE — TELEPHONE ENCOUNTER
----- Message from Dr. Sarthak Diaz MD sent at 12/15/2024 10:47 PM EST -----  Regarding: Call patient please  Find out when he goes back to [facility] to see Dr. Baker and let me know

## 2024-12-16 NOTE — TELEPHONE ENCOUNTER
Called number listed for patient, received message that the number had restrictions and wouldn't let the call go through.  No follow up appointment made.

## 2024-12-16 NOTE — TELEPHONE ENCOUNTER
Called Dr Baker's office. Patient was scheduled in November to see them but cancelled appointment and did not reschedule.   Encounter printed out to update Dr Diaz.

## 2025-01-15 ENCOUNTER — TELEPHONE (OUTPATIENT)
Dept: PULMONOLOGY | Age: 65
End: 2025-01-15

## 2025-01-15 NOTE — TELEPHONE ENCOUNTER
Patient never saw infectious disease, they were unable to get a hold of him. I called him but he has a restriction on his phone, so I left a message on his wife's voicemail.

## 2025-05-12 ENCOUNTER — TELEPHONE (OUTPATIENT)
Dept: PULMONOLOGY | Age: 65
End: 2025-05-12

## 2025-06-24 ENCOUNTER — HOSPITAL ENCOUNTER (OUTPATIENT)
Dept: RADIATION ONCOLOGY | Age: 65
Discharge: HOME | End: 2025-06-24
Payer: MEDICARE

## 2025-06-24 VITALS
OXYGEN SATURATION: 96 % | HEART RATE: 92 BPM | TEMPERATURE: 98.3 F | WEIGHT: 173 LBS | RESPIRATION RATE: 18 BRPM | BODY MASS INDEX: 24.82 KG/M2 | SYSTOLIC BLOOD PRESSURE: 190 MMHG | DIASTOLIC BLOOD PRESSURE: 86 MMHG

## 2025-06-24 DIAGNOSIS — C32.1 MALIGNANT NEOPLASM OF SUPRAGLOTTIS (HCC): Primary | ICD-10-CM

## 2025-06-24 PROCEDURE — 99212 OFFICE O/P EST SF 10 MIN: CPT

## 2025-06-24 PROCEDURE — 99214 OFFICE O/P EST MOD 30 MIN: CPT

## 2025-06-24 ASSESSMENT — ENCOUNTER SYMPTOMS
NAUSEA: 0
ABDOMINAL PAIN: 0
BACK PAIN: 0
SORE THROAT: 0
RECTAL PAIN: 0
VOICE CHANGE: 0
SINUS PRESSURE: 0
SHORTNESS OF BREATH: 0
TROUBLE SWALLOWING: 0
BLOOD IN STOOL: 0
COUGH: 1
SINUS PAIN: 0
FACIAL SWELLING: 0

## 2025-06-24 NOTE — PROGRESS NOTES
[facility]           [address], [address]        [place] [id number]        O: [phone]        F: [phone]       u[employer]amp            FOLLOW UP NOTE    Date of Service: 2025  Patient ID: Catracho Melchor   : 1960  MRN: [id number]   Acct Number: [id number]       DATE OF SERVICE: 2025   LOCATION: [facility]  PROVIDER: Cheri Wilson PA-C    FOLLOW UP PHYSICIANS: Aydee MCKAY (PCP)    ASSESSMENT AND PLAN:   Cancer Staging   Primary squamous cell carcinoma of supraglottis (HCC)  Staging form: Larynx - Supraglottis, AJCC 8th Edition  - Clinical stage from 2021: Stage III (cT3, cN0, cM0) - Signed by Jasosn Lassiter MD on 2021      - Catracho Melchor is a 65 y.o. male who presents today for regularly-scheduled follow-up for his supraglottic cancer. He completed radiation to his supraglottis and lymph nodes on 21  - The patient appears to be doing well, without convincing clinical evidence of supraglottic cancer recurrence  - He reports taste derangements, dry mouth, hoarseness, thick saliva, and right neck tightness are all chronic and stable for years. Denies notable mouth/throat pain, dysphagia, lumps, shortness of breath, chest pain, fatigue, weight changes, early satiety, skin changes, headaches, numbness/tingling, weakness, bone/joint pain, back pain, abdominal pain  - No concerning findings on limited head and neck exam today  - The patient's most recent scope on 12/15/24 showed no concerning findings per ENT note  - Continue to follow with all other medical providers. Encouraged him to see dentist. Will re-refer to ENT for scope surveillance. I gave him the numbers to call pulmonology and infectious disease as he has not followed with them for his positive Blastomyces titer  - Patient declines TSH monitoring. He is agreeable to the CT neck and chest I had ordered and was instructed to schedule at

## 2025-07-15 ENCOUNTER — OFFICE VISIT (OUTPATIENT)
Dept: ENT CLINIC | Age: 65
End: 2025-07-15
Payer: MEDICARE

## 2025-07-15 VITALS
SYSTOLIC BLOOD PRESSURE: 124 MMHG | TEMPERATURE: 97.4 F | HEART RATE: 92 BPM | WEIGHT: 172 LBS | BODY MASS INDEX: 24.62 KG/M2 | RESPIRATION RATE: 18 BRPM | HEIGHT: 70 IN | OXYGEN SATURATION: 95 % | DIASTOLIC BLOOD PRESSURE: 80 MMHG

## 2025-07-15 DIAGNOSIS — C32.1 PRIMARY SQUAMOUS CELL CARCINOMA OF SUPRAGLOTTIS (HCC): Primary | ICD-10-CM

## 2025-07-15 DIAGNOSIS — Z92.21 STATUS POST CHEMORADIATION: ICD-10-CM

## 2025-07-15 DIAGNOSIS — Z92.3 STATUS POST CHEMORADIATION: ICD-10-CM

## 2025-07-15 PROCEDURE — 3079F DIAST BP 80-89 MM HG: CPT | Performed by: OTOLARYNGOLOGY

## 2025-07-15 PROCEDURE — 31575 DIAGNOSTIC LARYNGOSCOPY: CPT | Performed by: OTOLARYNGOLOGY

## 2025-07-15 PROCEDURE — 1123F ACP DISCUSS/DSCN MKR DOCD: CPT | Performed by: OTOLARYNGOLOGY

## 2025-07-15 PROCEDURE — 3074F SYST BP LT 130 MM HG: CPT | Performed by: OTOLARYNGOLOGY

## 2025-07-15 PROCEDURE — 99212 OFFICE O/P EST SF 10 MIN: CPT | Performed by: OTOLARYNGOLOGY

## 2025-07-15 RX ORDER — HYDROCHLOROTHIAZIDE 25 MG/1
25 TABLET ORAL DAILY
COMMUNITY
Start: 2025-07-07

## 2025-07-15 RX ORDER — CETIRIZINE HYDROCHLORIDE 10 MG/1
10 TABLET ORAL DAILY
COMMUNITY
Start: 2024-11-18

## 2025-07-15 RX ORDER — ATORVASTATIN CALCIUM 40 MG/1
40 TABLET, FILM COATED ORAL DAILY
COMMUNITY
Start: 2025-07-07

## 2025-07-15 RX ORDER — AMLODIPINE BESYLATE 5 MG/1
5 TABLET ORAL DAILY
COMMUNITY
Start: 2025-07-07

## 2025-07-15 RX ORDER — MELOXICAM 7.5 MG/1
TABLET ORAL
COMMUNITY
Start: 2025-07-07

## 2025-07-15 NOTE — PROGRESS NOTES
[facility] [place] [facility]  [facility]  [address]  [address]  [facility] [address]  Dept: [phone]  Dept Fax: [phone]  Loc: [phone]    Catracho Melchor is a 65 y.o. male who was referred byCheri Wilson PA-C for:  Chief Complaint   Patient presents with    Follow-up     Cancer Surveillance f/u previous patient of Dr. Diaz.   He still has a lot of phlegm and cough.   He says he has occasional problems swallowing.      .    HPI:     Catracho Melchor is a 65 y.o. male who presents today for follow up of laryngeal cancer.He is hoarse, and immobile right true vocal cord was noted a couple of years ago. PET scan 7/1/24: New, mildly FDG avid right upper lobe lung nodule. The degree of activity is  suggestive of a benign process. However, low-grade or slow-growing malignancies  may demonstrate this level of activity and follow-up CT of the chest in 3 months  is recommended.CT CHEST W CONTRAST 11/16/2024: The previously described nodule in the right upper lobe has totally resolved.Evidence for old healed granulomatous disease. There are a few tiny  subcentimeter nodules in the left lower lobe, likely poorly calcified  granulomas. There is also a new 11 mm semisolid nodule in the right lower lobe,  likely an inflammatory nodule.. Mild atelectasis/pneumonitis left lower lobe.     History:     Allergies   Allergen Reactions    Lisinopril Swelling     Angioedema of the face/lips  Other reaction(s): cough  Pt unsure but thinks he is allergic to this       Current Outpatient Medications   Medication Sig Dispense Refill    atorvastatin (LIPITOR) 40 MG tablet 1 tablet daily      cetirizine (ZYRTEC) 10 MG tablet Take 1 tablet by mouth daily      hydroCHLOROthiazide (HYDRODIURIL) 25 MG tablet 1 tablet daily      meloxicam (MOBIC) 7.5 MG tablet 1 tab daily with food      Insulin Aspart (NOVOLOG IJ) Inject as directed      insulin glargine (LANTUS SOLOSTAR) 100 UNIT/ML

## 2025-07-19 ENCOUNTER — HOSPITAL ENCOUNTER (OUTPATIENT)
Dept: CT IMAGING | Age: 65
Discharge: HOME | End: 2025-07-19
Payer: MEDICARE

## 2025-07-19 DIAGNOSIS — C32.1 MALIGNANT NEOPLASM OF SUPRAGLOTTIS (HCC): ICD-10-CM

## 2025-07-19 DIAGNOSIS — R91.1 LUNG NODULE: ICD-10-CM

## 2025-07-19 LAB — POC CREATININE WHOLE BLOOD: 1 MG/DL (ref 0.5–1.2)

## 2025-07-19 PROCEDURE — 71260 CT THORAX DX C+: CPT

## 2025-07-19 PROCEDURE — 82565 ASSAY OF CREATININE: CPT

## 2025-07-19 PROCEDURE — 70491 CT SOFT TISSUE NECK W/DYE: CPT

## 2025-07-19 PROCEDURE — 6360000004 HC RX CONTRAST MEDICATION

## 2025-07-19 RX ORDER — IOPAMIDOL 755 MG/ML
75 INJECTION, SOLUTION INTRAVASCULAR
Status: COMPLETED | OUTPATIENT
Start: 2025-07-19 | End: 2025-07-19

## 2025-07-19 RX ADMIN — IOPAMIDOL 75 ML: 755 INJECTION, SOLUTION INTRAVENOUS at 11:32

## 2025-08-26 ENCOUNTER — APPOINTMENT (OUTPATIENT)
Dept: CT IMAGING | Age: 65
DRG: 065 | End: 2025-08-26
Payer: MEDICARE

## 2025-08-26 ENCOUNTER — HOSPITAL ENCOUNTER (INPATIENT)
Age: 65
LOS: 1 days | Discharge: HOME HEALTH | DRG: 065 | End: 2025-08-28
Attending: FAMILY MEDICINE | Admitting: STUDENT IN AN ORGANIZED HEALTH CARE EDUCATION/TRAINING PROGRAM
Payer: MEDICARE

## 2025-08-26 ENCOUNTER — APPOINTMENT (OUTPATIENT)
Dept: GENERAL RADIOLOGY | Age: 65
DRG: 065 | End: 2025-08-26
Payer: MEDICARE

## 2025-08-26 DIAGNOSIS — I63.9 CEREBRAL INFARCTION, UNSPECIFIED MECHANISM (HCC): ICD-10-CM

## 2025-08-26 DIAGNOSIS — R42 DIZZINESS: ICD-10-CM

## 2025-08-26 DIAGNOSIS — G45.9 TIA (TRANSIENT ISCHEMIC ATTACK): Primary | ICD-10-CM

## 2025-08-26 DIAGNOSIS — I63.9 CEREBROVASCULAR ACCIDENT (CVA), UNSPECIFIED MECHANISM (HCC): ICD-10-CM

## 2025-08-26 PROBLEM — R29.90 STROKE-LIKE SYMPTOMS: Status: ACTIVE | Noted: 2025-08-26

## 2025-08-26 LAB
ALBUMIN SERPL BCG-MCNC: 3.8 G/DL (ref 3.4–4.9)
ALP SERPL-CCNC: 116 U/L (ref 40–129)
ALT SERPL W/O P-5'-P-CCNC: < 5 U/L (ref 10–50)
AMMONIA PLAS-MCNC: 58 UMOL/L (ref 16–60)
ANION GAP SERPL CALC-SCNC: 12 MEQ/L (ref 8–16)
APTT PPP: 30.9 SECONDS (ref 22–38)
AST SERPL-CCNC: 17 U/L (ref 10–50)
BACTERIA URNS QL MICRO: ABNORMAL /HPF
BASOPHILS ABSOLUTE: 0.1 THOU/MM3 (ref 0–0.1)
BASOPHILS NFR BLD AUTO: 0.5 %
BILIRUB SERPL-MCNC: 0.4 MG/DL (ref 0.3–1.2)
BILIRUB UR QL STRIP.AUTO: NEGATIVE
BUN SERPL-MCNC: 15 MG/DL (ref 8–23)
CALCIUM SERPL-MCNC: 9.4 MG/DL (ref 8.5–10.5)
CASTS #/AREA URNS LPF: ABNORMAL /LPF
CASTS 2: ABNORMAL /LPF
CHARACTER UR: CLEAR
CHLORIDE SERPL-SCNC: 99 MEQ/L (ref 98–111)
CO2 SERPL-SCNC: 24 MEQ/L (ref 22–29)
COLOR, UA: YELLOW
CREAT SERPL-MCNC: 0.9 MG/DL (ref 0.7–1.2)
CRYSTALS URNS MICRO: ABNORMAL
DEPRECATED RDW RBC AUTO: 39.8 FL (ref 35–45)
EKG ATRIAL RATE: 73 BPM
EKG P AXIS: 55 DEGREES
EKG P-R INTERVAL: 170 MS
EKG Q-T INTERVAL: 414 MS
EKG QRS DURATION: 106 MS
EKG QTC CALCULATION (BAZETT): 456 MS
EKG R AXIS: 54 DEGREES
EKG T AXIS: 61 DEGREES
EKG VENTRICULAR RATE: 73 BPM
EOSINOPHIL NFR BLD AUTO: 5.1 %
EOSINOPHILS ABSOLUTE: 0.6 THOU/MM3 (ref 0–0.4)
EPITHELIAL CELLS, UA: ABNORMAL /HPF
ERYTHROCYTE [DISTWIDTH] IN BLOOD BY AUTOMATED COUNT: 13.4 % (ref 11.5–14.5)
GFR SERPL CREATININE-BSD FRML MDRD: > 90 ML/MIN/1.73M2
GLUCOSE SERPL-MCNC: 247 MG/DL (ref 74–109)
GLUCOSE UR QL STRIP.AUTO: 500 MG/DL
HCT VFR BLD AUTO: 42.1 % (ref 42–52)
HGB BLD-MCNC: 13.4 GM/DL (ref 14–18)
HGB UR QL STRIP.AUTO: NEGATIVE
IMM GRANULOCYTES # BLD AUTO: 0.03 THOU/MM3 (ref 0–0.07)
IMM GRANULOCYTES NFR BLD AUTO: 0.3 %
INR PPP: 1.01 (ref 0.85–1.13)
KETONES UR QL STRIP.AUTO: NEGATIVE
LYMPHOCYTES ABSOLUTE: 2.3 THOU/MM3 (ref 1–4.8)
LYMPHOCYTES NFR BLD AUTO: 20.8 %
MAGNESIUM SERPL-MCNC: 2 MG/DL (ref 1.6–2.6)
MCH RBC QN AUTO: 26 PG (ref 26–33)
MCHC RBC AUTO-ENTMCNC: 31.8 GM/DL (ref 32.2–35.5)
MCV RBC AUTO: 81.6 FL (ref 80–94)
MISCELLANEOUS 2: ABNORMAL
MONOCYTES ABSOLUTE: 0.4 THOU/MM3 (ref 0.4–1.3)
MONOCYTES NFR BLD AUTO: 3.8 %
NEUTROPHILS ABSOLUTE: 7.7 THOU/MM3 (ref 1.8–7.7)
NEUTROPHILS NFR BLD AUTO: 69.5 %
NITRITE UR QL STRIP: NEGATIVE
NRBC BLD AUTO-RTO: 0 /100 WBC
OSMOLALITY SERPL CALC.SUM OF ELEC: 279.2 MOSMOL/KG (ref 275–300)
PH UR STRIP.AUTO: 7 [PH] (ref 5–9)
PLATELET # BLD AUTO: 314 THOU/MM3 (ref 130–400)
PMV BLD AUTO: 9.9 FL (ref 9.4–12.4)
POTASSIUM SERPL-SCNC: 4 MEQ/L (ref 3.5–5.2)
PROCALCITONIN SERPL IA-MCNC: < 0.02 NG/ML (ref 0.01–0.09)
PROT SERPL-MCNC: 7.5 G/DL (ref 6.4–8.3)
PROT UR STRIP.AUTO-MCNC: ABNORMAL MG/DL
PROTHROMBIN TIME: 11.8 SECONDS (ref 10–13.5)
RBC # BLD AUTO: 5.16 MILL/MM3 (ref 4.7–6.1)
RBC URINE: ABNORMAL /HPF
RENAL EPI CELLS #/AREA URNS HPF: ABNORMAL /[HPF]
SODIUM SERPL-SCNC: 135 MEQ/L (ref 135–145)
SP GR UR REFRACT.AUTO: 1.02 (ref 1–1.03)
UROBILINOGEN, URINE: 0.2 EU/DL (ref 0–1)
WBC # BLD AUTO: 11.1 THOU/MM3 (ref 4.8–10.8)
WBC #/AREA URNS HPF: ABNORMAL /HPF
WBC #/AREA URNS HPF: NEGATIVE /[HPF]
YEAST LIKE FUNGI URNS QL MICRO: ABNORMAL

## 2025-08-26 PROCEDURE — 80053 COMPREHEN METABOLIC PANEL: CPT

## 2025-08-26 PROCEDURE — 2500000003 HC RX 250 WO HCPCS

## 2025-08-26 PROCEDURE — 6370000000 HC RX 637 (ALT 250 FOR IP)

## 2025-08-26 PROCEDURE — 96366 THER/PROPH/DIAG IV INF ADDON: CPT

## 2025-08-26 PROCEDURE — 93010 ELECTROCARDIOGRAM REPORT: CPT | Performed by: INTERNAL MEDICINE

## 2025-08-26 PROCEDURE — 6360000004 HC RX CONTRAST MEDICATION

## 2025-08-26 PROCEDURE — 81001 URINALYSIS AUTO W/SCOPE: CPT

## 2025-08-26 PROCEDURE — G0378 HOSPITAL OBSERVATION PER HR: HCPCS

## 2025-08-26 PROCEDURE — 82140 ASSAY OF AMMONIA: CPT

## 2025-08-26 PROCEDURE — 6360000002 HC RX W HCPCS

## 2025-08-26 PROCEDURE — 85610 PROTHROMBIN TIME: CPT

## 2025-08-26 PROCEDURE — 85730 THROMBOPLASTIN TIME PARTIAL: CPT

## 2025-08-26 PROCEDURE — 99223 1ST HOSP IP/OBS HIGH 75: CPT

## 2025-08-26 PROCEDURE — 70496 CT ANGIOGRAPHY HEAD: CPT

## 2025-08-26 PROCEDURE — 70498 CT ANGIOGRAPHY NECK: CPT

## 2025-08-26 PROCEDURE — 99285 EMERGENCY DEPT VISIT HI MDM: CPT

## 2025-08-26 PROCEDURE — 71045 X-RAY EXAM CHEST 1 VIEW: CPT

## 2025-08-26 PROCEDURE — 70450 CT HEAD/BRAIN W/O DYE: CPT

## 2025-08-26 PROCEDURE — 36415 COLL VENOUS BLD VENIPUNCTURE: CPT

## 2025-08-26 PROCEDURE — 96365 THER/PROPH/DIAG IV INF INIT: CPT

## 2025-08-26 PROCEDURE — 85025 COMPLETE CBC W/AUTO DIFF WBC: CPT

## 2025-08-26 PROCEDURE — 93005 ELECTROCARDIOGRAM TRACING: CPT | Performed by: FAMILY MEDICINE

## 2025-08-26 PROCEDURE — 2580000003 HC RX 258

## 2025-08-26 PROCEDURE — 83735 ASSAY OF MAGNESIUM: CPT

## 2025-08-26 PROCEDURE — 84145 PROCALCITONIN (PCT): CPT

## 2025-08-26 RX ORDER — ENOXAPARIN SODIUM 100 MG/ML
40 INJECTION SUBCUTANEOUS DAILY
Status: DISCONTINUED | OUTPATIENT
Start: 2025-08-27 | End: 2025-08-28 | Stop reason: HOSPADM

## 2025-08-26 RX ORDER — SODIUM CHLORIDE 9 MG/ML
INJECTION, SOLUTION INTRAVENOUS PRN
Status: DISCONTINUED | OUTPATIENT
Start: 2025-08-26 | End: 2025-08-28 | Stop reason: HOSPADM

## 2025-08-26 RX ORDER — SODIUM CHLORIDE 0.9 % (FLUSH) 0.9 %
5-40 SYRINGE (ML) INJECTION EVERY 12 HOURS SCHEDULED
Status: DISCONTINUED | OUTPATIENT
Start: 2025-08-26 | End: 2025-08-28 | Stop reason: HOSPADM

## 2025-08-26 RX ORDER — SODIUM CHLORIDE 0.9 % (FLUSH) 0.9 %
5-40 SYRINGE (ML) INJECTION PRN
Status: DISCONTINUED | OUTPATIENT
Start: 2025-08-26 | End: 2025-08-28 | Stop reason: HOSPADM

## 2025-08-26 RX ORDER — ACETAMINOPHEN 650 MG/1
650 SUPPOSITORY RECTAL EVERY 6 HOURS PRN
Status: DISCONTINUED | OUTPATIENT
Start: 2025-08-26 | End: 2025-08-28 | Stop reason: HOSPADM

## 2025-08-26 RX ORDER — ACETAMINOPHEN 325 MG/1
650 TABLET ORAL EVERY 6 HOURS PRN
Status: DISCONTINUED | OUTPATIENT
Start: 2025-08-26 | End: 2025-08-28 | Stop reason: HOSPADM

## 2025-08-26 RX ORDER — ONDANSETRON 4 MG/1
4 TABLET, ORALLY DISINTEGRATING ORAL EVERY 8 HOURS PRN
Status: DISCONTINUED | OUTPATIENT
Start: 2025-08-26 | End: 2025-08-28 | Stop reason: HOSPADM

## 2025-08-26 RX ORDER — POTASSIUM CHLORIDE 1500 MG/1
40 TABLET, EXTENDED RELEASE ORAL PRN
Status: DISCONTINUED | OUTPATIENT
Start: 2025-08-26 | End: 2025-08-28 | Stop reason: HOSPADM

## 2025-08-26 RX ORDER — ONDANSETRON 2 MG/ML
4 INJECTION INTRAMUSCULAR; INTRAVENOUS EVERY 6 HOURS PRN
Status: DISCONTINUED | OUTPATIENT
Start: 2025-08-26 | End: 2025-08-28 | Stop reason: HOSPADM

## 2025-08-26 RX ORDER — POTASSIUM CHLORIDE 7.45 MG/ML
10 INJECTION INTRAVENOUS PRN
Status: DISCONTINUED | OUTPATIENT
Start: 2025-08-26 | End: 2025-08-28 | Stop reason: HOSPADM

## 2025-08-26 RX ORDER — POLYETHYLENE GLYCOL 3350 17 G/17G
17 POWDER, FOR SOLUTION ORAL DAILY PRN
Status: DISCONTINUED | OUTPATIENT
Start: 2025-08-26 | End: 2025-08-28 | Stop reason: HOSPADM

## 2025-08-26 RX ORDER — HYDROCHLOROTHIAZIDE 25 MG/1
25 TABLET ORAL ONCE
Status: DISCONTINUED | OUTPATIENT
Start: 2025-08-26 | End: 2025-08-26

## 2025-08-26 RX ORDER — MAGNESIUM SULFATE IN WATER 40 MG/ML
2000 INJECTION, SOLUTION INTRAVENOUS PRN
Status: DISCONTINUED | OUTPATIENT
Start: 2025-08-26 | End: 2025-08-28 | Stop reason: HOSPADM

## 2025-08-26 RX ORDER — AMLODIPINE BESYLATE 5 MG/1
5 TABLET ORAL ONCE
Status: COMPLETED | OUTPATIENT
Start: 2025-08-26 | End: 2025-08-26

## 2025-08-26 RX ORDER — ATORVASTATIN CALCIUM 80 MG/1
80 TABLET, FILM COATED ORAL NIGHTLY
Status: DISCONTINUED | OUTPATIENT
Start: 2025-08-26 | End: 2025-08-28 | Stop reason: HOSPADM

## 2025-08-26 RX ORDER — IOPAMIDOL 755 MG/ML
80 INJECTION, SOLUTION INTRAVASCULAR
Status: COMPLETED | OUTPATIENT
Start: 2025-08-26 | End: 2025-08-26

## 2025-08-26 RX ORDER — ASPIRIN 81 MG/1
81 TABLET, CHEWABLE ORAL ONCE
Status: COMPLETED | OUTPATIENT
Start: 2025-08-26 | End: 2025-08-26

## 2025-08-26 RX ADMIN — ATORVASTATIN CALCIUM 80 MG: 80 TABLET, FILM COATED ORAL at 23:01

## 2025-08-26 RX ADMIN — IOPAMIDOL 80 ML: 755 INJECTION, SOLUTION INTRAVENOUS at 19:21

## 2025-08-26 RX ADMIN — NICARDIPINE HYDROCHLORIDE 5 MG/HR: 25 INJECTION INTRAVENOUS at 21:59

## 2025-08-26 RX ADMIN — AMLODIPINE BESYLATE 5 MG: 5 TABLET ORAL at 20:52

## 2025-08-26 RX ADMIN — SODIUM CHLORIDE, PRESERVATIVE FREE 10 ML: 5 INJECTION INTRAVENOUS at 23:02

## 2025-08-26 RX ADMIN — ASPIRIN 81 MG: 81 TABLET, CHEWABLE ORAL at 20:52

## 2025-08-26 ASSESSMENT — PAIN - FUNCTIONAL ASSESSMENT: PAIN_FUNCTIONAL_ASSESSMENT: 0-10

## 2025-08-26 ASSESSMENT — PAIN SCALES - GENERAL
PAINLEVEL_OUTOF10: 0
PAINLEVEL_OUTOF10: 0

## 2025-08-27 ENCOUNTER — APPOINTMENT (OUTPATIENT)
Dept: MRI IMAGING | Age: 65
DRG: 065 | End: 2025-08-27
Payer: MEDICARE

## 2025-08-27 ENCOUNTER — APPOINTMENT (OUTPATIENT)
Age: 65
DRG: 065 | End: 2025-08-27
Payer: MEDICARE

## 2025-08-27 PROBLEM — Z79.4 TYPE 2 DIABETES MELLITUS WITH HYPERGLYCEMIA, WITH LONG-TERM CURRENT USE OF INSULIN (HCC): Status: ACTIVE | Noted: 2025-08-27

## 2025-08-27 PROBLEM — I16.1 HYPERTENSIVE EMERGENCY: Status: ACTIVE | Noted: 2025-08-27

## 2025-08-27 PROBLEM — Z79.4 TYPE 2 DIABETES MELLITUS WITH DIABETIC NEUROPATHIC ARTHROPATHY, WITH LONG-TERM CURRENT USE OF INSULIN (HCC): Status: ACTIVE | Noted: 2025-08-27

## 2025-08-27 PROBLEM — I61.9 CVA (CEREBROVASCULAR ACCIDENT DUE TO INTRACEREBRAL HEMORRHAGE) (HCC): Status: ACTIVE | Noted: 2025-08-27

## 2025-08-27 PROBLEM — E11.65 TYPE 2 DIABETES MELLITUS WITH HYPERGLYCEMIA, WITH LONG-TERM CURRENT USE OF INSULIN (HCC): Status: ACTIVE | Noted: 2025-08-27

## 2025-08-27 PROBLEM — E11.610 TYPE 2 DIABETES MELLITUS WITH DIABETIC NEUROPATHIC ARTHROPATHY, WITH LONG-TERM CURRENT USE OF INSULIN (HCC): Status: ACTIVE | Noted: 2025-08-27

## 2025-08-27 LAB
ANION GAP SERPL CALC-SCNC: 10 MEQ/L (ref 8–16)
BASOPHILS ABSOLUTE: 0.1 THOU/MM3 (ref 0–0.1)
BASOPHILS NFR BLD AUTO: 0.8 %
BUN SERPL-MCNC: 16 MG/DL (ref 8–23)
CALCIUM SERPL-MCNC: 9.3 MG/DL (ref 8.5–10.5)
CHLORIDE SERPL-SCNC: 98 MEQ/L (ref 98–111)
CHOLEST SERPL-MCNC: 165 MG/DL (ref 100–199)
CO2 SERPL-SCNC: 27 MEQ/L (ref 22–29)
CREAT SERPL-MCNC: 1 MG/DL (ref 0.7–1.2)
DEPRECATED MEAN GLUCOSE BLD GHB EST-ACNC: 258 MG/DL (ref 70–126)
DEPRECATED RDW RBC AUTO: 40 FL (ref 35–45)
ECHO AO ASC DIAM: 3.6 CM
ECHO AO ASCENDING AORTA INDEX: 1.85 CM/M2
ECHO AV CUSP MM: 1.8 CM
ECHO AV PEAK GRADIENT: 7 MMHG
ECHO AV PEAK VELOCITY: 1.3 M/S
ECHO AV VELOCITY RATIO: 0.62
ECHO BSA: 1.95 M2
ECHO EST RA PRESSURE: 3 MMHG
ECHO LA AREA 2C: 14.6 CM2
ECHO LA AREA 4C: 18.7 CM2
ECHO LA DIAMETER INDEX: 1.85 CM/M2
ECHO LA DIAMETER: 3.6 CM
ECHO LA MAJOR AXIS: 5.5 CM
ECHO LA MINOR AXIS: 4.7 CM
ECHO LA VOL BP: 47 ML (ref 18–58)
ECHO LA VOL MOD A2C: 38 ML (ref 18–58)
ECHO LA VOL MOD A4C: 49 ML (ref 18–58)
ECHO LA VOL/BSA BIPLANE: 24 ML/M2 (ref 16–34)
ECHO LA VOLUME INDEX MOD A2C: 19 ML/M2 (ref 16–34)
ECHO LA VOLUME INDEX MOD A4C: 25 ML/M2 (ref 16–34)
ECHO LV E' LATERAL VELOCITY: 5 CM/S
ECHO LV E' SEPTAL VELOCITY: 5.7 CM/S
ECHO LV EJECTION FRACTION 3D: 55 %
ECHO LV FRACTIONAL SHORTENING: 37 % (ref 28–44)
ECHO LV INTERNAL DIMENSION DIASTOLE INDEX: 2.21 CM/M2
ECHO LV INTERNAL DIMENSION DIASTOLIC: 4.3 CM (ref 4.2–5.9)
ECHO LV INTERNAL DIMENSION SYSTOLIC INDEX: 1.38 CM/M2
ECHO LV INTERNAL DIMENSION SYSTOLIC: 2.7 CM
ECHO LV ISOVOLUMETRIC RELAXATION TIME (IVRT): 81 MS
ECHO LV IVSD: 1.8 CM (ref 0.6–1)
ECHO LV MASS 2D: 285.5 G (ref 88–224)
ECHO LV MASS INDEX 2D: 146.4 G/M2 (ref 49–115)
ECHO LV POSTERIOR WALL DIASTOLIC: 1.4 CM (ref 0.6–1)
ECHO LV RELATIVE WALL THICKNESS RATIO: 0.65
ECHO LVOT PEAK GRADIENT: 3 MMHG
ECHO LVOT PEAK VELOCITY: 0.8 M/S
ECHO MV A VELOCITY: 0.69 M/S
ECHO MV E DECELERATION TIME (DT): 219 MS
ECHO MV E VELOCITY: 0.42 M/S
ECHO MV E/A RATIO: 0.61
ECHO MV E/E' LATERAL: 8.4
ECHO MV E/E' RATIO (AVERAGED): 7.88
ECHO MV E/E' SEPTAL: 7.37
ECHO PV MAX VELOCITY: 0.8 M/S
ECHO PV PEAK GRADIENT: 2 MMHG
ECHO RV INTERNAL DIMENSION: 2.9 CM
ECHO RV TAPSE: 2.4 CM (ref 1.7–?)
ECHO TV E WAVE: 0.5 M/S
EOSINOPHIL NFR BLD AUTO: 5.3 %
EOSINOPHILS ABSOLUTE: 0.5 THOU/MM3 (ref 0–0.4)
ERYTHROCYTE [DISTWIDTH] IN BLOOD BY AUTOMATED COUNT: 13.5 % (ref 11.5–14.5)
GFR SERPL CREATININE-BSD FRML MDRD: 82 ML/MIN/1.73M2
GLUCOSE BLD STRIP.AUTO-MCNC: 186 MG/DL (ref 70–108)
GLUCOSE BLD STRIP.AUTO-MCNC: 201 MG/DL (ref 70–108)
GLUCOSE BLD STRIP.AUTO-MCNC: 224 MG/DL (ref 70–108)
GLUCOSE BLD STRIP.AUTO-MCNC: 239 MG/DL (ref 70–108)
GLUCOSE BLD STRIP.AUTO-MCNC: 292 MG/DL (ref 70–108)
GLUCOSE BLD STRIP.AUTO-MCNC: 391 MG/DL (ref 70–108)
GLUCOSE SERPL-MCNC: 282 MG/DL (ref 74–109)
HBA1C MFR BLD HPLC: 10.6 % (ref 4–6)
HCT VFR BLD AUTO: 39.5 % (ref 42–52)
HDLC SERPL-MCNC: 26 MG/DL
HGB BLD-MCNC: 12.5 GM/DL (ref 14–18)
IMM GRANULOCYTES # BLD AUTO: 0.04 THOU/MM3 (ref 0–0.07)
IMM GRANULOCYTES NFR BLD AUTO: 0.5 %
LDLC SERPL CALC-MCNC: 93 MG/DL
LYMPHOCYTES ABSOLUTE: 1.7 THOU/MM3 (ref 1–4.8)
LYMPHOCYTES NFR BLD AUTO: 19.7 %
MAGNESIUM SERPL-MCNC: 2 MG/DL (ref 1.6–2.6)
MCH RBC QN AUTO: 26 PG (ref 26–33)
MCHC RBC AUTO-ENTMCNC: 31.6 GM/DL (ref 32.2–35.5)
MCV RBC AUTO: 82.3 FL (ref 80–94)
MONOCYTES ABSOLUTE: 0.4 THOU/MM3 (ref 0.4–1.3)
MONOCYTES NFR BLD AUTO: 5.1 %
NEUTROPHILS ABSOLUTE: 5.9 THOU/MM3 (ref 1.8–7.7)
NEUTROPHILS NFR BLD AUTO: 68.6 %
NRBC BLD AUTO-RTO: 0 /100 WBC
PLATELET # BLD AUTO: 300 THOU/MM3 (ref 130–400)
PMV BLD AUTO: 10.2 FL (ref 9.4–12.4)
POTASSIUM SERPL-SCNC: 3.9 MEQ/L (ref 3.5–5.2)
RBC # BLD AUTO: 4.8 MILL/MM3 (ref 4.7–6.1)
SODIUM SERPL-SCNC: 135 MEQ/L (ref 135–145)
TRIGL SERPL-MCNC: 229 MG/DL (ref 0–199)
WBC # BLD AUTO: 8.6 THOU/MM3 (ref 4.8–10.8)

## 2025-08-27 PROCEDURE — 99223 1ST HOSP IP/OBS HIGH 75: CPT

## 2025-08-27 PROCEDURE — 96372 THER/PROPH/DIAG INJ SC/IM: CPT

## 2025-08-27 PROCEDURE — 97162 PT EVAL MOD COMPLEX 30 MIN: CPT

## 2025-08-27 PROCEDURE — 80061 LIPID PANEL: CPT

## 2025-08-27 PROCEDURE — 83036 HEMOGLOBIN GLYCOSYLATED A1C: CPT

## 2025-08-27 PROCEDURE — 70551 MRI BRAIN STEM W/O DYE: CPT

## 2025-08-27 PROCEDURE — 82948 REAGENT STRIP/BLOOD GLUCOSE: CPT

## 2025-08-27 PROCEDURE — 6370000000 HC RX 637 (ALT 250 FOR IP)

## 2025-08-27 PROCEDURE — 2500000003 HC RX 250 WO HCPCS

## 2025-08-27 PROCEDURE — 2580000003 HC RX 258

## 2025-08-27 PROCEDURE — 36415 COLL VENOUS BLD VENIPUNCTURE: CPT

## 2025-08-27 PROCEDURE — 6360000002 HC RX W HCPCS

## 2025-08-27 PROCEDURE — 83735 ASSAY OF MAGNESIUM: CPT

## 2025-08-27 PROCEDURE — 85025 COMPLETE CBC W/AUTO DIFF WBC: CPT

## 2025-08-27 PROCEDURE — 97166 OT EVAL MOD COMPLEX 45 MIN: CPT

## 2025-08-27 PROCEDURE — 93306 TTE W/DOPPLER COMPLETE: CPT

## 2025-08-27 PROCEDURE — 97530 THERAPEUTIC ACTIVITIES: CPT

## 2025-08-27 PROCEDURE — 80048 BASIC METABOLIC PNL TOTAL CA: CPT

## 2025-08-27 PROCEDURE — 97535 SELF CARE MNGMENT TRAINING: CPT

## 2025-08-27 PROCEDURE — 97116 GAIT TRAINING THERAPY: CPT

## 2025-08-27 PROCEDURE — 6370000000 HC RX 637 (ALT 250 FOR IP): Performed by: STUDENT IN AN ORGANIZED HEALTH CARE EDUCATION/TRAINING PROGRAM

## 2025-08-27 PROCEDURE — 93306 TTE W/DOPPLER COMPLETE: CPT | Performed by: NUCLEAR MEDICINE

## 2025-08-27 PROCEDURE — 2060000000 HC ICU INTERMEDIATE R&B

## 2025-08-27 RX ORDER — HYDROCHLOROTHIAZIDE 25 MG/1
25 TABLET ORAL DAILY
Status: DISCONTINUED | OUTPATIENT
Start: 2025-08-27 | End: 2025-08-28 | Stop reason: HOSPADM

## 2025-08-27 RX ORDER — DEXTROSE MONOHYDRATE 100 MG/ML
INJECTION, SOLUTION INTRAVENOUS CONTINUOUS PRN
Status: DISCONTINUED | OUTPATIENT
Start: 2025-08-27 | End: 2025-08-28 | Stop reason: HOSPADM

## 2025-08-27 RX ORDER — ASPIRIN 81 MG/1
81 TABLET, CHEWABLE ORAL DAILY
Status: DISCONTINUED | OUTPATIENT
Start: 2025-08-27 | End: 2025-08-28 | Stop reason: HOSPADM

## 2025-08-27 RX ORDER — CETIRIZINE HYDROCHLORIDE 10 MG/1
10 TABLET ORAL DAILY
Status: DISCONTINUED | OUTPATIENT
Start: 2025-08-27 | End: 2025-08-28 | Stop reason: HOSPADM

## 2025-08-27 RX ORDER — CLOPIDOGREL BISULFATE 75 MG/1
75 TABLET ORAL DAILY
Status: DISCONTINUED | OUTPATIENT
Start: 2025-08-27 | End: 2025-08-28 | Stop reason: HOSPADM

## 2025-08-27 RX ORDER — GEMFIBROZIL 600 MG/1
600 TABLET, FILM COATED ORAL
Status: DISCONTINUED | OUTPATIENT
Start: 2025-08-27 | End: 2025-08-27

## 2025-08-27 RX ORDER — SODIUM CHLORIDE 9 MG/ML
INJECTION, SOLUTION INTRAVENOUS CONTINUOUS
Status: ACTIVE | OUTPATIENT
Start: 2025-08-27 | End: 2025-08-27

## 2025-08-27 RX ORDER — INSULIN LISPRO 100 [IU]/ML
0-4 INJECTION, SOLUTION INTRAVENOUS; SUBCUTANEOUS
Status: DISCONTINUED | OUTPATIENT
Start: 2025-08-27 | End: 2025-08-28 | Stop reason: HOSPADM

## 2025-08-27 RX ORDER — INSULIN GLARGINE 100 [IU]/ML
35 INJECTION, SOLUTION SUBCUTANEOUS NIGHTLY
Status: DISCONTINUED | OUTPATIENT
Start: 2025-08-27 | End: 2025-08-28 | Stop reason: HOSPADM

## 2025-08-27 RX ORDER — GLUCAGON 1 MG/ML
1 KIT INJECTION PRN
Status: DISCONTINUED | OUTPATIENT
Start: 2025-08-27 | End: 2025-08-28 | Stop reason: HOSPADM

## 2025-08-27 RX ADMIN — ASPIRIN 81 MG: 81 TABLET, CHEWABLE ORAL at 07:37

## 2025-08-27 RX ADMIN — INSULIN LISPRO 1 UNITS: 100 INJECTION, SOLUTION INTRAVENOUS; SUBCUTANEOUS at 14:03

## 2025-08-27 RX ADMIN — INSULIN LISPRO 1 UNITS: 100 INJECTION, SOLUTION INTRAVENOUS; SUBCUTANEOUS at 18:12

## 2025-08-27 RX ADMIN — INSULIN GLARGINE 35 UNITS: 100 INJECTION, SOLUTION SUBCUTANEOUS at 01:10

## 2025-08-27 RX ADMIN — SODIUM CHLORIDE: 0.9 INJECTION, SOLUTION INTRAVENOUS at 00:57

## 2025-08-27 RX ADMIN — INSULIN LISPRO 1 UNITS: 100 INJECTION, SOLUTION INTRAVENOUS; SUBCUTANEOUS at 08:43

## 2025-08-27 RX ADMIN — ENOXAPARIN SODIUM 40 MG: 100 INJECTION SUBCUTANEOUS at 07:38

## 2025-08-27 RX ADMIN — INSULIN LISPRO 4 UNITS: 100 INJECTION, SOLUTION INTRAVENOUS; SUBCUTANEOUS at 01:09

## 2025-08-27 RX ADMIN — CETIRIZINE HYDROCHLORIDE 10 MG: 10 TABLET, FILM COATED ORAL at 07:37

## 2025-08-27 RX ADMIN — INSULIN GLARGINE 35 UNITS: 100 INJECTION, SOLUTION SUBCUTANEOUS at 20:37

## 2025-08-27 RX ADMIN — CLOPIDOGREL BISULFATE 75 MG: 75 TABLET, FILM COATED ORAL at 08:43

## 2025-08-27 RX ADMIN — ATORVASTATIN CALCIUM 80 MG: 80 TABLET, FILM COATED ORAL at 20:37

## 2025-08-27 RX ADMIN — SODIUM CHLORIDE, PRESERVATIVE FREE 10 ML: 5 INJECTION INTRAVENOUS at 20:37

## 2025-08-27 RX ADMIN — INSULIN LISPRO 1 UNITS: 100 INJECTION, SOLUTION INTRAVENOUS; SUBCUTANEOUS at 20:38

## 2025-08-27 ASSESSMENT — PAIN SCALES - GENERAL
PAINLEVEL_OUTOF10: 0

## 2025-08-27 ASSESSMENT — ENCOUNTER SYMPTOMS
ABDOMINAL PAIN: 0
SHORTNESS OF BREATH: 0
COUGH: 0
SORE THROAT: 0
VOMITING: 0
NAUSEA: 0
RHINORRHEA: 0

## 2025-08-27 ASSESSMENT — VISUAL ACUITY: VA_NORMAL: 1

## 2025-08-28 ENCOUNTER — APPOINTMENT (OUTPATIENT)
Age: 65
DRG: 065 | End: 2025-08-28
Attending: STUDENT IN AN ORGANIZED HEALTH CARE EDUCATION/TRAINING PROGRAM
Payer: MEDICARE

## 2025-08-28 VITALS
HEIGHT: 71 IN | TEMPERATURE: 97.8 F | BODY MASS INDEX: 23.1 KG/M2 | SYSTOLIC BLOOD PRESSURE: 175 MMHG | HEART RATE: 81 BPM | WEIGHT: 165 LBS | OXYGEN SATURATION: 96 % | RESPIRATION RATE: 18 BRPM | DIASTOLIC BLOOD PRESSURE: 78 MMHG

## 2025-08-28 LAB
ANION GAP SERPL CALC-SCNC: 9 MEQ/L (ref 8–16)
BASOPHILS ABSOLUTE: 0.1 THOU/MM3 (ref 0–0.1)
BASOPHILS NFR BLD AUTO: 0.6 %
BUN SERPL-MCNC: 19 MG/DL (ref 8–23)
CALCIUM SERPL-MCNC: 9 MG/DL (ref 8.5–10.5)
CHLORIDE SERPL-SCNC: 100 MEQ/L (ref 98–111)
CO2 SERPL-SCNC: 26 MEQ/L (ref 22–29)
CREAT SERPL-MCNC: 0.8 MG/DL (ref 0.7–1.2)
DEPRECATED RDW RBC AUTO: 41.2 FL (ref 35–45)
ECHO BSA: 1.95 M2
EOSINOPHIL NFR BLD AUTO: 4.8 %
EOSINOPHILS ABSOLUTE: 0.5 THOU/MM3 (ref 0–0.4)
ERYTHROCYTE [DISTWIDTH] IN BLOOD BY AUTOMATED COUNT: 13.6 % (ref 11.5–14.5)
GFR SERPL CREATININE-BSD FRML MDRD: > 90 ML/MIN/1.73M2
GLUCOSE BLD STRIP.AUTO-MCNC: 222 MG/DL (ref 70–108)
GLUCOSE BLD STRIP.AUTO-MCNC: 233 MG/DL (ref 70–108)
GLUCOSE BLD STRIP.AUTO-MCNC: 303 MG/DL (ref 70–108)
GLUCOSE SERPL-MCNC: 230 MG/DL (ref 74–109)
HCT VFR BLD AUTO: 39.6 % (ref 42–52)
HGB BLD-MCNC: 12.3 GM/DL (ref 14–18)
IMM GRANULOCYTES # BLD AUTO: 0.03 THOU/MM3 (ref 0–0.07)
IMM GRANULOCYTES NFR BLD AUTO: 0.3 %
LYMPHOCYTES ABSOLUTE: 1.7 THOU/MM3 (ref 1–4.8)
LYMPHOCYTES NFR BLD AUTO: 17 %
MAGNESIUM SERPL-MCNC: 2 MG/DL (ref 1.6–2.6)
MCH RBC QN AUTO: 26.1 PG (ref 26–33)
MCHC RBC AUTO-ENTMCNC: 31.1 GM/DL (ref 32.2–35.5)
MCV RBC AUTO: 83.9 FL (ref 80–94)
MONOCYTES ABSOLUTE: 0.4 THOU/MM3 (ref 0.4–1.3)
MONOCYTES NFR BLD AUTO: 4.2 %
NEUTROPHILS ABSOLUTE: 7.3 THOU/MM3 (ref 1.8–7.7)
NEUTROPHILS NFR BLD AUTO: 73.1 %
NRBC BLD AUTO-RTO: 0 /100 WBC
PLATELET # BLD AUTO: 279 THOU/MM3 (ref 130–400)
PMV BLD AUTO: 9.8 FL (ref 9.4–12.4)
POTASSIUM SERPL-SCNC: 4.5 MEQ/L (ref 3.5–5.2)
RBC # BLD AUTO: 4.72 MILL/MM3 (ref 4.7–6.1)
SODIUM SERPL-SCNC: 135 MEQ/L (ref 135–145)
WBC # BLD AUTO: 10 THOU/MM3 (ref 4.8–10.8)

## 2025-08-28 PROCEDURE — 6360000002 HC RX W HCPCS

## 2025-08-28 PROCEDURE — 6370000000 HC RX 637 (ALT 250 FOR IP)

## 2025-08-28 PROCEDURE — 99239 HOSP IP/OBS DSCHRG MGMT >30: CPT | Performed by: STUDENT IN AN ORGANIZED HEALTH CARE EDUCATION/TRAINING PROGRAM

## 2025-08-28 PROCEDURE — 85025 COMPLETE CBC W/AUTO DIFF WBC: CPT

## 2025-08-28 PROCEDURE — 2500000003 HC RX 250 WO HCPCS

## 2025-08-28 PROCEDURE — 82948 REAGENT STRIP/BLOOD GLUCOSE: CPT

## 2025-08-28 PROCEDURE — 97530 THERAPEUTIC ACTIVITIES: CPT

## 2025-08-28 PROCEDURE — 36415 COLL VENOUS BLD VENIPUNCTURE: CPT

## 2025-08-28 PROCEDURE — 6370000000 HC RX 637 (ALT 250 FOR IP): Performed by: STUDENT IN AN ORGANIZED HEALTH CARE EDUCATION/TRAINING PROGRAM

## 2025-08-28 PROCEDURE — 80048 BASIC METABOLIC PNL TOTAL CA: CPT

## 2025-08-28 PROCEDURE — 83735 ASSAY OF MAGNESIUM: CPT

## 2025-08-28 PROCEDURE — 93270 REMOTE 30 DAY ECG REV/REPORT: CPT

## 2025-08-28 RX ORDER — AMLODIPINE BESYLATE 5 MG/1
5 TABLET ORAL DAILY
Qty: 60 TABLET | Refills: 1 | Status: SHIPPED | OUTPATIENT
Start: 2025-08-28

## 2025-08-28 RX ORDER — CLOPIDOGREL BISULFATE 75 MG/1
75 TABLET ORAL DAILY
Qty: 30 TABLET | Refills: 3 | Status: SHIPPED | OUTPATIENT
Start: 2025-08-29 | End: 2025-08-28

## 2025-08-28 RX ORDER — ATORVASTATIN CALCIUM 40 MG/1
80 TABLET, FILM COATED ORAL DAILY
Qty: 30 TABLET | Refills: 3 | Status: SHIPPED | OUTPATIENT
Start: 2025-08-28

## 2025-08-28 RX ORDER — CLOPIDOGREL BISULFATE 75 MG/1
75 TABLET ORAL DAILY
Qty: 19 TABLET | Refills: 0 | Status: SHIPPED | OUTPATIENT
Start: 2025-08-29

## 2025-08-28 RX ORDER — AMLODIPINE BESYLATE 10 MG/1
10 TABLET ORAL DAILY
Qty: 60 TABLET | Refills: 0 | Status: SHIPPED | OUTPATIENT
Start: 2025-08-28 | End: 2025-08-28

## 2025-08-28 RX ORDER — AMLODIPINE BESYLATE 10 MG/1
5 TABLET ORAL DAILY
Qty: 60 TABLET | Refills: 0 | Status: SHIPPED | OUTPATIENT
Start: 2025-08-28 | End: 2025-08-28

## 2025-08-28 RX ADMIN — CETIRIZINE HYDROCHLORIDE 10 MG: 10 TABLET, FILM COATED ORAL at 08:53

## 2025-08-28 RX ADMIN — ENOXAPARIN SODIUM 40 MG: 100 INJECTION SUBCUTANEOUS at 08:53

## 2025-08-28 RX ADMIN — INSULIN LISPRO 1 UNITS: 100 INJECTION, SOLUTION INTRAVENOUS; SUBCUTANEOUS at 08:58

## 2025-08-28 RX ADMIN — CLOPIDOGREL BISULFATE 75 MG: 75 TABLET, FILM COATED ORAL at 08:52

## 2025-08-28 RX ADMIN — ACETAMINOPHEN 650 MG: 325 TABLET ORAL at 00:57

## 2025-08-28 RX ADMIN — ASPIRIN 81 MG: 81 TABLET, CHEWABLE ORAL at 08:52

## 2025-08-28 RX ADMIN — INSULIN LISPRO 1 UNITS: 100 INJECTION, SOLUTION INTRAVENOUS; SUBCUTANEOUS at 12:28

## 2025-08-28 RX ADMIN — SODIUM CHLORIDE, PRESERVATIVE FREE 5 ML: 5 INJECTION INTRAVENOUS at 09:31

## 2025-08-28 ASSESSMENT — PAIN SCALES - GENERAL
PAINLEVEL_OUTOF10: 0
PAINLEVEL_OUTOF10: 5
PAINLEVEL_OUTOF10: 0

## 2025-08-28 ASSESSMENT — PAIN - FUNCTIONAL ASSESSMENT
PAIN_FUNCTIONAL_ASSESSMENT: 0-10
PAIN_FUNCTIONAL_ASSESSMENT: 0-10

## 2025-08-28 ASSESSMENT — PAIN DESCRIPTION - DESCRIPTORS: DESCRIPTORS: ACHING

## 2025-08-28 ASSESSMENT — PAIN DESCRIPTION - LOCATION: LOCATION: HEAD

## 2025-08-29 PROBLEM — G45.9 TIA (TRANSIENT ISCHEMIC ATTACK): Status: ACTIVE | Noted: 2025-08-29

## 2025-08-30 ENCOUNTER — APPOINTMENT (OUTPATIENT)
Dept: CT IMAGING | Age: 65
End: 2025-08-30
Payer: MEDICARE

## 2025-08-30 ENCOUNTER — APPOINTMENT (OUTPATIENT)
Dept: GENERAL RADIOLOGY | Age: 65
End: 2025-08-30
Payer: MEDICARE

## 2025-08-30 ENCOUNTER — HOSPITAL ENCOUNTER (OUTPATIENT)
Age: 65
Setting detail: OBSERVATION
Discharge: HOME | End: 2025-08-31
Attending: EMERGENCY MEDICINE | Admitting: INTERNAL MEDICINE
Payer: MEDICARE

## 2025-08-30 DIAGNOSIS — R29.898 LEFT LEG WEAKNESS: ICD-10-CM

## 2025-08-30 DIAGNOSIS — G45.9 TIA (TRANSIENT ISCHEMIC ATTACK): Primary | ICD-10-CM

## 2025-08-30 PROBLEM — R29.818 TRANSIENT NEUROLOGIC DEFICIT: Status: ACTIVE | Noted: 2025-08-30

## 2025-08-30 LAB
ALBUMIN SERPL BCG-MCNC: 3.7 G/DL (ref 3.4–4.9)
ALP SERPL-CCNC: 111 U/L (ref 40–129)
ALT SERPL W/O P-5'-P-CCNC: 7 U/L (ref 10–50)
ANION GAP SERPL CALC-SCNC: 11 MEQ/L (ref 8–16)
AST SERPL-CCNC: 18 U/L (ref 10–50)
BASOPHILS ABSOLUTE: 0.1 THOU/MM3 (ref 0–0.1)
BASOPHILS NFR BLD AUTO: 0.6 %
BILIRUB SERPL-MCNC: 0.5 MG/DL (ref 0.3–1.2)
BUN SERPL-MCNC: 22 MG/DL (ref 8–23)
CALCIUM SERPL-MCNC: 9.8 MG/DL (ref 8.5–10.5)
CHLORIDE SERPL-SCNC: 102 MEQ/L (ref 98–111)
CO2 SERPL-SCNC: 26 MEQ/L (ref 22–29)
CREAT SERPL-MCNC: 1.2 MG/DL (ref 0.7–1.2)
CRP SERPL-MCNC: 2.32 MG/DL (ref 0–0.5)
DEPRECATED RDW RBC AUTO: 42.4 FL (ref 35–45)
EOSINOPHIL NFR BLD AUTO: 4.2 %
EOSINOPHILS ABSOLUTE: 0.5 THOU/MM3 (ref 0–0.4)
ERYTHROCYTE [DISTWIDTH] IN BLOOD BY AUTOMATED COUNT: 13.8 % (ref 11.5–14.5)
GFR SERPL CREATININE-BSD FRML MDRD: 66 ML/MIN/1.73M2
GLUCOSE BLD STRIP.AUTO-MCNC: 162 MG/DL (ref 70–108)
GLUCOSE BLD STRIP.AUTO-MCNC: 293 MG/DL (ref 70–108)
GLUCOSE SERPL-MCNC: 174 MG/DL (ref 74–109)
HCT VFR BLD AUTO: 39.9 % (ref 42–52)
HGB BLD-MCNC: 12.6 GM/DL (ref 14–18)
IMM GRANULOCYTES # BLD AUTO: 0.04 THOU/MM3 (ref 0–0.07)
IMM GRANULOCYTES NFR BLD AUTO: 0.3 %
LACTIC ACID, SEPSIS: 0.8 MMOL/L (ref 0.5–1.9)
LYMPHOCYTES ABSOLUTE: 1.8 THOU/MM3 (ref 1–4.8)
LYMPHOCYTES NFR BLD AUTO: 15.3 %
MCH RBC QN AUTO: 26.5 PG (ref 26–33)
MCHC RBC AUTO-ENTMCNC: 31.6 GM/DL (ref 32.2–35.5)
MCV RBC AUTO: 83.8 FL (ref 80–94)
MONOCYTES ABSOLUTE: 0.4 THOU/MM3 (ref 0.4–1.3)
MONOCYTES NFR BLD AUTO: 3.8 %
NEUTROPHILS ABSOLUTE: 8.9 THOU/MM3 (ref 1.8–7.7)
NEUTROPHILS NFR BLD AUTO: 75.8 %
NRBC BLD AUTO-RTO: 0 /100 WBC
OSMOLALITY SERPL CALC.SUM OF ELEC: 285.1 MOSMOL/KG (ref 275–300)
P2Y12 ASSAY: 213 PRU (ref 180–376)
PLATELET # BLD AUTO: 337 THOU/MM3 (ref 130–400)
PMV BLD AUTO: 10.2 FL (ref 9.4–12.4)
POTASSIUM SERPL-SCNC: 4.5 MEQ/L (ref 3.5–5.2)
PROCALCITONIN SERPL IA-MCNC: 0.03 NG/ML (ref 0.01–0.09)
PROT SERPL-MCNC: 7.4 G/DL (ref 6.4–8.3)
RBC # BLD AUTO: 4.76 MILL/MM3 (ref 4.7–6.1)
SODIUM SERPL-SCNC: 139 MEQ/L (ref 135–145)
WBC # BLD AUTO: 11.8 THOU/MM3 (ref 4.8–10.8)

## 2025-08-30 PROCEDURE — 82948 REAGENT STRIP/BLOOD GLUCOSE: CPT

## 2025-08-30 PROCEDURE — 70450 CT HEAD/BRAIN W/O DYE: CPT

## 2025-08-30 PROCEDURE — G0378 HOSPITAL OBSERVATION PER HR: HCPCS

## 2025-08-30 PROCEDURE — 99223 1ST HOSP IP/OBS HIGH 75: CPT | Performed by: PHYSICIAN ASSISTANT

## 2025-08-30 PROCEDURE — 99285 EMERGENCY DEPT VISIT HI MDM: CPT

## 2025-08-30 PROCEDURE — 80053 COMPREHEN METABOLIC PANEL: CPT

## 2025-08-30 PROCEDURE — 70498 CT ANGIOGRAPHY NECK: CPT

## 2025-08-30 PROCEDURE — 87040 BLOOD CULTURE FOR BACTERIA: CPT

## 2025-08-30 PROCEDURE — 85025 COMPLETE CBC W/AUTO DIFF WBC: CPT

## 2025-08-30 PROCEDURE — 36415 COLL VENOUS BLD VENIPUNCTURE: CPT

## 2025-08-30 PROCEDURE — 83605 ASSAY OF LACTIC ACID: CPT

## 2025-08-30 PROCEDURE — 70496 CT ANGIOGRAPHY HEAD: CPT

## 2025-08-30 PROCEDURE — 96374 THER/PROPH/DIAG INJ IV PUSH: CPT

## 2025-08-30 PROCEDURE — 86140 C-REACTIVE PROTEIN: CPT

## 2025-08-30 PROCEDURE — 93005 ELECTROCARDIOGRAM TRACING: CPT | Performed by: EMERGENCY MEDICINE

## 2025-08-30 PROCEDURE — 6360000002 HC RX W HCPCS

## 2025-08-30 PROCEDURE — 6360000004 HC RX CONTRAST MEDICATION

## 2025-08-30 PROCEDURE — 84145 PROCALCITONIN (PCT): CPT

## 2025-08-30 PROCEDURE — 85576 BLOOD PLATELET AGGREGATION: CPT

## 2025-08-30 PROCEDURE — 71046 X-RAY EXAM CHEST 2 VIEWS: CPT

## 2025-08-30 RX ORDER — HYDROCHLOROTHIAZIDE 25 MG/1
25 TABLET ORAL DAILY
Status: DISCONTINUED | OUTPATIENT
Start: 2025-08-30 | End: 2025-08-31 | Stop reason: HOSPADM

## 2025-08-30 RX ORDER — ENOXAPARIN SODIUM 100 MG/ML
40 INJECTION SUBCUTANEOUS DAILY
Status: DISCONTINUED | OUTPATIENT
Start: 2025-08-30 | End: 2025-08-31 | Stop reason: HOSPADM

## 2025-08-30 RX ORDER — IOPAMIDOL 755 MG/ML
80 INJECTION, SOLUTION INTRAVASCULAR
Status: COMPLETED | OUTPATIENT
Start: 2025-08-30 | End: 2025-08-30

## 2025-08-30 RX ORDER — ATORVASTATIN CALCIUM 80 MG/1
80 TABLET, FILM COATED ORAL DAILY
Status: DISCONTINUED | OUTPATIENT
Start: 2025-08-31 | End: 2025-08-31 | Stop reason: HOSPADM

## 2025-08-30 RX ORDER — INSULIN GLARGINE 100 [IU]/ML
35 INJECTION, SOLUTION SUBCUTANEOUS NIGHTLY
Status: DISCONTINUED | OUTPATIENT
Start: 2025-08-30 | End: 2025-08-30

## 2025-08-30 RX ORDER — ONDANSETRON 2 MG/ML
4 INJECTION INTRAMUSCULAR; INTRAVENOUS EVERY 6 HOURS PRN
Status: DISCONTINUED | OUTPATIENT
Start: 2025-08-30 | End: 2025-08-31 | Stop reason: HOSPADM

## 2025-08-30 RX ORDER — INSULIN LISPRO 100 [IU]/ML
0-16 INJECTION, SOLUTION INTRAVENOUS; SUBCUTANEOUS
Status: DISCONTINUED | OUTPATIENT
Start: 2025-08-30 | End: 2025-08-31 | Stop reason: HOSPADM

## 2025-08-30 RX ORDER — SODIUM CHLORIDE 0.9 % (FLUSH) 0.9 %
5-40 SYRINGE (ML) INJECTION PRN
Status: DISCONTINUED | OUTPATIENT
Start: 2025-08-30 | End: 2025-08-31 | Stop reason: HOSPADM

## 2025-08-30 RX ORDER — AMLODIPINE BESYLATE 5 MG/1
5 TABLET ORAL DAILY
Status: DISCONTINUED | OUTPATIENT
Start: 2025-08-30 | End: 2025-08-31 | Stop reason: HOSPADM

## 2025-08-30 RX ORDER — SODIUM CHLORIDE 0.9 % (FLUSH) 0.9 %
5-40 SYRINGE (ML) INJECTION EVERY 12 HOURS SCHEDULED
Status: DISCONTINUED | OUTPATIENT
Start: 2025-08-30 | End: 2025-08-31 | Stop reason: HOSPADM

## 2025-08-30 RX ORDER — ASPIRIN 81 MG/1
81 TABLET, CHEWABLE ORAL DAILY
Status: DISCONTINUED | OUTPATIENT
Start: 2025-08-31 | End: 2025-08-31 | Stop reason: HOSPADM

## 2025-08-30 RX ORDER — INSULIN GLARGINE 100 [IU]/ML
35 INJECTION, SOLUTION SUBCUTANEOUS NIGHTLY
Status: DISCONTINUED | OUTPATIENT
Start: 2025-08-31 | End: 2025-08-31 | Stop reason: HOSPADM

## 2025-08-30 RX ORDER — SODIUM CHLORIDE 9 MG/ML
INJECTION, SOLUTION INTRAVENOUS CONTINUOUS
Status: DISCONTINUED | OUTPATIENT
Start: 2025-08-30 | End: 2025-08-31 | Stop reason: HOSPADM

## 2025-08-30 RX ORDER — GLUCAGON 1 MG/ML
1 KIT INJECTION PRN
Status: DISCONTINUED | OUTPATIENT
Start: 2025-08-30 | End: 2025-08-31 | Stop reason: HOSPADM

## 2025-08-30 RX ORDER — HYDRALAZINE HYDROCHLORIDE 20 MG/ML
10 INJECTION INTRAMUSCULAR; INTRAVENOUS ONCE
Status: COMPLETED | OUTPATIENT
Start: 2025-08-30 | End: 2025-08-30

## 2025-08-30 RX ORDER — DEXTROSE MONOHYDRATE 100 MG/ML
INJECTION, SOLUTION INTRAVENOUS CONTINUOUS PRN
Status: DISCONTINUED | OUTPATIENT
Start: 2025-08-30 | End: 2025-08-31 | Stop reason: HOSPADM

## 2025-08-30 RX ORDER — POLYETHYLENE GLYCOL 3350 17 G/17G
17 POWDER, FOR SOLUTION ORAL DAILY PRN
Status: DISCONTINUED | OUTPATIENT
Start: 2025-08-30 | End: 2025-08-31 | Stop reason: HOSPADM

## 2025-08-30 RX ORDER — CLOPIDOGREL BISULFATE 75 MG/1
75 TABLET ORAL DAILY
Status: DISCONTINUED | OUTPATIENT
Start: 2025-08-31 | End: 2025-08-31 | Stop reason: HOSPADM

## 2025-08-30 RX ORDER — ONDANSETRON 4 MG/1
4 TABLET, ORALLY DISINTEGRATING ORAL EVERY 8 HOURS PRN
Status: DISCONTINUED | OUTPATIENT
Start: 2025-08-30 | End: 2025-08-31 | Stop reason: HOSPADM

## 2025-08-30 RX ORDER — SODIUM CHLORIDE 9 MG/ML
INJECTION, SOLUTION INTRAVENOUS PRN
Status: DISCONTINUED | OUTPATIENT
Start: 2025-08-30 | End: 2025-08-31 | Stop reason: HOSPADM

## 2025-08-30 RX ADMIN — IOPAMIDOL 80 ML: 755 INJECTION, SOLUTION INTRAVENOUS at 16:36

## 2025-08-30 RX ADMIN — HYDRALAZINE HYDROCHLORIDE 10 MG: 20 INJECTION INTRAMUSCULAR; INTRAVENOUS at 18:15

## 2025-08-30 ASSESSMENT — PAIN SCALES - GENERAL
PAINLEVEL_OUTOF10: 0

## 2025-08-30 ASSESSMENT — PAIN - FUNCTIONAL ASSESSMENT: PAIN_FUNCTIONAL_ASSESSMENT: 0-10

## 2025-08-31 ENCOUNTER — APPOINTMENT (OUTPATIENT)
Dept: MRI IMAGING | Age: 65
End: 2025-08-31
Payer: MEDICARE

## 2025-08-31 VITALS
DIASTOLIC BLOOD PRESSURE: 85 MMHG | HEIGHT: 71 IN | RESPIRATION RATE: 18 BRPM | SYSTOLIC BLOOD PRESSURE: 160 MMHG | TEMPERATURE: 97.8 F | HEART RATE: 84 BPM | WEIGHT: 165 LBS | OXYGEN SATURATION: 98 % | BODY MASS INDEX: 23.1 KG/M2

## 2025-08-31 LAB
ANION GAP SERPL CALC-SCNC: 10 MEQ/L (ref 8–16)
BACTERIA URNS QL MICRO: ABNORMAL /HPF
BILIRUB UR QL STRIP.AUTO: NEGATIVE
BUN SERPL-MCNC: 21 MG/DL (ref 8–23)
CALCIUM SERPL-MCNC: 9 MG/DL (ref 8.5–10.5)
CASTS #/AREA URNS LPF: ABNORMAL /LPF
CASTS 2: ABNORMAL /LPF
CHARACTER UR: CLEAR
CHLORIDE SERPL-SCNC: 102 MEQ/L (ref 98–111)
CO2 SERPL-SCNC: 26 MEQ/L (ref 22–29)
COLOR, UA: YELLOW
CREAT SERPL-MCNC: 1 MG/DL (ref 0.7–1.2)
CRYSTALS URNS MICRO: ABNORMAL
DEPRECATED RDW RBC AUTO: 43 FL (ref 35–45)
EKG ATRIAL RATE: 90 BPM
EKG P AXIS: 49 DEGREES
EKG P-R INTERVAL: 160 MS
EKG Q-T INTERVAL: 388 MS
EKG QRS DURATION: 100 MS
EKG QTC CALCULATION (BAZETT): 474 MS
EKG R AXIS: 73 DEGREES
EKG T AXIS: 66 DEGREES
EKG VENTRICULAR RATE: 90 BPM
EPITHELIAL CELLS, UA: ABNORMAL /HPF
ERYTHROCYTE [DISTWIDTH] IN BLOOD BY AUTOMATED COUNT: 13.9 % (ref 11.5–14.5)
FLUAV RNA RESP QL NAA+PROBE: NOT DETECTED
FLUBV RNA RESP QL NAA+PROBE: NOT DETECTED
GFR SERPL CREATININE-BSD FRML MDRD: 82 ML/MIN/1.73M2
GLUCOSE BLD STRIP.AUTO-MCNC: 236 MG/DL (ref 70–108)
GLUCOSE SERPL-MCNC: 175 MG/DL (ref 74–109)
GLUCOSE UR QL STRIP.AUTO: 500 MG/DL
HCT VFR BLD AUTO: 40.2 % (ref 42–52)
HGB BLD-MCNC: 12.6 GM/DL (ref 14–18)
HGB UR QL STRIP.AUTO: NEGATIVE
KETONES UR QL STRIP.AUTO: NEGATIVE
MCH RBC QN AUTO: 26.5 PG (ref 26–33)
MCHC RBC AUTO-ENTMCNC: 31.3 GM/DL (ref 32.2–35.5)
MCV RBC AUTO: 84.5 FL (ref 80–94)
MISCELLANEOUS 2: ABNORMAL
NITRITE UR QL STRIP: NEGATIVE
OSMOLALITY SERPL CALC.SUM OF ELEC: 282.9 MOSMOL/KG (ref 275–300)
PH UR STRIP.AUTO: 6 [PH] (ref 5–9)
PLATELET # BLD AUTO: 271 THOU/MM3 (ref 130–400)
PMV BLD AUTO: 9.9 FL (ref 9.4–12.4)
POTASSIUM SERPL-SCNC: 4.3 MEQ/L (ref 3.5–5.2)
PROT UR STRIP.AUTO-MCNC: ABNORMAL MG/DL
RBC # BLD AUTO: 4.76 MILL/MM3 (ref 4.7–6.1)
RBC URINE: ABNORMAL /HPF
RENAL EPI CELLS #/AREA URNS HPF: ABNORMAL /[HPF]
SARS-COV-2 RNA RESP QL NAA+PROBE: DETECTED
SODIUM SERPL-SCNC: 138 MEQ/L (ref 135–145)
SP GR UR REFRACT.AUTO: > 1.03 (ref 1–1.03)
UROBILINOGEN, URINE: 0.2 EU/DL (ref 0–1)
WBC # BLD AUTO: 10.6 THOU/MM3 (ref 4.8–10.8)
WBC #/AREA URNS HPF: ABNORMAL /HPF
WBC #/AREA URNS HPF: NEGATIVE /[HPF]
YEAST LIKE FUNGI URNS QL MICRO: ABNORMAL

## 2025-08-31 PROCEDURE — 82948 REAGENT STRIP/BLOOD GLUCOSE: CPT

## 2025-08-31 PROCEDURE — 6360000002 HC RX W HCPCS: Performed by: PHYSICIAN ASSISTANT

## 2025-08-31 PROCEDURE — 6370000000 HC RX 637 (ALT 250 FOR IP): Performed by: PHYSICIAN ASSISTANT

## 2025-08-31 PROCEDURE — 6370000000 HC RX 637 (ALT 250 FOR IP)

## 2025-08-31 PROCEDURE — 85027 COMPLETE CBC AUTOMATED: CPT

## 2025-08-31 PROCEDURE — 2580000003 HC RX 258: Performed by: PHYSICIAN ASSISTANT

## 2025-08-31 PROCEDURE — 70551 MRI BRAIN STEM W/O DYE: CPT

## 2025-08-31 PROCEDURE — 36415 COLL VENOUS BLD VENIPUNCTURE: CPT

## 2025-08-31 PROCEDURE — 2500000003 HC RX 250 WO HCPCS: Performed by: PHYSICIAN ASSISTANT

## 2025-08-31 PROCEDURE — 81001 URINALYSIS AUTO W/SCOPE: CPT

## 2025-08-31 PROCEDURE — G0378 HOSPITAL OBSERVATION PER HR: HCPCS

## 2025-08-31 PROCEDURE — 80048 BASIC METABOLIC PNL TOTAL CA: CPT

## 2025-08-31 PROCEDURE — 96372 THER/PROPH/DIAG INJ SC/IM: CPT

## 2025-08-31 PROCEDURE — 87636 SARSCOV2 & INF A&B AMP PRB: CPT

## 2025-08-31 PROCEDURE — 93010 ELECTROCARDIOGRAM REPORT: CPT | Performed by: INTERNAL MEDICINE

## 2025-08-31 RX ORDER — ACETAMINOPHEN 325 MG/1
650 TABLET ORAL EVERY 4 HOURS PRN
Status: DISCONTINUED | OUTPATIENT
Start: 2025-08-31 | End: 2025-08-31 | Stop reason: HOSPADM

## 2025-08-31 RX ADMIN — ATORVASTATIN CALCIUM 80 MG: 80 TABLET, FILM COATED ORAL at 09:47

## 2025-08-31 RX ADMIN — SODIUM CHLORIDE, PRESERVATIVE FREE 10 ML: 5 INJECTION INTRAVENOUS at 00:00

## 2025-08-31 RX ADMIN — SODIUM CHLORIDE: 0.9 INJECTION, SOLUTION INTRAVENOUS at 00:01

## 2025-08-31 RX ADMIN — INSULIN LISPRO 8 UNITS: 100 INJECTION, SOLUTION INTRAVENOUS; SUBCUTANEOUS at 00:00

## 2025-08-31 RX ADMIN — ENOXAPARIN SODIUM 40 MG: 100 INJECTION SUBCUTANEOUS at 00:00

## 2025-08-31 RX ADMIN — ACETAMINOPHEN 650 MG: 325 TABLET ORAL at 06:46

## 2025-08-31 RX ADMIN — ASPIRIN 81 MG: 81 TABLET, CHEWABLE ORAL at 09:47

## 2025-08-31 RX ADMIN — CLOPIDOGREL BISULFATE 75 MG: 75 TABLET, FILM COATED ORAL at 09:47

## 2025-08-31 ASSESSMENT — PAIN SCALES - GENERAL
PAINLEVEL_OUTOF10: 0
PAINLEVEL_OUTOF10: 0

## 2025-09-04 ENCOUNTER — TELEPHONE (OUTPATIENT)
Dept: NEUROLOGY | Age: 65
End: 2025-09-04

## 2025-09-04 LAB
BACTERIA BLD AEROBE CULT: NORMAL
BACTERIA BLD AEROBE CULT: NORMAL